# Patient Record
Sex: MALE | Race: WHITE | NOT HISPANIC OR LATINO | Employment: UNEMPLOYED | URBAN - METROPOLITAN AREA
[De-identification: names, ages, dates, MRNs, and addresses within clinical notes are randomized per-mention and may not be internally consistent; named-entity substitution may affect disease eponyms.]

---

## 2018-07-29 ENCOUNTER — HOSPITAL ENCOUNTER (INPATIENT)
Facility: CLINIC | Age: 48
LOS: 5 days | Discharge: HOME | End: 2018-08-03
Attending: FAMILY MEDICINE | Admitting: PSYCHIATRY & NEUROLOGY
Payer: COMMERCIAL

## 2018-07-29 DIAGNOSIS — F12.20 CANNABIS USE DISORDER, MODERATE, DEPENDENCE (H): ICD-10-CM

## 2018-07-29 DIAGNOSIS — R45.851 SUICIDAL IDEATION: Primary | ICD-10-CM

## 2018-07-29 DIAGNOSIS — F11.20 OPIOID USE DISORDER, SEVERE, ON MAINTENANCE THERAPY (H): ICD-10-CM

## 2018-07-29 DIAGNOSIS — F13.20 SEVERE BENZODIAZEPINE USE DISORDER (H): ICD-10-CM

## 2018-07-29 DIAGNOSIS — F13.930 BENZODIAZEPINE WITHDRAWAL WITHOUT COMPLICATION (H): ICD-10-CM

## 2018-07-29 DIAGNOSIS — F17.213 CIGARETTE NICOTINE DEPENDENCE WITH WITHDRAWAL: ICD-10-CM

## 2018-07-29 DIAGNOSIS — F19.980 SUBSTANCE-INDUCED ANXIETY DISORDER (H): ICD-10-CM

## 2018-07-29 DIAGNOSIS — F33.1 MODERATE EPISODE OF RECURRENT MAJOR DEPRESSIVE DISORDER (H): ICD-10-CM

## 2018-07-29 LAB
AMPHETAMINES UR QL SCN: NEGATIVE
BARBITURATES UR QL: NEGATIVE
BENZODIAZ UR QL: POSITIVE
CANNABINOIDS UR QL SCN: POSITIVE
COCAINE UR QL: NEGATIVE
ETHANOL UR QL SCN: NEGATIVE
OPIATES UR QL SCN: NEGATIVE

## 2018-07-29 PROCEDURE — 25000132 ZZH RX MED GY IP 250 OP 250 PS 637: Performed by: PSYCHIATRY & NEUROLOGY

## 2018-07-29 PROCEDURE — 25000132 ZZH RX MED GY IP 250 OP 250 PS 637: Performed by: FAMILY MEDICINE

## 2018-07-29 PROCEDURE — 90791 PSYCH DIAGNOSTIC EVALUATION: CPT

## 2018-07-29 PROCEDURE — 99285 EMERGENCY DEPT VISIT HI MDM: CPT | Mod: Z6 | Performed by: FAMILY MEDICINE

## 2018-07-29 PROCEDURE — HZ2ZZZZ DETOXIFICATION SERVICES FOR SUBSTANCE ABUSE TREATMENT: ICD-10-PCS | Performed by: PSYCHIATRY & NEUROLOGY

## 2018-07-29 PROCEDURE — 12400007 ZZH R&B MH INTERMEDIATE UMMC

## 2018-07-29 PROCEDURE — 80307 DRUG TEST PRSMV CHEM ANLYZR: CPT | Performed by: FAMILY MEDICINE

## 2018-07-29 PROCEDURE — 80320 DRUG SCREEN QUANTALCOHOLS: CPT | Performed by: FAMILY MEDICINE

## 2018-07-29 PROCEDURE — 99285 EMERGENCY DEPT VISIT HI MDM: CPT | Mod: 25 | Performed by: FAMILY MEDICINE

## 2018-07-29 RX ORDER — IBUPROFEN 600 MG/1
600 TABLET, FILM COATED ORAL ONCE
Status: COMPLETED | OUTPATIENT
Start: 2018-07-29 | End: 2018-07-29

## 2018-07-29 RX ORDER — PHENOBARBITAL 32.4 MG/1
32.4 TABLET ORAL ONCE
Status: COMPLETED | OUTPATIENT
Start: 2018-07-29 | End: 2018-07-29

## 2018-07-29 RX ORDER — HYDROXYZINE HYDROCHLORIDE 25 MG/1
25 TABLET, FILM COATED ORAL EVERY 4 HOURS PRN
Status: DISCONTINUED | OUTPATIENT
Start: 2018-07-29 | End: 2018-08-03 | Stop reason: HOSPADM

## 2018-07-29 RX ORDER — MIRTAZAPINE 15 MG/1
30 TABLET, FILM COATED ORAL AT BEDTIME
Status: DISCONTINUED | OUTPATIENT
Start: 2018-07-29 | End: 2018-08-03 | Stop reason: HOSPADM

## 2018-07-29 RX ORDER — PHENOBARBITAL 32.4 MG/1
32.4 TABLET ORAL 2 TIMES DAILY
Status: DISCONTINUED | OUTPATIENT
Start: 2018-07-29 | End: 2018-07-30

## 2018-07-29 RX ORDER — MIRTAZAPINE 30 MG/1
30 TABLET, FILM COATED ORAL AT BEDTIME
Status: ON HOLD | COMMUNITY
Start: 2018-06-12 | End: 2018-08-01

## 2018-07-29 RX ORDER — IBUPROFEN 400 MG/1
400 TABLET, FILM COATED ORAL EVERY 6 HOURS PRN
Status: ON HOLD | COMMUNITY
Start: 2018-06-12 | End: 2018-08-01

## 2018-07-29 RX ORDER — TRAZODONE HYDROCHLORIDE 50 MG/1
50 TABLET, FILM COATED ORAL
Status: DISCONTINUED | OUTPATIENT
Start: 2018-07-29 | End: 2018-08-03 | Stop reason: HOSPADM

## 2018-07-29 RX ORDER — VENLAFAXINE HYDROCHLORIDE 150 MG/1
300 CAPSULE, EXTENDED RELEASE ORAL DAILY
Status: ON HOLD | COMMUNITY
Start: 2018-06-13 | End: 2018-08-01

## 2018-07-29 RX ORDER — HYDROXYZINE HYDROCHLORIDE 50 MG/1
50 TABLET, FILM COATED ORAL EVERY 8 HOURS PRN
Status: ON HOLD | COMMUNITY
Start: 2018-06-12 | End: 2018-08-01

## 2018-07-29 RX ORDER — GABAPENTIN 400 MG/1
400 CAPSULE ORAL 2 TIMES DAILY
Status: DISCONTINUED | OUTPATIENT
Start: 2018-07-29 | End: 2018-08-03 | Stop reason: HOSPADM

## 2018-07-29 RX ORDER — VENLAFAXINE HYDROCHLORIDE 150 MG/1
300 TABLET, EXTENDED RELEASE ORAL DAILY
Status: DISCONTINUED | OUTPATIENT
Start: 2018-07-29 | End: 2018-08-03 | Stop reason: HOSPADM

## 2018-07-29 RX ORDER — OLANZAPINE 10 MG/1
10 TABLET ORAL
Status: DISCONTINUED | OUTPATIENT
Start: 2018-07-29 | End: 2018-08-03 | Stop reason: HOSPADM

## 2018-07-29 RX ORDER — IBUPROFEN 200 MG
400 TABLET ORAL EVERY 6 HOURS PRN
Status: DISCONTINUED | OUTPATIENT
Start: 2018-07-29 | End: 2018-08-03 | Stop reason: HOSPADM

## 2018-07-29 RX ORDER — ALUMINA, MAGNESIA, AND SIMETHICONE 2400; 2400; 240 MG/30ML; MG/30ML; MG/30ML
30 SUSPENSION ORAL EVERY 4 HOURS PRN
Status: DISCONTINUED | OUTPATIENT
Start: 2018-07-29 | End: 2018-08-03 | Stop reason: HOSPADM

## 2018-07-29 RX ORDER — HYDROXYZINE HYDROCHLORIDE 50 MG/1
50 TABLET, FILM COATED ORAL EVERY 8 HOURS PRN
Status: DISCONTINUED | OUTPATIENT
Start: 2018-07-29 | End: 2018-07-29

## 2018-07-29 RX ORDER — GABAPENTIN 800 MG/1
800 TABLET ORAL AT BEDTIME
Status: DISCONTINUED | OUTPATIENT
Start: 2018-07-29 | End: 2018-08-03 | Stop reason: HOSPADM

## 2018-07-29 RX ORDER — OLANZAPINE 10 MG/2ML
10 INJECTION, POWDER, FOR SOLUTION INTRAMUSCULAR
Status: DISCONTINUED | OUTPATIENT
Start: 2018-07-29 | End: 2018-08-03 | Stop reason: HOSPADM

## 2018-07-29 RX ORDER — POLYETHYLENE GLYCOL 3350 17 G
2-4 POWDER IN PACKET (EA) ORAL
Status: DISCONTINUED | OUTPATIENT
Start: 2018-07-29 | End: 2018-08-03 | Stop reason: HOSPADM

## 2018-07-29 RX ORDER — BISACODYL 10 MG
10 SUPPOSITORY, RECTAL RECTAL DAILY PRN
Status: DISCONTINUED | OUTPATIENT
Start: 2018-07-29 | End: 2018-08-03 | Stop reason: HOSPADM

## 2018-07-29 RX ORDER — NALOXONE HYDROCHLORIDE 0.4 MG/ML
.1-.4 INJECTION, SOLUTION INTRAMUSCULAR; INTRAVENOUS; SUBCUTANEOUS
Status: DISCONTINUED | OUTPATIENT
Start: 2018-07-29 | End: 2018-08-03 | Stop reason: HOSPADM

## 2018-07-29 RX ORDER — SENNOSIDES 8.6 MG
8.6 TABLET ORAL DAILY PRN
Status: ON HOLD | COMMUNITY
Start: 2018-06-12 | End: 2018-08-01

## 2018-07-29 RX ORDER — METHADONE HYDROCHLORIDE 5 MG/5ML
110 SOLUTION ORAL DAILY
Status: DISCONTINUED | OUTPATIENT
Start: 2018-07-29 | End: 2018-07-30

## 2018-07-29 RX ORDER — GABAPENTIN 400 MG/1
CAPSULE ORAL
Status: ON HOLD | COMMUNITY
Start: 2018-06-12 | End: 2018-08-01

## 2018-07-29 RX ADMIN — IBUPROFEN 600 MG: 600 TABLET, FILM COATED ORAL at 15:51

## 2018-07-29 RX ADMIN — PHENOBARBITAL 32.4 MG: 32.4 TABLET ORAL at 14:17

## 2018-07-29 RX ADMIN — PHENOBARBITAL 32.4 MG: 32.4 TABLET ORAL at 20:20

## 2018-07-29 RX ADMIN — GABAPENTIN 400 MG: 400 CAPSULE ORAL at 20:20

## 2018-07-29 RX ADMIN — GABAPENTIN 800 MG: 800 TABLET, FILM COATED ORAL at 23:36

## 2018-07-29 RX ADMIN — MIRTAZAPINE 30 MG: 15 TABLET, FILM COATED ORAL at 23:36

## 2018-07-29 RX ADMIN — VENLAFAXINE HYDROCHLORIDE 300 MG: 150 TABLET, EXTENDED RELEASE ORAL at 20:21

## 2018-07-29 RX ADMIN — METHADONE HYDROCHLORIDE 110 MG: 5 SOLUTION ORAL at 20:19

## 2018-07-29 ASSESSMENT — ENCOUNTER SYMPTOMS
NAUSEA: 0
DECREASED CONCENTRATION: 1
APPETITE CHANGE: 1
DYSPHORIC MOOD: 1
CHEST TIGHTNESS: 0
SHORTNESS OF BREATH: 0
CONFUSION: 0
PALPITATIONS: 0
ACTIVITY CHANGE: 1
ARTHRALGIAS: 0
HEADACHES: 0
DIFFICULTY URINATING: 0
COUGH: 0
ABDOMINAL PAIN: 0
TROUBLE SWALLOWING: 0
WEAKNESS: 0
VOMITING: 0
BACK PAIN: 0
BRUISES/BLEEDS EASILY: 0
FATIGUE: 1
FEVER: 0
NERVOUS/ANXIOUS: 1
COLOR CHANGE: 0
HALLUCINATIONS: 0
EYE REDNESS: 0
CHILLS: 1
NECK STIFFNESS: 0

## 2018-07-29 ASSESSMENT — ACTIVITIES OF DAILY LIVING (ADL)
DRESS: INDEPENDENT
LAUNDRY: WITH SUPERVISION
GROOMING: INDEPENDENT
ORAL_HYGIENE: INDEPENDENT

## 2018-07-29 NOTE — IP AVS SNAPSHOT
[address]    [address]    [place] [address]    Phone:  [phone]                                       After Visit Summary   7/29/2018    Zenon Truong    MRN: [id number]           After Visit Summary Signature Page     I have received my discharge instructions, and my questions have been answered. I have discussed any challenges I see with this plan with the nurse or doctor.    ..........................................................................................................................................  Patient/Patient Representative Signature      ..........................................................................................................................................  Patient Representative Print Name and Relationship to Patient    ..................................................               ................................................  Date                                            Time    ..........................................................................................................................................  Reviewed by Signature/Title    ...................................................              ..............................................  Date                                                            Time

## 2018-07-29 NOTE — PLAN OF CARE
"Admission:     Admitted voluntarily to [facility] for increasing ideation. Pt reports one prior attempt \"about a year\" via cutting wrist. [facility] assessment states 3 prior attempts. Records states numerous ER visits recently, [facility] yesterday. Hx substance abuse. Currently takes methadone maintenance daily at [facility]. States he's been abusing Xanax, 3-5 mg daily for the past 7 months. Reported hx of seizures with benzodiazapine withdrawal. Denies alcohol use. Reports daily marijuana use. Reports he is homeless. (See chart review, [facility] assessment for further details and hx)    Presents with blunted affect. Is calm and cooperative completing admission. Oriented to room and unit. Encouraged to voice needs, questions, and concerns. Pt verbalizes understanding.   "

## 2018-07-29 NOTE — ED NOTES
ED to Behavioral Floor Handoff    SITUATION  Zenon Truong is a 48 year old male who speaks English and is homeless  The patient arrived in the ED by ambulance from the streets with a complaint of being Suicidal  .The patient's current symptoms started/worsened 1 month(s) ago and during this time the symptoms have increased. Pt has been seen at multiple other ED's but is more disp[ondant now.  In the ED, pt was diagnosed with   Final diagnoses:   Suicidal ideation   Anxiety   Benzodiazepine abuse        Initial vitals were: BP: 141/83  Pulse: 61  Temp: 98.3  F (36.8  C)  Resp: 16  SpO2: 98 %   --------  Is the patient diabetic? No   If yes, last blood glucose? --     If yes, was this treated in the ED? --  --------  Is the patient inebriated (ETOH) No or Impaired on other substances? No  MSSA done? N/A  Last MSSA score: --    Were withdrawal symptoms treated? N/A  Does the patient have a seizure history? No. If yes, date of most recent seizure--  --------  Is the patient patient experiencing suicidal ideation? reports the following suicide factors: suicidal thoughts with plan to jump off a bridge or step in front of the light rail train.  has had 3 previous attempts in the past    Homicidal ideation? denies current or recent homicidal ideation or behaviors.    Self-injurious behavior/urges? denies current or recent self injurious behavior or ideation.  ------  Was pt aggressive in the ED No  Was a code called No  Is the pt now cooperative? Yes  -------  Meds given in ED:   Medications   PHENobarbital (LUMINAL) tablet 32.4 mg (32.4 mg Oral Given 7/29/18 1417)   ibuprofen (ADVIL/MOTRIN) tablet 600 mg (600 mg Oral Given 7/29/18 1551)      Family present during ED course? No  Family currently present? No    BACKGROUND  Does the patient have a cognitive impairment or developmental disability? No  Allergies:   Allergies   Allergen Reactions     Iodine-131 Hives   .   Social demographics are   Social History     Social  History     Marital status: Single     Spouse name: N/A     Number of children: N/A     Years of education: N/A     Social History Main Topics     Smoking status: Current Every Day Smoker     Smokeless tobacco: Never Used      Comment: Switched to e cigarettes     Alcohol use No     Drug use: Yes     Special: Benzodiazepines     Sexual activity: Not Asked     Other Topics Concern     None     Social History Narrative        ASSESSMENT  Labs results   Labs Ordered and Resulted from Time of ED Arrival Up to the Time of Departure from the ED   DRUG ABUSE SCREEN 6 CHEM DEP URINE ([facility]) - Abnormal; Notable for the following:        Result Value    Benzodiazepine Qual Urine Positive (*)     Cannabinoids Qual Urine Positive (*)     All other components within normal limits      Imaging Studies: No results found for this or any previous visit (from the past 24 hour(s)).   Most recent vital signs /83  Pulse 61  Temp 98.3  F (36.8  C) (Oral)  Resp 16  SpO2 98%   Abnormal labs/tests/findings requiring intervention:---   Pain control: pt had headache and given ibuprofen, pt missed methadone dose today.  Nausea control: pt had none    RECOMMENDATION  Are any infection precautions needed (MRSA, VRE, etc.)? No If yes, what infection? --  ---  Does the patient have mobility issues? independently. If yes, what device does the pt use? ---  ---  Is patient on 72 hour hold or commitment? No If on 72 hour hold, have hold and rights been given to patient? N/A  Are admitting orders written if after 10 p.m. ?N/A  Tasks needing to be completed:---     Urszula Campbell   [facility]-- [address]   [phone]   [phone] [facility] ED

## 2018-07-29 NOTE — PROGRESS NOTES
07/29/18 1718   Patient Belongings   Did you bring any home meds/supplements to the hospital?  No   Patient Belongings clothing;cell phone/electronics;plastic bag;shoes;wallet   Disposition of Belongings Locker;Sent to security per site process   Belongings Search Yes   Clothing Search Yes   Second Staff Brendan PATHAK   In locker 6:  Clothing, toiletries, green bag, leather bag, shoes, lighter, , phone, wallet, ID, misc. Cards   Security:  [facility] ([id number])  A               Admission:  I am responsible for any personal items that are not sent to the safe or pharmacy.  [facility] is not responsible for loss, theft or damage of any property in my possession.    Signature:  _________________________________ Date: _______  Time: _____                                              Staff Signature:  ____________________________ Date: ________  Time: _____      2nd Staff person, if patient is unable/unwilling to sign:    Signature: ________________________________ Date: ________  Time: _____     Discharge:  [facility] has returned all of my personal belongings:    Signature: _________________________________ Date: ________  Time: _____                                          Staff Signature:  ____________________________ Date: ________  Time: _____

## 2018-07-29 NOTE — ED NOTES
I have performed an in person assessment of the patient. Based on this assessment the patient no longer requires a one on one attendant at this point in time.    Jim London MD  11:28 AM  July 29, 2018             Jim London MD  07/29/18 1128

## 2018-07-29 NOTE — ED PROVIDER NOTES
History     Chief Complaint   Patient presents with     Suicidal     HPI  Zenon Truong is a 48 year old male with a history of depression, borderline personality disorder, polysubstance abuse, testicular cancer who presents to the Emergency Department via EMS for the evaluation of SI. Patient states he has suicidal ideation with a plan to jump of a bridge. Patient states he feels hopeless and suicidal and is seeking treatment for SI. Patient received treatment at [facility] in June and since has been declining and living homeless and in shelters. Patient's only consistent care is through his commitment  which will only last until August 2. Patient reports he has had no medication for the past 9 days as he states they do not help. He is currently going through benzodiazepine withdrawal (Xanax) with last use 2-3 days ago. Patient states he has a history of heroin and marijuana use and is still using marijuana. Patient has had 10 hospitalizations in 2017.  Patient now feeling unsafe denies any physical points at this point.  Denies any current seizures.    I have reviewed the Medications, Allergies, Past Medical and Surgical History, and Social History in the [facility] system.  Past Medical History:   Diagnosis Date     Hep C w/o coma, chronic (H) 2011     Substance abuse      Testicular cancer (H)        Past Surgical History:   Procedure Laterality Date     GENITOURINARY SURGERY  1996    Testicular CA        No family history on file.    Social History   Substance Use Topics     Smoking status: Current Every Day Smoker     Smokeless tobacco: Never Used      Comment: Switched to e cigarettes     Alcohol use No       No current facility-administered medications for this encounter.      Current Outpatient Prescriptions   Medication     gabapentin (NEURONTIN) 400 MG capsule     hydrOXYzine (ATARAX) 50 MG tablet     ibuprofen (ADVIL/MOTRIN) 400 MG tablet     melatonin 5 MG tablet     METHADONE HCL PO      mirtazapine (REMERON) 30 MG tablet     nicotine (NICOTROL) 10 MG Inhaler     venlafaxine (EFFEXOR-XR) 150 MG 24 hr capsule     sennosides (SENOKOT) 8.6 MG tablet        Allergies   Allergen Reactions     Iodine-131 Hives       Review of Systems   Constitutional: Positive for activity change, appetite change, chills and fatigue. Negative for fever.   HENT: Negative for congestion and trouble swallowing.    Eyes: Negative for redness and visual disturbance.   Respiratory: Negative for cough, chest tightness and shortness of breath.    Cardiovascular: Negative for chest pain, palpitations and leg swelling.   Gastrointestinal: Negative for abdominal pain, nausea and vomiting.   Genitourinary: Negative for difficulty urinating.   Musculoskeletal: Negative for arthralgias, back pain, gait problem and neck stiffness.   Skin: Negative for color change.   Allergic/Immunologic: Negative for immunocompromised state.   Neurological: Negative for weakness and headaches.   Hematological: Does not bruise/bleed easily.   Psychiatric/Behavioral: Positive for decreased concentration, dysphoric mood and suicidal ideas. Negative for confusion and hallucinations. The patient is nervous/anxious.    All other systems reviewed and are negative.      Physical Exam   BP: 141/83  Pulse: 61  Temp: 98.3  F (36.8  C)  Resp: 16  SpO2: 98 %      Physical Exam   Constitutional: He is oriented to person, place, and time. He appears well-developed and well-nourished. He appears distressed.   Patient mildly published in the ER though alert and oriented is not hallucinating.  Does admit to being suicidal still currently.   plan to jump off bridge.   HENT:   Head: Normocephalic and atraumatic.   Eyes: EOM are normal. Pupils are equal, round, and reactive to light. No scleral icterus.   Neck: Normal range of motion. Neck supple.   Cardiovascular: Regular rhythm.    Pulmonary/Chest: No stridor. No respiratory distress.   Abdominal: He exhibits no  distension. There is no tenderness.   Musculoskeletal: He exhibits no edema, tenderness or deformity.   Neurological: He is alert and oriented to person, place, and time. He has normal reflexes. No cranial nerve deficit. Coordination normal.   Tremor but nonfocal. More from anxiety.   Skin: Skin is warm and dry. No rash noted. He is not diaphoretic. No erythema. No pallor.   Psychiatric: He expresses suicidal ideation. He expresses suicidal plans.   Patient with SI and anxiousness. No acute delusional or HI.   Nursing note and vitals reviewed.      ED Course     ED Course       Patient evaluated ER.  Was seen by our assessors also.  At this point patient does have some benzodiazepine withdrawal.  Patient will be admitted to mental health once bed is available patient is voluntary at this point.    Patient did receive phenobarbital 32.4 mg orally ×1 to help with some of his benzodiazepine withdrawal and prevent any possible seizure activity patient understand and agrees with plan.      Procedures             Critical Care time:  none             Labs Ordered and Resulted from Time of ED Arrival Up to the Time of Departure from the ED   DRUG ABUSE SCREEN 6 CHEM DEP URINE ([facility]) - Abnormal; Notable for the following:        Result Value    Benzodiazepine Qual Urine Positive (*)     Cannabinoids Qual Urine Positive (*)     All other components within normal limits     Results for orders placed or performed during the hospital encounter of 07/29/18   Drug abuse screen 6 urine (tox)   Result Value Ref Range    Amphetamine Qual Urine Negative NEG^Negative    Barbiturates Qual Urine Negative NEG^Negative    Benzodiazepine Qual Urine Positive (A) NEG^Negative    Cannabinoids Qual Urine Positive (A) NEG^Negative    Cocaine Qual Urine Negative NEG^Negative    Ethanol Qual Urine Negative NEG^Negative    Opiates Qualitative Urine Negative NEG^Negative              Assessments & Plan (with Medical Decision Making)  48-year-old  male presents with suicidal ideation.  Patient plan to jump off bridge.  Patient also is been abusing benzodiazepine off the street.  Last use 02 days ago of Xanax.  Patient somewhat tremulous denies any seizures this point is not hallucinating no reports of alcohol abuse.  Patient evaluated ER is still suicidal will be admitted to mental health patient given 32.4 mg phenobarbital orally ×1 to help with some of the shakiness and benzodiazepine type withdrawal symptoms.  Patient is voluntary agrees with plan awaiting for bed.         I have reviewed the nursing notes.    I have reviewed the findings, diagnosis, plan and need for follow up with the patient.    New Prescriptions    No medications on file       Final diagnoses:   Suicidal ideation   Anxiety   Benzodiazepine abuse     I, Luis Alfredo Jensen, am serving as a trained medical scribe to document services personally performed by Jim London MD, based on the provider's statements to me.   I, Jim London MD, was physically present and have reviewed and verified the accuracy of this note documented by Luis Alfredo Jensen.    7/29/2018   [facility], [place], EMERGENCY DEPARTMENT      This note was created at least in part by the use of dragon voice dictation system. Inadvertent typographical errors may still exist.  Jim London MD.         Jim London MD  07/29/18 8916       Jim London MD  07/29/18 0362

## 2018-07-29 NOTE — IP AVS SNAPSHOT
"                  MRN:[id number]                      After Visit Summary   2018    Zenon Truong    MRN: [id number]           Thank you!     Thank you for choosing [facility] for your care. Our goal is always to provide you with excellent care.        Patient Information     Date Of Birth          1970        Designated Caregiver       Most Recent Value    Caregiver    Will someone help with your care after discharge? no [\"I'm on the street\"]      About your hospital stay     You were admitted on:  2018 You last received care in the:  [facility] 32NR    You were discharged on:  August 3, 2018       Who to Call     For medical emergencies, please call 911.  For non-urgent questions about your medical care, please call your primary care provider or clinic, None          Attending Provider     Provider Specialty    Jim London MD Emergency Medicine    \[facility]\""Rex rocha MD Psychiatry    Avery Bolton MD Psychiatry       Primary Care Provider Fax #    Physician No Ref-Primary [phone]      Additional Services     Medication Therapy Management Referral       MTM referral reason            antidepressants: 3 or more prescribed     This service is designed to help you get the most from your medications.  A specially trained pharmacist will work closely with you and your doctors  to solve any problems related to your medications and to help you get the   best results from taking them.      The Medication Therapy Management staff will call you to schedule an appointment.                  Further instructions from your care team        Behavioral Discharge Planning and Instructions      Summary:  You were admitted on 2018  due to Suicidal Ideations.  You were treated by Dr. Martha Cabezas MD and discharged on 8/3/2018 from [facility] [place]    You are on commitment through [facility]. The commitment will  2018. You remain on the same provisional discharge as " your PD was not revoked.     Principal Diagnosis:   Moderate episode of recurrent major depressive disorder (H)  Cigarette nicotine dependence with withdrawal  Cannabis use disorder, moderate, dependence (H)  Substance-induced anxiety disorder (H)  Severe benzodiazepine use disorder (H)  Opioid use disorder, severe, on maintenance therapy (H)    Health Care Follow-up Appointments:   You are going to a sober board and lodge:   SHANNON hawkins, [address] [facility]    : Natasha Gallegos  Phone: [phone]  Fax: [phone]    Attend all scheduled appointments with your outpatient providers. Call at least 24 hours in advance if you need to reschedule an appointment to ensure continued access to your outpatient providers.   Major Treatments, Procedures and Findings:  You were provided with: a psychiatric assessment, assessed for medical stability, medication evaluation and/or management, group therapy and milieu management    Symptoms to Report: feeling more aggressive, increased confusion, losing more sleep, mood getting worse or thoughts of suicide    Early warning signs can include: increased depression or anxiety sleep disturbances increased thoughts or behaviors of suicide or self-harm  increased unusual thinking, such as paranoia or hearing voices    Safety and Wellness:  Take all medicines as directed.  Make no changes unless your doctor suggests them.      Follow treatment recommendations.  Refrain from alcohol and non-prescribed drugs.  If there is a concern for safety, call 911.    Resources:   Crisis Intervention: 691.524.2109 or 954-794-5985 (TTY: 587.239.8588).  Call anytime for help.  [place] on Mental Illness (www.mn.ant.org): 741.499.5460 or 137-820-6926.  Alcoholics Anonymous (www.alcoholics-anonymous.org): Check your phone book for your local chapter.  Suicide Awareness Voices of Education (SAVE) (www.save.org): 644-106-ENWZ (7606)  National Suicide Prevention Line  "(www.mentalhealthmn.org): 769-476-RHWP (8255)  Mental Health Consumer/Survivor Network of [place] (www.mhcsn.net): 876-588-0809 or 723-521-2609  Mental Health Association of [place] (www.mentalhealth.org): 751.243.7196 or 809-535-3808  Self- Management and Recovery Training., SMART-- Toll free: 975.311.1682  [employer]-One.SentiOne  Text 4 Life: txt \"LIFE\" to 75246 for immediate support and crisis intervention  Crisis text line: Text \"[place]\" to 548063. Free, confidential, .    The treatment team has appreciated the opportunity to work with you.     If you have any questions or concerns our unit number is [phone]  You may be receiving a follow-up phone call within the next three days from a representative from behavioral health.            Pending Results     No orders found from 2018 to 2018.            Admission Information     Date & Time Provider Department Dept. Phone    2018 Avery Bolton MD  32NR [phone]      Your Vitals Were     Blood Pressure Pulse Temperature Respirations Height Weight    115/75 66 97.7  F (36.5  C) (Oral) 16 1.803 m (5' 11\") 102.5 kg (226 lb)    Pulse Oximetry BMI (Body Mass Index)                98% 31.52 kg/m2          MyChart Information     [employer] lets you send messages to your doctor, view your test results, renew your prescriptions, schedule appointments and more. To sign up, go to [email or web address] . Click on \"Log in\" on the left side of the screen, which will take you to the Welcome page. Then click on \"Sign up Now\" on the right side of the page.     You will be asked to enter the access code listed below, as well as some personal information. Please follow the directions to create your username and password.     Your access code is: [email or web address]  Expires: 2018  7:03 AM     Your access code will  in 90 days. If you need help or a new code, please call your [place] clinic or [phone].        Care EveryWhere ID     This is your Care " EveryWhere ID. This could be used by other organizations to access your [facility] medical records  [id number]        Equal Access to Services     JOANNA NEGRO : Fran Christianson, fam campbell, jamal soto, anderson blas. So [facility] [phone].    ATENCIÓN: Si habla español, tiene a bella disposición servicios gratuitos de asistencia lingüística. Llame al [phone].    We comply with applicable federal civil rights laws and [place] laws. We do not discriminate on the basis of race, color, national origin, age, disability, sex, sexual orientation, or gender identity.               Review of your medicines      CONTINUE these medicines which have NOT CHANGED        Dose / Directions    gabapentin 400 MG capsule   Commonly known as:  NEURONTIN   Used for:  Opioid use disorder, severe, on maintenance therapy (H)        Take 1 capsule (400 mg) by mouth twice daily and 2 capsules (800 mg) at bedtime   Quantity:  120 capsule   Refills:  1       hydrOXYzine 50 MG tablet   Commonly known as:  ATARAX        Dose:  50 mg   Take 1 tablet (50 mg) by mouth every 8 hours as needed   Quantity:  90 tablet   Refills:  1       ibuprofen 400 MG tablet   Commonly known as:  ADVIL/MOTRIN   Used for:  Opioid use disorder, severe, on maintenance therapy (H)        Dose:  400 mg   Take 1 tablet (400 mg) by mouth every 6 hours as needed   Quantity:  120 tablet   Refills:  1       melatonin 5 MG tablet        Dose:  5 mg   Take 1 tablet (5 mg) by mouth nightly as needed   Quantity:  30 tablet   Refills:  1       METHADONE HCL PO        Dose:  110 mg   Take 110 mg by mouth daily   Refills:  0       mirtazapine 30 MG tablet   Commonly known as:  REMERON        Dose:  30 mg   Take 1 tablet (30 mg) by mouth At Bedtime   Quantity:  30 tablet   Refills:  1       nicotine 10 MG Inhaler   Commonly known as:  NICOTROL        Dose:  10 mg   Inhale 10 mg into the lungs daily as needed   Refills:   0       venlafaxine 150 MG 24 hr capsule   Commonly known as:  EFFEXOR-XR        Dose:  300 mg   Take 2 capsules (300 mg) by mouth daily   Quantity:  30 capsule   Refills:  1         STOP taking     sennosides 8.6 MG tablet   Commonly known as:  SENOKOT                Where to get your medicines      These medications were sent to [facility] [place], [place] - [address]  [address], [facility] [address]     Phone:  [phone]     gabapentin 400 MG capsule    hydrOXYzine 50 MG tablet    ibuprofen 400 MG tablet    melatonin 5 MG tablet    mirtazapine 30 MG tablet    venlafaxine 150 MG 24 hr capsule                Protect others around you: Learn how to safely use, store and throw away your medicines at www.disposemymeds.org.        Information about OPIOIDS     PRESCRIPTION OPIOIDS: WHAT YOU NEED TO KNOW   We gave you an opioid (narcotic) pain medicine. It is important to manage your pain, but opioids are not always the best choice. You should first try all the other options your care team gave you. Take this medicine for as short a time (and as few doses) as possible.     These medicines have risks:    DO NOT drive when on new or higher doses of pain medicine. These medicines can affect your alertness and reaction times, and you could be arrested for driving under the influence (DUI). If you need to use opioids long-term, talk to your care team about driving.    DO NOT operate heave machinery    DO NOT do any other dangerous activities while taking these medicines.     DO NOT drink any alcohol while taking these medicines.      If the opioid prescribed includes acetaminophen, DO NOT take with any other medicines that contain acetaminophen. Read all labels carefully. Look for the word  acetaminophen  or  Tylenol.  Ask your pharmacist if you have questions or are unsure.    You can get addicted to pain medicines, especially if you have a history of addiction (chemical, alcohol or  substance dependence). Talk to your care team about ways to reduce this risk.    Store your pills in a secure place, locked if possible. We will not replace any lost or stolen medicine. If you don t finish your medicine, please throw away (dispose) as directed by your pharmacist. The [place] Pollution Control Agency has more information about safe disposal: https://www.pca.[facility].mn.us/living-green/managing-unwanted-medications.     All opioids tend to cause constipation. Drink plenty of water and eat foods that have a lot of fiber, such as fruits, vegetables, prune juice, apple juice and high-fiber cereal. Take a laxative (Miralax, milk of magnesia, Colace, Senna) if you don t move your bowels at least every other day.              Medication List: This is a list of all your medications and when to take them. Check marks below indicate your daily home schedule. Keep this list as a reference.      Medications           Morning Afternoon Evening Bedtime As Needed    gabapentin 400 MG capsule   Commonly known as:  NEURONTIN   Take 1 capsule (400 mg) by mouth twice daily and 2 capsules (800 mg) at bedtime   Last time this was given:  400 mg on 8/3/2018  7:41 AM                                hydrOXYzine 50 MG tablet   Commonly known as:  ATARAX   Take 1 tablet (50 mg) by mouth every 8 hours as needed   Last time this was given:  25 mg on 8/2/2018  6:24 PM                                ibuprofen 400 MG tablet   Commonly known as:  ADVIL/MOTRIN   Take 1 tablet (400 mg) by mouth every 6 hours as needed   Last time this was given:  600 mg on 7/29/2018  3:51 PM                                melatonin 5 MG tablet   Take 1 tablet (5 mg) by mouth nightly as needed                                METHADONE HCL PO   Take 110 mg by mouth daily   Last time this was given:  110 mg on 8/3/2018  7:41 AM                                mirtazapine 30 MG tablet   Commonly known as:  REMERON   Take 1 tablet (30 mg) by mouth At Bedtime    Last time this was given:  30 mg on 8/2/2018  8:20 PM                                nicotine 10 MG Inhaler   Commonly known as:  NICOTROL   Inhale 10 mg into the lungs daily as needed                                venlafaxine 150 MG 24 hr capsule   Commonly known as:  EFFEXOR-XR   Take 2 capsules (300 mg) by mouth daily

## 2018-07-29 NOTE — PHARMACY-ADMISSION MEDICATION HISTORY
Admission Medication History status for the 7/29/2018 admission is complete.  See EPIC admission navigator for Prior to Admission medications.    Medication history sources:  Patient, Care Everywhere     Medication history source reliability: Good    Medication adherence:  Moderate    Changes made to PTA medication list (reason)  Added: None  Deleted: None  Changed: gabapentin 400 mg TID >>>> gabapentin 400 mg twice daily and 800 mg at bedtime    Additional medication history information (including reliability of information, actions taken by pharmacist):     -Patient's medication allergies and home medications were reviewed.    -Medication history sources were reliable. Patient was able to identify medications, doses and directions. Care Everywhere was also used to confirm all medications.     -Gabapentin regimen was confirmed with patient and Care Everywhere. Patient said he has not taken this medication for a little over a week.    -Patient receives methadone from [facility] in [place], Patient states he takes 110 mg by mouth daily for heroin use treatment. [place] was contacted via phone and verified that this patient picked up 110 mg of methadone on 7/28/18 (1 dose).   The clinic location and phone number are:  [address] [place] [address] [id number] [phone]    Time spent in this activity: 20 minutes    Medication history completed by: Suad Short PD2 Pharmacy Intern    Prior to Admission medications    Medication Sig Last Dose Taking? Auth Provider   gabapentin (NEURONTIN) 400 MG capsule Take 1 capsule (400 mg) by mouth twice daily and 2 capsules (800 mg) at bedtime Past Month at Unknown time Yes Reported, Patient   hydrOXYzine (ATARAX) 50 MG tablet Take 50 mg by mouth every 8 hours as needed  Past Month at Unknown time Yes Reported, Patient   ibuprofen (ADVIL/MOTRIN) 400 MG tablet Take 400 mg by mouth every 6 hours as needed  Past Month at Unknown time Yes Reported, Patient    melatonin 5 MG tablet Take 5 mg by mouth nightly as needed  Past Week at Unknown time Yes Reported, Patient   METHADONE HCL PO Take 110 mg by mouth daily 7/28/2018 at Unknown time Yes Reported, Patient   mirtazapine (REMERON) 30 MG tablet Take 30 mg by mouth At Bedtime  7/25/2018 at Bedtime Yes Reported, Patient   nicotine (NICOTROL) 10 MG Inhaler Inhale 10 mg into the lungs daily as needed  Past Month at Unknown time Yes Reported, Patient   venlafaxine (EFFEXOR-XR) 150 MG 24 hr capsule Take 300 mg by mouth daily  7/25/2018 at Unknown time Yes Reported, Patient   sennosides (SENOKOT) 8.6 MG tablet Take 8.6 mg by mouth daily as needed  More than a month at Unknown time  Reported, Patient

## 2018-07-29 NOTE — PHARMACY
Methadone Clinic Information Note    Clinic Name: [facility]  Clinic Location (city): [place]  Phone Number: [phone]  Methadone Dose: 110mg daily    This information was copied from the medication history note. ED Pharmacist Janna Legesse confirmed the dose.     Lacey Cuellar, ArD, BCPS

## 2018-07-30 LAB
ALBUMIN SERPL-MCNC: 3.6 G/DL (ref 3.4–5)
ALP SERPL-CCNC: 91 U/L (ref 40–150)
ALT SERPL W P-5'-P-CCNC: 199 U/L (ref 0–70)
ANION GAP SERPL CALCULATED.3IONS-SCNC: 6 MMOL/L (ref 3–14)
AST SERPL W P-5'-P-CCNC: 123 U/L (ref 0–45)
BASOPHILS # BLD AUTO: 0 10E9/L (ref 0–0.2)
BASOPHILS NFR BLD AUTO: 0.5 %
BILIRUB SERPL-MCNC: 0.4 MG/DL (ref 0.2–1.3)
BUN SERPL-MCNC: 8 MG/DL (ref 7–30)
CALCIUM SERPL-MCNC: 8.6 MG/DL (ref 8.5–10.1)
CHLORIDE SERPL-SCNC: 104 MMOL/L (ref 94–109)
CHOLEST SERPL-MCNC: 99 MG/DL
CO2 SERPL-SCNC: 28 MMOL/L (ref 20–32)
CREAT SERPL-MCNC: 0.76 MG/DL (ref 0.66–1.25)
DIFFERENTIAL METHOD BLD: ABNORMAL
EOSINOPHIL # BLD AUTO: 0.2 10E9/L (ref 0–0.7)
EOSINOPHIL NFR BLD AUTO: 5.1 %
ERYTHROCYTE [DISTWIDTH] IN BLOOD BY AUTOMATED COUNT: 12.5 % (ref 10–15)
GFR SERPL CREATININE-BSD FRML MDRD: >90 ML/MIN/1.7M2
GLUCOSE SERPL-MCNC: 89 MG/DL (ref 70–99)
HCT VFR BLD AUTO: 42 % (ref 40–53)
HDLC SERPL-MCNC: 44 MG/DL
HGB BLD-MCNC: 14 G/DL (ref 13.3–17.7)
IMM GRANULOCYTES # BLD: 0 10E9/L (ref 0–0.4)
IMM GRANULOCYTES NFR BLD: 0.7 %
LDLC SERPL CALC-MCNC: 49 MG/DL
LYMPHOCYTES # BLD AUTO: 2 10E9/L (ref 0.8–5.3)
LYMPHOCYTES NFR BLD AUTO: 47.3 %
MCH RBC QN AUTO: 28.9 PG (ref 26.5–33)
MCHC RBC AUTO-ENTMCNC: 33.3 G/DL (ref 31.5–36.5)
MCV RBC AUTO: 87 FL (ref 78–100)
MONOCYTES # BLD AUTO: 0.5 10E9/L (ref 0–1.3)
MONOCYTES NFR BLD AUTO: 11 %
NEUTROPHILS # BLD AUTO: 1.5 10E9/L (ref 1.6–8.3)
NEUTROPHILS NFR BLD AUTO: 35.4 %
NONHDLC SERPL-MCNC: 55 MG/DL
NRBC # BLD AUTO: 0 10*3/UL
NRBC BLD AUTO-RTO: 0 /100
PLATELET # BLD AUTO: 124 10E9/L (ref 150–450)
POTASSIUM SERPL-SCNC: 3.8 MMOL/L (ref 3.4–5.3)
PROT SERPL-MCNC: 7.5 G/DL (ref 6.8–8.8)
RBC # BLD AUTO: 4.85 10E12/L (ref 4.4–5.9)
SODIUM SERPL-SCNC: 138 MMOL/L (ref 133–144)
TRIGL SERPL-MCNC: 30 MG/DL
TSH SERPL DL<=0.005 MIU/L-ACNC: 0.96 MU/L (ref 0.4–4)
WBC # BLD AUTO: 4.3 10E9/L (ref 4–11)

## 2018-07-30 PROCEDURE — 36415 COLL VENOUS BLD VENIPUNCTURE: CPT | Performed by: PSYCHIATRY & NEUROLOGY

## 2018-07-30 PROCEDURE — 99223 1ST HOSP IP/OBS HIGH 75: CPT | Mod: AI | Performed by: PSYCHIATRY & NEUROLOGY

## 2018-07-30 PROCEDURE — 80061 LIPID PANEL: CPT | Performed by: PSYCHIATRY & NEUROLOGY

## 2018-07-30 PROCEDURE — 80053 COMPREHEN METABOLIC PANEL: CPT | Performed by: PSYCHIATRY & NEUROLOGY

## 2018-07-30 PROCEDURE — 12400007 ZZH R&B MH INTERMEDIATE UMMC

## 2018-07-30 PROCEDURE — 85025 COMPLETE CBC W/AUTO DIFF WBC: CPT | Performed by: PSYCHIATRY & NEUROLOGY

## 2018-07-30 PROCEDURE — 25000132 ZZH RX MED GY IP 250 OP 250 PS 637: Performed by: PSYCHIATRY & NEUROLOGY

## 2018-07-30 PROCEDURE — 84443 ASSAY THYROID STIM HORMONE: CPT | Performed by: PSYCHIATRY & NEUROLOGY

## 2018-07-30 RX ORDER — PHENOBARBITAL 32.4 MG/1
64.8 TABLET ORAL 2 TIMES DAILY
Status: DISCONTINUED | OUTPATIENT
Start: 2018-07-31 | End: 2018-07-31

## 2018-07-30 RX ORDER — METHADONE HYDROCHLORIDE 5 MG/5ML
110 SOLUTION ORAL EVERY MORNING
Status: DISCONTINUED | OUTPATIENT
Start: 2018-07-31 | End: 2018-08-03 | Stop reason: HOSPADM

## 2018-07-30 RX ADMIN — NICOTINE POLACRILEX 4 MG: 2 LOZENGE ORAL at 13:16

## 2018-07-30 RX ADMIN — HYDROXYZINE HYDROCHLORIDE 25 MG: 25 TABLET ORAL at 18:00

## 2018-07-30 RX ADMIN — NICOTINE POLACRILEX 4 MG: 2 LOZENGE ORAL at 15:50

## 2018-07-30 RX ADMIN — PHENOBARBITAL 32.4 MG: 32.4 TABLET ORAL at 18:00

## 2018-07-30 RX ADMIN — GABAPENTIN 400 MG: 400 CAPSULE ORAL at 16:28

## 2018-07-30 RX ADMIN — GABAPENTIN 800 MG: 800 TABLET, FILM COATED ORAL at 21:20

## 2018-07-30 RX ADMIN — METHADONE HYDROCHLORIDE 110 MG: 5 SOLUTION ORAL at 19:13

## 2018-07-30 RX ADMIN — VENLAFAXINE HYDROCHLORIDE 300 MG: 150 TABLET, EXTENDED RELEASE ORAL at 08:14

## 2018-07-30 RX ADMIN — NICOTINE POLACRILEX 4 MG: 2 LOZENGE ORAL at 08:36

## 2018-07-30 RX ADMIN — GABAPENTIN 400 MG: 400 CAPSULE ORAL at 08:14

## 2018-07-30 RX ADMIN — MIRTAZAPINE 30 MG: 15 TABLET, FILM COATED ORAL at 21:20

## 2018-07-30 RX ADMIN — PHENOBARBITAL 32.4 MG: 32.4 TABLET ORAL at 08:14

## 2018-07-30 ASSESSMENT — ACTIVITIES OF DAILY LIVING (ADL)
DRESS: INDEPENDENT
GROOMING: HANDWASHING;INDEPENDENT
DRESS: SCRUBS (BEHAVIORAL HEALTH);INDEPENDENT
ORAL_HYGIENE: INDEPENDENT
ORAL_HYGIENE: INDEPENDENT
GROOMING: INDEPENDENT

## 2018-07-30 NOTE — PROGRESS NOTES
Patient's MSSA score was 6, with 4 points given for tremors and 1 for barely perceptible sweating.  He stated he was feeling very uncomfortable, and that day nurse and physician had told him he could have tonight's dose of methadone (due at 8pm) at 1800, and then subsequently daily doses would be given 2 hours earlier every day until he was back to AM administration time.  I informed him that schedule change was not passed on in report, only that he had opted to stay with evening administration time vs. refusing today's dose and starting AM dosing again tomorrow; and that there was no MD note in the chart from today that would confirm the discussion.  I gave him PRN Vistaril 25 mg, as well as 8pm dose of phenobarbital early, and informed him I could give him methadone at 1900 and would pass on his concerns to be addressed tomorrow.  Patient indicated he was satisfied with this for tonight.

## 2018-07-30 NOTE — PLAN OF CARE
Problem: Patient Care Overview  Goal: Team Discussion  Team Plan:   BEHAVIORAL TEAM DISCUSSION    Participants: Provider:  Rex Dewitt MD  CTC: Selma Leslie MS,   Nurse:  Keli Shetty RN     Progress: Pt newly admitted, suicidal ideation, is on commitment but not being revoked.  Continued Stay Criteria/Rationale: pt has reported suicidal ideation, has been using methamphetamine, struggles to comply with outpatient treatment  Medical/Physical: see chart  Precautions:   Behavioral Orders   Procedures     Code 1 - Restrict to Unit     Routine Programming     As clinically indicated     Seizure precautions     Status 15     Every 15 minutes.     Suicide precautions     Patients on Suicide Precautions should have a Combination Diet ordered that includes a Diet selection(s) AND a Behavioral Tray selection for Safe Tray - with utensils, or Safe Tray - NO utensils       Withdrawal precautions     Plan: The patient will continue to meet w/ the treatment team for assessment and treatment planning, CTC will continue to work w/ for discharge planning.    Rationale for change in precautions or plan: Pt to remain inpatient for stabilization and observation

## 2018-07-30 NOTE — PROGRESS NOTES
07/30/18 1203   Behavioral Health   Hallucinations denies / not responding to hallucinations   Thinking poor concentration   Orientation place: oriented;person: oriented   Memory baseline memory   Insight poor   Judgement impaired   Eye Contact at examiner   Affect full range affect   Mood mood is calm;anxious   Physical Appearance/Attire attire appropriate to age and situation   Hygiene other (see comment)  (fair)   Suicidality other (see comments)  (pt denies)   1. Wish to be Dead No   2. Non-Specific Active Suicidal Thoughts  No   Self Injury other (see comment)  (none stated. none observed.)   Elopement (none observed.)   Activity (present in the milieu.)   Speech clear;coherent   Medication Sensitivity no stated side effects;no observed side effects   Psychomotor / Gait balanced;steady   Psycho Education   Type of Intervention 1:1 intervention   Response participates with encouragement   Hours 0.5   Treatment Detail 1:1 check in   Activities of Daily Living   Hygiene/Grooming independent   Oral Hygiene independent   Dress independent   Room Organization independent   Pt denies SI/SIB. His goal for the day was to figure out housing after discharge which he states he established with his . He rates his depression as a 5/10 and anxiety as a 9/10. During the day shift, pt was calm, social and present in the milieu. Day shift was otherwise unremarkable.

## 2018-07-30 NOTE — PROGRESS NOTES
[facility] called pt's , Natasha Gallegos, [phone]. She states that pt is on commitment which expires . They will not revoke or extend the commitment; they are letting it .    Pt has extensive substance use history, minimal success in treatment. Was in CARE programs, board and lodge programs. Pt has been more communicative the past few months, has been telling her when he goes from one place to another. She has applications in for board and lodge programs in the [place]. Plan is to use their volunteer  to get him to a board and lodge.     Fax: [phone]

## 2018-07-30 NOTE — PROGRESS NOTES
Pt. admitted from the ER due to suicidal ideation.  Pt. states that he has been drinking a liter of hard liquor per day for a month.  Pt. Reports that he lost his job due to his drinking.  Pt. and his girlfriend also broke up.  Pt. feels helpless, hopeless and had a plan to commit suicide by carbon monoxide.  Pt. Is a voluntary pt.

## 2018-07-31 PROBLEM — F33.9 RECURRENT MAJOR DEPRESSIVE DISORDER (H): Status: ACTIVE | Noted: 2018-07-31

## 2018-07-31 PROBLEM — F19.980 SUBSTANCE-INDUCED ANXIETY DISORDER (H): Status: ACTIVE | Noted: 2018-07-31

## 2018-07-31 PROBLEM — F17.213 CIGARETTE NICOTINE DEPENDENCE WITH WITHDRAWAL: Status: ACTIVE | Noted: 2018-07-31

## 2018-07-31 PROBLEM — F12.20 CANNABIS USE DISORDER, MODERATE, DEPENDENCE (H): Status: ACTIVE | Noted: 2018-07-31

## 2018-07-31 PROBLEM — F13.930 BENZODIAZEPINE WITHDRAWAL WITHOUT COMPLICATION (H): Status: ACTIVE | Noted: 2018-07-31

## 2018-07-31 PROBLEM — F13.20 SEVERE BENZODIAZEPINE USE DISORDER (H): Status: ACTIVE | Noted: 2018-07-31

## 2018-07-31 PROBLEM — Z59.00 HOMELESSNESS: Status: ACTIVE | Noted: 2018-07-31

## 2018-07-31 PROBLEM — F41.9 ANXIETY: Status: ACTIVE | Noted: 2018-07-31

## 2018-07-31 PROBLEM — F33.1 MODERATE EPISODE OF RECURRENT MAJOR DEPRESSIVE DISORDER (H): Status: ACTIVE | Noted: 2018-07-31

## 2018-07-31 PROBLEM — F13.10 BENZODIAZEPINE ABUSE (H): Status: ACTIVE | Noted: 2018-07-31

## 2018-07-31 PROCEDURE — 25000132 ZZH RX MED GY IP 250 OP 250 PS 637: Performed by: PSYCHIATRY & NEUROLOGY

## 2018-07-31 PROCEDURE — 12400007 ZZH R&B MH INTERMEDIATE UMMC

## 2018-07-31 PROCEDURE — 99232 SBSQ HOSP IP/OBS MODERATE 35: CPT | Performed by: PSYCHIATRY & NEUROLOGY

## 2018-07-31 RX ORDER — PHENOBARBITAL 32.4 MG/1
32.4 TABLET ORAL ONCE
Status: COMPLETED | OUTPATIENT
Start: 2018-07-31 | End: 2018-07-31

## 2018-07-31 RX ORDER — PHENOBARBITAL 32.4 MG/1
32.4 TABLET ORAL 2 TIMES DAILY
Status: COMPLETED | OUTPATIENT
Start: 2018-07-31 | End: 2018-08-01

## 2018-07-31 RX ADMIN — PHENOBARBITAL 32.4 MG: 32.4 TABLET ORAL at 13:30

## 2018-07-31 RX ADMIN — NICOTINE POLACRILEX 4 MG: 2 LOZENGE ORAL at 16:05

## 2018-07-31 RX ADMIN — NICOTINE POLACRILEX 4 MG: 2 LOZENGE ORAL at 12:38

## 2018-07-31 RX ADMIN — NICOTINE POLACRILEX 4 MG: 2 LOZENGE ORAL at 18:41

## 2018-07-31 RX ADMIN — NICOTINE POLACRILEX 4 MG: 2 LOZENGE ORAL at 21:15

## 2018-07-31 RX ADMIN — PHENOBARBITAL 64.8 MG: 32.4 TABLET ORAL at 09:00

## 2018-07-31 RX ADMIN — NICOTINE POLACRILEX 4 MG: 2 LOZENGE ORAL at 05:35

## 2018-07-31 RX ADMIN — VENLAFAXINE HYDROCHLORIDE 300 MG: 150 TABLET, EXTENDED RELEASE ORAL at 09:00

## 2018-07-31 RX ADMIN — GABAPENTIN 800 MG: 800 TABLET, FILM COATED ORAL at 21:15

## 2018-07-31 RX ADMIN — NICOTINE POLACRILEX 4 MG: 2 LOZENGE ORAL at 08:06

## 2018-07-31 RX ADMIN — HYDROXYZINE HYDROCHLORIDE 25 MG: 25 TABLET ORAL at 19:09

## 2018-07-31 RX ADMIN — GABAPENTIN 400 MG: 400 CAPSULE ORAL at 13:30

## 2018-07-31 RX ADMIN — GABAPENTIN 400 MG: 400 CAPSULE ORAL at 09:00

## 2018-07-31 RX ADMIN — METHADONE HYDROCHLORIDE 110 MG: 5 SOLUTION ORAL at 13:30

## 2018-07-31 RX ADMIN — MIRTAZAPINE 30 MG: 15 TABLET, FILM COATED ORAL at 21:15

## 2018-07-31 RX ADMIN — PHENOBARBITAL 32.4 MG: 32.4 TABLET ORAL at 19:09

## 2018-07-31 ASSESSMENT — ACTIVITIES OF DAILY LIVING (ADL)
DRESS: SCRUBS (BEHAVIORAL HEALTH)
ORAL_HYGIENE: INDEPENDENT
GROOMING: INDEPENDENT
LAUNDRY: WITH SUPERVISION

## 2018-07-31 NOTE — PROGRESS NOTES
[facility], [place]   Psychiatric Progress Note  Hospital Day: 2        Interim History:   The patient's care was discussed with the treatment team during the daily team meeting and/or staff's chart notes were reviewed.  Staff report patient has been doing well. No acute issues.    Upon interview, the patient indicates that he probably needs to leave by Friday. Due to someone being on vacation next week, he indicates that he cannot get into his housing next week, so he wants to go by the end of this week. He denies suicidal thoughts. He denies medications problems other than some concern with night dosing of methadone. He isn't keen on skipping a dose to get back to morning dosing.    Psychiatric Symptoms: denies    Medication side effects reported: none    Other issues reported by patient: none         Medications:       gabapentin  400 mg Oral BID     gabapentin  800 mg Oral At Bedtime     methadone  110 mg Oral QAM     mirtazapine  30 mg Oral At Bedtime     PHENobarbital  64.8 mg Oral BID     venlafaxine  300 mg Oral Daily          Allergies:     Allergies   Allergen Reactions     Iodine-131 Hives          Labs:     Recent Results (from the past 48 hour(s))   Drug abuse screen 6 urine (tox)    Collection Time: 07/29/18  1:45 PM   Result Value Ref Range    Amphetamine Qual Urine Negative NEG^Negative    Barbiturates Qual Urine Negative NEG^Negative    Benzodiazepine Qual Urine Positive (A) NEG^Negative    Cannabinoids Qual Urine Positive (A) NEG^Negative    Cocaine Qual Urine Negative NEG^Negative    Ethanol Qual Urine Negative NEG^Negative    Opiates Qualitative Urine Negative NEG^Negative   CBC with platelets differential    Collection Time: 07/30/18  8:06 AM   Result Value Ref Range    WBC 4.3 4.0 - 11.0 10e9/L    RBC Count 4.85 4.4 - 5.9 10e12/L    Hemoglobin 14.0 13.3 - 17.7 g/dL    Hematocrit 42.0 40.0 - 53.0 %    MCV 87 78 - 100 fl    MCH 28.9 26.5 - 33.0 pg    MCHC 33.3 31.5 -  "36.5 g/dL    RDW 12.5 10.0 - 15.0 %    Platelet Count 124 (L) 150 - 450 10e9/L    Diff Method Automated Method     % Neutrophils 35.4 %    % Lymphocytes 47.3 %    % Monocytes 11.0 %    % Eosinophils 5.1 %    % Basophils 0.5 %    % Immature Granulocytes 0.7 %    Nucleated RBCs 0 0 /100    Absolute Neutrophil 1.5 (L) 1.6 - 8.3 10e9/L    Absolute Lymphocytes 2.0 0.8 - 5.3 10e9/L    Absolute Monocytes 0.5 0.0 - 1.3 10e9/L    Absolute Eosinophils 0.2 0.0 - 0.7 10e9/L    Absolute Basophils 0.0 0.0 - 0.2 10e9/L    Abs Immature Granulocytes 0.0 0 - 0.4 10e9/L    Absolute Nucleated RBC 0.0    TSH with free T4 reflex and/or T3 as indicated    Collection Time: 07/30/18  8:06 AM   Result Value Ref Range    TSH 0.96 0.40 - 4.00 mU/L   Comprehensive metabolic panel    Collection Time: 07/30/18  8:06 AM   Result Value Ref Range    Sodium 138 133 - 144 mmol/L    Potassium 3.8 3.4 - 5.3 mmol/L    Chloride 104 94 - 109 mmol/L    Carbon Dioxide 28 20 - 32 mmol/L    Anion Gap 6 3 - 14 mmol/L    Glucose 89 70 - 99 mg/dL    Urea Nitrogen 8 7 - 30 mg/dL    Creatinine 0.76 0.66 - 1.25 mg/dL    GFR Estimate >90 >60 mL/min/1.7m2    GFR Estimate If Black >90 >60 mL/min/1.7m2    Calcium 8.6 8.5 - 10.1 mg/dL    Bilirubin Total 0.4 0.2 - 1.3 mg/dL    Albumin 3.6 3.4 - 5.0 g/dL    Protein Total 7.5 6.8 - 8.8 g/dL    Alkaline Phosphatase 91 40 - 150 U/L     (H) 0 - 70 U/L     (H) 0 - 45 U/L   Lipid panel    Collection Time: 07/30/18  8:06 AM   Result Value Ref Range    Cholesterol 99 <200 mg/dL    Triglycerides 30 <150 mg/dL    HDL Cholesterol 44 >39 mg/dL    LDL Cholesterol Calculated 49 <100 mg/dL    Non HDL Cholesterol 55 <130 mg/dL          Psychiatric Examination:     /65  Pulse 70  Temp 98  F (36.7  C)  Resp 16  Ht 1.803 m (5' 11\")  Wt 103.3 kg (227 lb 11.2 oz)  SpO2 98%  BMI 31.76 kg/m2  Weight is 227 lbs 11.2 oz  Body mass index is 31.76 kg/(m^2).    Orthostatic Vitals       Most Recent      Sitting Orthostatic " /82 07/31 0732    Sitting Orthostatic Pulse (bpm) 65 07/31 0732    Standing Orthostatic /81 07/31 0732    Standing Orthostatic Pulse (bpm) 62 07/31 0732            Appearance: awake, alert, adequately groomed and dressed in hospital scrubs  Attitude:  cooperative  Eye Contact:  good  Mood:  good  Affect:  appropriate and in normal range and mood congruent  Speech:  clear, coherent and normal prosody  Language: fluent and intact in English  Psychomotor, Gait, Musculoskeletal:  no evidence of tardive dyskinesia, dystonia, or tics and intact station, gait and muscle tone  Throught Process:  logical, linear and goal oriented  Associations:  no loose associations  Thought Content:  no evidence of suicidal ideation or homicidal ideation and no evidence of psychotic thought  Insight:  fair  Judgement:  intact  Oriented to:  time, person, and place  Attention Span and Concentration:  intact  Recent and Remote Memory:  intact  Fund of Knowledge:  appropriate    Clinical Global Impressions  First:  Considering your total clinical experience with this particular patient population, how severe are the patient's symptoms at this time?: 7 (07/31/18 1234)  Compared to the patient's condition at the START of treatment, this patient's condition is:: 4 (07/31/18 1234)  Most recent:  Considering your total clinical experience with this particular patient population, how severe are the patient's symptoms at this time?: 7 (07/31/18 1234)  Compared to the patient's condition at the START of treatment, this patient's condition is:: 4 (07/31/18 1234)    # Pain Assessment:  Current Pain Score 7/31/2018   Patient currently in pain? dylan   Zenon s pain level was assessed and he currently denies pain.             Precautions:     Behavioral Orders   Procedures     Code 1 - Restrict to Unit     Routine Programming     As clinically indicated     Seizure precautions     Status 15     Every 15 minutes.     Suicide precautions      Patients on Suicide Precautions should have a Combination Diet ordered that includes a Diet selection(s) AND a Behavioral Tray selection for Safe Tray - with utensils, or Safe Tray - NO utensils       Withdrawal precautions          Diagnoses:      Suicidal ideation  Benzodiazepine withdrawal without complication (H)  Moderate episode of recurrent major depressive disorder (H)  Cigarette nicotine dependence with withdrawal  Cannabis use disorder, moderate, dependence (H)  Substance-induced anxiety disorder (H)  Severe benzodiazepine use disorder (H)  Opioid use disorder, severe, on maintenance therapy (H)         Assessment & Plan:       Target psychiatric symptoms and interventions:  Benzodiazapine withdrawal - continue phenobarbital taper.  Opioid dependence. Will change methadone to morning dose starting tomorrow. Will give 10 mg this evening to get through the night.  Nicotine dep - continue with nicotine replacement therapy  Depression and anxiety - continue with current medications    Medical Problems and Treatments:  None acute    Behavioral/Psychological/Social:  Encourage unit programming      Disposition Plan   Reason for ongoing admission: requires detoxification from substance that poses a risk of bodily harm during withdrawal period  Discharge location: board and lodge in [place]  Discharge Medications: not ordered  Follow-up Appointments: not scheduled  Legal Status: voluntary  Entered by: Avery Bolton on 7/31/2018 at 12:34 PM

## 2018-07-31 NOTE — H&P
"Admitted:     2018      The patient was seen for 70 minutes on 2018.  Greater than 50% of time was spent on counseling and coordinating care, clarifying diagnostic prognostic issues.      CHIEF COMPLAINT/REASON FOR ADMISSION:  The patient is a 48-year-old  male with history of depression, also borderline personality disorder, polysubstance abuse, and also history of testicular cancer who presented to the Emergency Room for evaluation of suicidal ideation.  The patient reported having plan to jump off a bridge.  Says that he went through chemical dependency treatment program in [facility].  He was committed to this program and still on commitment that would  in 1 week.  Reported that since his discharge he has been homeless, living in a shelter.  His only consistent care is through commitment  which will only last until .  Patient reported that he has not had any medication for the past 9 days and they did not believe that they were working.  He was self-medicating with Xanax and was using up to 6 mg per day.  Reported also using heroin, marijuana and still using marijuana.  He reported having thoughts about jumping off a bridge, poor sleep, poor appetite, feeling hopeless, helpless.      PAST PSYCHIATRIC HISTORY:  The patient had multiple psychiatric hospitalizations, a history of depression, polysubstance dependence and also a number of chemical dependency treatments, was discharged from [place] again in 2018.  Was incarcerated in [place] for 21 months for selling drugs.  He is currently receiving methadone from [facility], says that he has been off his Effexor, Remeron, gabapentin for 9 days initially because \"they do not help me.\"  During today's interview, he told me that he in fact felt that medications were helping him so much that he decided that he did not need them.  He has a  through [facility] called Natasha" El, phone number [phone].  He does not have any outpatient providers at this point in time.  Last hospitalization was in 2018 at [facility] [place] with diagnosis of major depression, borderline personality disorder and polysubstance dependence.      PAST MEDICAL HISTORY:  History of testicular cancer, hepatitis C, chronic.      PAST SURGICAL HISTORY:  History of genitourinary surgery for testicular cancer in .        ALLERGIES:  ALLERGIC TO ? .      HOME MEDICATIONS:   1. Gabapentin 400 mg twice a day and 800 mg at night.   2. Hydroxyzine 50 mg every 8 hours if needed for anxiety.     3. Ibuprofen 400 mg every 6 hours as needed for pain.   4. Melatonin 5 mg nightly as needed for sleep.     5. Methadone 110 mg daily.   6. Remeron 30 mg at bedtime.   7. Senokot 8.6 mg daily as needed.    8. Effexor  mg daily.      FAMILY AND SOCIAL HISTORY:  The patient was raised in [place].  Both of his parents are .  He has 4 sisters with whom he has not had any contact for 25 years.  He has 26-year-old son.  Has GED education.  Is not working.  Family history of MICD includes his father was an alcoholic and mother had bipolar disorder and had many suicide attempts and hospitalizations.        PHYSICAL EXAMINATION/REVIEW OF SYSTEMS:  For physical examination and 12-point review of systems please refer to Dr. Jim London's note from 2018.  I reviewed this note and agree with it.      VITAL SIGNS:  Temperature 97.9, respirations 16, blood pressure 132/85, heart rate 69.      MENTAL STATUS:  The patient is a  male dressed in hospital garbs, unshaven.  Pleasant, cooperative on approach.  Speech is spontaneous, normal rate, normal volume.  He seems to be open in talking about his inability to stay clean and sober, poor judgments about relapsing on drugs.  Stated that he wants to go into a board and lodge in the [facility].  Reports passive suicidal thoughts.  Said that he  feels safe being in the hospital.  Denies homicidal thoughts.  Describes his mood as depressed.  Affect constricted, congruent with mood.  There is no evidence of psychosis, hypomania or shreyas.  Thought processes are linear, goal directed.  He is alert, oriented x 3.  Fund of knowledge is average with proper usage of vocabulary.  Ability to focus, concentrate, immediate short and long-term memories are all intact.  Insight into his situation are fair.  Judgment fair.  There are no abnormal involuntary movements noted during this interview and posture and gait were within normal limits.      IMPRESSION:    1.  Major depressive disorder, recurrent, moderate severity.   2.  Polysubstance dependence.      TREATMENT PLAN:  The patient will be continued to treat with phenobarbital for benzodiazepine withdrawal and will continue his methadone dose per his request.  Methadone will be given to him in the morning.  Dose of phenobarbital will be increased to 64.8 mg 2 times a day.  The rest of the patient's medication will be restarted unchanged.  He already indicated that he had made some preparations to go to board and lodge in [facility].  I expect that he would need to continue to stay at this hospital to detoxify from his prolonged benzodiazepine use and stabilize his depressive symptoms for at least 3-4 days.         ZACARIAS LATIF MD             D: 2018   T: 2018   MT: ROMULO      Name:     EDELMIRA LEBLANC   MRN:      [id number]        Account:      [id number]   :      1970        Admitted:     2018                   Document: [id number]

## 2018-07-31 NOTE — PROGRESS NOTES
VM from pt's , Natasha. Has found housing for him in [place]. [phone]    [facility] called back. Left message

## 2018-07-31 NOTE — PROGRESS NOTES
VM from pt's , Natasha, [phone]. [facility] called back. Left message.    VM from Natasha. States that pt will go to [facility], [address] [facility].    [facility] called back. Natasha states that they don't have a ; wondered about using a bus

## 2018-07-31 NOTE — PROGRESS NOTES
INITIAL PSYCHOSOCIAL ASSESSMENT AND NOTE  I have reviewed the chart met with the patient, and developed Care Plan.  Information for assessment was obtained from: Pt and chart  PRESENTING PROBLEM: Pt was admitted to station 32 on a voluntary basis secondary to reporting SI with plan to jump off a bridge. Pt had apparently called the [facility] from a [employer] [facility] on [address] and told staff he was suicidal. They convinced him to ask someone to call the police, stayed on the phone with him until the police arrived and police brought him to hospital.   Pt is on commitment with [facility]; his PD is not revoked and they are not going to extend the commitment. His commitment expires 2018.   The following areas have been assessed:  History of Mental Health and Chemical Dependency: Pt reports ongoing issues with depression and substance abuse. Hx of suicide attempts X 3.   PT was in [facility] from 3/2018 to 2018. [place] went [place] [facility]. Hx of IV heroin, benzodiazepine and pot use. Hx of 15+ CD treatments. Last use Xanax and pot was 2 days earlier. Using 5-8 grams Xanax per day. Hx of withdrawal seizure in . Pt is on methadone.   Living Situation: homeless  Significant Life Events (Illness, Abuse, Trauma, Death): unknown at this time  Family Description (Constellation, Family Psychiatric History): Not , has a 26 year old son, no contact. Raised in , both parents are . 4 sisters; no contact for 25 years.   Dad alcoholic, mom had bipolar disorder resulting in many hospitalizations and suicide attempts. Strong family hx of mental health and chemical dependency issues.  Financial Status: no income; has [employer] MA  Occupational History: unemployed  Educational Background: obtained his GED     Service History: none  Legal Issues: hx incarceration at [place] for 21 months secondary to selling drugs.   Ethnic/Cultural Issues: none  Spiritual Orientation: none  Social Functioning  (organizations, interests): Much of pt's energy goes towards using and obtaining drugs. Pt has long hx of non-compliance with treatment    Current Treatment providers:   Methadone form [facility]   through [facility]: Natasha Gallegos, [phone]  Fax: [phone]  Social Service Assessment/Plan:   Pt to stabilize on medication. Pt will be seen and assessed by provider and staff. Groups will be offered to assist pt with increasing knowledge, strategies and coping techniques to help manage mental health. CTC will meet with pt to provide individualized mental health resources and support. CTC will assist with developing a care plan and after hospital appointments. Pt is on commitment; his  is seeking housing for him.

## 2018-08-01 PROCEDURE — 12400007 ZZH R&B MH INTERMEDIATE UMMC

## 2018-08-01 PROCEDURE — 97127 ZZHC OT THERAPEUTIC INTERVENTION W/FOCUS ON COGNITIVE FUNCTION,EA 15 MIN: CPT | Mod: GO

## 2018-08-01 PROCEDURE — 25000132 ZZH RX MED GY IP 250 OP 250 PS 637: Performed by: PSYCHIATRY & NEUROLOGY

## 2018-08-01 PROCEDURE — 99232 SBSQ HOSP IP/OBS MODERATE 35: CPT | Performed by: PSYCHIATRY & NEUROLOGY

## 2018-08-01 RX ORDER — HYDROXYZINE HYDROCHLORIDE 50 MG/1
50 TABLET, FILM COATED ORAL EVERY 8 HOURS PRN
Qty: 90 TABLET | Refills: 1 | Status: SHIPPED | OUTPATIENT
Start: 2018-08-01 | End: 2024-08-06

## 2018-08-01 RX ORDER — VENLAFAXINE HYDROCHLORIDE 150 MG/1
300 CAPSULE, EXTENDED RELEASE ORAL DAILY
Qty: 30 CAPSULE | Refills: 1 | Status: SHIPPED | OUTPATIENT
Start: 2018-08-01 | End: 2024-08-06

## 2018-08-01 RX ORDER — MIRTAZAPINE 30 MG/1
30 TABLET, FILM COATED ORAL AT BEDTIME
Qty: 30 TABLET | Refills: 1 | Status: ON HOLD | OUTPATIENT
Start: 2018-08-01 | End: 2024-08-09

## 2018-08-01 RX ORDER — PHENOBARBITAL 16.2 MG/1
16.2 TABLET ORAL 2 TIMES DAILY
Status: COMPLETED | OUTPATIENT
Start: 2018-08-02 | End: 2018-08-03

## 2018-08-01 RX ORDER — IBUPROFEN 400 MG/1
400 TABLET, FILM COATED ORAL EVERY 6 HOURS PRN
Qty: 120 TABLET | Refills: 1 | Status: SHIPPED | OUTPATIENT
Start: 2018-08-01 | End: 2024-08-06

## 2018-08-01 RX ORDER — GABAPENTIN 400 MG/1
CAPSULE ORAL
Qty: 120 CAPSULE | Refills: 1 | Status: SHIPPED | OUTPATIENT
Start: 2018-08-01 | End: 2024-08-06

## 2018-08-01 RX ADMIN — VENLAFAXINE HYDROCHLORIDE 300 MG: 150 TABLET, EXTENDED RELEASE ORAL at 08:48

## 2018-08-01 RX ADMIN — METHADONE HYDROCHLORIDE 110 MG: 5 SOLUTION ORAL at 08:48

## 2018-08-01 RX ADMIN — NICOTINE POLACRILEX 4 MG: 2 LOZENGE ORAL at 14:23

## 2018-08-01 RX ADMIN — HYDROXYZINE HYDROCHLORIDE 25 MG: 25 TABLET ORAL at 20:10

## 2018-08-01 RX ADMIN — NICOTINE POLACRILEX 4 MG: 2 LOZENGE ORAL at 12:00

## 2018-08-01 RX ADMIN — NICOTINE POLACRILEX 4 MG: 2 LOZENGE ORAL at 08:30

## 2018-08-01 RX ADMIN — NICOTINE POLACRILEX 2 MG: 2 LOZENGE ORAL at 20:13

## 2018-08-01 RX ADMIN — MIRTAZAPINE 30 MG: 15 TABLET, FILM COATED ORAL at 20:11

## 2018-08-01 RX ADMIN — PHENOBARBITAL 32.4 MG: 32.4 TABLET ORAL at 08:48

## 2018-08-01 RX ADMIN — NICOTINE POLACRILEX 4 MG: 2 LOZENGE ORAL at 06:50

## 2018-08-01 RX ADMIN — GABAPENTIN 800 MG: 800 TABLET, FILM COATED ORAL at 20:11

## 2018-08-01 RX ADMIN — PHENOBARBITAL 32.4 MG: 32.4 TABLET ORAL at 20:11

## 2018-08-01 RX ADMIN — GABAPENTIN 400 MG: 400 CAPSULE ORAL at 14:23

## 2018-08-01 RX ADMIN — GABAPENTIN 400 MG: 400 CAPSULE ORAL at 08:48

## 2018-08-01 ASSESSMENT — ACTIVITIES OF DAILY LIVING (ADL)
LAUNDRY: WITH SUPERVISION
GROOMING: INDEPENDENT
ORAL_HYGIENE: INDEPENDENT
GROOMING: INDEPENDENT
ORAL_HYGIENE: INDEPENDENT
LAUNDRY: WITH SUPERVISION
DRESS: INDEPENDENT
DRESS: STREET CLOTHES;INDEPENDENT

## 2018-08-01 NOTE — PROGRESS NOTES
"[facility], [place]   Psychiatric Progress Note  Hospital Day: 3        Interim History:   The patient's care was discussed with the treatment team during the daily team meeting and/or staff's chart notes were reviewed.  Staff report patient has been doing well. No acute issues.    Upon interview, the patient denies any problems with withdrawal symptoms. He will be leaving on Friday. He indicates intent on working with current methadone clinic to get care transferred near the board and lodge, or at least establishing appointments as he is aware we cannot send him out with methadone. Long term, he'd like to get switched to Suboxone.    Psychiatric Symptoms: denies    Medication side effects reported: none    Other issues reported by patient: none         Medications:       gabapentin  400 mg Oral BID     gabapentin  800 mg Oral At Bedtime     methadone  110 mg Oral QAM     mirtazapine  30 mg Oral At Bedtime     [START ON 8/2/2018] PHENobarbital  16.2 mg Oral BID     PHENobarbital  32.4 mg Oral BID     venlafaxine  300 mg Oral Daily          Allergies:     Allergies   Allergen Reactions     Iodine-131 Hives          Labs:     No results found for this or any previous visit (from the past 48 hour(s)).       Psychiatric Examination:     /86  Pulse 57  Temp 98.4  F (36.9  C)  Resp 16  Ht 1.803 m (5' 11\")  Wt 103.3 kg (227 lb 11.2 oz)  SpO2 98%  BMI 31.76 kg/m2  Weight is 227 lbs 11.2 oz  Body mass index is 31.76 kg/(m^2).    Orthostatic Vitals       Most Recent      Sitting Orthostatic /85 08/01 0735    Sitting Orthostatic Pulse (bpm) 65 08/01 0735    Standing Orthostatic /77 08/01 0735    Standing Orthostatic Pulse (bpm) 77 08/01 0735            Appearance: awake, alert, adequately groomed and dressed in hospital scrubs  Attitude:  cooperative  Eye Contact:  good  Mood:  good  Affect:  appropriate and in normal range and mood congruent  Speech:  clear, coherent and " normal prosody  Language: fluent and intact in English  Psychomotor, Gait, Musculoskeletal:  no evidence of tardive dyskinesia, dystonia, or tics and intact station, gait and muscle tone  Throught Process:  logical, linear and goal oriented  Associations:  no loose associations  Thought Content:  no evidence of suicidal ideation or homicidal ideation and no evidence of psychotic thought  Insight:  fair  Judgement:  intact  Oriented to:  time, person, and place  Attention Span and Concentration:  intact  Recent and Remote Memory:  intact  Fund of Knowledge:  appropriate    Clinical Global Impressions  First:  Considering your total clinical experience with this particular patient population, how severe are the patient's symptoms at this time?: 7 (07/31/18 1234)  Compared to the patient's condition at the START of treatment, this patient's condition is:: 4 (07/31/18 1234)  Most recent:  Considering your total clinical experience with this particular patient population, how severe are the patient's symptoms at this time?: 7 (07/31/18 1234)  Compared to the patient's condition at the START of treatment, this patient's condition is:: 4 (07/31/18 1234)    # Pain Assessment:  Current Pain Score 8/1/2018   Patient currently in pain? dylan Yarbrough s pain level was assessed and he currently denies pain.             Precautions:     Behavioral Orders   Procedures     Code 1 - Restrict to Unit     Routine Programming     As clinically indicated     Seizure precautions     Status 15     Every 15 minutes.     Suicide precautions     Patients on Suicide Precautions should have a Combination Diet ordered that includes a Diet selection(s) AND a Behavioral Tray selection for Safe Tray - with utensils, or Safe Tray - NO utensils       Withdrawal precautions          Diagnoses:      Suicidal ideation  Benzodiazepine withdrawal without complication (H)  Moderate episode of recurrent major depressive disorder (H)  Cigarette nicotine  dependence with withdrawal  Cannabis use disorder, moderate, dependence (H)  Substance-induced anxiety disorder (H)  Severe benzodiazepine use disorder (H)  Opioid use disorder, severe, on maintenance therapy (H)         Assessment & Plan:       Target psychiatric symptoms and interventions:  Benzodiazapine withdrawal - continue phenobarbital taper. Last dose Friday AM.  Opioid dependence. Continue with methadone maintenance.  Nicotine dep - continue with nicotine replacement therapy  Depression and anxiety - continue with current medications    Medical Problems and Treatments:  None acute    Behavioral/Psychological/Social:  Encourage unit programming      Disposition Plan   Reason for ongoing admission: requires detoxification from substance that poses a risk of bodily harm during withdrawal period  Discharge location: board and lodge in [place]  Discharge Medications: ordered.  Follow-up Appointments: not scheduled  Legal Status: voluntary  Entered by: Avery Bolton on 8/1/2018 at 4:05 PM

## 2018-08-01 NOTE — PROGRESS NOTES
[facility] called , Natasha, [phone]. Left message requesting that she try to arrange transport. Also asked about what pharmacy they want to use and the methadone clinic pt will use.    Call from Josef, [phone]. States that the volunteer transportation department won't provide transport because pt has insurance. [facility] will have pt call insurance to see about transport on Friday.    [facility] gave pt insurance transportation information; he will call to determine if they can provide transport. Pt states was able to get transportation; will be picked up Friday at 9 am. He also arranged transportation to the methadone clinic in [place] for 10 days; will work in the meantime to get his methadone transferred to [facility].

## 2018-08-01 NOTE — PROGRESS NOTES
"Pt was calm and appropriate. Did not attend group, but sat in the lounge and played cards with peers. Pt declined a check in but responds with \"Everything is good, no worries\". Pt ate well, hygiene is good, no visitors today.       08/01/18 1446   Behavioral Health   Hallucinations denies / not responding to hallucinations   Thinking intact   Orientation person: oriented;date: oriented;place: oriented;time: oriented   Memory baseline memory   Insight poor   Judgement impaired   Eye Contact at examiner   Affect blunted, flat   Mood anxious;mood is calm   Physical Appearance/Attire disheveled   Hygiene neglected grooming - unclean body, hair, teeth   Suicidality other (see comments)  (Denies)   1. Wish to be Dead No   2. Non-Specific Active Suicidal Thoughts  No   Self Injury other (see comment)  (Denies)   Activity withdrawn   Speech coherent;clear   Medication Sensitivity no observed side effects;no stated side effects   Psychomotor / Gait balanced;steady   Activities of Daily Living   Hygiene/Grooming independent   Oral Hygiene independent   Dress street clothes;independent   Laundry with supervision   Room Organization independent   Activity   Activity Assistance Provided independent     "

## 2018-08-01 NOTE — PLAN OF CARE
Problem: Mood Impairment (Depressive Signs/Symptoms) (Adult)  Goal: Improved Mood Symptoms (Depressive Signs/Symptoms)  Outcome: Improving  Nursing Assessment:  Zenon was up ad radha, spent >50% of the shift out of his room visible in the milieu. Pt was selectively social with peers. Zenon reported feeling body aches and increased hand tremors priors to receiving his HS dose of phenobarbitol. Pt MSSA=3, COW=6. About 1 hour after receiving HS phenobarb and remaining scheduled meds, Zenon reported feeling better with decreased withdrawal symptoms.     Zenon continues to report feeling moderately depressed and somewhat anxious, he denies having suicidal ideation or thoughts of hurting/harming himself. Pt was med compliant, pleasant on approach, cooperative.

## 2018-08-01 NOTE — PLAN OF CARE
"Problem: Social/Occupational/Functional Impairment (Depressive Signs/Symptoms) (Adult)  Goal: Improved Social/Occupational/Functional Skills (Depressive Signs/Symptoms)  Initiated Creative expressions group but, only stayed long enough to complete Self Assessment Form. Pt was given and completed a written self assessment. OT purpose was explained with a value of having involvement in tx plan, and provided options to meet self identified goals. Will assess further in the areas of organization, problem solving, and concentration. Identified experiencing sadness, anxiety, depression, racing thoughts, trouble concentrating, suicidal thoughts, restlessness and isolation for \"quite awhile\". Coping skills he has used is walks and meditation. Goals identified included: finding resources, managing anxiety and med compliance. Indicated his  is his only support. Left group to meet with doctor and did not return.  Not charged due to limited time in group. Will continue to assess functional performance and provide team with updates.        "

## 2018-08-01 NOTE — DISCHARGE INSTRUCTIONS
Behavioral Discharge Planning and Instructions      Summary:  You were admitted on 2018  due to Suicidal Ideations.  You were treated by Dr. Martha Cabezas MD and discharged on 8/3/2018 from [facility] to [facility] [place]    You are on commitment through [facility]. The commitment will  2018. You remain on the same provisional discharge as your PD was not revoked.     Principal Diagnosis:   Moderate episode of recurrent major depressive disorder (H)  Cigarette nicotine dependence with withdrawal  Cannabis use disorder, moderate, dependence (H)  Substance-induced anxiety disorder (H)  Severe benzodiazepine use disorder (H)  Opioid use disorder, severe, on maintenance therapy (H)    Health Care Follow-up Appointments:   You are going to a sober board and lodge:   SHANNON ross, [address] [facility]    : Natasha Gallegos  Phone: [phone]  Fax: [phone]    Attend all scheduled appointments with your outpatient providers. Call at least 24 hours in advance if you need to reschedule an appointment to ensure continued access to your outpatient providers.   Major Treatments, Procedures and Findings:  You were provided with: a psychiatric assessment, assessed for medical stability, medication evaluation and/or management, group therapy and milieu management    Symptoms to Report: feeling more aggressive, increased confusion, losing more sleep, mood getting worse or thoughts of suicide    Early warning signs can include: increased depression or anxiety sleep disturbances increased thoughts or behaviors of suicide or self-harm  increased unusual thinking, such as paranoia or hearing voices    Safety and Wellness:  Take all medicines as directed.  Make no changes unless your doctor suggests them.      Follow treatment recommendations.  Refrain from alcohol and non-prescribed drugs.  If there is a concern for safety, call [phone].    Resources:   Crisis Intervention: 906.391.2569 or  "501.351.9523 (TTY: 824.178.2908).  Call anytime for help.  National [place] on Mental Illness (www.mn.ant.org): 857.102.1091 or 932-401-5302.  Alcoholics Anonymous (www.alcoholics-anonymous.org): Check your phone book for your local chapter.  Suicide Awareness Voices of Education (SAVE) (www.save.org): 936-763-IMNK (1113)  National Suicide Prevention Line (www.mentalhealthmn.org): 744-257-ICEC (7261)  Mental Health Consumer/Survivor Network of [place] (www.mhcsn.net): 887.406.7731 or 554-709-3580  Mental Health Association of [place] (www.mentalhealth.org): 144.828.1088 or 589-908-7423  Self- Management and Recovery Training., [place]-- Toll free: 619.364.4765  www.Flyfit.Ticketbis  Text 4 Life: txt \"LIFE\" to 59413 for immediate support and crisis intervention  Crisis text line: Text \"[place]\" to 691576. Free, confidential, 24/7.    The treatment team has appreciated the opportunity to work with you.     If you have any questions or concerns our unit number is [phone]  You may be receiving a follow-up phone call within the next three days from a representative from behavioral health.          "

## 2018-08-02 PROCEDURE — 25000132 ZZH RX MED GY IP 250 OP 250 PS 637: Performed by: PSYCHIATRY & NEUROLOGY

## 2018-08-02 PROCEDURE — G0177 OPPS/PHP; TRAIN & EDUC SERV: HCPCS

## 2018-08-02 PROCEDURE — 12400007 ZZH R&B MH INTERMEDIATE UMMC

## 2018-08-02 RX ADMIN — NICOTINE POLACRILEX 4 MG: 2 LOZENGE ORAL at 06:29

## 2018-08-02 RX ADMIN — METHADONE HYDROCHLORIDE 110 MG: 5 SOLUTION ORAL at 07:55

## 2018-08-02 RX ADMIN — GABAPENTIN 800 MG: 800 TABLET, FILM COATED ORAL at 20:21

## 2018-08-02 RX ADMIN — MIRTAZAPINE 30 MG: 15 TABLET, FILM COATED ORAL at 20:20

## 2018-08-02 RX ADMIN — NICOTINE POLACRILEX 4 MG: 2 LOZENGE ORAL at 08:29

## 2018-08-02 RX ADMIN — VENLAFAXINE HYDROCHLORIDE 300 MG: 150 TABLET, EXTENDED RELEASE ORAL at 07:55

## 2018-08-02 RX ADMIN — NICOTINE POLACRILEX 4 MG: 2 LOZENGE ORAL at 10:58

## 2018-08-02 RX ADMIN — PHENOBARBITAL 16.2 MG: 16.2 TABLET ORAL at 20:21

## 2018-08-02 RX ADMIN — PHENOBARBITAL 16.2 MG: 16.2 TABLET ORAL at 07:55

## 2018-08-02 RX ADMIN — NICOTINE POLACRILEX 4 MG: 2 LOZENGE ORAL at 20:22

## 2018-08-02 RX ADMIN — NICOTINE POLACRILEX 4 MG: 2 LOZENGE ORAL at 16:13

## 2018-08-02 RX ADMIN — GABAPENTIN 400 MG: 400 CAPSULE ORAL at 07:55

## 2018-08-02 RX ADMIN — HYDROXYZINE HYDROCHLORIDE 25 MG: 25 TABLET ORAL at 18:24

## 2018-08-02 RX ADMIN — GABAPENTIN 400 MG: 400 CAPSULE ORAL at 14:51

## 2018-08-02 ASSESSMENT — ACTIVITIES OF DAILY LIVING (ADL)
ORAL_HYGIENE: INDEPENDENT
DRESS: STREET CLOTHES;INDEPENDENT
GROOMING: HANDWASHING;INDEPENDENT

## 2018-08-02 NOTE — PLAN OF CARE
Problem: Social/Occupational/Functional Impairment (Depressive Signs/Symptoms) (Adult)  Goal: Improved Social/Occupational/Functional Skills (Depressive Signs/Symptoms)  Attended 1 of 2 OT groups offered. Initiated task selection and engagement. Attentive to creative task x 45 minutes with good attention to details and planning. Social with peers. Identified the benefits he gained from participation in creative task.  Futuristic regarding goals and planning. Encourage participation in verbal groups to increase awareness of positive self management skills and application.

## 2018-08-02 NOTE — PROGRESS NOTES
VM from pt's Natasha GAINES. The pharmacy is [facility]; they will then deliver the meds to the board and lodge. CTC called back, advised that meds will be given to pt upon discharge; that they are already ordered.

## 2018-08-02 NOTE — PLAN OF CARE
Problem: Mood Impairment (Depressive Signs/Symptoms) (Adult)  Goal: Improved Mood Symptoms (Depressive Signs/Symptoms)      48 hour nursing assessment:  Pt evaluation continues. Assessed mood, anxiety, thoughts, and behavior. Is progressing towards goals. Encourage participation in groups and developing healthy coping skills. Pt denies auditory or visual  Hallucinations. Pt excited to DC on Friday. Refer to daily team meeting notes for individualized plan of care. Will continue to assess.

## 2018-08-02 NOTE — PROGRESS NOTES
Pt visible in milieu and social with peers, full range of affect. Irritable at times such as when TV was changed from something that was not unit appropriate to something that was and when the lights were turned on in the lounge, but otherwise pleasant. No SI, SIB stated/observed. No behavioral issues.     08/01/18 2151   Behavioral Health   Hallucinations denies / not responding to hallucinations   Thinking intact   Orientation person: oriented;place: oriented   Memory baseline memory   Insight poor   Judgement impaired   Eye Contact at examiner   Affect full range affect   Mood mood is calm;irritable   Physical Appearance/Attire attire appropriate to age and situation   Hygiene neglected grooming - unclean body, hair, teeth   Suicidality other (see comments)  (none stated/observed)   1. Wish to be Dead No   2. Non-Specific Active Suicidal Thoughts  No   Self Injury other (see comment)  (none stated/observed)   Elopement (N/A)   Activity other (see comment)  (visible/social in milieu)   Speech clear;coherent   Medication Sensitivity no stated side effects;no observed side effects   Psychomotor / Gait balanced;steady   Psycho Education   Type of Intervention 1:1 intervention   Response participates, initiates socially appropriate   Hours 0.5   Treatment Detail check in   Activities of Daily Living   Hygiene/Grooming independent   Oral Hygiene independent   Dress independent   Laundry with supervision   Room Organization independent

## 2018-08-03 VITALS
BODY MASS INDEX: 31.64 KG/M2 | HEIGHT: 71 IN | OXYGEN SATURATION: 98 % | SYSTOLIC BLOOD PRESSURE: 115 MMHG | RESPIRATION RATE: 16 BRPM | HEART RATE: 66 BPM | TEMPERATURE: 97.7 F | DIASTOLIC BLOOD PRESSURE: 75 MMHG | WEIGHT: 226 LBS

## 2018-08-03 PROCEDURE — 25000132 ZZH RX MED GY IP 250 OP 250 PS 637: Performed by: PSYCHIATRY & NEUROLOGY

## 2018-08-03 PROCEDURE — 99239 HOSP IP/OBS DSCHRG MGMT >30: CPT | Performed by: PSYCHIATRY & NEUROLOGY

## 2018-08-03 RX ADMIN — NICOTINE POLACRILEX 4 MG: 2 LOZENGE ORAL at 05:21

## 2018-08-03 RX ADMIN — PHENOBARBITAL 16.2 MG: 16.2 TABLET ORAL at 07:41

## 2018-08-03 RX ADMIN — METHADONE HYDROCHLORIDE 110 MG: 5 SOLUTION ORAL at 07:41

## 2018-08-03 RX ADMIN — NICOTINE POLACRILEX 4 MG: 2 LOZENGE ORAL at 07:44

## 2018-08-03 RX ADMIN — GABAPENTIN 400 MG: 400 CAPSULE ORAL at 07:41

## 2018-08-03 RX ADMIN — VENLAFAXINE HYDROCHLORIDE 300 MG: 150 TABLET, EXTENDED RELEASE ORAL at 07:41

## 2018-08-03 ASSESSMENT — ACTIVITIES OF DAILY LIVING (ADL)
LAUNDRY: WITH SUPERVISION
DRESS: STREET CLOTHES
GROOMING: INDEPENDENT
ORAL_HYGIENE: INDEPENDENT

## 2018-08-03 NOTE — PROGRESS NOTES
Patient was discharged today from [facility] [address] [place] to a board and lodge in [place] at approximately 0900. Patient was given discharge and medication instructions. Patient denied suicidal thoughts at time of discharge. A 30 day supply of medications was sent with patient. All personal belongings were returned to patient at discharge.

## 2018-08-03 NOTE — DISCHARGE SUMMARY
Psychiatric Discharge Summary    Zenon Truong MRN# [id number]   Age: 48 year old YOB: 1970     Date of Admission:  2018  Date of Discharge:  8/3/2018  Admitting Physician:  Rex Dewitt MD  Discharge Physician:  Avery Bolton MD          Event Leading to Hospitalization:   The patient is a 48-year-old  male with history of depression, also borderline personality disorder, polysubstance abuse, and also history of testicular cancer who presented to the Emergency Room for evaluation of suicidal ideation.  The patient reported having plan to jump off a bridge.  Says that he went through chemical dependency treatment program in [facility].  He was committed to this program and still on commitment that would  in 1 week.  Reported that since his discharge he has been homeless, living in a shelter.  His only consistent care is through commitment  which will only last until .  Patient reported that he has not had any medication for the past 9 days and they did not believe that they were working.  He was self-medicating with Xanax and was using up to 6 mg per day.  Reported also using heroin, marijuana and still using marijuana.  He reported having thoughts about jumping off a bridge, poor sleep, poor appetite, feeling hopeless, helpless.        See Admission note by Rex Dewitt MD on 18 for additional details.          Diagnoses:         Suicidal ideation - resolved  Benzodiazepine withdrawal without complication  Moderate episode of recurrent major depressive disorder  Cigarette nicotine dependence with withdrawal  Cannabis use disorder, moderate, dependence  Substance-induced anxiety disorder  Severe benzodiazepine use disorder  Opioid use disorder, severe, on maintenance therapy         Labs:     Results for orders placed or performed during the hospital encounter of 18   Drug abuse screen 6 urine (tox)   Result Value Ref Range     Amphetamine Qual Urine Negative NEG^Negative    Barbiturates Qual Urine Negative NEG^Negative    Benzodiazepine Qual Urine Positive (A) NEG^Negative    Cannabinoids Qual Urine Positive (A) NEG^Negative    Cocaine Qual Urine Negative NEG^Negative    Ethanol Qual Urine Negative NEG^Negative    Opiates Qualitative Urine Negative NEG^Negative   CBC with platelets differential   Result Value Ref Range    WBC 4.3 4.0 - 11.0 10e9/L    RBC Count 4.85 4.4 - 5.9 10e12/L    Hemoglobin 14.0 13.3 - 17.7 g/dL    Hematocrit 42.0 40.0 - 53.0 %    MCV 87 78 - 100 fl    MCH 28.9 26.5 - 33.0 pg    MCHC 33.3 31.5 - 36.5 g/dL    RDW 12.5 10.0 - 15.0 %    Platelet Count 124 (L) 150 - 450 10e9/L    Diff Method Automated Method     % Neutrophils 35.4 %    % Lymphocytes 47.3 %    % Monocytes 11.0 %    % Eosinophils 5.1 %    % Basophils 0.5 %    % Immature Granulocytes 0.7 %    Nucleated RBCs 0 0 /100    Absolute Neutrophil 1.5 (L) 1.6 - 8.3 10e9/L    Absolute Lymphocytes 2.0 0.8 - 5.3 10e9/L    Absolute Monocytes 0.5 0.0 - 1.3 10e9/L    Absolute Eosinophils 0.2 0.0 - 0.7 10e9/L    Absolute Basophils 0.0 0.0 - 0.2 10e9/L    Abs Immature Granulocytes 0.0 0 - 0.4 10e9/L    Absolute Nucleated RBC 0.0    TSH with free T4 reflex and/or T3 as indicated   Result Value Ref Range    TSH 0.96 0.40 - 4.00 mU/L   Comprehensive metabolic panel   Result Value Ref Range    Sodium 138 133 - 144 mmol/L    Potassium 3.8 3.4 - 5.3 mmol/L    Chloride 104 94 - 109 mmol/L    Carbon Dioxide 28 20 - 32 mmol/L    Anion Gap 6 3 - 14 mmol/L    Glucose 89 70 - 99 mg/dL    Urea Nitrogen 8 7 - 30 mg/dL    Creatinine 0.76 0.66 - 1.25 mg/dL    GFR Estimate >90 >60 mL/min/1.7m2    GFR Estimate If Black >90 >60 mL/min/1.7m2    Calcium 8.6 8.5 - 10.1 mg/dL    Bilirubin Total 0.4 0.2 - 1.3 mg/dL    Albumin 3.6 3.4 - 5.0 g/dL    Protein Total 7.5 6.8 - 8.8 g/dL    Alkaline Phosphatase 91 40 - 150 U/L     (H) 0 - 70 U/L     (H) 0 - 45 U/L   Lipid panel   Result  Value Ref Range    Cholesterol 99 <200 mg/dL    Triglycerides 30 <150 mg/dL    HDL Cholesterol 44 >39 mg/dL    LDL Cholesterol Calculated 49 <100 mg/dL    Non HDL Cholesterol 55 <130 mg/dL              Consults:   No consultations were requested during this admission         Hospital Course:   Zenon Truong was admitted to [facility] with attending Rex Dewitt MD as a voluntary patient. The patient was placed under status 15 (15 minute checks) to ensure patient safety.     The patient was detoxed off of benzodiazapine medications with phenobarbital. He was able to obtain a bed at a [facility] and lod in [place]. His suicidal thinking resolved very early in his stay. He as discharged on the medications noted below.    # Discharge Pain Plan:   - Patient currently has NO PAIN and is not being prescribed pain medications on discharge.      Zenon Truong was released to [facility]. At the time of discharge Zenon Truong was determined to not be a danger to himself or others.          Discharge Medications:     Current Discharge Medication List      CONTINUE these medications which have CHANGED    Details   gabapentin (NEURONTIN) 400 MG capsule Take 1 capsule (400 mg) by mouth twice daily and 2 capsules (800 mg) at bedtime  Qty: 120 capsule, Refills: 1    Associated Diagnoses: Severe benzodiazepine use disorder (H); Opioid use disorder, severe, on maintenance therapy (H); Substance-induced anxiety disorder (H)      hydrOXYzine (ATARAX) 50 MG tablet Take 1 tablet (50 mg) by mouth every 8 hours as needed  Qty: 90 tablet, Refills: 1    Associated Diagnoses: Substance-induced anxiety disorder (H)      ibuprofen (ADVIL/MOTRIN) 400 MG tablet Take 1 tablet (400 mg) by mouth every 6 hours as needed  Qty: 120 tablet, Refills: 1    Associated Diagnoses: Opioid use disorder, severe, on maintenance therapy (H)      melatonin 5 MG tablet Take 1 tablet (5 mg) by mouth nightly as needed  Qty: 30 tablet, Refills:  1    Associated Diagnoses: Substance-induced anxiety disorder (H)      mirtazapine (REMERON) 30 MG tablet Take 1 tablet (30 mg) by mouth At Bedtime  Qty: 30 tablet, Refills: 1    Associated Diagnoses: Substance-induced anxiety disorder (H)      venlafaxine (EFFEXOR-XR) 150 MG 24 hr capsule Take 2 capsules (300 mg) by mouth daily  Qty: 30 capsule, Refills: 1    Associated Diagnoses: Substance-induced anxiety disorder (H)         CONTINUE these medications which have NOT CHANGED    Details   METHADONE HCL PO Take 110 mg by mouth daily      nicotine (NICOTROL) 10 MG Inhaler Inhale 10 mg into the lungs daily as needed          STOP taking these medications       sennosides (SENOKOT) 8.6 MG tablet Comments:   Reason for Stopping:                    Psychiatric Examination:   Appearance:  awake, alert, adequately groomed and casually dressed  Attitude:  cooperative  Eye Contact:  good  Mood:  good  Affect:  appropriate and in normal range and mood congruent  Speech:  clear, coherent and normal prosody  Psychomotor Behavior:  no evidence of tardive dyskinesia, dystonia, or tics and intact station, gait and muscle tone  Thought Process:  logical, linear and goal oriented  Associations:  no loose associations  Thought Content:  no evidence of suicidal ideation or homicidal ideation and no evidence of psychotic thought  Insight:  good  Judgment:  intact  Oriented to:  time, person, and place  Attention Span and Concentration:  intact  Recent and Remote Memory:  intact  Language: Able to name objects, Able to repeat phrases and Able to read and write  Fund of Knowledge: appropriate  Muscle Strength and Tone: normal  Gait and Station: Normal         Discharge Plan:   Patient was discharged with plans to stay at [facility] in [place].  Outpatient : Natasha Gallegos  Patient will continue to follow-up with his methadone clinic.    Attestation:  The patient has been seen and evaluated by Avery carcamo  Chava Bolton MD  On the day of discharge, I saw the patient and performed the above examination, reviewed discharge medications, reviewed follow-up plan, and assessed safety for discharge. I spent greater than 30 minutes on these tasks.

## 2018-08-03 NOTE — PLAN OF CARE
Problem: Social/Occupational/Functional Impairment (Depressive Signs/Symptoms) (Adult)  Goal: Improved Social/Occupational/Functional Skills (Depressive Signs/Symptoms)  Attended 1 of 3 OT groups offered. Attentive to discussion focused on coping skills. Took note of personal ideas and ideas offered by peers. Pleasant and social. Goal is to discharge tomorrow.

## 2018-08-07 ENCOUNTER — TELEPHONE (OUTPATIENT)
Dept: PHARMACY | Facility: OTHER | Age: 48
End: 2018-08-07

## 2018-08-07 NOTE — TELEPHONE ENCOUNTER
MTM referral from: Transitions of Care (recent hospital discharge or ED visit)    MTM referral outreach attempt #2 on August 7, 2018 at 12:19 PM      Outcome: Patient not reachable after several attempts, will route to MTM Pharmacist/Provider as an FYI. Thank you for the referral.    Charles Raygoza, MTM Coordinator

## 2024-08-06 ENCOUNTER — HOSPITAL ENCOUNTER (INPATIENT)
Facility: CLINIC | Age: 54
LOS: 6 days | Discharge: PSYCHIATRIC OR SUBSTANCE USE FACILITY | DRG: 897 | End: 2024-08-12
Attending: EMERGENCY MEDICINE | Admitting: PSYCHIATRY & NEUROLOGY
Payer: COMMERCIAL

## 2024-08-06 ENCOUNTER — TELEPHONE (OUTPATIENT)
Dept: BEHAVIORAL HEALTH | Facility: CLINIC | Age: 54
End: 2024-08-06

## 2024-08-06 DIAGNOSIS — Z86.79 PERSONAL HISTORY OF OTHER DISEASES OF THE CIRCULATORY SYSTEM: Primary | ICD-10-CM

## 2024-08-06 DIAGNOSIS — M62.838 MUSCLE SPASM: ICD-10-CM

## 2024-08-06 DIAGNOSIS — F41.1 GAD (GENERALIZED ANXIETY DISORDER): ICD-10-CM

## 2024-08-06 DIAGNOSIS — F19.980 SUBSTANCE-INDUCED ANXIETY DISORDER (H): ICD-10-CM

## 2024-08-06 DIAGNOSIS — F33.1 MODERATE EPISODE OF RECURRENT MAJOR DEPRESSIVE DISORDER (H): ICD-10-CM

## 2024-08-06 DIAGNOSIS — F13.930 BENZODIAZEPINE WITHDRAWAL WITHOUT COMPLICATION (H): ICD-10-CM

## 2024-08-06 DIAGNOSIS — Z59.9 PROBLEM RELATED TO HOUSING AND ECONOMIC CIRCUMSTANCES: ICD-10-CM

## 2024-08-06 DIAGNOSIS — Z86.59 PERSONAL HISTORY OF MENTAL DISORDER: ICD-10-CM

## 2024-08-06 DIAGNOSIS — F17.213 CIGARETTE NICOTINE DEPENDENCE WITH WITHDRAWAL: ICD-10-CM

## 2024-08-06 DIAGNOSIS — Z56.9 UNSPECIFIED PROBLEMS RELATED TO EMPLOYMENT: ICD-10-CM

## 2024-08-06 DIAGNOSIS — F11.93 OPIOID WITHDRAWAL (H): ICD-10-CM

## 2024-08-06 DIAGNOSIS — F11.20 OPIOID USE DISORDER, SEVERE, ON MAINTENANCE THERAPY (H): ICD-10-CM

## 2024-08-06 LAB
ALBUMIN SERPL BCG-MCNC: 4.6 G/DL (ref 3.5–5.2)
ALP SERPL-CCNC: 84 U/L (ref 40–150)
ALT SERPL W P-5'-P-CCNC: 141 U/L (ref 0–70)
AMPHETAMINES UR QL SCN: ABNORMAL
ANION GAP SERPL CALCULATED.3IONS-SCNC: 12 MMOL/L (ref 7–15)
AST SERPL W P-5'-P-CCNC: 79 U/L (ref 0–45)
BARBITURATES UR QL SCN: ABNORMAL
BASOPHILS # BLD AUTO: 0 10E3/UL (ref 0–0.2)
BASOPHILS NFR BLD AUTO: 1 %
BENZODIAZ UR QL SCN: ABNORMAL
BILIRUB SERPL-MCNC: 0.6 MG/DL
BUN SERPL-MCNC: 13.9 MG/DL (ref 6–20)
BZE UR QL SCN: ABNORMAL
CALCIUM SERPL-MCNC: 9.1 MG/DL (ref 8.8–10.4)
CANNABINOIDS UR QL SCN: ABNORMAL
CHLORIDE SERPL-SCNC: 105 MMOL/L (ref 98–107)
CREAT SERPL-MCNC: 0.8 MG/DL (ref 0.67–1.17)
EGFRCR SERPLBLD CKD-EPI 2021: >90 ML/MIN/1.73M2
EOSINOPHIL # BLD AUTO: 0.4 10E3/UL (ref 0–0.7)
EOSINOPHIL NFR BLD AUTO: 8 %
ERYTHROCYTE [DISTWIDTH] IN BLOOD BY AUTOMATED COUNT: 13 % (ref 10–15)
FENTANYL UR QL: ABNORMAL
GLUCOSE SERPL-MCNC: 111 MG/DL (ref 70–99)
HCO3 SERPL-SCNC: 24 MMOL/L (ref 22–29)
HCT VFR BLD AUTO: 42.6 % (ref 40–53)
HGB BLD-MCNC: 15.5 G/DL (ref 13.3–17.7)
IMM GRANULOCYTES # BLD: 0 10E3/UL
IMM GRANULOCYTES NFR BLD: 1 %
LYMPHOCYTES # BLD AUTO: 1 10E3/UL (ref 0.8–5.3)
LYMPHOCYTES NFR BLD AUTO: 22 %
MAGNESIUM SERPL-MCNC: 1.9 MG/DL (ref 1.7–2.3)
MCH RBC QN AUTO: 30.3 PG (ref 26.5–33)
MCHC RBC AUTO-ENTMCNC: 36.4 G/DL (ref 31.5–36.5)
MCV RBC AUTO: 83 FL (ref 78–100)
MONOCYTES # BLD AUTO: 0.4 10E3/UL (ref 0–1.3)
MONOCYTES NFR BLD AUTO: 9 %
NEUTROPHILS # BLD AUTO: 2.7 10E3/UL (ref 1.6–8.3)
NEUTROPHILS NFR BLD AUTO: 59 %
NRBC # BLD AUTO: 0 10E3/UL
NRBC BLD AUTO-RTO: 0 /100
OPIATES UR QL SCN: ABNORMAL
PCP QUAL URINE (ROCHE): ABNORMAL
PLATELET # BLD AUTO: 107 10E3/UL (ref 150–450)
POTASSIUM SERPL-SCNC: 4.3 MMOL/L (ref 3.4–5.3)
PROT SERPL-MCNC: 7 G/DL (ref 6.4–8.3)
RBC # BLD AUTO: 5.12 10E6/UL (ref 4.4–5.9)
SODIUM SERPL-SCNC: 141 MMOL/L (ref 135–145)
WBC # BLD AUTO: 4.4 10E3/UL (ref 4–11)

## 2024-08-06 PROCEDURE — 96374 THER/PROPH/DIAG INJ IV PUSH: CPT | Performed by: EMERGENCY MEDICINE

## 2024-08-06 PROCEDURE — 250N000012 HC RX MED GY IP 250 OP 636 PS 637: Performed by: EMERGENCY MEDICINE

## 2024-08-06 PROCEDURE — 128N000004 HC R&B CD ADULT

## 2024-08-06 PROCEDURE — 80307 DRUG TEST PRSMV CHEM ANLYZR: CPT | Performed by: EMERGENCY MEDICINE

## 2024-08-06 PROCEDURE — HZ2ZZZZ DETOXIFICATION SERVICES FOR SUBSTANCE ABUSE TREATMENT: ICD-10-PCS | Performed by: PSYCHIATRY & NEUROLOGY

## 2024-08-06 PROCEDURE — 96361 HYDRATE IV INFUSION ADD-ON: CPT | Performed by: EMERGENCY MEDICINE

## 2024-08-06 PROCEDURE — 99285 EMERGENCY DEPT VISIT HI MDM: CPT | Performed by: EMERGENCY MEDICINE

## 2024-08-06 PROCEDURE — 99285 EMERGENCY DEPT VISIT HI MDM: CPT | Mod: 25 | Performed by: EMERGENCY MEDICINE

## 2024-08-06 PROCEDURE — 250N000011 HC RX IP 250 OP 636: Performed by: EMERGENCY MEDICINE

## 2024-08-06 PROCEDURE — G2213 INITIAT MED ASSIST TX IN ER: HCPCS | Performed by: EMERGENCY MEDICINE

## 2024-08-06 PROCEDURE — 258N000003 HC RX IP 258 OP 636: Performed by: EMERGENCY MEDICINE

## 2024-08-06 PROCEDURE — 250N000013 HC RX MED GY IP 250 OP 250 PS 637: Performed by: EMERGENCY MEDICINE

## 2024-08-06 PROCEDURE — 85025 COMPLETE CBC W/AUTO DIFF WBC: CPT | Performed by: EMERGENCY MEDICINE

## 2024-08-06 PROCEDURE — 36415 COLL VENOUS BLD VENIPUNCTURE: CPT | Performed by: EMERGENCY MEDICINE

## 2024-08-06 PROCEDURE — 250N000013 HC RX MED GY IP 250 OP 250 PS 637: Performed by: REGISTERED NURSE

## 2024-08-06 PROCEDURE — 80053 COMPREHEN METABOLIC PANEL: CPT | Performed by: EMERGENCY MEDICINE

## 2024-08-06 PROCEDURE — 83735 ASSAY OF MAGNESIUM: CPT | Performed by: EMERGENCY MEDICINE

## 2024-08-06 RX ORDER — METHOCARBAMOL 750 MG/1
750 TABLET, FILM COATED ORAL 3 TIMES DAILY
Status: ON HOLD | COMMUNITY
End: 2024-08-07

## 2024-08-06 RX ORDER — PHENOBARBITAL 64.8 MG/1
64.8 TABLET ORAL ONCE
Status: COMPLETED | OUTPATIENT
Start: 2024-08-06 | End: 2024-08-06

## 2024-08-06 RX ORDER — QUETIAPINE FUMARATE 50 MG/1
50 TABLET, FILM COATED ORAL EVERY 6 HOURS PRN
Status: ON HOLD | COMMUNITY
End: 2024-08-07

## 2024-08-06 RX ORDER — AMOXICILLIN 250 MG
1 CAPSULE ORAL 2 TIMES DAILY PRN
Status: DISCONTINUED | OUTPATIENT
Start: 2024-08-06 | End: 2024-08-12 | Stop reason: HOSPADM

## 2024-08-06 RX ORDER — PHENOBARBITAL 64.8 MG/1
64.8 TABLET ORAL 3 TIMES DAILY
Status: DISCONTINUED | OUTPATIENT
Start: 2024-08-07 | End: 2024-08-06

## 2024-08-06 RX ORDER — METHOCARBAMOL 500 MG/1
500 TABLET, FILM COATED ORAL 3 TIMES DAILY PRN
Status: DISCONTINUED | OUTPATIENT
Start: 2024-08-06 | End: 2024-08-12 | Stop reason: HOSPADM

## 2024-08-06 RX ORDER — IBUPROFEN 600 MG/1
600 TABLET, FILM COATED ORAL EVERY 6 HOURS PRN
Status: DISCONTINUED | OUTPATIENT
Start: 2024-08-06 | End: 2024-08-12 | Stop reason: HOSPADM

## 2024-08-06 RX ORDER — CLONIDINE HYDROCHLORIDE 0.1 MG/1
0.1 TABLET ORAL EVERY 6 HOURS PRN
Status: DISCONTINUED | OUTPATIENT
Start: 2024-08-06 | End: 2024-08-12 | Stop reason: HOSPADM

## 2024-08-06 RX ORDER — ONDANSETRON 2 MG/ML
4 INJECTION INTRAMUSCULAR; INTRAVENOUS ONCE
Status: COMPLETED | OUTPATIENT
Start: 2024-08-06 | End: 2024-08-06

## 2024-08-06 RX ORDER — MAGNESIUM HYDROXIDE/ALUMINUM HYDROXICE/SIMETHICONE 120; 1200; 1200 MG/30ML; MG/30ML; MG/30ML
30 SUSPENSION ORAL EVERY 4 HOURS PRN
Status: DISCONTINUED | OUTPATIENT
Start: 2024-08-06 | End: 2024-08-12 | Stop reason: HOSPADM

## 2024-08-06 RX ORDER — LOPERAMIDE HCL 2 MG
2 CAPSULE ORAL EVERY 4 HOURS PRN
Status: DISCONTINUED | OUTPATIENT
Start: 2024-08-06 | End: 2024-08-12 | Stop reason: HOSPADM

## 2024-08-06 RX ORDER — DIAZEPAM 5 MG
5-20 TABLET ORAL EVERY 30 MIN PRN
Status: DISCONTINUED | OUTPATIENT
Start: 2024-08-06 | End: 2024-08-06

## 2024-08-06 RX ORDER — BUPRENORPHINE AND NALOXONE 8; 2 MG/1; MG/1
1 FILM, SOLUBLE BUCCAL; SUBLINGUAL 2 TIMES DAILY
Status: ON HOLD | COMMUNITY
End: 2024-08-07

## 2024-08-06 RX ORDER — FOLIC ACID 1 MG/1
1 TABLET ORAL DAILY
Status: DISCONTINUED | OUTPATIENT
Start: 2024-08-06 | End: 2024-08-07

## 2024-08-06 RX ORDER — ACETAMINOPHEN 325 MG/1
650 TABLET ORAL EVERY 4 HOURS PRN
Status: DISCONTINUED | OUTPATIENT
Start: 2024-08-06 | End: 2024-08-12 | Stop reason: HOSPADM

## 2024-08-06 RX ORDER — GABAPENTIN 600 MG/1
1200 TABLET ORAL 3 TIMES DAILY
Status: ON HOLD | COMMUNITY
End: 2024-08-09

## 2024-08-06 RX ORDER — PHENOBARBITAL 64.8 MG/1
64.8 TABLET ORAL 2 TIMES DAILY
Status: COMPLETED | OUTPATIENT
Start: 2024-08-06 | End: 2024-08-07

## 2024-08-06 RX ORDER — BUPRENORPHINE 2 MG/1
2 TABLET SUBLINGUAL
Status: DISCONTINUED | OUTPATIENT
Start: 2024-08-06 | End: 2024-08-07

## 2024-08-06 RX ORDER — HYDROXYZINE PAMOATE 50 MG/1
50 CAPSULE ORAL 3 TIMES DAILY PRN
Status: ON HOLD | COMMUNITY
End: 2024-08-09

## 2024-08-06 RX ORDER — DICYCLOMINE HYDROCHLORIDE 10 MG/1
20 CAPSULE ORAL 3 TIMES DAILY PRN
Status: DISCONTINUED | OUTPATIENT
Start: 2024-08-06 | End: 2024-08-12 | Stop reason: HOSPADM

## 2024-08-06 RX ORDER — HYDROXYZINE HYDROCHLORIDE 25 MG/1
25 TABLET, FILM COATED ORAL EVERY 4 HOURS PRN
Status: DISCONTINUED | OUTPATIENT
Start: 2024-08-06 | End: 2024-08-12 | Stop reason: HOSPADM

## 2024-08-06 RX ORDER — TRAZODONE HYDROCHLORIDE 50 MG/1
50 TABLET, FILM COATED ORAL
Status: DISCONTINUED | OUTPATIENT
Start: 2024-08-06 | End: 2024-08-12 | Stop reason: HOSPADM

## 2024-08-06 RX ORDER — PHENOBARBITAL 64.8 MG/1
64.8 TABLET ORAL 3 TIMES DAILY
Status: DISCONTINUED | OUTPATIENT
Start: 2024-08-06 | End: 2024-08-06

## 2024-08-06 RX ADMIN — BUPRENORPHINE 2 MG: 2 TABLET SUBLINGUAL at 16:54

## 2024-08-06 RX ADMIN — PHENOBARBITAL 64.8 MG: 64.8 TABLET ORAL at 20:41

## 2024-08-06 RX ADMIN — DIAZEPAM 10 MG: 5 TABLET ORAL at 11:01

## 2024-08-06 RX ADMIN — CLONIDINE HYDROCHLORIDE 0.1 MG: 0.1 TABLET ORAL at 11:06

## 2024-08-06 RX ADMIN — BUPRENORPHINE 2 MG: 2 TABLET SUBLINGUAL at 11:02

## 2024-08-06 RX ADMIN — NICOTINE POLACRILEX 4 MG: 4 GUM, CHEWING BUCCAL at 11:35

## 2024-08-06 RX ADMIN — THIAMINE HCL TAB 100 MG 100 MG: 100 TAB at 11:05

## 2024-08-06 RX ADMIN — LOPERAMIDE HYDROCHLORIDE 2 MG: 2 CAPSULE ORAL at 11:05

## 2024-08-06 RX ADMIN — NICOTINE POLACRILEX 4 MG: 4 GUM, CHEWING BUCCAL at 13:19

## 2024-08-06 RX ADMIN — DIAZEPAM 10 MG: 5 TABLET ORAL at 15:45

## 2024-08-06 RX ADMIN — SODIUM CHLORIDE 1000 ML: 9 INJECTION, SOLUTION INTRAVENOUS at 11:02

## 2024-08-06 RX ADMIN — FOLIC ACID 1 MG: 1 TABLET ORAL at 11:05

## 2024-08-06 RX ADMIN — DIAZEPAM 5 MG: 5 TABLET ORAL at 13:20

## 2024-08-06 RX ADMIN — PHENOBARBITAL 64.8 MG: 64.8 TABLET ORAL at 11:43

## 2024-08-06 RX ADMIN — DIAZEPAM 10 MG: 5 TABLET ORAL at 16:54

## 2024-08-06 RX ADMIN — ONDANSETRON 4 MG: 2 INJECTION INTRAMUSCULAR; INTRAVENOUS at 11:05

## 2024-08-06 SDOH — ECONOMIC STABILITY - INCOME SECURITY: PROBLEM RELATED TO HOUSING AND ECONOMIC CIRCUMSTANCES, UNSPECIFIED: Z59.9

## 2024-08-06 SDOH — ECONOMIC STABILITY - INCOME SECURITY: UNSPECIFIED PROBLEMS RELATED TO EMPLOYMENT: Z56.9

## 2024-08-06 ASSESSMENT — ACTIVITIES OF DAILY LIVING (ADL)
LAUNDRY: WITH SUPERVISION
ADLS_ACUITY_SCORE: 28
DRESS: INDEPENDENT
ADLS_ACUITY_SCORE: 35
HYGIENE/GROOMING: INDEPENDENT
ADLS_ACUITY_SCORE: 28
ADLS_ACUITY_SCORE: 28
ADLS_ACUITY_SCORE: 35
ADLS_ACUITY_SCORE: 28
ADLS_ACUITY_SCORE: 35
ADLS_ACUITY_SCORE: 28
ADLS_ACUITY_SCORE: 28
ORAL_HYGIENE: INDEPENDENT
ADLS_ACUITY_SCORE: 35
ADLS_ACUITY_SCORE: 35

## 2024-08-06 ASSESSMENT — LIFESTYLE VARIABLES
TOTAL_SCORE: 13
TOTAL_SCORE: 21

## 2024-08-06 ASSESSMENT — COLUMBIA-SUICIDE SEVERITY RATING SCALE - C-SSRS
2. HAVE YOU ACTUALLY HAD ANY THOUGHTS OF KILLING YOURSELF IN THE PAST MONTH?: NO
1. IN THE PAST MONTH, HAVE YOU WISHED YOU WERE DEAD OR WISHED YOU COULD GO TO SLEEP AND NOT WAKE UP?: NO
6. HAVE YOU EVER DONE ANYTHING, STARTED TO DO ANYTHING, OR PREPARED TO DO ANYTHING TO END YOUR LIFE?: NO

## 2024-08-06 NOTE — ED NOTES
Pt states he has gone through withdrawal before and experienced several seizures. Seizure pads put in place.

## 2024-08-06 NOTE — PLAN OF CARE
"  Problem: Substance Withdrawal  Goal: Substance Withdrawal  Description: Signs and symptoms of listed problems will be absent or manageable.  Outcome: Progressing   3AW Admission Note    S = Situation:   Zenon Truong is a 54 year old year old male with a chief complaint of Addiction Problem (Patient stopped using fentanyl and xanax and now having withdrawal symptoms. Patient reports hx of withdrawal seizures from benzos)    Voluntary admission to 3AW for Benzo withdrawal and detox.    B  = Background:   Substance use history: benzodiazapine  and Fentanyl  Mental health history: Hx SI, Depression and anxiety .  Medical history: No acute medical concerns  Legal history: Voluntary  Treatment history: more than 20 inpatients and one out patient treatment  Living situation: Homeless  Recent life stressors: frustrations for being jobless and  the inability to ever keep a job     A  =  Assessment:   During admission interview, pt affect was flat and blunted while mood is calm and cooperative.Patient reports currently using 2.4 mg of xanax daily since October 2023.  His last use was 3 days ago.  Pt endorse withdrawal symptoms like Tremulous, Perspiration , and Nausea.Pt  endorses hx of seizures. Last known seizure: it's been a while, no specifics  MSSA 6 & 7 while COWS was 3 &  /76   Pulse 65   Temp 97.2  F (36.2  C) (Oral)   Resp 16   Ht 1.803 m (5' 11\")   Wt 93.9 kg (207 lb)   SpO2 100%   BMI 28.87 kg/m       R =   Request or Recommendation:   Patient's benzodiazapine withdrawal will be monitored and treated using MSSA with valium and phenobarbital  Pt will meet with psychiatry, internal medicine, and case management tomorrow.  At the time of admission, pt reports discharge plan is to detox and go get treatment at [facility].  "

## 2024-08-06 NOTE — TELEPHONE ENCOUNTER
R: 3A/CD/LEVAR    11:54a Paged ED Psychiatry NP Kraina, awaiting response.     [12:32 PM] Ericka Collins     MRN [id number]  accepted to 3A/detox/levar    Intake to update work lists.     12:36p Indicia Completed.     12:36p Called Unit 3A CRN Ericka to inform pt is in queue for the unit. CRN informed they will call for report when they are ready to admit pt.     12:41p Called [facility] ED Nurse Betty to inform pt is in queue for 3A/Levar. 3A CRN informed they will call for report when they are ready to admit pt.     12:42p Intake notes all work lists updated with pt's acceptance.

## 2024-08-06 NOTE — TELEPHONE ENCOUNTER
S: [facility]  provider Pranay calling at 11:38 AM with clinical on a 54 year old/Male presenting for benzodiazapine detox.     B: Pt presents for benzodiazapine detox. Currently reports using 2.4 mg of xanax daily since October 2023.    Patient reports last use was 3 days ago.  Pt endorsing following symptoms of withdrawal: Tremulous, Perspiration , and Nausea  Pt  endorses hx of seizures. Last known seizure: it's been a while, no specifics.   Pt denies acute mental health or medical concerns.   Pt endorses other drug use: fentanyl      Pt has a detox care plan in [facility]? No    Does pt present with specific needs, assistive devices, or exclusionary criteria? None      A: Pt meets criteria to be presented for IP detox admission. Patient is voluntary    COVID Symptoms: No  If yes, COVID test required   Utox: Positive for  amphetamines, marijuana and fentanyl  Magnesium: WNL  CMP: Abnormalities: GLUCOSE 111, AST 79,   CBC: Abnormalities: Platelet count 107  HCG: N/A     R: Patient cleared and ready for behavioral bed placement: Yes    Pt is meeting criteria for presentation to 3A/CD    Does Patient need a Transfer Center request created? No, Pt is located within [facility] ED, [facility] ED, or [place] ED

## 2024-08-06 NOTE — ED NOTES
Patient is calm and cooperative, able to ambulate independently. Patient wants detox. Patient has been to detox before and is familiar with the rules, that he will not have his phone and that it is a locked unit.    Recorded Pressure: LV, Ao, HR=62, Condition=Condition 1 (Left Ventricle) /1/19, (Aorta) Ao 108/56/77

## 2024-08-06 NOTE — ED TRIAGE NOTES
Patient reports that he stopped using benzos and fentanyl a few days ago. Patient reports he has had seizures before from benzo withdrawal. Patient reports he is in treatment right now and they told him they are not equipped for this kind of withdrawal.     Friday was last use of Xanax  Yesterday was last use of Fentanyl

## 2024-08-06 NOTE — PROGRESS NOTES
08/06/24 1207   Patient Belongings   Did you bring any home meds/supplements to the hospital?  Yes   Patient Belongings Remaining with Patient other (see comments)   Patient Belongings Put in Hospital Secure Location (Security or Locker, etc.) other (see comments)   Belongings Search Yes   Clothing Search Yes   Second Staff Nadhi     Storage Bin: shoe lace, 16 per socks, 13 T-shirts, 4 towels, 2 shirts, 2 pants, 1 hat, 1 sweatshirt, 3 underwear's,1 small bag, 1 duffle bag and small duffle bag with his personal care   Meed Room Box Bin : Cell phone, wallet, key ,lighter and 2 vapes  Security env#[id number]:  Cash $ 186 dollars and 2 :70 in change, 1 master card and [place] ID   A               Admission:  I am responsible for any personal items that are not sent to the safe or pharmacy.  [facility] is not responsible for loss, theft or damage of any property in my possession.    Signature:  _________________________________ Date: _______  Time: _____                                              Staff Signature:  ____________________________ Date: ________  Time: _____      2nd Staff person, if patient is unable/unwilling to sign:    Signature: ________________________________ Date: ________  Time: _____     Discharge:  [facility] has returned all of my personal belongings:    Signature: _________________________________ Date: ________  Time: _____                                          Staff Signature:  ____________________________ Date: ________  Time: _____

## 2024-08-06 NOTE — ED PROVIDER NOTES
[facility] [place] EMERGENCY DEPARTMENT ([facility])    8/06/24      ED PROVIDER NOTE    History     Chief Complaint   Patient presents with    Addiction Problem     Patient stopped using fentanyl and xanax and now having withdrawal symptoms. Patient reports hx of withdrawal seizures from benzos     The history is provided by the patient and medical records.     Zenon Truong is a 54 year old male with PMH notable for Afib, HTN, polysubstance use disorder (cannabis, opioid, meth), ALISSON, prior suicidal attempts with recent admission for overdosing on prescribed medications (07/25-07/29/24) who presents to the Emergency Department with withdrawal symptoms.     Patient reports using fentanyl and xanax daily. His last fentanyl use was last morning at 5 AM. He reports taking 1.3-1.4 mg of fentanyl daily. Also reports taking ~2.4mg of xanax daily since 10/2023. He now reports withdrawal symptoms including shaking, diarrhea, anxiety, and inability to tolerate PO. He reports history of withdrawal seizure. He reports occasional mild alcohol use. Denies other drug use. He reports taking Suboxone before but not taking it now.        Past Medical History  Past Medical History:   Diagnosis Date    Hep C w/o coma, chronic (H) 2011    Substance abuse (H)     Testicular cancer (H)      Past Surgical History:   Procedure Laterality Date    GENITOURINARY SURGERY  1996    Testicular CA     TESTICLE SURGERY Bilateral      gabapentin (NEURONTIN) 400 MG capsule  hydrOXYzine (ATARAX) 50 MG tablet  ibuprofen (ADVIL/MOTRIN) 400 MG tablet  melatonin 5 MG tablet  METHADONE HCL PO  mirtazapine (REMERON) 30 MG tablet  nicotine (NICOTROL) 10 MG Inhaler  venlafaxine (EFFEXOR-XR) 150 MG 24 hr capsule      Allergies   Allergen Reactions    Iodinated Contrast Media Hives and Rash     Reports he is allergic to Iodine contrast     Reports he is allergic to Iodine contrast       Reports he is allergic to Iodine contrast    Reports he is allergic to  "Iodine contrast      Reports he is allergic to Iodine contrast   Reports he is allergic to Iodine contrast    Iodine Hives    Lisinopril Hives and Rash    Lithium Hives    Topiramate Hives    Escitalopram Rash     Rash around mouth    Iodine-131 Hives    Trazodone Anxiety     Trazodone causes RLS for pt    Olanzapine Rash     Pt reported taking approximately one year ago. Developed rash around mouth.     Family History  Family History   Problem Relation Age of Onset    Cancer Mother     Mental Illness Mother     Cancer Father     Cancer Sister     Depression Mother     Alcoholism Father     Substance Abuse Sister      Social History   Social History     Tobacco Use    Smoking status: Every Day    Smokeless tobacco: Never    Tobacco comments:     Switched to e cigarettes   Substance Use Topics    Alcohol use: No    Drug use: Yes     Types: Benzodiazepines         ROS: 14 point ROS neg other than the symptoms noted above in the HPI.      Physical Exam   BP: (!) 126/97  Pulse: 98  Temp: 98  F (36.7  C)  Resp: 18  Height: 180.3 cm (5' 11\")  Weight: 93.9 kg (207 lb)  SpO2: 98 %  Physical Exam  Physical Exam   Constitutional: oriented to person, place, and time. appears well-developed and well-nourished.   HENT:   Head: Normocephalic and atraumatic.   Neck: Normal range of motion.   Pulmonary/Chest: Effort normal. No respiratory distress.   Cardiac: No murmurs, rubs, gallops. RRR.  Abdominal: Abdomen soft, nontender, nondistended. No rebound tenderness.  MSK: Long bones without deformity or evidence of trauma  Neurological: alert and oriented to person, place, and time. Tremors present. Tongue fasciculations present. No hallucinations. Stable gait  Skin: Skin is warm and dry.   Psychiatric:  normal mood and affect.  behavior is normal. Thought content normal.       ED Course, Procedures, & Data      Procedures       Results for orders placed or performed during the hospital encounter of 08/06/24   Comprehensive metabolic " panel     Status: Abnormal   Result Value Ref Range    Sodium 141 135 - 145 mmol/L    Potassium 4.3 3.4 - 5.3 mmol/L    Carbon Dioxide (CO2) 24 22 - 29 mmol/L    Anion Gap 12 7 - 15 mmol/L    Urea Nitrogen 13.9 6.0 - 20.0 mg/dL    Creatinine 0.80 0.67 - 1.17 mg/dL    GFR Estimate >90 >60 mL/min/1.73m2    Calcium 9.1 8.8 - 10.4 mg/dL    Chloride 105 98 - 107 mmol/L    Glucose 111 (H) 70 - 99 mg/dL    Alkaline Phosphatase 84 40 - 150 U/L    AST 79 (H) 0 - 45 U/L     (H) 0 - 70 U/L    Protein Total 7.0 6.4 - 8.3 g/dL    Albumin 4.6 3.5 - 5.2 g/dL    Bilirubin Total 0.6 <=1.2 mg/dL   Magnesium     Status: Normal   Result Value Ref Range    Magnesium 1.9 1.7 - 2.3 mg/dL   CBC with platelets and differential     Status: Abnormal   Result Value Ref Range    WBC Count 4.4 4.0 - 11.0 10e3/uL    RBC Count 5.12 4.40 - 5.90 10e6/uL    Hemoglobin 15.5 13.3 - 17.7 g/dL    Hematocrit 42.6 40.0 - 53.0 %    MCV 83 78 - 100 fL    MCH 30.3 26.5 - 33.0 pg    MCHC 36.4 31.5 - 36.5 g/dL    RDW 13.0 10.0 - 15.0 %    Platelet Count 107 (L) 150 - 450 10e3/uL    % Neutrophils 59 %    % Lymphocytes 22 %    % Monocytes 9 %    % Eosinophils 8 %    % Basophils 1 %    % Immature Granulocytes 1 %    NRBCs per 100 WBC 0 <1 /100    Absolute Neutrophils 2.7 1.6 - 8.3 10e3/uL    Absolute Lymphocytes 1.0 0.8 - 5.3 10e3/uL    Absolute Monocytes 0.4 0.0 - 1.3 10e3/uL    Absolute Eosinophils 0.4 0.0 - 0.7 10e3/uL    Absolute Basophils 0.0 0.0 - 0.2 10e3/uL    Absolute Immature Granulocytes 0.0 <=0.4 10e3/uL    Absolute NRBCs 0.0 10e3/uL   Urine Drug Screen Panel     Status: Abnormal   Result Value Ref Range    Amphetamines Urine Screen Positive (A) Screen Negative    Barbituates Urine Screen Negative Screen Negative    Benzodiazepine Urine Screen Negative Screen Negative    Cannabinoids Urine Screen Positive (A) Screen Negative    Cocaine Urine Screen Negative Screen Negative    Fentanyl Qual Urine Screen Positive (A) Screen Negative    Opiates  Urine Screen Negative Screen Negative    PCP Urine Screen Negative Screen Negative   CBC with platelets differential     Status: Abnormal    Narrative    The following orders were created for panel order CBC with platelets differential.  Procedure                               Abnormality         Status                     ---------                               -----------         ------                     CBC with platelets and d...[[id number]]  Abnormal            Final result                 Please view results for these tests on the individual orders.   Urine Drug Screen     Status: None ()    Narrative    The following orders were created for panel order Urine Drug Screen.  Procedure                               Abnormality         Status                     ---------                               -----------         ------                     Urine Drug Screen Panel[[id number]]                                                       Please view results for these tests on the individual orders.   Urine Drug Screen     Status: Abnormal    Narrative    The following orders were created for panel order Urine Drug Screen.  Procedure                               Abnormality         Status                     ---------                               -----------         ------                     Urine Drug Screen Panel[[id number]]      Abnormal            Final result                 Please view results for these tests on the individual orders.     Medications - No data to display  Labs Ordered and Resulted from Time of ED Arrival to Time of ED Departure - No data to display  No orders to display          Critical care was not performed.     Medical Decision Making  The patient's presentation was of high complexity (an acute health issue posing potential threat to life or bodily function).    The patient's evaluation involved:  review of external note(s) from 1 sources (discharge note from 7/25/24)  ordering and/or  review of 3+ test(s) in this encounter (see separate area of note for details)  discussion of management or test interpretation with another health professional (chem dep MD)    The patient's management necessitated moderate risk (limitations due to social determinants of health (employment or unemployment issues, financial insecurity, and substance use)) and high risk (a decision regarding hospitalization).    Assessment & Plan    MDM   Patient here with BZD's and opioid use. Here given bzds, phenobarb, and suboxone. Symptoms improved. Labs ok, patient medically clear. He will go to a detox bed.    I have reviewed the nursing notes. I have reviewed the findings, diagnosis, plan and need for follow up with the patient.    New Prescriptions    No medications on file       Final diagnoses:   Benzodiazepine withdrawal without complication (H)   Opioid withdrawal (H)       Luis Alfredo Pires MD  [facility] EMERGENCY DEPARTMENT  8/6/2024        Luis Alfredo Pires MD  08/06/24 1146

## 2024-08-07 PROCEDURE — 250N000013 HC RX MED GY IP 250 OP 250 PS 637: Performed by: EMERGENCY MEDICINE

## 2024-08-07 PROCEDURE — 128N000004 HC R&B CD ADULT

## 2024-08-07 PROCEDURE — 90853 GROUP PSYCHOTHERAPY: CPT

## 2024-08-07 PROCEDURE — 250N000013 HC RX MED GY IP 250 OP 250 PS 637: Performed by: PSYCHIATRY & NEUROLOGY

## 2024-08-07 PROCEDURE — 250N000012 HC RX MED GY IP 250 OP 636 PS 637: Performed by: PSYCHIATRY & NEUROLOGY

## 2024-08-07 PROCEDURE — 250N000013 HC RX MED GY IP 250 OP 250 PS 637: Performed by: REGISTERED NURSE

## 2024-08-07 PROCEDURE — 250N000011 HC RX IP 250 OP 636: Performed by: STUDENT IN AN ORGANIZED HEALTH CARE EDUCATION/TRAINING PROGRAM

## 2024-08-07 PROCEDURE — 250N000013 HC RX MED GY IP 250 OP 250 PS 637: Performed by: NURSE PRACTITIONER

## 2024-08-07 PROCEDURE — 99253 IP/OBS CNSLTJ NEW/EST LOW 45: CPT

## 2024-08-07 RX ORDER — BUPRENORPHINE 2 MG/1
2 TABLET SUBLINGUAL EVERY 4 HOURS PRN
Status: DISCONTINUED | OUTPATIENT
Start: 2024-08-07 | End: 2024-08-12 | Stop reason: HOSPADM

## 2024-08-07 RX ORDER — DESVENLAFAXINE 25 MG/1
25 TABLET, EXTENDED RELEASE ORAL DAILY
Status: DISCONTINUED | OUTPATIENT
Start: 2024-08-08 | End: 2024-08-12 | Stop reason: HOSPADM

## 2024-08-07 RX ORDER — ONDANSETRON 4 MG/1
4 TABLET, ORALLY DISINTEGRATING ORAL EVERY 6 HOURS PRN
Status: DISCONTINUED | OUTPATIENT
Start: 2024-08-07 | End: 2024-08-12 | Stop reason: HOSPADM

## 2024-08-07 RX ORDER — BUPRENORPHINE 2 MG/1
2 TABLET SUBLINGUAL 4 TIMES DAILY
Status: DISCONTINUED | OUTPATIENT
Start: 2024-08-08 | End: 2024-08-07

## 2024-08-07 RX ORDER — NALOXONE HYDROCHLORIDE 0.4 MG/ML
0.2 INJECTION, SOLUTION INTRAMUSCULAR; INTRAVENOUS; SUBCUTANEOUS
Status: DISCONTINUED | OUTPATIENT
Start: 2024-08-07 | End: 2024-08-12 | Stop reason: HOSPADM

## 2024-08-07 RX ORDER — GABAPENTIN 300 MG/1
300 CAPSULE ORAL 3 TIMES DAILY
Status: COMPLETED | OUTPATIENT
Start: 2024-08-07 | End: 2024-08-07

## 2024-08-07 RX ORDER — NALOXONE HYDROCHLORIDE 0.4 MG/ML
0.4 INJECTION, SOLUTION INTRAMUSCULAR; INTRAVENOUS; SUBCUTANEOUS
Status: DISCONTINUED | OUTPATIENT
Start: 2024-08-07 | End: 2024-08-12 | Stop reason: HOSPADM

## 2024-08-07 RX ORDER — BUPRENORPHINE 2 MG/1
2 TABLET SUBLINGUAL 3 TIMES DAILY
Status: DISCONTINUED | OUTPATIENT
Start: 2024-08-07 | End: 2024-08-09

## 2024-08-07 RX ORDER — ONDANSETRON 4 MG/1
4 TABLET, ORALLY DISINTEGRATING ORAL EVERY 6 HOURS PRN
OUTPATIENT
Start: 2024-08-07

## 2024-08-07 RX ORDER — MIRTAZAPINE 30 MG/1
30 TABLET, FILM COATED ORAL AT BEDTIME
Status: DISCONTINUED | OUTPATIENT
Start: 2024-08-07 | End: 2024-08-12 | Stop reason: HOSPADM

## 2024-08-07 RX ORDER — POLYETHYLENE GLYCOL 3350 17 G
4 POWDER IN PACKET (EA) ORAL
Status: DISCONTINUED | OUTPATIENT
Start: 2024-08-07 | End: 2024-08-12 | Stop reason: HOSPADM

## 2024-08-07 RX ORDER — PHENOBARBITAL 64.8 MG/1
64.8 TABLET ORAL 2 TIMES DAILY
Status: DISCONTINUED | OUTPATIENT
Start: 2024-08-07 | End: 2024-08-08

## 2024-08-07 RX ORDER — FOLIC ACID 1 MG/1
5 TABLET ORAL DAILY
Status: DISCONTINUED | OUTPATIENT
Start: 2024-08-08 | End: 2024-08-12 | Stop reason: HOSPADM

## 2024-08-07 RX ORDER — BUPRENORPHINE 2 MG/1
2 TABLET SUBLINGUAL
Status: DISCONTINUED | OUTPATIENT
Start: 2024-08-07 | End: 2024-08-07

## 2024-08-07 RX ORDER — GABAPENTIN 300 MG/1
600 CAPSULE ORAL 3 TIMES DAILY
Status: DISCONTINUED | OUTPATIENT
Start: 2024-08-08 | End: 2024-08-09

## 2024-08-07 RX ADMIN — NICOTINE POLACRILEX 4 MG: 2 LOZENGE ORAL at 20:07

## 2024-08-07 RX ADMIN — MIRTAZAPINE 30 MG: 30 TABLET, FILM COATED ORAL at 21:54

## 2024-08-07 RX ADMIN — GABAPENTIN 300 MG: 300 CAPSULE ORAL at 20:07

## 2024-08-07 RX ADMIN — GABAPENTIN 300 MG: 300 CAPSULE ORAL at 14:10

## 2024-08-07 RX ADMIN — NICOTINE POLACRILEX 4 MG: 2 LOZENGE ORAL at 21:54

## 2024-08-07 RX ADMIN — BUPRENORPHINE HYDROCHLORIDE 2 MG: 2 TABLET SUBLINGUAL at 14:11

## 2024-08-07 RX ADMIN — HYDROXYZINE HYDROCHLORIDE 25 MG: 25 TABLET ORAL at 08:21

## 2024-08-07 RX ADMIN — GABAPENTIN 300 MG: 300 CAPSULE ORAL at 10:43

## 2024-08-07 RX ADMIN — THIAMINE HCL TAB 100 MG 100 MG: 100 TAB at 08:21

## 2024-08-07 RX ADMIN — NICOTINE POLACRILEX 4 MG: 4 GUM, CHEWING BUCCAL at 08:21

## 2024-08-07 RX ADMIN — NICOTINE POLACRILEX 4 MG: 2 LOZENGE ORAL at 10:43

## 2024-08-07 RX ADMIN — PHENOBARBITAL 64.8 MG: 64.8 TABLET ORAL at 20:03

## 2024-08-07 RX ADMIN — ONDANSETRON 4 MG: 4 TABLET, ORALLY DISINTEGRATING ORAL at 12:00

## 2024-08-07 RX ADMIN — NICOTINE POLACRILEX 4 MG: 2 LOZENGE ORAL at 14:12

## 2024-08-07 RX ADMIN — NICOTINE POLACRILEX 4 MG: 2 LOZENGE ORAL at 12:00

## 2024-08-07 RX ADMIN — BUPRENORPHINE HYDROCHLORIDE 2 MG: 2 TABLET SUBLINGUAL at 20:08

## 2024-08-07 RX ADMIN — METHOCARBAMOL 500 MG: 500 TABLET ORAL at 08:21

## 2024-08-07 RX ADMIN — PHENOBARBITAL 64.8 MG: 64.8 TABLET ORAL at 08:21

## 2024-08-07 RX ADMIN — NICOTINE POLACRILEX 4 MG: 4 GUM, CHEWING BUCCAL at 05:39

## 2024-08-07 RX ADMIN — ONDANSETRON 4 MG: 4 TABLET, ORALLY DISINTEGRATING ORAL at 04:39

## 2024-08-07 RX ADMIN — NICOTINE POLACRILEX 4 MG: 2 LOZENGE ORAL at 18:29

## 2024-08-07 RX ADMIN — NICOTINE POLACRILEX 4 MG: 4 GUM, CHEWING BUCCAL at 07:01

## 2024-08-07 RX ADMIN — NICOTINE POLACRILEX 4 MG: 2 LOZENGE ORAL at 16:09

## 2024-08-07 RX ADMIN — FOLIC ACID 1 MG: 1 TABLET ORAL at 08:21

## 2024-08-07 ASSESSMENT — ACTIVITIES OF DAILY LIVING (ADL)
ADLS_ACUITY_SCORE: 28
DRESS: INDEPENDENT
HYGIENE/GROOMING: INDEPENDENT
ADLS_ACUITY_SCORE: 28
ORAL_HYGIENE: INDEPENDENT
HYGIENE/GROOMING: INDEPENDENT
ADLS_ACUITY_SCORE: 28
ORAL_HYGIENE: INDEPENDENT
DRESS: INDEPENDENT
ADLS_ACUITY_SCORE: 28

## 2024-08-07 NOTE — PLAN OF CARE
Problem: Sleep Disturbance  Goal: Adequate Sleep/Rest  Outcome: Progressing     Problem: Pain Acute  Goal: Optimal Pain Control and Function  Outcome: Progressing     Problem: Substance Withdrawal  Goal: Substance Withdrawal  Description: Signs and symptoms of listed problems will be absent or manageable.  Outcome: Progressing  Flowsheets (Taken 8/7/2024 0154)  Substance Withdrawal Assessed: all   Goal Outcome Evaluation:        This patient is observed through the night for Benzos withdrawal symptoms. He scored 4 and 2 on MSSA and COWS; He had complained of Nausea and vomiting X1. Zofran offered after getting an order from the on call MD. He appeared comfortable while sleeping. Breathing was quiet and spontaneous. No safety or behavioral issues were reported or noted.The team will continue to monitor.

## 2024-08-07 NOTE — H&P
"Mr. Zenon Truong is a 54 year old man admitted to [facility] to withdrawal from Opioids and Benzos in the presence of treatment resistant depression.    From [facility] ED note:  \"  Addiction Problem        Patient stopped using fentanyl and xanax and now having withdrawal symptoms. Patient reports hx of withdrawal seizures from benzos      The history is provided by the patient and medical records.      Zenon Truong is a 54 year old male with PMH notable for Afib, HTN, polysubstance use disorder (cannabis, opioid, meth), ALISSON, prior suicidal attempts with recent admission for overdosing on prescribed medications (07/25-07/29/24) who presents to the Emergency Department with withdrawal symptoms.      Patient reports using fentanyl and xanax daily. His last fentanyl use was last morning at 5 AM. He reports taking 1.3-1.4 mg of fentanyl daily. Also reports taking ~2.4mg of xanax daily since 10/2023. He now reports withdrawal symptoms including shaking, diarrhea, anxiety, and inability to tolerate PO. He reports history of withdrawal seizure. He reports occasional mild alcohol use. Denies other drug use. He reports taking Suboxone before but not taking it now.\"    He has a history of anxiety since about age 22 with [ainc and severe discomfort.    Depression started in his early teens with symptoms of hopelessness, isolation, and suicidal feelings and actions.  He estimates he has had over 25 psychiatric hospital stays in 10 years.    Last Fentanyl use was 2 days ago, last Methadone was over one year ago.  He recently failed IM buprenorphine, and has not tried oral by his report, see below.      Medications Prior to Admission   Medication Sig Dispense Refill Last Dose    buprenorphine ER (SUBLOCADE) 300 MG/1.5ML prefilled syringe Inject 300 mg subcutaneously every 30 days   7/23/2024    buprenorphine HCl-naloxone HCl (SUBOXONE) 8-2 MG per film Place 1 Film under the tongue 2 times daily       gabapentin " "(NEURONTIN) 600 MG tablet Take 1,200 mg by mouth 3 times daily       hydrOXYzine (VISTARIL) 50 MG capsule Take 50 mg by mouth 3 times daily as needed for anxiety       methocarbamol (ROBAXIN) 750 MG tablet Take 750 mg by mouth 3 times daily       mirtazapine (REMERON) 30 MG tablet Take 1 tablet (30 mg) by mouth At Bedtime 30 tablet 1     QUEtiapine (SEROQUEL) 50 MG tablet Take 50 mg by mouth every 6 hours as needed (anxiety, agitation)         Past psych meds include Celexa (allergy), Zoloft, Effexor up to 300mg, Cymbalta (GI issues), Lithium (rash), Topamaz (rash), propranolol, Buspoar, Latuda, Risperdal, Seroquel, Strattera,and Abilify.    Family history positive for father with alcohol problems, and mother with depression and possibly bipolar.    Childhood diagnosis:  ADHD    8/7: Blood pressure (!) 125/90, pulse 63, temperature 97.1  F (36.2  C), temperature source Temporal, resp. rate 16, height 1.803 m (5' 11\"), weight 93.9 kg (207 lb), SpO2 98%.  8/7: General appearance: stressed  Alert.   Affect: fair, anxious  Mood: fair    Speech:  normal.   Eye contact:  good.    Psychomotor behavior: slightly increased  Gait: steady   Abnormal movements:  none  Delusions: none  Hallucinations:  none  Thoughts: logical, linear  Associations: intact  Judgement: fair  Insight: good  Cognitions: intact in conversation  Memory:  intact in conversation  Orientation: normal    Not suicidal.    He feels hot/cold, runny nose, body aches.    Patient Active Problem List   Diagnosis    Opioid use disorder, severe, on maintenance therapy (H)    Suicidal ideation    Severe benzodiazepine use disorder (H)    Substance-induced anxiety disorder (H)    Benzodiazepine withdrawal without complication (H)    Cannabis use disorder, moderate, dependence (H)    Cigarette nicotine dependence with withdrawal    Moderate episode of recurrent major depressive disorder (H)    Homelessness    Opioid withdrawal (H)     Past Medical History: "   Diagnosis Date    Hep C w/o coma, chronic (H) 2011    Substance abuse (H)     Testicular cancer (H)      Imp:  Opiod use disorder, severe, recurrent, with withdrawal, complicated  2.  Benzodiazepine use disorder, severe, recurrent, with withdrawal and complications  3.  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    Plan:  Detox with phenobarbital and Subutex  2.  Rx Pristiq (because it is kidney metabolized) and augment with high dose folic acid    Recent Results (from the past 168 hour(s))   Comprehensive metabolic panel    Collection Time: 08/06/24 10:43 AM   Result Value Ref Range    Sodium 141 135 - 145 mmol/L    Potassium 4.3 3.4 - 5.3 mmol/L    Carbon Dioxide (CO2) 24 22 - 29 mmol/L    Anion Gap 12 7 - 15 mmol/L    Urea Nitrogen 13.9 6.0 - 20.0 mg/dL    Creatinine 0.80 0.67 - 1.17 mg/dL    GFR Estimate >90 >60 mL/min/1.73m2    Calcium 9.1 8.8 - 10.4 mg/dL    Chloride 105 98 - 107 mmol/L    Glucose 111 (H) 70 - 99 mg/dL    Alkaline Phosphatase 84 40 - 150 U/L    AST 79 (H) 0 - 45 U/L     (H) 0 - 70 U/L    Protein Total 7.0 6.4 - 8.3 g/dL    Albumin 4.6 3.5 - 5.2 g/dL    Bilirubin Total 0.6 <=1.2 mg/dL   Magnesium    Collection Time: 08/06/24 10:43 AM   Result Value Ref Range    Magnesium 1.9 1.7 - 2.3 mg/dL   CBC with platelets and differential    Collection Time: 08/06/24 10:44 AM   Result Value Ref Range    WBC Count 4.4 4.0 - 11.0 10e3/uL    RBC Count 5.12 4.40 - 5.90 10e6/uL    Hemoglobin 15.5 13.3 - 17.7 g/dL    Hematocrit 42.6 40.0 - 53.0 %    MCV 83 78 - 100 fL    MCH 30.3 26.5 - 33.0 pg    MCHC 36.4 31.5 - 36.5 g/dL    RDW 13.0 10.0 - 15.0 %    Platelet Count 107 (L) 150 - 450 10e3/uL    % Neutrophils 59 %    % Lymphocytes 22 %    % Monocytes 9 %    % Eosinophils 8 %    % Basophils 1 %    % Immature Granulocytes 1 %    NRBCs per 100 WBC 0 <1 /100    Absolute Neutrophils 2.7 1.6 - 8.3 10e3/uL    Absolute Lymphocytes 1.0 0.8 - 5.3 10e3/uL     Absolute Monocytes 0.4 0.0 - 1.3 10e3/uL    Absolute Eosinophils 0.4 0.0 - 0.7 10e3/uL    Absolute Basophils 0.0 0.0 - 0.2 10e3/uL    Absolute Immature Granulocytes 0.0 <=0.4 10e3/uL    Absolute NRBCs 0.0 10e3/uL   Urine Drug Screen Panel    Collection Time: 08/06/24 10:46 AM   Result Value Ref Range    Amphetamines Urine Screen Positive (A) Screen Negative    Barbituates Urine Screen Negative Screen Negative    Benzodiazepine Urine Screen Negative Screen Negative    Cannabinoids Urine Screen Positive (A) Screen Negative    Cocaine Urine Screen Negative Screen Negative    Fentanyl Qual Urine Screen Positive (A) Screen Negative    Opiates Urine Screen Negative Screen Negative    PCP Urine Screen Negative Screen Negative     .  Current Facility-Administered Medications   Medication Dose Route Frequency Provider Last Rate Last Admin    acetaminophen (TYLENOL) tablet 650 mg  650 mg Oral Q4H PRN Ericka Doins APRN CNP        alum & mag hydroxide-simethicone (MAALOX) suspension 30 mL  30 mL Oral Q4H PRN Ericka Donis APRN CNP        buprenorphine (SUBUTEX) sublingual tablet 2 mg  2 mg Sublingual Q2H PRN Reilly Hunter MD        buprenorphine (SUBUTEX) sublingual tablet 2 mg  2 mg Sublingual TID Chava Madrid MD        [START ON 8/8/2024] buprenorphine (SUBUTEX) sublingual tablet 2 mg  2 mg Sublingual Q4H PRN Chava Madrid MD        cloNIDine (CATAPRES) tablet 0.1 mg  0.1 mg Oral Q6H PRN Luis Alfredo Pires MD   0.1 mg at 08/06/24 1106    [START ON 8/8/2024] desvenlafaxine succinate (PRISTIQ) 24 hr tablet 25 mg  25 mg Oral Daily Chava Madrid MD        dicyclomine (BENTYL) capsule 20 mg  20 mg Oral TID PRN Luis Alfredo Pires MD        [START ON 8/8/2024] folic acid (FOLVITE) tablet 5 mg  5 mg Oral Daily Chava Madrid MD        gabapentin (NEURONTIN) capsule 300 mg  300 mg Oral TID Chava Madrid MD   300 mg at 08/07/24 1043    [START ON 8/8/2024] gabapentin (NEURONTIN) capsule 600 mg  600 mg  Oral TID Chava Madrid MD        hydrOXYzine HCl (ATARAX) tablet 25 mg  25 mg Oral Q4H PRN Ericka Donis APRN CNP   25 mg at 08/07/24 0821    ibuprofen (ADVIL/MOTRIN) tablet 600 mg  600 mg Oral Q6H PRN Ericka Donis APRN CNP        loperamide (IMODIUM) capsule 2 mg  2 mg Oral Q4H PRN Luis Alfredo Pires MD   2 mg at 08/06/24 1105    methocarbamol (ROBAXIN) tablet 500 mg  500 mg Oral TID PRN Ericka Donis APRN CNP   500 mg at 08/07/24 0821    mirtazapine (REMERON) tablet 30 mg  30 mg Oral At Bedtime Chava Madrid MD        naloxone (NARCAN) injection 0.2 mg  0.2 mg Intravenous Q2 Min PRN Chava Madrid MD        Or    naloxone (NARCAN) injection 0.4 mg  0.4 mg Intravenous Q2 Min PRN Chava Madrid MD        Or    naloxone (NARCAN) injection 0.2 mg  0.2 mg Intramuscular Q2 Min PRN Chava Madrid MD        Or    naloxone (NARCAN) injection 0.4 mg  0.4 mg Intramuscular Q2 Min PRN Chava Madrid MD        nicotine (COMMIT) lozenge 4 mg  4 mg Buccal Q1H PRN Chava Madrid MD   4 mg at 08/07/24 1043    ondansetron (ZOFRAN ODT) ODT tab 4 mg  4 mg Oral Q6H PRN Fifi Virgen MD   4 mg at 08/07/24 0439    senna-docusate (SENOKOT-S/PERICOLACE) 8.6-50 MG per tablet 1 tablet  1 tablet Oral BID PRN Ericka Donis APRN CNP        thiamine (B-1) tablet 100 mg  100 mg Oral Daily Luis Alfredo Pires MD   100 mg at 08/07/24 0821    traZODone (DESYREL) tablet 50 mg  50 mg Oral At Bedtime PRN Ericka Donis APRN CNP

## 2024-08-07 NOTE — PLAN OF CARE
".Problem: Substance Withdrawal  Goal: Substance Withdrawal  Description: Signs and symptoms of listed problems will be absent or manageable.  Outcome: Progressing    Patient is being monitored for benzodiazepine and opioid withdrawal. MSSA scores are 6 and 2, and COWS scores are 4 and 2 for this shift. The patient is on scheduled SUBUTEX (buprenorphine) x 3 and Phenobarbital 68.4 mg x 2. The patient reports chills and symptoms of feeling hot and cold. PRN clonidine was offered but declined by the patient, who expressed concern that it would lower his pulse rate. Blood pressure was elevated during the assessment, and the patient also reported feeling somewhat dizzy. The patient was encouraged to increase fluid intake and rest. BP remains slightly elevated.    1600 Vitals .BP (!) 153/109 (BP Location: Left arm)   Pulse 86   Temp 98  F (36.7  C) (Oral)   Resp 16    SpO2 92%      2000 Vitals: .BP (!) 140/98 (BP Location: Left arm)   Pulse 64   Temp 97.5  F (36.4  C) (Temporal)   Resp 16   Ht 1.803 m (5' 11\")   Wt 93.9 kg (207 lb)   SpO2 95%       "

## 2024-08-07 NOTE — CONSULTS
Behavioral Team Discussion: (8/7/2024)    Continued Stay Criteria/Rationale: Patient admitted for Chemical Use Issues.  Plan: The following services will be provided to the patient; psychiatric assessment, medication management, therapeutic milieu, individual and group support, and skills groups.   Participants: 3A Provider: Dr. Chava Madrid MD; 3A RN:  Kay Moran RN; 3A CM's:  BLOSSOM Navarrete .  Summary/Recommendation: Providers will assess today for treatment recommendations, discharge planning, and aftercare plans. CM will meet with pt for discharge planning.   Medical/Physical: pt reports withdrawal symptoms including shaking, diarrhea, anxiety, and inability to tolerate PO.   Precautions:   Behavioral Orders   Procedures    Code 1 - Restrict to Unit    Routine Programming     As clinically indicated    Status 15     Every 15 minutes.    Withdrawal precautions     Rationale for change in precautions or plan: N/A  Progress: Initial.    ASAM Dimension Scale Ratings:  Dimension 1: 1 Client can tolerate and cope with withdrawal discomfort. The client displays mild to moderate intoxication or signs and symptoms interfering with daily functioning but does not immediately endanger self or others. Client poses minimal risk of severe withdrawal.  Dimension 2: 1 Client tolerates and kena with physical discomfort and is able to get the services that the client needs.  Dimension 3: 2 Client has difficulty with impulse control and lacks coping skills. Client has thoughts of suicide or harm to others without means; however, the thoughts may interfere with participation in some treatment activities. Client has difficulty functioning in significant life areas. Client has moderate symptoms of emotional, behavioral, or cognitive problems. Client is able to participate in most treatment activities.  Dimension 4: 2 Client displays verbal compliance, but lacks consistent behaviors; has low motivation for change; and is  passively involved in treatment.  Dimension 5: 4 No awareness of the negative impact of mental health problems or substance abuse. No coping skills to arrest mental health or addiction illnesses, or prevent relapse.  Dimension 6: 4 Client has (A) Chronically antagonistic significant other, living environment, family, peer group or long-term criminal justice involvement that is harmful to recovery or treatment progress, or (B) Client has an actively antagonistic significant other, family, work, or living environment with immediate threat to the client's safety and well-being.

## 2024-08-07 NOTE — CONSULTS
[facility]  Internal Medicine Consult     Zenon Truong MRN# [id number]   Age: 54 year old YOB: 1970     Date of Admission: 8/6/2024  Date of Consult: 8/7/2024    Primary Care Provider: No Ref-Primary, Physician    Requesting Service: Behavioral Health - Chava Madrid MD  Reason for Consult: General Medical Evaluation      SUBJECTIVE   CC:   Fatigue   Assessment and Plan/Recommendations:     Zenon Truong is a 54 year old man with a history of opioid dependence, polysubstance abuse, depression, anxiety, prior suicidal attempts, PAF, hypertension, HCV. Admitted to  8/9/24 for medical management of withdrawal from fentanyl and xanax.     # Polysubstance abuse (cannabis, opioids, meth, benzo)  Pt reports using fentanyl and xanax daily. He reports taking 1.3-1.4 mg of fentanyl daily and ~ 2.4 mg of xanax daily since 10/2023. Presents with withdrawal symptoms including shaking, diarrhea, anxiety, and inability to tolerate PO. Hx of withdrawal seizures. Occasional mild alcohol use. Has been on suboxone in the past.   - Management per psychiatry    # Transaminitis  AST 79, , T bili 0.6, alk phos 141. Abd US 2/15/24 normal gallbladder, fatty liver. C/o nausea.    - Encourage alcohol and polysubstance cessation    # Thrombocytopenia  Several year history of low platelet count due to ETOH abuse causing bone marrow suppression. No s/s of bleeding. PLT count currently 107.  - No aspirin, ibuprofen, naproxen, or other non-steroidal anti-inflammatory drugs  - Alcohol cessation  - Continue to manage outpatient with PCP     # Hx of hypertension  # Hx of PAF  Not currently on medication. Normotensive, regular rate and rhythm.   - Continue to monitor    # Chronic back pain  - APAP, robaxin, ibuprofen PRN     Currently, medically stable and internal medicine will sign off. Please contact if future questions or concerns arise. Thank you for the opportunity to be a part of this  patient's care.      HERBERT Davis CNP  Internal Medicine [facility] Hospitalist  Securely message with the [employer] Web Console (learn more here)  Text paging via [employer] is appreciated  August 7, 2024         HPI:   Zenon Truong is a 54 year old man with a history of opioid dependence, polysubstance abuse, depression, anxiety, prior suicidal attempts, PAF, hypertension, HCV. Admitted to  8/9/24 for medical management of withdrawal from fentanyl and xanax.   Pt reports using fentanyl and xanax daily. He reports taking 1.3-1.4 mg of fentanyl daily and ~ 2.4 mg of xanax daily since 10/2023. Presents with withdrawal symptoms including shaking, diarrhea, anxiety, and inability to tolerate PO. Hx of withdrawal seizures. Occasional mild alcohol use. Has been on suboxone in the past.   Feels generally unwell with body aches, fatigue and nausea. Falling asleep during exam, calm and cooperative. Denies other needs or concerns for Medicine at this time.        Past Medical History:     Past Medical History:   Diagnosis Date    Hep C w/o coma, chronic (H) 2011    Substance abuse (H)     Testicular cancer (H)         Reviewed and updated in [employer].     Past Surgical History:      Past Surgical History:   Procedure Laterality Date    GENITOURINARY SURGERY  1996    Testicular CA     TESTICLE SURGERY Bilateral          Social History:     Social History     Tobacco Use    Smoking status: Every Day    Smokeless tobacco: Never    Tobacco comments:     Switched to e cigarettes   Substance Use Topics    Alcohol use: No    Drug use: Yes     Types: Benzodiazepines        Family History:     Family History   Problem Relation Age of Onset    Cancer Mother     Mental Illness Mother     Cancer Father     Cancer Sister     Depression Mother     Alcoholism Father     Substance Abuse Sister          Allergies:     Allergies   Allergen Reactions    Iodinated Contrast Media Hives and Rash     Reports he is allergic to Iodine contrast     Reports  "he is allergic to Iodine contrast       Reports he is allergic to Iodine contrast    Reports he is allergic to Iodine contrast      Reports he is allergic to Iodine contrast   Reports he is allergic to Iodine contrast    Iodine Hives    Lisinopril Hives and Rash    Lithium Hives    Topiramate Hives    Escitalopram Rash     Rash around mouth    Iodine-131 Hives    Trazodone Anxiety     Trazodone causes RLS for pt    Olanzapine Rash     Pt reported taking approximately one year ago. Developed rash around mouth.         Medications:   Reviewed. Please see MAR     Review of Systems:   10 point ROS of systems including Constitutional, Eyes, Respiratory, Cardiovascular, Gastroenterology, Genitourinary, Integumentary, Muscularskeletal, Psychiatric were all negative except for pertinent positives noted in my HPI.    OBJECTIVE   Physical Exam:   Vitals were reviewed  Blood pressure 128/89, pulse 71, temperature 96.8  F (36  C), temperature source Temporal, resp. rate 16, height 1.803 m (5' 11\"), weight 93.9 kg (207 lb), SpO2 100%.  General: Alert and oriented x3, mild distress, fatigue  HEENT: Anicteric sclera, MMM  Cardiovascular: RRR, S1S2. No murmur noted  Lungs: CTAB without wheezing or crackles   GI: Abdomen soft, non-tender with bowel sounds present. No guarding or rebound   Vascular: No peripheral edema, distal pulses palpable  Neurologic: No focal deficits, CN II-XII grossly intact  Skin: No jaundice, rashes, or lesions        Data:        Lab Results   Component Value Date     08/06/2024     07/30/2018    Lab Results   Component Value Date    CHLORIDE 105 08/06/2024    CHLORIDE 104 07/30/2018    Lab Results   Component Value Date    BUN 13.9 08/06/2024    BUN 8 07/30/2018      Lab Results   Component Value Date    POTASSIUM 4.3 08/06/2024    POTASSIUM 3.8 07/30/2018    Lab Results   Component Value Date    CO2 24 08/06/2024    CO2 28 07/30/2018    Lab Results   Component Value Date    CR 0.80 08/06/2024 "    CR 0.76 2018        Lab Results   Component Value Date    WBC 4.4 2024    HGB 15.5 2024    HCT 42.6 2024    MCV 83 2024     (L) 2024     Lab Results   Component Value Date    WBC 4.4 2024           Medical Decision Makin MINUTES SPENT BY ME on the date of service doing chart review, history, exam, documentation & further activities per the note.

## 2024-08-07 NOTE — PROGRESS NOTES
"Email from Nurse Deidra Gonzalez at [facility] regarding patient's attempt at admission to [facility] prior to hospital admission (TIERNEY on file):    \"....He was recently admitted to our treatment program yesterday afternoon (8/05) and withdrawing from Xanax. I completed his nursing intake today and recommended detox. He arrived here with no medications and no established providers. He reports last Xanax use on Saturday 8/03/24 and last Fentanyl use yesterday 8/05/24. He stated he had been using Xanax 2-4mg along with Fentanyl daily for the last 10 months or more. He has history of multiple seizures d/t withdrawal from Xanax. He stated he was not prescribed Xanax by any provider and was getting his supply  from a friend .\"    Writer will coordinate with Deidra as needed if patient wishes to return to [facility]  "

## 2024-08-07 NOTE — PLAN OF CARE
Group Attendance:  attended partial group    Time session began: 1645  Time session ended: 1730  Patient's total time in group: 20    Total # Attendees   6   Group Type psychotherapeutic and DBT     Group Topic Covered insight improvement, healthy coping skills, and DBT skills: IMPROVE     Group Session Detail Group members checked in before the activity. Group members discussed the DBT acronym IMPROVE, discussing them meaning of each and putting them into practice. Writer then led a guided meditation for Urge Surfing. After pts checked in with what they noticed during or after the exercise. Pt's were offered worksheets with descriptions of both exercises.        Patient's response to the group topic/interactions:  cooperative with task and listened actively     Patient Details: Pt attended group for the first 20 minutes, listened and answered questions when asked directly. Pt left group early.            45032 - Group psychotherapy - 1 Session    Patient Active Problem List   Diagnosis    Opioid use disorder, severe, on maintenance therapy (H)    Suicidal ideation    Severe benzodiazepine use disorder (H)    Substance-induced anxiety disorder (H)    Benzodiazepine withdrawal without complication (H)    Cannabis use disorder, moderate, dependence (H)    Cigarette nicotine dependence with withdrawal    Moderate episode of recurrent major depressive disorder (H)    Homelessness    Opioid withdrawal (H)

## 2024-08-07 NOTE — PHARMACY-ADMISSION MEDICATION HISTORY
Admission Medication History Completed by Pharmacy    See [facility] Admission Navigator for allergy information, preferred outpatient pharmacy, prior to admission medications and immunization status.     Medication history sources:  Patient via phone; Surescripts dispense history; chart review     Pertinent changes made to PTA medication list:  Added: N/A  Deleted:   - Suboxone 8-2 mg films (per patient - no longer taking; only on Sublocade injection)   Changed: N/A    Additional medication history information:   - Patient denies taking any additional Rx/OTC medications other than the ones listed below.    Prior to Admission medications    Medication Sig Last Dose Taking? Auth Provider Long Term End Date   buprenorphine ER (SUBLOCADE) 300 MG/1.5ML prefilled syringe Inject 300 mg subcutaneously every 30 days 7/23/2024 Yes Unknown, Entered By History     gabapentin (NEURONTIN) 600 MG tablet Take 1,200 mg by mouth 3 times daily Past Week Yes Unknown, Entered By History No    hydrOXYzine (VISTARIL) 50 MG capsule Take 50 mg by mouth 3 times daily as needed for anxiety Past Week Yes Unknown, Entered By History     mirtazapine (REMERON) 30 MG tablet Take 1 tablet (30 mg) by mouth At Bedtime Past Week Yes Avery Bolton MD Yes           Date completed: 08/07/24    Medication history completed by:   Diana Phoenix, PharmD  *[id number]

## 2024-08-07 NOTE — PSYCH
Writer received call from nurse reporting that patient has nausea and requesting Zofran. Order placed for Zofran prn.

## 2024-08-07 NOTE — PLAN OF CARE
"  Problem: Adult Behavioral Health Plan of Care  Goal: Plan of Care Review  Outcome: Progressing     Problem: Pain Acute  Goal: Optimal Pain Control and Function  Outcome: Progressing  Intervention: Develop Pain Management Plan  Recent Flowsheet Documentation  Taken 8/7/2024 0826 by Kay Moran RN  Pain Management Interventions: medication (see MAR)     Problem: Substance Withdrawal  Goal: Substance Withdrawal  Description: Signs and symptoms of listed problems will be absent or manageable.  Outcome: Progressing   Goal Outcome Evaluation:                      Patient alert and oriented on approach. He reported nausea and prn was previously given (too soon to administer another dose during 0800 assessment).   He reported back pain at 10/10-prn robaxin administered. Robaxin was effective per patient. Reported increasing anxiety-prn hydroxyzine 25 mg administered. Patient vitals noted /89   Pulse 71   Temp 96.8  F (36  C) (Temporal)   Resp 16   Ht 1.803 m (5' 11\")   Wt 93.9 kg (207 lb)   SpO2 100%   BMI 28.87 kg/m    He denied SI, HI, hallucinations and contracted for safety. Mild sadness noted. He remains safe on the unit and have been slightly engaging with others and out of his room on and off.    MSSA at 0800 assessment was 6 and COWS was 4-no prn medications administered for withdrawal symptoms.     1200 MSSA assessment completed and patient scored 4 and COWS 4-no prn administered for withdrawal symptoms.  Vitals noted BP (!) 125/90   Pulse 63   Temp 97.1  F (36.2  C) (Temporal)   Resp 16   Ht 1.803 m (5' 11\")   Wt 93.9 kg (207 lb)   SpO2 98%   BMI 28.87 kg/m    Patient given prn Zofran for nausea and he remains calm and safe on the unit. No further concerns noted.    "

## 2024-08-07 NOTE — PROGRESS NOTES
"Jeronimo Langston's goals for this visit include: Follow up on right knee pain. Discuss injection and other treatment options.   He requests these members of his care team be copied on today's visit information: yes    PCP: None    Referring Provider:  No referring provider defined for this encounter.    Chief Complaint   Patient presents with     RECHECK     Recheck right knee. Discuss injection.        Initial /89  Pulse 91  SpO2 93% Estimated body mass index is 32.17 kg/(m^2) as calculated from the following:    Height as of 8/15/16: 1.753 m (5' 9\").    Weight as of 8/15/16: 98.8 kg (217 lb 13 oz).  Medication Reconciliation: complete  " [facility]     Case Management Encounter: Writer met with patient to discuss after care plans. Patient reported he arrived at [facility] location   And was under the influence so he was recommended detox (this was confirmed by [facility] nursing who emailed an TIERNEY to writer). Patient reports he would like to return to [facility] once out of detox status.     Insurance: [facility] [employer] PMAP     Legal Status: vol     SUDs Assessment Status: does not need     ROIs on file: [facility]     Living Situation: [place]    Current Providers, Supports & Collateral:  Friends and family, [facility] staff    Current Plan/Referral Status: Return to [facility] once out of detox status. (Writer confirmed they are holding a bed for patient for 8/9 - writer will schedule a ride - time is up to [facility], [facility] is prepared for him anytime Friday)    [facility]:  [address] [place] [address] [place] [address]  Phone: [phone]  Fax: [phone]  Email: [email or web address]    RN updated.    Monique Bailey  [facility] - Adult Inpatient Addiction Psychiatry Unit

## 2024-08-07 NOTE — DISCHARGE INSTRUCTIONS
Behavioral Discharge Planning and Instructions  THANK YOU FOR CHOOSING [facility]  3A  [phone]    Summary: You were admitted to [facility] on 8/6/24 for detoxification from Benzodiazepines and Opiates.  A medical exam was performed that included lab work. You have met with a  and opted to attend [facility].  Please take care and make your recovery a daily priority, Zenon!  It was a pleasure working with you and the entire treatment team here wishes you the very best in your recovery!     Recommendation:  Attend residential JOSIAH treatment at [facility]. Complete program and follow all aftercare recommendations. Abstain from using mood altering substances.     Main Diagnoses:  Per Dr. Chava Madrid MD;  304.10 (F13.20) Sedative, Hypnotic, Anxiolytic Use Disorder Severe    Major Treatments, Procedures and Findings:  You were treated for benzodiazepine detoxification using phenobarbital.  You did not complete a chemical dependency assessment. You had labs drawn and those results were reviewed with you. Please take a copy of your lab work with you to your next primary care provider appointment.    Symptoms to Report:  If you experience more anxiety, confusion, sleeplessness, deep sadness or thoughts of suicide, notify your treatment team or notify your primary care provider. IF ANY OF THE SYMPTOMS YOU ARE EXPERIENCING ARE A MEDICAL EMERGENCY CALL 911 IMMEDIATELY.     Lifestyle Adjustment: Adjust your lifestyle to get enough sleep, relaxation, exercise and good nutrition. Continue to develop healthy coping skills to decrease stress and promote a sober living environment. Do not use mood altering substances including alcohol, illegal drugs or addictive medications other than what is currently prescribed.     Disposition: [facility]:  [address] [place] [address]  Phone: [phone]  Fax: [phone]    Facts about COVID19 at www.cdc.gov/COVID19  and www.[place].gov/covid19    Keeping hands clean is one of the most important steps we can take to avoid getting sick and spreading germs to others.  Please wash your hands frequently and lather with soap for at least 20 seconds!    Residential JOSIAH Treatment:   [facility] House:   [address]  [place] [address]  Phone: [phone]  Fax: [phone]  Email: [email or web address]    Akil Bowen at 8 am for continued suboxone maintainace    Recovery apps for your phone for educational purposes and to locate in person and zoom recovery meetings  Everything AA -  matthew is a great resource  12 Step Toolkit - educational purpose learning about the 12 steps to recovery  [employer] Cloud - meeting matthew  AA  - meeting matthew  Meeting guide - meeting matthew  Quick NA meeting - meeting matthew  Layne- has various apps    Resources:  AA/NA meetings have returned to in-person or a hybrid combination of zoom/in-person therefore please check online to verify*  Need Support Now? If you or someone you know is struggling or in crisis, help is available. Call or text "[employer] or chat [employer].Categorical  AA meetings search for them at: https://aa-intergroup.org (worldwide meeting listings)  AA meetings for [place] area can be found online at: https://a[place].org (click local online meetings listings)  NA meetings for [place] area can be found online at: https://www.nam[place].org  (click find a meeting)  AA and NA Sponsors are excellent resources for support and you can find one at any support group meeting.   Alcoholics Anonymous (https://aa.org/): for information 24 hours/day  AA Intergroup service office in [place] (http://www.aastpaul.org/) 519.445.3013  AA Intergroup service office in [facility]: 133.145.3745. (http://www.a[place].org/)  Narcotics Anonymous (www.nam[place].org) (371) 180-6724  https://aafairviewriverside.org/meetings  SMART Recovery - self management for addiction recovery:   "www.AMSC.org  Pathways ~ A Health Crisis Resource & Support Center:  617.332.5791.  https://prescribetoprevent.org/patient-education/videos/  http://www.harmreduction.org  Crisis Text Line  Text 009330  You will be connected with a trained live crisis counselor to provide support. Por espanol, texto  MICHAEL a 170402 o texto a 442-AYUDAME en WhatsApp  [place] Hope Line  1.800.SUICIDE [9554834]  [facility] 263-550-8839  Support Group:  AA/NA and Sponsor/support.  Fast Tracker  Linking people to mental health and substance use disorder resources  Intelligent Portal [employer]as[employer].[address] [facility] FACC Fund I   [place] Mental Health Warm Line  Peer to peer support  Monday thru Saturday, 12 pm to 10 pm  525.686.5092 or 6.727.829.8113  Text \"Support\" to 38025  National [place] on Mental Illness (GEMA)  321.420.8970 or 1888.GEMA.HELPS  Alcoholics Anonymous (www.alcoholics-anonymous.org): Check your phone book for your local chapter.  Suicide Awareness Voices of Education (SAVE) (www.save.org): 156-498-VOAM (7283)  National Suicide Prevention Line (www.mentalhealthmn.org): 370-744-EIZV (4164)  Mental Health Consumer/Survivor Network of [place] (www.mhcsn.net): 819.518.7938 or 774-380-6849  Mental Health Association of [place] (www.mentalhealth.org): 652.832.1360 or 729-175-6555   Substance Abuse and Mental Health Services (www.samhsa.gov)  [place] Opioid Prevention Coalition: www.opioidcoalition.org    [place] Recovery Connection (MRC)  [facility] connects people seeking recovery to resources that help foster and sustain long-term recovery.  Whether you are seeking resources for treatment, transportation, housing, job training, education, health care or other pathways to recovery, [facility] is a great place to start.  813.263.6048.  www.sharing.it.org    Great Pod casts for nutrition and wellness  Listen on Apple Podcasts  Dishing Up Nutrition   [employer]   Nutrition       Understand the connection between what you " eat and how you feel. Hosted by licensed nutritionists and dietitians from [employer] we share practical, real-life solutions for healthier living through nutrition.     General Medication Instructions:   See your medication sheet(s) for instructions.   Take all medications as prescribed.  Make no changes unless your primary care provider suggests them.   Go to all your primary care provider visits.  Be sure to have all your required lab tests. This way, your medicines can be refilled on time.  Do not use any forms of alcohol.    Please Note:  If you have any questions at anytime after you are discharged please call [facility] detox unit 3AW at [phone].  [facility], Behavioral Intake [phone]  Medical Records call [phone]  Outpatient Behavioral Intake call [phone]  LP+ Wait List/Bed Availability call [phone]    Please remember to take all of your behavioral discharge planning and lab paperwork to any follow up appointments, it contains your lab results, diagnosis, medication list and discharge recommendations.      THANK YOU FOR CHOOSING [facility]

## 2024-08-08 PROCEDURE — 250N000013 HC RX MED GY IP 250 OP 250 PS 637: Performed by: NURSE PRACTITIONER

## 2024-08-08 PROCEDURE — 250N000013 HC RX MED GY IP 250 OP 250 PS 637: Performed by: EMERGENCY MEDICINE

## 2024-08-08 PROCEDURE — 250N000013 HC RX MED GY IP 250 OP 250 PS 637: Performed by: PSYCHIATRY & NEUROLOGY

## 2024-08-08 PROCEDURE — 128N000004 HC R&B CD ADULT

## 2024-08-08 PROCEDURE — 250N000012 HC RX MED GY IP 250 OP 636 PS 637: Performed by: PSYCHIATRY & NEUROLOGY

## 2024-08-08 RX ORDER — PHENOBARBITAL 64.8 MG/1
64.8 TABLET ORAL 3 TIMES DAILY
Status: DISCONTINUED | OUTPATIENT
Start: 2024-08-08 | End: 2024-08-09

## 2024-08-08 RX ADMIN — GABAPENTIN 600 MG: 300 CAPSULE ORAL at 08:06

## 2024-08-08 RX ADMIN — NICOTINE POLACRILEX 4 MG: 2 LOZENGE ORAL at 20:09

## 2024-08-08 RX ADMIN — NICOTINE POLACRILEX 4 MG: 2 LOZENGE ORAL at 08:08

## 2024-08-08 RX ADMIN — NICOTINE POLACRILEX 4 MG: 2 LOZENGE ORAL at 09:14

## 2024-08-08 RX ADMIN — BUPRENORPHINE HYDROCHLORIDE 2 MG: 2 TABLET SUBLINGUAL at 20:09

## 2024-08-08 RX ADMIN — HYDROXYZINE HYDROCHLORIDE 25 MG: 25 TABLET ORAL at 04:27

## 2024-08-08 RX ADMIN — NICOTINE POLACRILEX 4 MG: 2 LOZENGE ORAL at 11:18

## 2024-08-08 RX ADMIN — FOLIC ACID 5 MG: 1 TABLET ORAL at 08:06

## 2024-08-08 RX ADMIN — BUPRENORPHINE HYDROCHLORIDE 2 MG: 2 TABLET SUBLINGUAL at 08:10

## 2024-08-08 RX ADMIN — NICOTINE POLACRILEX 4 MG: 2 LOZENGE ORAL at 18:12

## 2024-08-08 RX ADMIN — PHENOBARBITAL 64.8 MG: 64.8 TABLET ORAL at 20:09

## 2024-08-08 RX ADMIN — PHENOBARBITAL 64.8 MG: 64.8 TABLET ORAL at 13:38

## 2024-08-08 RX ADMIN — THIAMINE HCL TAB 100 MG 100 MG: 100 TAB at 08:06

## 2024-08-08 RX ADMIN — NICOTINE POLACRILEX 4 MG: 2 LOZENGE ORAL at 06:41

## 2024-08-08 RX ADMIN — NICOTINE POLACRILEX 4 MG: 2 LOZENGE ORAL at 16:37

## 2024-08-08 RX ADMIN — DESVENLAFAXINE 25 MG: 25 TABLET, EXTENDED RELEASE ORAL at 08:06

## 2024-08-08 RX ADMIN — NICOTINE POLACRILEX 4 MG: 2 LOZENGE ORAL at 13:38

## 2024-08-08 RX ADMIN — NICOTINE POLACRILEX 4 MG: 2 LOZENGE ORAL at 04:25

## 2024-08-08 RX ADMIN — PHENOBARBITAL 64.8 MG: 64.8 TABLET ORAL at 08:06

## 2024-08-08 RX ADMIN — BUPRENORPHINE HYDROCHLORIDE 2 MG: 2 TABLET SUBLINGUAL at 13:38

## 2024-08-08 RX ADMIN — GABAPENTIN 600 MG: 300 CAPSULE ORAL at 20:08

## 2024-08-08 RX ADMIN — GABAPENTIN 600 MG: 300 CAPSULE ORAL at 13:38

## 2024-08-08 ASSESSMENT — ACTIVITIES OF DAILY LIVING (ADL)
ORAL_HYGIENE: INDEPENDENT
ADLS_ACUITY_SCORE: 28
DRESS: INDEPENDENT
ADLS_ACUITY_SCORE: 28
HYGIENE/GROOMING: INDEPENDENT
ORAL_HYGIENE: INDEPENDENT
ADLS_ACUITY_SCORE: 28
ADLS_ACUITY_SCORE: 28
HYGIENE/GROOMING: INDEPENDENT
ADLS_ACUITY_SCORE: 28
DRESS: INDEPENDENT
ADLS_ACUITY_SCORE: 28

## 2024-08-08 NOTE — PLAN OF CARE
Problem: Substance Withdrawal  Goal: Substance Withdrawal  Description: Signs and symptoms of listed problems will be absent or manageable.  Outcome: Progressing   Goal Outcome Evaluation:    Plan of Care Reviewed With: patient        Pt presented this am with significant tremor, facial twitching and involuntary muscle movement.  Pt reported feeling unsettled.  Gabapentin, phenobarbital and buprenorphine given with little improvement --discussed issue with psychiatry and dose of phenobarbital was increased to 64.2 TID.  Patient denies SI SIB.  He is awake and alert, denies somnolence form phenobarbital.  Gait is steady and speech is clear.  Will closely monitor changes in symptoms.  Pt is planning to attend [facility] when discharged--pt has already made arrangements for admission.  Will continue to monitor and assist with discharge planning

## 2024-08-08 NOTE — PLAN OF CARE
Problem: Sleep Disturbance  Goal: Adequate Sleep/Rest  Outcome: Progressing   Goal Outcome Evaluation:    Pt is in detox for benzos and opioids. On COWS and MSSA.  Pt slept well, did not appear to be in any distress.

## 2024-08-08 NOTE — PROGRESS NOTES
Writer emailed nursing at [facility] to confirm patient can attend program on a Phenobarbital taper. Will update nursing when she responds.     [facility] Nursing Contact:   Deidra Gonzalez RN  [facility]  [address] [place] [address]  P: [phone]  F: [phone]    Discharge Address: (needs transportation)  [facility] House:  [address] [place] [address]  Phone: [phone]  Fax: [phone]    [facility] complete

## 2024-08-08 NOTE — PLAN OF CARE
"Problem: Substance Withdrawal  Goal: Substance Withdrawal  Description: Signs and symptoms of listed problems will be absent or manageable.  Outcome: Progressing   Plan of Care Reviewed With: patient    Patient continues monitored for benzodiazepines and opioids. COWS score 3 and 4; MSSA score 4, 5.  Patient is out and visible on the unit. Reports eating 100% of meals and hydrating well. Reports mild anxiety and sweating. Notably asymptomatic bradycardia with a pulse of 54 during initial assessments; patient reports this is their baseline.     Patient on Scheduled SUBUTEX 3 times daily and Phenobarbital 68.4 mg 3 times daily  No medication side effects reported. Pt Able to verbalize needs effectively.    Vital Signs:  1600: ./88 (BP Location: Left arm)   Pulse 54   Temp 97.5  F (36.4  C) (Temporal)   Resp 16   SpO2 99%  .  /79   Pulse 77   Temp 97.7  F (36.5  C)   Resp 16   Ht 1.803 m (5' 11\")     SpO2 100%      Discharge plan to [facility] after detox.    "

## 2024-08-09 ENCOUNTER — DOCUMENTATION ONLY (OUTPATIENT)
Dept: BEHAVIORAL HEALTH | Facility: CLINIC | Age: 54
End: 2024-08-09
Payer: COMMERCIAL

## 2024-08-09 PROCEDURE — 250N000012 HC RX MED GY IP 250 OP 636 PS 637: Performed by: PSYCHIATRY & NEUROLOGY

## 2024-08-09 PROCEDURE — 250N000013 HC RX MED GY IP 250 OP 250 PS 637: Performed by: EMERGENCY MEDICINE

## 2024-08-09 PROCEDURE — 128N000004 HC R&B CD ADULT

## 2024-08-09 PROCEDURE — 250N000013 HC RX MED GY IP 250 OP 250 PS 637: Performed by: PSYCHIATRY & NEUROLOGY

## 2024-08-09 RX ORDER — DESVENLAFAXINE 25 MG/1
25 TABLET, EXTENDED RELEASE ORAL DAILY
Qty: 30 TABLET | Refills: 3 | Status: SHIPPED | OUTPATIENT
Start: 2024-08-10

## 2024-08-09 RX ORDER — METHOCARBAMOL 500 MG/1
500 TABLET, FILM COATED ORAL 3 TIMES DAILY PRN
Qty: 90 TABLET | Refills: 3 | Status: SHIPPED | OUTPATIENT
Start: 2024-08-09

## 2024-08-09 RX ORDER — GABAPENTIN 600 MG/1
1200 TABLET ORAL 3 TIMES DAILY
Qty: 180 TABLET | Refills: 2 | Status: SHIPPED | OUTPATIENT
Start: 2024-08-09

## 2024-08-09 RX ORDER — GABAPENTIN 400 MG/1
1200 CAPSULE ORAL 3 TIMES DAILY
Status: DISCONTINUED | OUTPATIENT
Start: 2024-08-09 | End: 2024-08-12 | Stop reason: HOSPADM

## 2024-08-09 RX ORDER — BUPRENORPHINE 2 MG/1
4 TABLET SUBLINGUAL 3 TIMES DAILY
Status: DISCONTINUED | OUTPATIENT
Start: 2024-08-09 | End: 2024-08-12 | Stop reason: HOSPADM

## 2024-08-09 RX ORDER — HYDROXYZINE PAMOATE 50 MG/1
50 CAPSULE ORAL 3 TIMES DAILY PRN
Qty: 90 CAPSULE | Refills: 2 | Status: SHIPPED | OUTPATIENT
Start: 2024-08-09

## 2024-08-09 RX ORDER — PHENOBARBITAL 64.8 MG/1
64.8 TABLET ORAL
Status: DISCONTINUED | OUTPATIENT
Start: 2024-08-09 | End: 2024-08-12 | Stop reason: HOSPADM

## 2024-08-09 RX ORDER — PHENOBARBITAL 32.4 MG/1
TABLET ORAL
Qty: 37 TABLET | Refills: 0 | Status: SHIPPED | OUTPATIENT
Start: 2024-08-09

## 2024-08-09 RX ORDER — FOLIC ACID 1 MG/1
5 TABLET ORAL DAILY
Qty: 150 TABLET | Refills: 2 | Status: SHIPPED | OUTPATIENT
Start: 2024-08-10

## 2024-08-09 RX ORDER — MIRTAZAPINE 30 MG/1
30 TABLET, FILM COATED ORAL AT BEDTIME
Qty: 30 TABLET | Refills: 2 | Status: SHIPPED | OUTPATIENT
Start: 2024-08-09

## 2024-08-09 RX ORDER — POLYETHYLENE GLYCOL 3350 17 G
4 POWDER IN PACKET (EA) ORAL
Qty: 100 LOZENGE | Refills: 4 | Status: SHIPPED | OUTPATIENT
Start: 2024-08-09

## 2024-08-09 RX ADMIN — BUPRENORPHINE 4 MG: 2 TABLET SUBLINGUAL at 13:03

## 2024-08-09 RX ADMIN — DESVENLAFAXINE 25 MG: 25 TABLET, EXTENDED RELEASE ORAL at 08:03

## 2024-08-09 RX ADMIN — PHENOBARBITAL 64.8 MG: 64.8 TABLET ORAL at 08:03

## 2024-08-09 RX ADMIN — NICOTINE POLACRILEX 4 MG: 2 LOZENGE ORAL at 13:03

## 2024-08-09 RX ADMIN — PHENOBARBITAL 64.8 MG: 64.8 TABLET ORAL at 16:26

## 2024-08-09 RX ADMIN — NICOTINE POLACRILEX 4 MG: 2 LOZENGE ORAL at 04:57

## 2024-08-09 RX ADMIN — NICOTINE POLACRILEX 4 MG: 2 LOZENGE ORAL at 10:40

## 2024-08-09 RX ADMIN — NICOTINE POLACRILEX 4 MG: 2 LOZENGE ORAL at 11:30

## 2024-08-09 RX ADMIN — MIRTAZAPINE 30 MG: 30 TABLET, FILM COATED ORAL at 21:29

## 2024-08-09 RX ADMIN — NICOTINE POLACRILEX 4 MG: 2 LOZENGE ORAL at 17:51

## 2024-08-09 RX ADMIN — NICOTINE POLACRILEX 4 MG: 2 LOZENGE ORAL at 08:03

## 2024-08-09 RX ADMIN — THIAMINE HCL TAB 100 MG 100 MG: 100 TAB at 08:07

## 2024-08-09 RX ADMIN — NICOTINE POLACRILEX 4 MG: 2 LOZENGE ORAL at 20:36

## 2024-08-09 RX ADMIN — NICOTINE POLACRILEX 4 MG: 2 LOZENGE ORAL at 06:59

## 2024-08-09 RX ADMIN — NICOTINE POLACRILEX 4 MG: 2 LOZENGE ORAL at 16:28

## 2024-08-09 RX ADMIN — NICOTINE POLACRILEX 4 MG: 2 LOZENGE ORAL at 14:48

## 2024-08-09 RX ADMIN — BUPRENORPHINE HYDROCHLORIDE 2 MG: 2 TABLET SUBLINGUAL at 08:03

## 2024-08-09 RX ADMIN — GABAPENTIN 600 MG: 300 CAPSULE ORAL at 08:03

## 2024-08-09 RX ADMIN — GABAPENTIN 1200 MG: 400 CAPSULE ORAL at 20:45

## 2024-08-09 RX ADMIN — BUPRENORPHINE 4 MG: 2 TABLET SUBLINGUAL at 20:45

## 2024-08-09 RX ADMIN — GABAPENTIN 1200 MG: 400 CAPSULE ORAL at 13:03

## 2024-08-09 RX ADMIN — PHENOBARBITAL 64.8 MG: 64.8 TABLET ORAL at 12:23

## 2024-08-09 RX ADMIN — FOLIC ACID 5 MG: 1 TABLET ORAL at 08:03

## 2024-08-09 RX ADMIN — PHENOBARBITAL 64.8 MG: 64.8 TABLET ORAL at 21:29

## 2024-08-09 ASSESSMENT — ACTIVITIES OF DAILY LIVING (ADL)
ADLS_ACUITY_SCORE: 28
HYGIENE/GROOMING: INDEPENDENT
ADLS_ACUITY_SCORE: 28
DRESS: INDEPENDENT
ADLS_ACUITY_SCORE: 28
ADLS_ACUITY_SCORE: 28
ORAL_HYGIENE: INDEPENDENT
ADLS_ACUITY_SCORE: 28
ADLS_ACUITY_SCORE: 28
DRESS: INDEPENDENT
ADLS_ACUITY_SCORE: 28
HYGIENE/GROOMING: INDEPENDENT
LAUNDRY: WITH SUPERVISION
ADLS_ACUITY_SCORE: 28
ORAL_HYGIENE: INDEPENDENT

## 2024-08-09 NOTE — PLAN OF CARE
Problem: Substance Withdrawal  Goal: Substance Withdrawal  Description: Signs and symptoms of listed problems will be absent or manageable.  Outcome: Progressing   Goal Outcome Evaluation:    Plan of Care Reviewed With: patient        Patient is reporting an improvement in withdrawal symptoms from yesterday after his dosage of phenobarbital was increased.  He reports that he wakes around 4 am with a resumption of more intense withdrawal symptoms and consequently his dose was increased again today.  Patient has been cooperative and medications compliant.  He is social with staff and peers, he attends programming.  Patient denies SI, SIB--he is alert and oriented X 4.  Patient is eating well and drinking adequate fluids.  Patient plans to attend [facility]/return to [facility] when he begins the phenobarbital taper.  Will continue to monitor withdrawal and assist with discharge planning.

## 2024-08-09 NOTE — PROVIDER NOTIFICATION
08/09/24 0628   Sleep/Rest   Sleep/Rest/Relaxation no problem identified   Sleep Hygiene Promotion regular sleep pattern promoted;reading encouraged;noise level reduced   Night Time # Hours 7 hours     MSSA score was 2      Patient's eyes were closed, respirations regular, no indication made to staff of being awake.      No complaints/concerns voiced. Patient appeared to have slept most of the night.

## 2024-08-09 NOTE — PLAN OF CARE
"  Problem: Adult Behavioral Health Plan of Care  Goal: Patient-Specific Goal (Individualization)  Description: You can add care plan individualizations to a care plan. Examples of Individualization might be:  \"Parent requests to be called daily at 9am for status\", \"I have a hard time hearing out of my right ear\", or \"Do not touch me to wake me up as it startles  me\".  Outcome: Progressing     Patient was active on the unit engaging with staff and peers appropriately. Patient denies pain and endorsed anxiety and depression rated 7/10 and 3/10. He report  intermittent nausea and  denies other mental health symptoms. Patient stated,\" My appetite is improving. I couldn't get good sleep last night, I think I only slept for about 3 hrs.\" Patient COWS score was 5 and 5 MSSA score was 7 and 5. Patient utilized PRN Lozenges. Patient is compliant with schedule medication. Patient denies any acute concern and no safety issue observed or reported.    "

## 2024-08-09 NOTE — PROGRESS NOTES
Writer spoke to nursing at [facility] who stated they were unable to hold patient's bed until Monday and that he would need to return to the wait list. Patient is not medically ready to discharge today and [facility] nursing stated they need patient to be medically cleared to return.     Writer spoke to the  at [facility] and explained the situation and they agreed they would save a bed until Monday for the patient and writer will coordinate the time and ride.     Plan: [facility] on Monday, 8/12

## 2024-08-09 NOTE — PROGRESS NOTES
Prior Authorization **INITIATED**    Medication: PRISTIQ 25 MG PO TB24  Insurance Company: [employer] - Phone [phone] Fax [phone]  Pharmacy Filling the Rx: [place], [place] - [address]  Filling Pharmacy Phone: [phone]  Filling Pharmacy Fax: [phone]  Start Date: 8/9/2024  Reference #: CoverMyMeds Key: [id number] PA Case ID #: [id number]  Comments:  -      Kristen Zavala [facility]  Discharge Pharmacy Liaison  [facility]  Pronouns: She/Her/Hers    Securely message with Liberata, Epic Secure Chat, or Zelnas  Phone: [phone]  Fax: [phone]  [email or web address]

## 2024-08-09 NOTE — PROGRESS NOTES
"Patient seen, chart reviewed, care discussed with staff.  Blood pressure (!) 124/97, pulse 80, temperature 97.2  F (36.2  C), temperature source Temporal, resp. rate 16, height 1.803 m (5' 11\"), weight 93.9 kg (207 lb), SpO2 100%.  Moderate withdrawal continues; twitching, feeling his equilibrium is off (consistent with past withdrawals), and discomfort.    He notes he was \"constantly blacking out\" and is uncertain how much he was using.    General appearance: looks uncomfortable  Alert.   Affect: fair  Mood: fair    Speech:  spontaneous production a bit low   Eye contact:  good.    Psychomotor behavior: normal  Gait: slow and careful but steady   Abnormal movements:  none  Delusions: none  Hallucinations:  denied  Thoughts: linear  Associations: intact  Judgement: good  Insight: good  Cognitions: intact in conversation  Memory:  intact in conversation  Orientation: normal    Not suicidal (some thoughts, no plans or intent).    Imp:   Opiod use disorder, severe, recurrent, with withdrawal, complicated  2.  Benzodiazepine use disorder, severe, recurrent, with withdrawal and complications  3.  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  And:   Patient Active Problem List   Diagnosis    Opioid use disorder, severe, on maintenance therapy (H)    Suicidal ideation    Severe benzodiazepine use disorder (H)    Substance-induced anxiety disorder (H)    Benzodiazepine withdrawal without complication (H)    Cannabis use disorder, moderate, dependence (H)    Cigarette nicotine dependence with withdrawal    Moderate episode of recurrent major depressive disorder (H)    Homelessness    Opioid withdrawal (H)     Plan:  Increase Gabapentin to previous dose  2.  Increase Phenobarbital to 64.8  mg QID  3.  Increase Subutex    Current Facility-Administered Medications   Medication Dose Route Frequency Provider Last Rate Last Admin    acetaminophen (TYLENOL) tablet 650 mg  650 mg Oral " Q4H PRN Ericka Donis APRN CNP        alum & mag hydroxide-simethicone (MAALOX) suspension 30 mL  30 mL Oral Q4H PRN Ericka Donis APRN CNP        buprenorphine (SUBUTEX) sublingual tablet 2 mg  2 mg Sublingual Q4H PRN Chava Madrid MD        buprenorphine (SUBUTEX) sublingual tablet 4 mg  4 mg Sublingual TID Chava Madrid MD        cloNIDine (CATAPRES) tablet 0.1 mg  0.1 mg Oral Q6H PRN Luis Alfredo Pires MD   0.1 mg at 08/06/24 1106    desvenlafaxine succinate (PRISTIQ) 24 hr tablet 25 mg  25 mg Oral Daily Chava Madrid MD   25 mg at 08/09/24 0803    dicyclomine (BENTYL) capsule 20 mg  20 mg Oral TID PRN Luis Alfredo Pires MD        folic acid (FOLVITE) tablet 5 mg  5 mg Oral Daily Chava Madrid MD   5 mg at 08/09/24 0803    gabapentin (NEURONTIN) capsule 1,200 mg  1,200 mg Oral TID Chava Madrid MD        hydrOXYzine HCl (ATARAX) tablet 25 mg  25 mg Oral Q4H PRN Ericka Donis APRN CNP   25 mg at 08/08/24 0427    ibuprofen (ADVIL/MOTRIN) tablet 600 mg  600 mg Oral Q6H PRN Ericka Donis APRN CNP        loperamide (IMODIUM) capsule 2 mg  2 mg Oral Q4H PRN Luis Alfredo Pires MD   2 mg at 08/06/24 1105    methocarbamol (ROBAXIN) tablet 500 mg  500 mg Oral TID PRN Ericka Donis APRN CNP   500 mg at 08/07/24 0821    mirtazapine (REMERON) tablet 30 mg  30 mg Oral At Bedtime Chava Madrid MD   30 mg at 08/07/24 2154    naloxone (NARCAN) injection 0.2 mg  0.2 mg Intravenous Q2 Min PRN Chava Madrid MD        Or    naloxone (NARCAN) injection 0.4 mg  0.4 mg Intravenous Q2 Min PRN Chava Madrid MD        Or    naloxone (NARCAN) injection 0.2 mg  0.2 mg Intramuscular Q2 Min PRN Chava Madrid MD        Or    naloxone (NARCAN) injection 0.4 mg  0.4 mg Intramuscular Q2 Min PRN Chava Madrid MD        nicotine (COMMIT) lozenge 4 mg  4 mg Buccal Q1H PRN Chava Madrid MD   4 mg at 08/09/24 0803    ondansetron (ZOFRAN ODT) ODT tab 4 mg  4 mg Oral Q6H PRN Fifi Virgen MD   4 mg at  08/07/24 1200    PHENobarbital tablet 64.8 mg  64.8 mg Oral 4x Daily AC & HS Chava Madrid MD        senna-docusate (SENOKOT-S/PERICOLACE) 8.6-50 MG per tablet 1 tablet  1 tablet Oral BID PRN Ericka Donis APRN CNP        thiamine (B-1) tablet 100 mg  100 mg Oral Daily Luis Alfredo Pires MD   100 mg at 08/09/24 0807    traZODone (DESYREL) tablet 50 mg  50 mg Oral At Bedtime PRN Ericka Donis APRN CNP         Recent Results (from the past 168 hour(s))   Comprehensive metabolic panel    Collection Time: 08/06/24 10:43 AM   Result Value Ref Range    Sodium 141 135 - 145 mmol/L    Potassium 4.3 3.4 - 5.3 mmol/L    Carbon Dioxide (CO2) 24 22 - 29 mmol/L    Anion Gap 12 7 - 15 mmol/L    Urea Nitrogen 13.9 6.0 - 20.0 mg/dL    Creatinine 0.80 0.67 - 1.17 mg/dL    GFR Estimate >90 >60 mL/min/1.73m2    Calcium 9.1 8.8 - 10.4 mg/dL    Chloride 105 98 - 107 mmol/L    Glucose 111 (H) 70 - 99 mg/dL    Alkaline Phosphatase 84 40 - 150 U/L    AST 79 (H) 0 - 45 U/L     (H) 0 - 70 U/L    Protein Total 7.0 6.4 - 8.3 g/dL    Albumin 4.6 3.5 - 5.2 g/dL    Bilirubin Total 0.6 <=1.2 mg/dL   Magnesium    Collection Time: 08/06/24 10:43 AM   Result Value Ref Range    Magnesium 1.9 1.7 - 2.3 mg/dL   CBC with platelets and differential    Collection Time: 08/06/24 10:44 AM   Result Value Ref Range    WBC Count 4.4 4.0 - 11.0 10e3/uL    RBC Count 5.12 4.40 - 5.90 10e6/uL    Hemoglobin 15.5 13.3 - 17.7 g/dL    Hematocrit 42.6 40.0 - 53.0 %    MCV 83 78 - 100 fL    MCH 30.3 26.5 - 33.0 pg    MCHC 36.4 31.5 - 36.5 g/dL    RDW 13.0 10.0 - 15.0 %    Platelet Count 107 (L) 150 - 450 10e3/uL    % Neutrophils 59 %    % Lymphocytes 22 %    % Monocytes 9 %    % Eosinophils 8 %    % Basophils 1 %    % Immature Granulocytes 1 %    NRBCs per 100 WBC 0 <1 /100    Absolute Neutrophils 2.7 1.6 - 8.3 10e3/uL    Absolute Lymphocytes 1.0 0.8 - 5.3 10e3/uL    Absolute Monocytes 0.4 0.0 - 1.3 10e3/uL    Absolute Eosinophils 0.4 0.0 - 0.7  10e3/uL    Absolute Basophils 0.0 0.0 - 0.2 10e3/uL    Absolute Immature Granulocytes 0.0 <=0.4 10e3/uL    Absolute NRBCs 0.0 10e3/uL   Urine Drug Screen Panel    Collection Time: 08/06/24 10:46 AM   Result Value Ref Range    Amphetamines Urine Screen Positive (A) Screen Negative    Barbituates Urine Screen Negative Screen Negative    Benzodiazepine Urine Screen Negative Screen Negative    Cannabinoids Urine Screen Positive (A) Screen Negative    Cocaine Urine Screen Negative Screen Negative    Fentanyl Qual Urine Screen Positive (A) Screen Negative    Opiates Urine Screen Negative Screen Negative    PCP Urine Screen Negative Screen Negative

## 2024-08-10 PROCEDURE — 250N000013 HC RX MED GY IP 250 OP 250 PS 637: Performed by: EMERGENCY MEDICINE

## 2024-08-10 PROCEDURE — 128N000004 HC R&B CD ADULT

## 2024-08-10 PROCEDURE — 250N000013 HC RX MED GY IP 250 OP 250 PS 637: Performed by: PSYCHIATRY & NEUROLOGY

## 2024-08-10 PROCEDURE — 250N000012 HC RX MED GY IP 250 OP 636 PS 637: Performed by: PSYCHIATRY & NEUROLOGY

## 2024-08-10 RX ADMIN — NICOTINE POLACRILEX 4 MG: 2 LOZENGE ORAL at 07:52

## 2024-08-10 RX ADMIN — MIRTAZAPINE 30 MG: 30 TABLET, FILM COATED ORAL at 21:28

## 2024-08-10 RX ADMIN — NICOTINE POLACRILEX 4 MG: 2 LOZENGE ORAL at 10:55

## 2024-08-10 RX ADMIN — DESVENLAFAXINE 25 MG: 25 TABLET, EXTENDED RELEASE ORAL at 07:52

## 2024-08-10 RX ADMIN — PHENOBARBITAL 64.8 MG: 64.8 TABLET ORAL at 21:27

## 2024-08-10 RX ADMIN — PHENOBARBITAL 64.8 MG: 64.8 TABLET ORAL at 12:03

## 2024-08-10 RX ADMIN — NICOTINE POLACRILEX 4 MG: 2 LOZENGE ORAL at 05:27

## 2024-08-10 RX ADMIN — GABAPENTIN 1200 MG: 400 CAPSULE ORAL at 20:25

## 2024-08-10 RX ADMIN — BUPRENORPHINE 4 MG: 2 TABLET SUBLINGUAL at 07:53

## 2024-08-10 RX ADMIN — NICOTINE POLACRILEX 4 MG: 2 LOZENGE ORAL at 20:25

## 2024-08-10 RX ADMIN — GABAPENTIN 1200 MG: 400 CAPSULE ORAL at 07:52

## 2024-08-10 RX ADMIN — BUPRENORPHINE 4 MG: 2 TABLET SUBLINGUAL at 20:25

## 2024-08-10 RX ADMIN — NICOTINE POLACRILEX 4 MG: 2 LOZENGE ORAL at 13:48

## 2024-08-10 RX ADMIN — NICOTINE POLACRILEX 4 MG: 2 LOZENGE ORAL at 12:03

## 2024-08-10 RX ADMIN — GABAPENTIN 1200 MG: 400 CAPSULE ORAL at 13:47

## 2024-08-10 RX ADMIN — THIAMINE HCL TAB 100 MG 100 MG: 100 TAB at 07:56

## 2024-08-10 RX ADMIN — PHENOBARBITAL 64.8 MG: 64.8 TABLET ORAL at 16:33

## 2024-08-10 RX ADMIN — FOLIC ACID 5 MG: 1 TABLET ORAL at 07:52

## 2024-08-10 RX ADMIN — PHENOBARBITAL 64.8 MG: 64.8 TABLET ORAL at 07:52

## 2024-08-10 RX ADMIN — NICOTINE POLACRILEX 4 MG: 2 LOZENGE ORAL at 06:38

## 2024-08-10 RX ADMIN — NICOTINE POLACRILEX 4 MG: 2 LOZENGE ORAL at 21:35

## 2024-08-10 RX ADMIN — BUPRENORPHINE 4 MG: 2 TABLET SUBLINGUAL at 13:48

## 2024-08-10 RX ADMIN — NICOTINE POLACRILEX 4 MG: 2 LOZENGE ORAL at 09:23

## 2024-08-10 RX ADMIN — NICOTINE POLACRILEX 4 MG: 2 LOZENGE ORAL at 16:33

## 2024-08-10 RX ADMIN — NICOTINE POLACRILEX 4 MG: 2 LOZENGE ORAL at 17:39

## 2024-08-10 ASSESSMENT — ACTIVITIES OF DAILY LIVING (ADL)
ADLS_ACUITY_SCORE: 28
DRESS: INDEPENDENT
ADLS_ACUITY_SCORE: 28
ORAL_HYGIENE: INDEPENDENT
ADLS_ACUITY_SCORE: 28
ADLS_ACUITY_SCORE: 28
HYGIENE/GROOMING: INDEPENDENT
ADLS_ACUITY_SCORE: 28

## 2024-08-10 NOTE — PLAN OF CARE
Problem: Substance Withdrawal  Goal: Substance Withdrawal  Description: Signs and symptoms of listed problems will be absent or manageable.  Outcome: Progressing   Goal Outcome Evaluation:    Plan of Care Reviewed With: patient        Patient has been awake and alert all day. He is eating well and drinking adequate fluids. Pt reports improved sleep and improved appetite.  Patient's anxiety has improved and he no longer has a tremor.  Patient's affect is full range and he is calmer today.  Pt is participating in station programming and is medication compliant.  Pt is social with staff and peers.  He offers no complaints today.  Pt's gait is steady, his speech is clear and his thoughts follow.  Pt denies SI, SIB and is alert and oriented X 4.  Will continue to monitor withdrawal and assist with discharge planning for Monday to [facility].

## 2024-08-10 NOTE — PLAN OF CARE
Problem: Sleep Disturbance  Goal: Adequate Sleep/Rest  Outcome: Progressing     Problem: Substance Withdrawal  Goal: Substance Withdrawal  Description: Signs and symptoms of listed problems will be absent or manageable.  Outcome: Progressing   Goal Outcome Evaluation:    The Patient continues to take Phenobarbital and Suboxone for Benzodiazepine withdrawal. He slept throughout the night.

## 2024-08-11 PROCEDURE — 250N000013 HC RX MED GY IP 250 OP 250 PS 637: Performed by: EMERGENCY MEDICINE

## 2024-08-11 PROCEDURE — 250N000012 HC RX MED GY IP 250 OP 636 PS 637: Performed by: PSYCHIATRY & NEUROLOGY

## 2024-08-11 PROCEDURE — 250N000013 HC RX MED GY IP 250 OP 250 PS 637: Performed by: NURSE PRACTITIONER

## 2024-08-11 PROCEDURE — 128N000004 HC R&B CD ADULT

## 2024-08-11 PROCEDURE — 250N000013 HC RX MED GY IP 250 OP 250 PS 637: Performed by: PSYCHIATRY & NEUROLOGY

## 2024-08-11 RX ADMIN — NICOTINE POLACRILEX 4 MG: 2 LOZENGE ORAL at 05:21

## 2024-08-11 RX ADMIN — NICOTINE POLACRILEX 4 MG: 2 LOZENGE ORAL at 15:52

## 2024-08-11 RX ADMIN — BUPRENORPHINE 4 MG: 2 TABLET SUBLINGUAL at 13:28

## 2024-08-11 RX ADMIN — GABAPENTIN 1200 MG: 400 CAPSULE ORAL at 20:07

## 2024-08-11 RX ADMIN — NICOTINE POLACRILEX 4 MG: 2 LOZENGE ORAL at 19:41

## 2024-08-11 RX ADMIN — NICOTINE POLACRILEX 4 MG: 2 LOZENGE ORAL at 21:44

## 2024-08-11 RX ADMIN — PHENOBARBITAL 64.8 MG: 64.8 TABLET ORAL at 11:57

## 2024-08-11 RX ADMIN — CLONIDINE HYDROCHLORIDE 0.1 MG: 0.1 TABLET ORAL at 17:13

## 2024-08-11 RX ADMIN — MIRTAZAPINE 30 MG: 30 TABLET, FILM COATED ORAL at 21:58

## 2024-08-11 RX ADMIN — NICOTINE POLACRILEX 4 MG: 2 LOZENGE ORAL at 10:43

## 2024-08-11 RX ADMIN — NICOTINE POLACRILEX 4 MG: 2 LOZENGE ORAL at 09:21

## 2024-08-11 RX ADMIN — NICOTINE POLACRILEX 4 MG: 2 LOZENGE ORAL at 17:33

## 2024-08-11 RX ADMIN — GABAPENTIN 1200 MG: 400 CAPSULE ORAL at 08:04

## 2024-08-11 RX ADMIN — GABAPENTIN 1200 MG: 400 CAPSULE ORAL at 13:28

## 2024-08-11 RX ADMIN — NICOTINE POLACRILEX 4 MG: 2 LOZENGE ORAL at 06:44

## 2024-08-11 RX ADMIN — PHENOBARBITAL 64.8 MG: 64.8 TABLET ORAL at 21:58

## 2024-08-11 RX ADMIN — DESVENLAFAXINE 25 MG: 25 TABLET, EXTENDED RELEASE ORAL at 08:04

## 2024-08-11 RX ADMIN — BUPRENORPHINE 4 MG: 2 TABLET SUBLINGUAL at 08:04

## 2024-08-11 RX ADMIN — HYDROXYZINE HYDROCHLORIDE 25 MG: 25 TABLET ORAL at 15:55

## 2024-08-11 RX ADMIN — PHENOBARBITAL 64.8 MG: 64.8 TABLET ORAL at 08:08

## 2024-08-11 RX ADMIN — NICOTINE POLACRILEX 4 MG: 2 LOZENGE ORAL at 11:57

## 2024-08-11 RX ADMIN — NICOTINE POLACRILEX 4 MG: 2 LOZENGE ORAL at 13:28

## 2024-08-11 RX ADMIN — THIAMINE HCL TAB 100 MG 100 MG: 100 TAB at 08:03

## 2024-08-11 RX ADMIN — FOLIC ACID 5 MG: 1 TABLET ORAL at 08:04

## 2024-08-11 RX ADMIN — BUPRENORPHINE 4 MG: 2 TABLET SUBLINGUAL at 20:08

## 2024-08-11 RX ADMIN — NICOTINE POLACRILEX 4 MG: 2 LOZENGE ORAL at 08:05

## 2024-08-11 RX ADMIN — PHENOBARBITAL 64.8 MG: 64.8 TABLET ORAL at 15:55

## 2024-08-11 RX ADMIN — ACETAMINOPHEN 650 MG: 325 TABLET ORAL at 08:05

## 2024-08-11 ASSESSMENT — ACTIVITIES OF DAILY LIVING (ADL)
ADLS_ACUITY_SCORE: 28
HYGIENE/GROOMING: INDEPENDENT
ADLS_ACUITY_SCORE: 28
DRESS: INDEPENDENT
ADLS_ACUITY_SCORE: 28
ADLS_ACUITY_SCORE: 28
DRESS: INDEPENDENT
ADLS_ACUITY_SCORE: 28
HYGIENE/GROOMING: INDEPENDENT
ADLS_ACUITY_SCORE: 28
ADLS_ACUITY_SCORE: 28
LAUNDRY: UNABLE TO COMPLETE
ADLS_ACUITY_SCORE: 28
ORAL_HYGIENE: INDEPENDENT
ADLS_ACUITY_SCORE: 28
ORAL_HYGIENE: INDEPENDENT
ADLS_ACUITY_SCORE: 28

## 2024-08-11 NOTE — PLAN OF CARE
Problem: Adult Behavioral Health Plan of Care  Goal: Plan of Care Review  Outcome: Progressing  Flowsheets (Taken 8/10/2024 3401)  Patient Agreement with Plan of Care: agrees     Problem: Pain Acute  Goal: Optimal Pain Control and Function  Outcome: Progressing     Problem: Substance Withdrawal  Goal: Substance Withdrawal  Description: Signs and symptoms of listed problems will be absent or manageable.  Outcome: Progressing   Goal Outcome Evaluation:    Plan of Care Reviewed With: patient      The Patient visibly spent time in the lounge watching TV and socializing with his peers throughout this shift. Despite having a calm mood and a flat affect, he reported feeling good but anxious. The Patient denied any thoughts of self-harm, thoughts of harming others, or experiencing any visual or auditory hallucinations and physical pain. He is eating, sleeping, and not constipated. He took his routine meds and PRN nicotine lozenges.

## 2024-08-11 NOTE — PLAN OF CARE
"  Problem: Substance Withdrawal  Goal: Substance Withdrawal  Description: Signs and symptoms of listed problems will be absent or manageable.  Outcome: Progressing   Goal Outcome Evaluation:    Plan of Care Reviewed With: patient        Pt has been awake and alert all day.  His speech is clear and his gait is steady.  Pt is alert and oriented X 4 with no SI, no SIB.  Pt asks appropriate questions regarding discharge plan.  Have discussed discharge process with him today regarding phenobarbital and a taper after discharge.  Pt's affect is blunted, he is calm and cooperative and medication compliant.  [facility] taper as ordered.  [facility] has been informed of taper by case management and they are willing to admit.  Withdrawal has been stable, patient mostly reports feeling \"good.\"  Will continue to monitor and assist with discharge planning.           "

## 2024-08-11 NOTE — PLAN OF CARE
Problem: Sleep Disturbance  Goal: Adequate Sleep/Rest  Outcome: Progressing     Problem: Substance Withdrawal  Goal: Substance Withdrawal  Description: Signs and symptoms of listed problems will be absent or manageable.  8/11/2024 0415 by Neptali Wallace RN  Outcome: Progressing   Goal Outcome Evaluation:    Plan of Care Reviewed With: patient      The Patient' sleep without any Prns during the night. The fifteen-minute safety checks are ongoing without any related issues. He takes Subutex and Phenobarbital during the day. The healthcare team conducted safety checks every 15 minutes without any problems.

## 2024-08-12 VITALS
HEIGHT: 71 IN | HEART RATE: 52 BPM | BODY MASS INDEX: 28.98 KG/M2 | RESPIRATION RATE: 16 BRPM | OXYGEN SATURATION: 100 % | WEIGHT: 207 LBS | TEMPERATURE: 97.2 F | SYSTOLIC BLOOD PRESSURE: 112 MMHG | DIASTOLIC BLOOD PRESSURE: 80 MMHG

## 2024-08-12 PROCEDURE — 250N000012 HC RX MED GY IP 250 OP 636 PS 637: Performed by: PSYCHIATRY & NEUROLOGY

## 2024-08-12 PROCEDURE — 99239 HOSP IP/OBS DSCHRG MGMT >30: CPT | Performed by: PSYCHIATRY & NEUROLOGY

## 2024-08-12 PROCEDURE — 250N000013 HC RX MED GY IP 250 OP 250 PS 637: Performed by: PSYCHIATRY & NEUROLOGY

## 2024-08-12 PROCEDURE — 250N000013 HC RX MED GY IP 250 OP 250 PS 637: Performed by: EMERGENCY MEDICINE

## 2024-08-12 RX ORDER — BUPRENORPHINE HYDROCHLORIDE AND NALOXONE HYDROCHLORIDE DIHYDRATE 2; .5 MG/1; MG/1
2 TABLET SUBLINGUAL 3 TIMES DAILY
Qty: 14 TABLET | Refills: 0 | Status: SHIPPED | OUTPATIENT
Start: 2024-08-12

## 2024-08-12 RX ADMIN — BUPRENORPHINE 4 MG: 2 TABLET SUBLINGUAL at 07:46

## 2024-08-12 RX ADMIN — NICOTINE POLACRILEX 4 MG: 2 LOZENGE ORAL at 12:06

## 2024-08-12 RX ADMIN — PHENOBARBITAL 64.8 MG: 64.8 TABLET ORAL at 07:46

## 2024-08-12 RX ADMIN — NICOTINE POLACRILEX 4 MG: 2 LOZENGE ORAL at 07:46

## 2024-08-12 RX ADMIN — NICOTINE POLACRILEX 4 MG: 2 LOZENGE ORAL at 08:59

## 2024-08-12 RX ADMIN — NICOTINE POLACRILEX 4 MG: 2 LOZENGE ORAL at 06:48

## 2024-08-12 RX ADMIN — NICOTINE POLACRILEX 4 MG: 2 LOZENGE ORAL at 11:09

## 2024-08-12 RX ADMIN — NICOTINE POLACRILEX 4 MG: 2 LOZENGE ORAL at 13:26

## 2024-08-12 RX ADMIN — GABAPENTIN 1200 MG: 400 CAPSULE ORAL at 12:05

## 2024-08-12 RX ADMIN — THIAMINE HCL TAB 100 MG 100 MG: 100 TAB at 07:50

## 2024-08-12 RX ADMIN — BUPRENORPHINE 4 MG: 2 TABLET SUBLINGUAL at 12:06

## 2024-08-12 RX ADMIN — FOLIC ACID 5 MG: 1 TABLET ORAL at 07:46

## 2024-08-12 RX ADMIN — PHENOBARBITAL 64.8 MG: 64.8 TABLET ORAL at 11:09

## 2024-08-12 RX ADMIN — GABAPENTIN 1200 MG: 400 CAPSULE ORAL at 07:46

## 2024-08-12 RX ADMIN — NICOTINE POLACRILEX 4 MG: 2 LOZENGE ORAL at 10:14

## 2024-08-12 RX ADMIN — DESVENLAFAXINE 25 MG: 25 TABLET, EXTENDED RELEASE ORAL at 07:46

## 2024-08-12 RX ADMIN — NICOTINE POLACRILEX 4 MG: 2 LOZENGE ORAL at 05:03

## 2024-08-12 ASSESSMENT — ACTIVITIES OF DAILY LIVING (ADL)
ADLS_ACUITY_SCORE: 28
ORAL_HYGIENE: INDEPENDENT
ADLS_ACUITY_SCORE: 28
HYGIENE/GROOMING: INDEPENDENT
DRESS: INDEPENDENT
ADLS_ACUITY_SCORE: 28

## 2024-08-12 NOTE — CARE PLAN
Upon discharge patient found 23 seroquel tablets in his belongings and requested that they be destroyed.  Medications were destroyed per patient request

## 2024-08-12 NOTE — PLAN OF CARE
Problem: Adult Behavioral Health Plan of Care  Goal: Plan of Care Review  Outcome: Progressing  Flowsheets (Taken 8/12/2024 0800)  Patient Agreement with Plan of Care: agrees   Goal Outcome Evaluation:    Plan of Care Reviewed With: patient       Patient alert and oriented X 4 with no SI, SIB.  Pt is eating well and drinking adequate fluids. Pt's affect is full range and he is calm.  Patient participates in programming and is social with peer and staff.  Pt has completed detox.  He is discharging to [facility].  All labs, medications and discharge instructions have been reviewed with patient.  Patient denies questions.  Discharge via [facility] to [facility].

## 2024-08-12 NOTE — PLAN OF CARE
"  Problem: Adult Behavioral Health Plan of Care  Goal: Plan of Care Review  Outcome: Progressing  Flowsheets (Taken 8/11/2024 1600)  Patient Agreement with Plan of Care: agrees     Problem: Substance Withdrawal  Goal: Substance Withdrawal  Description: Signs and symptoms of listed problems will be absent or manageable.  Outcome: Progressing   Goal Outcome Evaluation:    Plan of Care Reviewed With: patient        Pt continues to seek detox from alcohol, benzodiazepines, and opioids . MSSA scores = 4 and 4 while COWS scores = 4 and 2 respectively. No intervention needed. At 17:07, pt., c/o \"experiencing sensation of pins/needles pocking my face and having cold sweats\". VSS. MSSA/COWS repeated. Criteria for treatment not met. Clonidine 0.1 mg administered with some relief. Up and visible on the unit the majority of the shift, but did not attend group activities. Will continue to monitor and treat as ordered.             "

## 2024-08-12 NOTE — PLAN OF CARE
Problem: Sleep Disturbance  Goal: Adequate Sleep/Rest  Outcome: Progressing   Goal Outcome Evaluation:    The Patient continues to take Phenobarbital and Subutex for Benzodiazepine withdrawal. He slept throughout the night.

## 2024-08-12 NOTE — PROGRESS NOTES
Patient has a 2pm med cab for discharge to [facility] - [employer] - they WILL call when they arrive and if we don't hear from them by 2:15 call [phone]    Discharge Address:  [facility]:  [facility] [place] [address]  Phone: [phone]  Fax: [phone]

## 2024-08-12 NOTE — DISCHARGE SUMMARY
"Psychiatric Discharge Summary    Zenon Truong MRN# [id number]   Age: 54 year old YOB: 1970     Date of Admission:  8/6/2024  Date of Discharge:  8/12/2024  Admitting Physician:  Chava Madrid MD  Discharge Physician:  Martha Barnhart MD (Contact: [phone])         Event Leading to Hospitalization:   Per H&P:    Mr. Zenon Truong is a 54 year old man admitted to [facility] to withdrawal from Opioids and Benzos in the presence of treatment resistant depression.    From [facility] ED note:  \"       Addiction Problem         Patient stopped using fentanyl and xanax and now having withdrawal symptoms. Patient reports hx of withdrawal seizures from benzos      The history is provided by the patient and medical records.      Zenon Truong is a 54 year old male with PMH notable for Afib, HTN, polysubstance use disorder (cannabis, opioid, meth), ALISSON, prior suicidal attempts with recent admission for overdosing on prescribed medications (07/25-07/29/24) who presents to the Emergency Department with withdrawal symptoms.      Patient reports using fentanyl and xanax daily. His last fentanyl use was last morning at 5 AM. He reports taking 1.3-1.4 mg of fentanyl daily. Also reports taking ~2.4mg of xanax daily since 10/2023. He now reports withdrawal symptoms including shaking, diarrhea, anxiety, and inability to tolerate PO. He reports history of withdrawal seizure. He reports occasional mild alcohol use. Denies other drug use. He reports taking Suboxone before but not taking it now.\"     He has a history of anxiety since about age 22 with [ainc and severe discomfort.    Depression started in his early teens with symptoms of hopelessness, isolation, and suicidal feelings and actions.  He estimates he has had over 25 psychiatric hospital stays in 10 years.     Last Fentanyl use was 2 days ago, last Methadone was over one year ago.  He recently failed IM buprenorphine, and has not tried oral by " "his report, see below.       Prescriptions Prior to Admission           Medications Prior to Admission   Medication Sig Dispense Refill Last Dose    buprenorphine ER (SUBLOCADE) 300 MG/1.5ML prefilled syringe Inject 300 mg subcutaneously every 30 days     7/23/2024    buprenorphine HCl-naloxone HCl (SUBOXONE) 8-2 MG per film Place 1 Film under the tongue 2 times daily          gabapentin (NEURONTIN) 600 MG tablet Take 1,200 mg by mouth 3 times daily          hydrOXYzine (VISTARIL) 50 MG capsule Take 50 mg by mouth 3 times daily as needed for anxiety          methocarbamol (ROBAXIN) 750 MG tablet Take 750 mg by mouth 3 times daily          mirtazapine (REMERON) 30 MG tablet Take 1 tablet (30 mg) by mouth At Bedtime 30 tablet 1      QUEtiapine (SEROQUEL) 50 MG tablet Take 50 mg by mouth every 6 hours as needed (anxiety, agitation)                Past psych meds include Celexa (allergy), Zoloft, Effexor up to 300mg, Cymbalta (GI issues), Lithium (rash), Topamaz (rash), propranolol, Buspoar, Latuda, Risperdal, Seroquel, Strattera,and Abilify.     Family history positive for father with alcohol problems, and mother with depression and possibly bipolar.     Childhood diagnosis:  ADHD     8/7: Blood pressure (!) 125/90, pulse 63, temperature 97.1  F (36.2  C), temperature source Temporal, resp. rate 16, height 1.803 m (5' 11\"), weight 93.9 kg (207 lb), SpO2 98%.  8/7: General appearance: stressed  Alert.   Affect: fair, anxious  Mood: fair    Speech:  normal.   Eye contact:  good.    Psychomotor behavior: slightly increased  Gait: steady   Abnormal movements:  none  Delusions: none  Hallucinations:  none  Thoughts: logical, linear  Associations: intact  Judgement: fair  Insight: good  Cognitions: intact in conversation  Memory:  intact in conversation  Orientation: normal    Not suicidal.     He feels hot/cold, runny nose, body aches.         Patient Active Problem List   Diagnosis    Opioid use disorder, severe, on " maintenance therapy (H)    Suicidal ideation    Severe benzodiazepine use disorder (H)    Substance-induced anxiety disorder (H)    Benzodiazepine withdrawal without complication (H)    Cannabis use disorder, moderate, dependence (H)    Cigarette nicotine dependence with withdrawal    Moderate episode of recurrent major depressive disorder (H)    Homelessness    Opioid withdrawal (H)      Past Medical History        Past Medical History:   Diagnosis Date    Hep C w/o coma, chronic (H) 2011    Substance abuse (H)      Testicular cancer (H)           Imp:  Opiod use disorder, severe, recurrent, with withdrawal, complicated  2.  Benzodiazepine use disorder, severe, recurrent, with withdrawal and complications  3.  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     Plan:  Detox with phenobarbital and Subutex  2.  Rx Pristiq (because it is kidney metabolized) and augment with high dose folic acid          See Admission note by Chava Madrid MD on 8/7/24 for additional details.          Diagnoses:   Opiod use disorder, severe, recurrent, with withdrawal, complicated  2.  Benzodiazepine use disorder, severe, recurrent, with withdrawal and complications  3.  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         Labs:     Recent Results (from the past 168 hour(s))   Comprehensive metabolic panel    Collection Time: 08/06/24 10:43 AM   Result Value Ref Range    Sodium 141 135 - 145 mmol/L    Potassium 4.3 3.4 - 5.3 mmol/L    Carbon Dioxide (CO2) 24 22 - 29 mmol/L    Anion Gap 12 7 - 15 mmol/L    Urea Nitrogen 13.9 6.0 - 20.0 mg/dL    Creatinine 0.80 0.67 - 1.17 mg/dL    GFR Estimate >90 >60 mL/min/1.73m2    Calcium 9.1 8.8 - 10.4 mg/dL    Chloride 105 98 - 107 mmol/L    Glucose 111 (H) 70 - 99 mg/dL    Alkaline Phosphatase 84 40 - 150 U/L    AST 79 (H) 0 - 45 U/L     (H) 0 - 70 U/L    Protein Total 7.0 6.4 - 8.3 g/dL    Albumin 4.6  3.5 - 5.2 g/dL    Bilirubin Total 0.6 <=1.2 mg/dL   Magnesium    Collection Time: 08/06/24 10:43 AM   Result Value Ref Range    Magnesium 1.9 1.7 - 2.3 mg/dL   CBC with platelets and differential    Collection Time: 08/06/24 10:44 AM   Result Value Ref Range    WBC Count 4.4 4.0 - 11.0 10e3/uL    RBC Count 5.12 4.40 - 5.90 10e6/uL    Hemoglobin 15.5 13.3 - 17.7 g/dL    Hematocrit 42.6 40.0 - 53.0 %    MCV 83 78 - 100 fL    MCH 30.3 26.5 - 33.0 pg    MCHC 36.4 31.5 - 36.5 g/dL    RDW 13.0 10.0 - 15.0 %    Platelet Count 107 (L) 150 - 450 10e3/uL    % Neutrophils 59 %    % Lymphocytes 22 %    % Monocytes 9 %    % Eosinophils 8 %    % Basophils 1 %    % Immature Granulocytes 1 %    NRBCs per 100 WBC 0 <1 /100    Absolute Neutrophils 2.7 1.6 - 8.3 10e3/uL    Absolute Lymphocytes 1.0 0.8 - 5.3 10e3/uL    Absolute Monocytes 0.4 0.0 - 1.3 10e3/uL    Absolute Eosinophils 0.4 0.0 - 0.7 10e3/uL    Absolute Basophils 0.0 0.0 - 0.2 10e3/uL    Absolute Immature Granulocytes 0.0 <=0.4 10e3/uL    Absolute NRBCs 0.0 10e3/uL   Urine Drug Screen Panel    Collection Time: 08/06/24 10:46 AM   Result Value Ref Range    Amphetamines Urine Screen Positive (A) Screen Negative    Barbituates Urine Screen Negative Screen Negative    Benzodiazepine Urine Screen Negative Screen Negative    Cannabinoids Urine Screen Positive (A) Screen Negative    Cocaine Urine Screen Negative Screen Negative    Fentanyl Qual Urine Screen Positive (A) Screen Negative    Opiates Urine Screen Negative Screen Negative    PCP Urine Screen Negative Screen Negative          Consults:   Consultation during this admission received from internal medicine on 8/7/24:    Zenon Truong is a 54 year old man with a history of opioid dependence, polysubstance abuse, depression, anxiety, prior suicidal attempts, PAF, hypertension, HCV. Admitted to  8/9/24 for medical management of withdrawal from fentanyl and xanax.      # Polysubstance abuse (cannabis, opioids, meth,  benzo)  Pt reports using fentanyl and xanax daily. He reports taking 1.3-1.4 mg of fentanyl daily and ~ 2.4 mg of xanax daily since 10/2023. Presents with withdrawal symptoms including shaking, diarrhea, anxiety, and inability to tolerate PO. Hx of withdrawal seizures. Occasional mild alcohol use. Has been on suboxone in the past.   - Management per psychiatry     # Transaminitis  AST 79, , T bili 0.6, alk phos 141. Abd US 2/15/24 normal gallbladder, fatty liver. C/o nausea.    - Encourage alcohol and polysubstance cessation     # Thrombocytopenia  Several year history of low platelet count due to ETOH abuse causing bone marrow suppression. No s/s of bleeding. PLT count currently 107.  - No aspirin, ibuprofen, naproxen, or other non-steroidal anti-inflammatory drugs  - Alcohol cessation  - Continue to manage outpatient with PCP      # Hx of hypertension  # Hx of PAF  Not currently on medication. Normotensive, regular rate and rhythm.   - Continue to monitor     # Chronic back pain  - APAP, robaxin, ibuprofen PRN      Currently, medically stable and internal medicine will sign off. Please contact if future questions or concerns arise. Thank you for the opportunity to be a part of this patient's care.        [facility] Course:   Patient was admitted to [facility] with attending Martha Barnhart MD as a voluntary patient. The patient was placed under status 15 (15 minute checks) to ensure patient safety.     Fentanyl dependence:  Started on Subutex which was titrated to current dose of 4 mg TID with noted resolution of withdrawal sx and cravings.    Xanax dependence:  Started on Phenobarbital with resolution of withdrawal sx at dose of 64.8 mg QID. The following taper plan was given upon discharge:  Take two 4 times daily for 2 days, then 1 each in the morning and two 3 times per day for the rest of the day for 1 day, then 2 three times daily for 1 day, then one 5 times/day for  "1 day, then one 4 times/day for 1 day, then one 3 times//day for one day, then one twice/day for one day, then one/day for 2 days.     Zenon has not experienced any significant symptoms of withdrawal since that time. He experienced no complications associated with withdrawal. Hydroxyzine and Trazodone were utilized prn for management of anxiety and sleep, respectively. He was evaluated by IM (see above). He was medically cleared at time of discharge.     For treatment resistant MDD, pt was started on Pristiq, and tolerated medications well without reported side effects.     Per CTC note dated 8/12/24, treatment plan includes:     Patient has a 2pm med cab for discharge to [facility] - they WILL call when they arrive and if we don't hear from them by 2:15 call [phone]     Patient denied alcohol cravings at time of discharge. He understands risks associated with relapse. Appears to be motivated to maintain sobriety upon discharge.      He did participate in groups and was visible in the milieu.     The patient's symptoms of withdrawal fully resolved.     Patient was released to [facility] treatment facility. At the time of discharge, patient was determined to not be a danger to himself or others. He consistently denied active SI/HI, and remained future oriented. Denied access to firearms. He reported \"fleeting\" SI at baseline. Protective factors include: 4 older sisters, nieces and nephews, and 30 year old son. He expressed desire to reconnect with his family after a period of sobriety.     Because this patient meets criteria for an Alcohol Use Disorder, I performed the following brief intervention on the date of this note:              1) Expressed concern that the patient is drinking at unhealthy levels known to increase their risk of alcohol related problems              2) Gave feedback linking alcohol use and health, including personalized feedback explaining how alcohol use can interact with " their medical and/or psychiatric problems, and with prescribed medications.              3) Advised patient to abstain.    RISK ASSESSMENT:  Today Zenon Truong denies SI, SIB, and HI. No overt evidence of psychosis or shreyas observed. Patient grossly appears to be cognitively intact. Patient has not exhibited any aggressive or violent behaviors throughout hospitalization. He has notable risk factors for self-harm including previous suicide attempt, lives alone/ isolated, substance use / pending treatment, recent loss, financial/legal stress, relationship conflict, and on opiates.  However, risk is mitigated by no plan or intent, describes a safety plan, h/o seeking help when needed, symptom improvement, future oriented, and commitment to family. Patient does not have access to firearms. Based on all available evidence he does not appear to be at imminent risk for self-harm therefore does not meet criteria for a 72-hr hold/ involuntary hospitalization.  However, based on degree of symptoms substance use treatment, therapy, and close psych FU was recommended which the pt did agree to. Patient also agreed to call 911/present to ED if any imminent safety concerns arise, including emergence of SI, HI, symptoms of psychosis or shreyas. Patient was provided with crisis resources at the time of discharge. Patient agreed to further reduce risk of self-harm by completely abstaining from illicit substances and alcohol, and agreed to remain medication adherent. Expressed understanding of the risks associated with excessive alcohol use, illicit substance use, and medication/treatment non-adherence, including increased risk of harm to self or others.             Discharge Medications:     Current Discharge Medication List        START taking these medications    Details   buprenorphine-naloxone (SUBOXONE) 2-0.5 MG SUBL sublingual tablet Place 2 tablets under the tongue 3 times daily  Qty: 14 tablet, Refills: 0    Associated  Diagnoses: Opioid use disorder, severe, on maintenance therapy (H)      desvenlafaxine succinate (PRISTIQ) 25 MG 24 hr tablet Take 1 tablet (25 mg) by mouth daily  Qty: 30 tablet, Refills: 3    Comments: See h&p for med failure history  Associated Diagnoses: Moderate episode of recurrent major depressive disorder (H)      folic acid (FOLVITE) 1 MG tablet Take 5 tablets (5 mg) by mouth daily  Qty: 150 tablet, Refills: 2    Associated Diagnoses: Moderate episode of recurrent major depressive disorder (H)      methocarbamol (ROBAXIN) 500 MG tablet Take 1 tablet (500 mg) by mouth 3 times daily as needed for muscle spasms  Qty: 90 tablet, Refills: 3    Associated Diagnoses: Muscle spasm      nicotine (COMMIT) 2 MG lozenge Place 2 lozenges (4 mg) inside cheek every hour as needed for nicotine withdrawal symptoms  Qty: 100 lozenge, Refills: 4    Associated Diagnoses: Cigarette nicotine dependence with withdrawal      PHENobarbital 32.4 MG tablet Take two 4 times daily for 2 days, then 1 each in the morning and two 3 times per day for the rest of the day for 1 day, then 2 three times daily for 1 day, then one 5 times/day for 1 day, then one 4 times/day for 1 day, then one 3 times//day for one day, then one twice/day for one day, then one/day for 2 days.  Qty: 37 tablet, Refills: 0    Associated Diagnoses: Benzodiazepine withdrawal without complication (H)           CONTINUE these medications which have CHANGED    Details   gabapentin (NEURONTIN) 600 MG tablet Take 2 tablets (1,200 mg) by mouth 3 times daily  Qty: 180 tablet, Refills: 2    Associated Diagnoses: ALISSON (generalized anxiety disorder)      hydrOXYzine (VISTARIL) 50 MG capsule Take 1 capsule (50 mg) by mouth 3 times daily as needed for anxiety  Qty: 90 capsule, Refills: 2    Associated Diagnoses: ALISSON (generalized anxiety disorder)      mirtazapine (REMERON) 30 MG tablet Take 1 tablet (30 mg) by mouth at bedtime  Qty: 30 tablet, Refills: 2    Associated Diagnoses:  Substance-induced anxiety disorder (H)           STOP taking these medications       buprenorphine ER (SUBLOCADE) 300 MG/1.5ML prefilled syringe Comments:   Reason for Stopping:                       Psychiatric Examination:   Appearance:  awake, alert, adequately groomed, and casually dressed  Attitude:  cooperative  Eye Contact:  good  Mood:  better  Affect:  appropriate and in normal range  Speech:  clear, coherent  Psychomotor Behavior:  no evidence of tardive dyskinesia, dystonia, or tics  Thought Process:  logical, linear, and goal oriented  Associations:  no loose associations  Thought Content:  no evidence of active suicidal ideation or homicidal ideation and no evidence of psychotic thought. Pt reported brief, fleeting SI thoughts at baseline that are longstanding  Insight:  good  Judgment:  intact  Oriented to:  time, person, and place  Attention Span and Concentration:  intact  Recent and Remote Memory:  intact  Language: Able to name objects, Able to repeat phrases, and Able to read and write  Fund of Knowledge: appropriate  Muscle Strength and Tone: normal  Gait and Station: Normal         Discharge Plan:   Summary: You were admitted to [facility] on 8/6/24 for detoxification from Benzodiazepines and Opiates.  A medical exam was performed that included lab work. You have met with a  and opted to attend [facility].  Please take care and make your recovery a daily priority, Zenon!  It was a pleasure working with you and the entire treatment team here wishes you the very best in your recovery!      Recommendation:  Attend residential JOSIAH treatment at [facility]. Complete program and follow all aftercare recommendations. Abstain from using mood altering substances.      Main Diagnoses:  Per Dr. 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  You did not complete a chemical dependency assessment. You had labs drawn and those results were reviewed with you. Please take a copy of your lab work with you to your next primary care provider appointment.     Symptoms to Report:  If you experience more anxiety, confusion, sleeplessness, deep sadness or thoughts of suicide, notify your treatment team or notify your primary care provider. IF ANY OF THE SYMPTOMS YOU ARE EXPERIENCING ARE A MEDICAL EMERGENCY CALL 911 IMMEDIATELY.      Lifestyle Adjustment: Adjust your lifestyle to get enough sleep, relaxation, exercise and good nutrition. Continue to develop healthy coping skills to decrease stress and promote a sober living environment. Do not use mood altering substances including alcohol, illegal drugs or addictive medications other than what is currently prescribed.      Disposition: [facility]:  [address] [facility] [place] [address]  Phone: [phone]  Fax: [phone]     Facts about COVID19 at www.cdc.gov/COVID19 and www.[place].gov/covid19     Keeping hands clean is one of the most important steps we can take to avoid getting sick and spreading germs to others.  Please wash your hands frequently and lather with soap for at least 20 seconds!     Residential JOSIAH Treatment:   [facility]:   [address] [place] [address]  Phone: [phone]  Fax: [phone]  Email: [email or web address]     Akil Bowen at 8 am for continued suboxone maintainace     Recovery apps for your phone for educational purposes and to locate in person and zoom recovery meetings  Everything AA -  matthew is a great resource  12 Step Toolkit - educational purpose learning about the 12 steps to recovery  [place] Cloud - meeting matthew  AA  - meeting matthew  Meeting guide - meeting matthew  Quick NA meeting - meeting matthew  Layne- has various apps     Resources:  AA/NA meetings have returned to in-person  "or a hybrid combination of zoom/in-person therefore please check online to verify*  Need Support Now? If you or someone you know is struggling or in crisis, help is available. Call or text 121 or chat Sensopia.org  AA meetings search for them at: https://aa-intergroup.org (worldwide meeting listings)  AA meetings for [place] area can be found online at: https://a[place].org (click local online meetings listings)  NA meetings for [place] area can be found online at: https://www.nam[place].org  (click find a meeting)  AA and NA Sponsors are excellent resources for support and you can find one at any support group meeting.   Alcoholics Anonymous (https://aa.org/): for information 24 hours/day  AA Intergroup service office in [place] (http://www.aastpaul.org/) 322.173.1515  AA Intergroup service office in [facility]: 656.435.8043. (http://www.aaCymaBay [employer]rapeutics.org/)  Narcotics Anonymous (www.nam[place].org) (322) 667-8135  https://aafairviewriverside.org/meetings  SMART Recovery - self management for addiction recovery:  www.smartrecovery.org  Pathways ~ A Health Crisis Resource & Support Center:  464.365.2799.  https://prescribetoprevent.org/patient-education/videos/  http://www.harmreduction.org  Crisis Text Line  Text 323536  You will be connected with a trained live crisis counselor to provide support. Por espanol, texto  MICHAEL a 152279 o texto a 442-AYUDAME en WhatsApp  [place] Hope Line  1.800.SUICIDE [1046512]  [facility] 509-772-5311  Support Group:  AA/NA and Sponsor/support.  Fast Tracker  Linking people to mental health and substance use disorder resources  [employer].org   [place] Mental Health Warm Line  Peer to peer support  Monday thru Saturday, 12 pm to 10 pm  781.871.7081 or 9.978.343.0344  Text \"Support\" to 31987  National Ecru on Mental Illness (GEMA)  735.978.1666 or 1.887.GEMA.HELPS  Alcoholics Anonymous (www.alcoholics-anonymous.org): Check your phone book " for your local chapter.  Suicide Awareness Voices of Education (SAVE) (www.save.org): 879-327-RIBG (7283)  National Suicide Prevention Line (www.mentalhealthmn.org): 993-641-TVYY (8201)  Mental Health Consumer/Survivor Network of [place] (www.mhcsn.net): 546.371.7674 or 705-903-2995  Mental Health Association of [place] (www.mentalhealth.org): 578.173.5950 or 520-537-6301   Substance Abuse and Mental Health Services (www.samhsa.gov)  [place] Opioid Prevention Coalition: www.opioidcoalition.org     [place] Recovery Connection (MRC)  [facility] connects people seeking recovery to resources that help foster and sustain long-term recovery.  Whether you are seeking resources for treatment, transportation, housing, job training, education, health care or other pathways to recovery, [facility] is a great place to start.  112.433.6031.  www.[employer]     Great Pod casts for nutrition and wellness  Listen on Apple Podcasts  Dishing Up Nutrition   [employer] Weight & "[employer]   Nutrition       Understand the connection between what you eat and how you feel. Hosted by licensed nutritionists and dietitians from [employer] we share practical, real-life solutions for healthier living through nutrition.      General Medication Instructions:   See your medication sheet(s) for instructions.   Take all medications as prescribed.  Make no changes unless your primary care provider suggests them.   Go to all your primary care provider visits.  Be sure to have all your required lab tests. This way, your medicines can be refilled on time.  Do not use any forms of alcohol.     Please Note:  If you have any questions at anytime after you are discharged please call [facility] detox unit [facility] at [phone].  [facility], Behavioral Intake [phone]  Medical Records call [phone]  Outpatient Behavioral Intake call [phone]  LP+ Wait List/Bed Availability call [phone]    Please remember to take  all of your behavioral discharge planning and lab paperwork to any follow up appointments, it contains your lab results, diagnosis, medication list and discharge recommendations.       THANK YOU FOR CHOOSING [facility]     Attestation:  The patient has been seen and evaluated by me,  Martha Barnhart MD    > 45 minutes total time that was spent and over 50% of this time was spent in counseling and coordination of care with staff, reviewing medical record, educating patient about treatment options, side effects and benefits and alternative treatments for medications, providing supportive therapy and redirection regarding above symptoms.     This document is created with the help of Dragon dictation system.  All grammatical/typing errors or context distortion are unintentional and inherent to software.

## 2024-08-22 NOTE — PROGRESS NOTES
Prior Authorization **APPROVED**    Authorization Effective Date: 7/10/2024  Authorization Expiration Date: 8/9/2025  Medication: DESVENLAFAXINE SUCCINATE ER 25 MG PO TB24  Approved Dose/Quantity: -  Reference #: CoverMyMeds Key: [id number] Case ID #: [id number], [employer] Case ID: [id number]   Insurance Company: [employer] - [employer] [phone] Fax [phone]  Expected CoPay: $ 0    CoPay Card Available: No    Foundation Assistance Needed: -  Which Pharmacy is filling the prescription (Not needed for infusion/clinic administered): [facility] [place] [address]  Pharmacy Notified: Yes  Patient Notified: Yes  Comments:  -            Kristen Zavala [facility]  Discharge Pharmacy Liaison  [facility]  Pronouns: She/Her/Hers    Securely message with Rosalind, Epic Secure Chat, or [facility]  Phone: [phone]  Fax: [phone]  [email or web address]

## 2025-03-14 ENCOUNTER — HOSPITAL ENCOUNTER (INPATIENT)
Facility: CLINIC | Age: 55
LOS: 4 days | Discharge: PSYCHIATRIC OR SUBSTANCE USE FACILITY | DRG: 897 | End: 2025-03-18
Attending: STUDENT IN AN ORGANIZED HEALTH CARE EDUCATION/TRAINING PROGRAM | Admitting: INTERNAL MEDICINE
Payer: COMMERCIAL

## 2025-03-14 DIAGNOSIS — Z59.00 HOMELESSNESS: ICD-10-CM

## 2025-03-14 DIAGNOSIS — D64.9 ANEMIA, UNSPECIFIED TYPE: Primary | ICD-10-CM

## 2025-03-14 DIAGNOSIS — R45.851 SUICIDAL IDEATION: ICD-10-CM

## 2025-03-14 DIAGNOSIS — F33.2 MDD (MAJOR DEPRESSIVE DISORDER), RECURRENT SEVERE, WITHOUT PSYCHOSIS (H): ICD-10-CM

## 2025-03-14 DIAGNOSIS — F41.1 GAD (GENERALIZED ANXIETY DISORDER): ICD-10-CM

## 2025-03-14 DIAGNOSIS — F13.930 BENZODIAZEPINE WITHDRAWAL WITHOUT COMPLICATION (H): ICD-10-CM

## 2025-03-14 DIAGNOSIS — R21 RASH: ICD-10-CM

## 2025-03-14 PROBLEM — F13.20 SEVERE BENZODIAZEPINE USE DISORDER (H): Status: ACTIVE | Noted: 2018-07-31

## 2025-03-14 LAB
ALBUMIN SERPL BCG-MCNC: 4.6 G/DL (ref 3.5–5.2)
ALP SERPL-CCNC: 84 U/L (ref 40–150)
ALT SERPL W P-5'-P-CCNC: 84 U/L (ref 0–70)
AMPHETAMINES UR QL SCN: ABNORMAL
ANION GAP SERPL CALCULATED.3IONS-SCNC: 11 MMOL/L (ref 7–15)
AST SERPL W P-5'-P-CCNC: 47 U/L (ref 0–45)
BARBITURATES UR QL SCN: ABNORMAL
BASOPHILS # BLD AUTO: 0 10E3/UL (ref 0–0.2)
BASOPHILS NFR BLD AUTO: 1 %
BENZODIAZ UR QL SCN: ABNORMAL
BILIRUB SERPL-MCNC: 0.4 MG/DL
BUN SERPL-MCNC: 9.9 MG/DL (ref 6–20)
BZE UR QL SCN: ABNORMAL
CALCIUM SERPL-MCNC: 9.2 MG/DL (ref 8.8–10.4)
CANNABINOIDS UR QL SCN: ABNORMAL
CHLORIDE SERPL-SCNC: 101 MMOL/L (ref 98–107)
CREAT SERPL-MCNC: 0.75 MG/DL (ref 0.67–1.17)
EGFRCR SERPLBLD CKD-EPI 2021: >90 ML/MIN/1.73M2
EOSINOPHIL # BLD AUTO: 0.4 10E3/UL (ref 0–0.7)
EOSINOPHIL NFR BLD AUTO: 8 %
ERYTHROCYTE [DISTWIDTH] IN BLOOD BY AUTOMATED COUNT: 13.1 % (ref 10–15)
ETHANOL SERPL-MCNC: <0.01 G/DL
FENTANYL UR QL: ABNORMAL
GLUCOSE SERPL-MCNC: 82 MG/DL (ref 70–99)
HCO3 SERPL-SCNC: 27 MMOL/L (ref 22–29)
HCT VFR BLD AUTO: 29.6 % (ref 40–53)
HGB BLD-MCNC: 10.1 G/DL (ref 13.3–17.7)
HOLD SPECIMEN: NORMAL
IMM GRANULOCYTES # BLD: 0 10E3/UL
IMM GRANULOCYTES NFR BLD: 1 %
LACTATE SERPL-SCNC: 0.5 MMOL/L (ref 0.7–2)
LYMPHOCYTES # BLD AUTO: 1 10E3/UL (ref 0.8–5.3)
LYMPHOCYTES NFR BLD AUTO: 23 %
MAGNESIUM SERPL-MCNC: 1.9 MG/DL (ref 1.7–2.3)
MCH RBC QN AUTO: 30 PG (ref 26.5–33)
MCHC RBC AUTO-ENTMCNC: 34.1 G/DL (ref 31.5–36.5)
MCV RBC AUTO: 88 FL (ref 78–100)
MONOCYTES # BLD AUTO: 0.3 10E3/UL (ref 0–1.3)
MONOCYTES NFR BLD AUTO: 6 %
NEUTROPHILS # BLD AUTO: 2.7 10E3/UL (ref 1.6–8.3)
NEUTROPHILS NFR BLD AUTO: 61 %
NRBC # BLD AUTO: 0 10E3/UL
NRBC BLD AUTO-RTO: 0 /100
OPIATES UR QL SCN: ABNORMAL
PCP QUAL URINE (ROCHE): ABNORMAL
PLATELET # BLD AUTO: 78 10E3/UL (ref 150–450)
POTASSIUM SERPL-SCNC: 3.7 MMOL/L (ref 3.4–5.3)
PROT SERPL-MCNC: 7.3 G/DL (ref 6.4–8.3)
RBC # BLD AUTO: 3.37 10E6/UL (ref 4.4–5.9)
SODIUM SERPL-SCNC: 139 MMOL/L (ref 135–145)
WBC # BLD AUTO: 4.4 10E3/UL (ref 4–11)

## 2025-03-14 PROCEDURE — 84295 ASSAY OF SERUM SODIUM: CPT | Performed by: STUDENT IN AN ORGANIZED HEALTH CARE EDUCATION/TRAINING PROGRAM

## 2025-03-14 PROCEDURE — 82077 ASSAY SPEC XCP UR&BREATH IA: CPT | Performed by: STUDENT IN AN ORGANIZED HEALTH CARE EDUCATION/TRAINING PROGRAM

## 2025-03-14 PROCEDURE — 99223 1ST HOSP IP/OBS HIGH 75: CPT | Performed by: INTERNAL MEDICINE

## 2025-03-14 PROCEDURE — 250N000013 HC RX MED GY IP 250 OP 250 PS 637: Performed by: STUDENT IN AN ORGANIZED HEALTH CARE EDUCATION/TRAINING PROGRAM

## 2025-03-14 PROCEDURE — 83735 ASSAY OF MAGNESIUM: CPT | Performed by: STUDENT IN AN ORGANIZED HEALTH CARE EDUCATION/TRAINING PROGRAM

## 2025-03-14 PROCEDURE — 85004 AUTOMATED DIFF WBC COUNT: CPT | Performed by: STUDENT IN AN ORGANIZED HEALTH CARE EDUCATION/TRAINING PROGRAM

## 2025-03-14 PROCEDURE — 120N000002 HC R&B MED SURG/OB UMMC

## 2025-03-14 PROCEDURE — 85041 AUTOMATED RBC COUNT: CPT | Performed by: STUDENT IN AN ORGANIZED HEALTH CARE EDUCATION/TRAINING PROGRAM

## 2025-03-14 PROCEDURE — 83605 ASSAY OF LACTIC ACID: CPT | Performed by: STUDENT IN AN ORGANIZED HEALTH CARE EDUCATION/TRAINING PROGRAM

## 2025-03-14 PROCEDURE — 99207 PR NO CHARGE LOS: CPT | Performed by: PSYCHIATRY & NEUROLOGY

## 2025-03-14 PROCEDURE — 99285 EMERGENCY DEPT VISIT HI MDM: CPT | Performed by: STUDENT IN AN ORGANIZED HEALTH CARE EDUCATION/TRAINING PROGRAM

## 2025-03-14 PROCEDURE — 36415 COLL VENOUS BLD VENIPUNCTURE: CPT | Performed by: STUDENT IN AN ORGANIZED HEALTH CARE EDUCATION/TRAINING PROGRAM

## 2025-03-14 PROCEDURE — HZ2ZZZZ DETOXIFICATION SERVICES FOR SUBSTANCE ABUSE TREATMENT: ICD-10-PCS | Performed by: INTERNAL MEDICINE

## 2025-03-14 PROCEDURE — 80307 DRUG TEST PRSMV CHEM ANLYZR: CPT | Performed by: STUDENT IN AN ORGANIZED HEALTH CARE EDUCATION/TRAINING PROGRAM

## 2025-03-14 PROCEDURE — 250N000013 HC RX MED GY IP 250 OP 250 PS 637: Performed by: INTERNAL MEDICINE

## 2025-03-14 RX ORDER — AMOXICILLIN 250 MG
1 CAPSULE ORAL 2 TIMES DAILY PRN
Status: DISCONTINUED | OUTPATIENT
Start: 2025-03-14 | End: 2025-03-18 | Stop reason: HOSPADM

## 2025-03-14 RX ORDER — MULTIPLE VITAMINS W/ MINERALS TAB 9MG-400MCG
1 TAB ORAL DAILY
Status: DISCONTINUED | OUTPATIENT
Start: 2025-03-14 | End: 2025-03-18 | Stop reason: HOSPADM

## 2025-03-14 RX ORDER — LORAZEPAM 2 MG/ML
2 INJECTION INTRAMUSCULAR ONCE
Status: DISCONTINUED | OUTPATIENT
Start: 2025-03-14 | End: 2025-03-14

## 2025-03-14 RX ORDER — METHADONE HYDROCHLORIDE 10 MG/ML
105 CONCENTRATE ORAL DAILY
Status: ON HOLD | COMMUNITY

## 2025-03-14 RX ORDER — FOLIC ACID 1 MG/1
1 TABLET ORAL DAILY
Status: DISCONTINUED | OUTPATIENT
Start: 2025-03-14 | End: 2025-03-18 | Stop reason: HOSPADM

## 2025-03-14 RX ORDER — BUPROPION HYDROCHLORIDE 300 MG/1
300 TABLET ORAL EVERY MORNING
Status: ON HOLD | COMMUNITY
End: 2025-03-18

## 2025-03-14 RX ORDER — DESVENLAFAXINE 25 MG/1
25 TABLET, EXTENDED RELEASE ORAL DAILY
Status: DISCONTINUED | OUTPATIENT
Start: 2025-03-14 | End: 2025-03-18 | Stop reason: HOSPADM

## 2025-03-14 RX ORDER — POLYETHYLENE GLYCOL 3350 17 G
4 POWDER IN PACKET (EA) ORAL ONCE
Status: COMPLETED | OUTPATIENT
Start: 2025-03-14 | End: 2025-03-14

## 2025-03-14 RX ORDER — POLYETHYLENE GLYCOL 3350 17 G
4 POWDER IN PACKET (EA) ORAL
Status: DISCONTINUED | OUTPATIENT
Start: 2025-03-14 | End: 2025-03-14

## 2025-03-14 RX ORDER — LORAZEPAM 1 MG/1
1 TABLET ORAL ONCE
Status: COMPLETED | OUTPATIENT
Start: 2025-03-14 | End: 2025-03-14

## 2025-03-14 RX ORDER — DIAZEPAM 5 MG/1
10 TABLET ORAL EVERY 30 MIN PRN
Status: DISCONTINUED | OUTPATIENT
Start: 2025-03-14 | End: 2025-03-16

## 2025-03-14 RX ORDER — CALCIUM CARBONATE 500 MG/1
1000 TABLET, CHEWABLE ORAL 4 TIMES DAILY PRN
Status: DISCONTINUED | OUTPATIENT
Start: 2025-03-14 | End: 2025-03-18 | Stop reason: HOSPADM

## 2025-03-14 RX ORDER — LORAZEPAM 2 MG/1
2 TABLET ORAL ONCE
Status: DISCONTINUED | OUTPATIENT
Start: 2025-03-14 | End: 2025-03-14

## 2025-03-14 RX ORDER — DIAZEPAM 10 MG/2ML
5-10 INJECTION, SOLUTION INTRAMUSCULAR; INTRAVENOUS EVERY 30 MIN PRN
Status: DISCONTINUED | OUTPATIENT
Start: 2025-03-14 | End: 2025-03-16

## 2025-03-14 RX ORDER — POLYETHYLENE GLYCOL 3350 17 G
2 POWDER IN PACKET (EA) ORAL ONCE
Status: DISCONTINUED | OUTPATIENT
Start: 2025-03-14 | End: 2025-03-14

## 2025-03-14 RX ORDER — ONDANSETRON 4 MG/1
4 TABLET, ORALLY DISINTEGRATING ORAL EVERY 6 HOURS PRN
Status: DISCONTINUED | OUTPATIENT
Start: 2025-03-14 | End: 2025-03-18 | Stop reason: HOSPADM

## 2025-03-14 RX ORDER — PROCHLORPERAZINE MALEATE 5 MG/1
10 TABLET ORAL EVERY 6 HOURS PRN
Status: DISCONTINUED | OUTPATIENT
Start: 2025-03-14 | End: 2025-03-18 | Stop reason: HOSPADM

## 2025-03-14 RX ORDER — LIDOCAINE 40 MG/G
CREAM TOPICAL
Status: DISCONTINUED | OUTPATIENT
Start: 2025-03-14 | End: 2025-03-18 | Stop reason: HOSPADM

## 2025-03-14 RX ORDER — MIRTAZAPINE 30 MG/1
30 TABLET, FILM COATED ORAL AT BEDTIME
Status: DISCONTINUED | OUTPATIENT
Start: 2025-03-14 | End: 2025-03-18 | Stop reason: HOSPADM

## 2025-03-14 RX ORDER — METHADONE HYDROCHLORIDE 10 MG/1
100 TABLET ORAL
Status: DISCONTINUED | OUTPATIENT
Start: 2025-03-15 | End: 2025-03-14

## 2025-03-14 RX ORDER — METHADONE HYDROCHLORIDE 10 MG/ML
105 CONCENTRATE ORAL DAILY
Status: DISCONTINUED | OUTPATIENT
Start: 2025-03-15 | End: 2025-03-18 | Stop reason: HOSPADM

## 2025-03-14 RX ORDER — MAGNESIUM HYDROXIDE/ALUMINUM HYDROXICE/SIMETHICONE 120; 1200; 1200 MG/30ML; MG/30ML; MG/30ML
30 SUSPENSION ORAL EVERY 4 HOURS PRN
Status: DISCONTINUED | OUTPATIENT
Start: 2025-03-14 | End: 2025-03-18 | Stop reason: HOSPADM

## 2025-03-14 RX ORDER — PHENOBARBITAL 64.8 MG/1
64.8 TABLET ORAL ONCE
Status: COMPLETED | OUTPATIENT
Start: 2025-03-14 | End: 2025-03-14

## 2025-03-14 RX ORDER — FLUMAZENIL 0.1 MG/ML
0.2 INJECTION, SOLUTION INTRAVENOUS
Status: DISCONTINUED | OUTPATIENT
Start: 2025-03-14 | End: 2025-03-18 | Stop reason: HOSPADM

## 2025-03-14 RX ORDER — AMOXICILLIN 250 MG
2 CAPSULE ORAL 2 TIMES DAILY PRN
Status: DISCONTINUED | OUTPATIENT
Start: 2025-03-14 | End: 2025-03-18 | Stop reason: HOSPADM

## 2025-03-14 RX ORDER — ACETAMINOPHEN 325 MG/1
650 TABLET ORAL EVERY 4 HOURS PRN
Status: DISCONTINUED | OUTPATIENT
Start: 2025-03-14 | End: 2025-03-18 | Stop reason: HOSPADM

## 2025-03-14 RX ORDER — FLUMAZENIL 0.1 MG/ML
0.2 INJECTION, SOLUTION INTRAVENOUS
Status: DISCONTINUED | OUTPATIENT
Start: 2025-03-14 | End: 2025-03-14

## 2025-03-14 RX ORDER — ACETAMINOPHEN 650 MG/1
650 SUPPOSITORY RECTAL EVERY 4 HOURS PRN
Status: DISCONTINUED | OUTPATIENT
Start: 2025-03-14 | End: 2025-03-18 | Stop reason: HOSPADM

## 2025-03-14 RX ORDER — POLYETHYLENE GLYCOL 3350 17 G/17G
17 POWDER, FOR SOLUTION ORAL 2 TIMES DAILY PRN
Status: DISCONTINUED | OUTPATIENT
Start: 2025-03-14 | End: 2025-03-18 | Stop reason: HOSPADM

## 2025-03-14 RX ORDER — ONDANSETRON 2 MG/ML
4 INJECTION INTRAMUSCULAR; INTRAVENOUS EVERY 6 HOURS PRN
Status: DISCONTINUED | OUTPATIENT
Start: 2025-03-14 | End: 2025-03-18 | Stop reason: HOSPADM

## 2025-03-14 RX ADMIN — LORAZEPAM 1 MG: 1 TABLET ORAL at 15:39

## 2025-03-14 RX ADMIN — NICOTINE POLACRILEX 4 MG: 2 LOZENGE ORAL at 20:29

## 2025-03-14 RX ADMIN — Medication 1 TABLET: at 18:03

## 2025-03-14 RX ADMIN — FOLIC ACID 1 MG: 1 TABLET ORAL at 18:03

## 2025-03-14 RX ADMIN — DESVENLAFAXINE 25 MG: 25 TABLET, EXTENDED RELEASE ORAL at 18:03

## 2025-03-14 RX ADMIN — NICOTINE POLACRILEX 4 MG: 2 LOZENGE ORAL at 15:21

## 2025-03-14 RX ADMIN — NICOTINE POLACRILEX 4 MG: 4 LOZENGE ORAL at 23:57

## 2025-03-14 RX ADMIN — PHENOBARBITAL 64.8 MG: 64.8 TABLET ORAL at 12:19

## 2025-03-14 RX ADMIN — THIAMINE HCL TAB 100 MG 100 MG: 100 TAB at 18:03

## 2025-03-14 RX ADMIN — NICOTINE POLACRILEX 4 MG: 2 LOZENGE ORAL at 18:02

## 2025-03-14 RX ADMIN — DIAZEPAM 10 MG: 10 TABLET ORAL at 18:20

## 2025-03-14 RX ADMIN — DIAZEPAM 10 MG: 10 TABLET ORAL at 19:57

## 2025-03-14 SDOH — ECONOMIC STABILITY - HOUSING INSECURITY: HOMELESSNESS UNSPECIFIED: Z59.00

## 2025-03-14 ASSESSMENT — COLUMBIA-SUICIDE SEVERITY RATING SCALE - C-SSRS
5. HAVE YOU STARTED TO WORK OUT OR WORKED OUT THE DETAILS OF HOW TO KILL YOURSELF? DO YOU INTEND TO CARRY OUT THIS PLAN?: NO
6. HAVE YOU EVER DONE ANYTHING, STARTED TO DO ANYTHING, OR PREPARED TO DO ANYTHING TO END YOUR LIFE?: NO
2. HAVE YOU ACTUALLY HAD ANY THOUGHTS OF KILLING YOURSELF IN THE PAST MONTH?: YES
4. HAVE YOU HAD THESE THOUGHTS AND HAD SOME INTENTION OF ACTING ON THEM?: YES
3. HAVE YOU BEEN THINKING ABOUT HOW YOU MIGHT KILL YOURSELF?: YES
1. IN THE PAST MONTH, HAVE YOU WISHED YOU WERE DEAD OR WISHED YOU COULD GO TO SLEEP AND NOT WAKE UP?: YES

## 2025-03-14 ASSESSMENT — ACTIVITIES OF DAILY LIVING (ADL)
ADLS_ACUITY_SCORE: 46

## 2025-03-14 ASSESSMENT — LIFESTYLE VARIABLES
TOTAL SCORE: 13
TOTAL_SCORE: 17
TACTILE DISTURBANCES: MILD ITCHING, PINS AND NEEDLES, BURNING OR NUMBNESS
HEADACHE, FULLNESS IN HEAD: VERY MILD
TOTAL SCORE: 11
AUDITORY DISTURBANCES: NOT PRESENT
TREMOR: 2
PAROXYSMAL SWEATS: NO SWEAT VISIBLE
HEADACHE, FULLNESS IN HEAD: MILD
ORIENTATION AND CLOUDING OF SENSORIUM: ORIENTED AND CAN DO SERIAL ADDITIONS
ORIENTATION AND CLOUDING OF SENSORIUM: ORIENTED AND CAN DO SERIAL ADDITIONS
VISUAL DISTURBANCES: MILD SENSITIVITY
ANXIETY: 2
NAUSEA AND VOMITING: MILD NAUSEA WITH NO VOMITING
ANXIETY: 2
TACTILE DISTURBANCES: MODERATELY SEVERE HALLUCINATIONS
VISUAL DISTURBANCES: VERY MILD SENSITIVITY
AGITATION: NORMAL ACTIVITY
AUDITORY DISTURBANCES: NOT PRESENT
PAROXYSMAL SWEATS: NO SWEAT VISIBLE
AGITATION: NORMAL ACTIVITY
TREMOR: MODERATE, WITH PATIENT'S ARMS EXTENDED
NAUSEA AND VOMITING: MILD NAUSEA WITH NO VOMITING

## 2025-03-14 NOTE — ED PROVIDER NOTES
[facility] [place] [facility] ([facility])    3/14/25      ED PROVIDER NOTE    History     Chief Complaint   Patient presents with    Suicidal     SI with a plan to overdose on fentanyl    Addiction Problem     Fentanyl and benzos. Smoke the fentanyl, snort the benzos. Last use of benzos was yesterday and fentanyl was the day before.      The history is provided by the patient, a caregiver and medical records.     Zenon Truong is a 54 year old male with a history notable for polysubstance use disorder, depression, anxiety, housing instability who presents to the ED with suicidal ideation and concern for withdrawal.  Patient presents to the ED with .  Patient reports suicidal ideation with a plan to overdose on fentanyl.  He reports using fentanyl and Xanax regularly.  His last use of fentanyl was 2 days ago.  His last use of Xanax was yesterday and he is concerned he is going into withdrawal.  He began developing shakiness and sweatiness this morning which is usual when he withdrawals from benzos.  He reports a history of withdrawal seizures.  States he takes between 6-10 mg of Xanax per day.    Past Medical History  Past Medical History:   Diagnosis Date    Hep C w/o coma, chronic (H) 2011    Substance abuse (H)     Testicular cancer (H)      Past Surgical History:   Procedure Laterality Date    GENITOURINARY SURGERY  1996    Testicular CA     TESTICLE SURGERY Bilateral      buprenorphine-naloxone (SUBOXONE) 2-0.5 MG SUBL sublingual tablet  desvenlafaxine succinate (PRISTIQ) 25 MG 24 hr tablet  folic acid (FOLVITE) 1 MG tablet  gabapentin (NEURONTIN) 600 MG tablet  hydrOXYzine (VISTARIL) 50 MG capsule  methocarbamol (ROBAXIN) 500 MG tablet  mirtazapine (REMERON) 30 MG tablet  nicotine (COMMIT) 2 MG lozenge  PHENobarbital 32.4 MG tablet      Allergies   Allergen Reactions    Iodinated Contrast Media Hives and Rash     Reports he is allergic to Iodine contrast     Reports he is allergic to  "Iodine contrast       Reports he is allergic to Iodine contrast    Reports he is allergic to Iodine contrast      Reports he is allergic to Iodine contrast   Reports he is allergic to Iodine contrast    Iodine Hives    Lisinopril Hives and Rash    Lithium Hives    Topiramate Hives    Escitalopram Rash     Rash around mouth    Iodine-131 Hives    Trazodone Anxiety     Trazodone causes RLS for pt    Olanzapine Rash     Pt reported taking approximately one year ago. Developed rash around mouth.     Family History  Family History   Problem Relation Age of Onset    Cancer Mother     Mental Illness Mother     Cancer Father     Cancer Sister     Depression Mother     Alcoholism Father     Substance Abuse Sister      Social History   Social History     Tobacco Use    Smoking status: Every Day    Smokeless tobacco: Never    Tobacco comments:     Switched to e cigarettes   Substance Use Topics    Alcohol use: No    Drug use: Yes     Types: Benzodiazepines      Past medical history, past surgical history, medications, allergies, family history, and social history were reviewed with the patient. No additional pertinent items.     A complete review of systems was performed with pertinent positives and negatives noted in the HPI, and all other systems negative.    Physical Exam   BP: (!) 177/106  Pulse: 80  Temp: 98.4  F (36.9  C)  Resp: 18  Height: 180.3 cm (5' 11\")  Weight: 88.5 kg (195 lb)  SpO2: 98 %    Physical Exam  Vitals and nursing note reviewed.   Constitutional:       General: He is not in acute distress.     Appearance: Normal appearance. He is not toxic-appearing.   HENT:      Head: Atraumatic.   Eyes:      General: No scleral icterus.     Extraocular Movements: Extraocular movements intact.      Conjunctiva/sclera: Conjunctivae normal.      Pupils: Pupils are equal, round, and reactive to light.   Cardiovascular:      Rate and Rhythm: Normal rate.      Heart sounds: Normal heart sounds. No murmur heard.  Pulmonary: "      Effort: Pulmonary effort is normal. No respiratory distress.      Breath sounds: Normal breath sounds. No stridor. No wheezing, rhonchi or rales.   Chest:      Chest wall: No tenderness.   Abdominal:      General: There is no distension.      Palpations: Abdomen is soft. There is no mass.      Tenderness: There is no abdominal tenderness. There is no guarding.      Hernia: No hernia is present.   Musculoskeletal:         General: No deformity.      Cervical back: Neck supple.   Skin:     General: Skin is warm.   Neurological:      Mental Status: He is alert.      Comments: Shaking, sweating       ED Course, Procedures, & Data      Procedures                No results found for any visits on 03/14/25.  Medications - No data to display  Labs Ordered and Resulted from Time of ED Arrival to Time of ED Departure - No data to display  No orders to display          Critical care was not performed.     Medical Decision Making  The patient's presentation was of high complexity (an acute health issue posing potential threat to life or bodily function).    The patient's evaluation involved:  review of external note(s) from 3+ sources (see separate area of note for details)  review of 3+ test result(s) ordered prior to this encounter (see separate area of note for details)  ordering and/or review of 3+ test(s) in this encounter (see separate area of note for details)  discussion of management or test interpretation with another health professional (hospitalist, DEC )    The patient's management necessitated high risk (a decision regarding hospitalization).    Assessment & Plan    Patient presented to the emergency room for benzo withdrawal and suicidality  Spoke with DEC who recommends admission which I agree with as patient is high risk for suicidality, furthermore his  was hoping we would petition for commitment as he is chronically suicidal, has blocked guns multiple times with intention to harm  himself, and has had multiple suicide attempts.  However patient is voluntary at this point  Labs as reviewed and interpreted by me are reassuring, but do show a new anemia to 10.1 from a baseline around 15, as patient denies any current rectal bleeding, or any kind of other bleeding, inpatient hospitalist can consider endoscopy colonoscopy  Discussed patient with detox staff who said patient cannot go to detox due to his high risk suicidality, discussed with mental health who said patient cannot go to mental health due to his high risk for withdrawal seizures  Initially ordered phenobarbital for benzo withdrawal, and then ordered 1 mg of p.o. Ativan  4 discussed case with internal medicine physician who accepted patient for admission for suicidality, benzo withdrawal, and new anemia 10.1      I have reviewed the nursing notes. I have reviewed the findings, diagnosis, plan and need for follow up with the patient.    New Prescriptions    No medications on file       Final diagnoses:   None     I, Narendra Hernandez, am serving as a trained medical scribe to document services personally performed by Eleazar Xiao MD based on the provider's statements to me on March 14, 2025.  This document has been checked and approved by the attending provider.    I, Eleazar Xiao MD, was physically present and have reviewed and verified the accuracy of this note documented by Narendra Hernandez medical scribe.      Eleazar Xiao MD  [facility] EMERGENCY DEPARTMENT  3/14/2025     Eleazar Xiao MD  03/14/25 1533       Eleazar Xiao MD  03/14/25 1534

## 2025-03-14 NOTE — PHARMACY-ADMISSION MEDICATION HISTORY
Pharmacist Admission Medication History    Admission medication history is complete. The information provided in this note is only as accurate as the sources available at the time of the update.    Medication reconciliation/reorder completed by provider prior to medication history? No    Information Source(s): Patient and Patient's pharmacy via in-person    Pertinent Information: Discussed medication history with patient and updated list accordingly.     Changes made to PTA medication list:  Added: Bupropion, methadone  Deleted: Suboxone, phenobarbital  Changed:   Desvenlafaxine from 25mg daily TO 50mg daily  Gabapentin from 1200mg three times daily TO 600mg three times daily    Allergies reviewed with patient and updates made in EHR: yes    Medication History Completed By: ERICK LAW RPH 3/14/2025 6:50 PM    Prior to Admission medications    Medication Sig Last Dose Taking? Auth Provider Long Term End Date   buPROPion (WELLBUTRIN XL) 300 MG 24 hr tablet Take 300 mg by mouth every morning. Past Week Yes Unknown, Entered By History Yes    desvenlafaxine succinate (PRISTIQ) 25 MG 24 hr tablet Take 1 tablet (25 mg) by mouth daily  Patient taking differently: Take 50 mg by mouth daily. Past Week Yes Chava [place] MD Yes    folic acid (FOLVITE) 1 MG tablet Take 5 tablets (5 mg) by mouth daily Past Week Yes Chava [place] MD     gabapentin (NEURONTIN) 600 MG tablet Take 2 tablets (1,200 mg) by mouth 3 times daily  Patient taking differently: Take 600 mg by mouth 3 times daily. Past Week Yes Chava [place] MD Yes    hydrOXYzine (VISTARIL) 50 MG capsule Take 1 capsule (50 mg) by mouth 3 times daily as needed for anxiety Past Week Yes Chava [place] MD     methadone (DOLOPHINE-INTENSOL) 10 MG/ML (HIGH CONC) solution Take 105 mg by mouth daily. 3/14/2025 Yes Unknown, Entered By History     methocarbamol (ROBAXIN) 500 MG tablet Take 1 tablet (500 mg) by mouth 3 times daily as needed for muscle spasms Past Week Yes Julius  Chava PAUL MD     mirtazapine (REMERON) 30 MG tablet Take 1 tablet (30 mg) by mouth at bedtime Past Week Yes Chava Madrid MD Yes    nicotine (COMMIT) 2 MG lozenge Place 2 lozenges (4 mg) inside cheek every hour as needed for nicotine withdrawal symptoms Past Week Yes Chava Madrid MD

## 2025-03-14 NOTE — CONSULTS
Diagnostic Evaluation Consultation  Crisis Assessment    Patient Name: Zenon Truong  Age:  54 year old  Legal Sex: male  Gender Identity: male  Pronouns:   Race: White  Ethnicity: Not  or   Language: English      Patient was assessed: Virtual: iPad   Crisis Assessment Start Date: 03/14/25  Crisis Assessment Start Time: 1354  Crisis Assessment Stop Time: 1417  Patient location: [facility] Emergency Department                             ED12    Referral Data and Chief Complaint  Zenon Truong presents to the ED per community partner(s). Patient is presenting to the ED for the following concerns: Depression, Suicidal ideation, Significant behavioral change, Worsening psychosocial stress, Anxiety, Substance use. Factors that make the mental health crisis life threatening or complex are: Pt presenting in the ER today with his  due to worsening of depression, anxiety and suicidal ideations.  Pt has been homeless but staying at sober housing called, [facility] in [place] for a week.  Pt reported he has been using Fentanyl 2 to 3x weekly and his last use was 2 days ago.  Pt reported using Xanax daily and his last use was 2 days ago.  Pt reported his doctor discontinued Klonopin prescription 10 days ago.  Pt stopped taking all other psychiatric medications 1 week ago.  Pt has no current outpatient mental health service providers..      Informed Consent and Assessment Methods  Explained the crisis assessment process, including applicable information disclosures and limits to confidentiality, assessed understanding of the process, and obtained consent to proceed with the assessment.  Assessment methods included conducting a formal interview with patient, review of medical records, collaboration with medical staff, and obtaining relevant collateral information from family and community providers when available.  : done     History of the Crisis   Pt is a 54 year old White male  "with history of depression, anxiety, suicidal ideations and JOSIAH.  Pt was brought to the ER today by his  due to worsening of depression, anxiety and suicidal ideations.  Pt has history of multiple psychiatric hospitalizations and CD treatments.  Pt curently being homeless but has been staying at sober housing called, [facility] in [place] for a week now.  Pt reported he has been using Fentanyl 2 to 3x weekly and his last use was 2 days ago. Pt reported using Xanax daily and his last use was 2 days ago.  Pt reported having Benzo withdrawal symptoms today, including sweating, runny nose, shaky, vomiting, nausea, anxious and panicking.  Pt remarked, \"Because I was going to kill myself.\" as his reason for visiting the ER today.  Pt was not able to identify particular events, stressors or triggers leading to his current mental health crisis.  However, Pt shared being hopeless as he has chronic suicidal ideations, struggle with depression, anxiety and substance abuse as stressors.  Pt endorsed increased depression, isolation, cry, worry, racing thoughts and anxiety.  Pt shared he mostly worried about uncertain future and everything.  Pt reported having poor sleep and appetite.  Pt denied having acute psychosis and shreyas.  Pt endorsed active suicidal ideations with intent and plan to overdosing on Fentanyl.  Pt noted he purchased Fentanyl and going to mix it with water and drink to kill himself.  Pt denied having homicidal ideations, access to firearms and history of SIB.  Pt reported 3x previous suicide attempts by overdosing on pills and substance.    Brief Psychosocial History  Family:  Single, Children no  Support System:   (Pt identified his  as positive support.)  Employment Status:  unemployed  Source of Income:  unable to assess  Financial Environmental Concerns:  unable to afford rent/mortgage, unemployed  Current Hobbies:  arts/crafts, television/movies/videos, reading, " writing/journaling/blogging, social media/computer activities, music, meditation, exercise/fitness, outdoor activities  Barriers in Personal Life:  behavioral concerns, mental health concerns, financial concerns, lack of motivation, emotional concerns    Significant Clinical History  Current Anxiety Symptoms:  panic attack, shortness of breath or racing heart, anxious, racing thoughts, excessive worry  Current Depression/Trauma:  apathy, crying or feels like crying, helplessness, hopelessness, sadness, difficulty concentrating, low self esteem, thoughts of death/suicide, impaired decision making, withdrawl/isolation, negativistic  Current Somatic Symptoms:  excessive worry, shortness of breath or racing heart, anxious, racing thoughts  Current Psychosis/Thought Disturbance:  impulsive, high risk behavior  Current Eating Symptoms:  loss of appetite  Chemical Use History:  Alcohol: None  Benzodiazepines: Rx Abuse, Daily use  Last Use:: 03/12/25  Opiates: Other (comments) (Pt reported using Fentanyl 2 to 3x weekly.)  Last Use:: 03/12/25  Cocaine: None  Marijuana: Occasional  Other Use: Methamphetamines  Withdrawal Symptoms: Tremors, Nausea   Past diagnosis:  Anxiety Disorder, Suicide attempt(s), Depression, Substance Use Disorder  Family history:  No known history of mental health or chemical health concerns  Past treatment:  Civil Commitment, Primary Care, Psychiatric Medication Management, Case management, Supportive Living Environment (group home, prison house, etc), Inpatient Hospitalization  Details of most recent treatment:  Per EPIC record, Pt had most recent psychiatric hospitalization at [facility] on 2/23/25.  Pt reported he has no current outpatient mental health service providers, but has his .  Other relevant history:  Pt reported his parents passed away, he had 4 siblings but one passed away.  Pt reported he was single, has no children, being homeless and unemployed.  Pt  denied having medical conditions and denied history of legal issues.  Pt denied history of being abused.    Have there been any medication changes in the past two weeks:  yes, please comment  Pt reported his doctor discontinued his Klonopin 10 days ago and Pt did not know the reason.    Is the patient compliant with medications:  no  Pt reported he stopped taking all of his psychiatric medications a week ago.     Collateral Information  Is there collateral information: Yes     Collateral information name, relationship, phone number:  [facility] , Stephanie Bailey [phone]    What happened today: Stephanie reported she has been working with Pt for about a year now.  Stephanie shared Pt has been suicidal for entire week as he has been sending her text messages with suicidal comments and telling her he has been also using substances.  Stephanie reported Pt made suicidal comments to her today with plans to either jumping into oncoming car or overdosing on Fentanyl which lead him to the ER visit today.     What is different about patient's functioning: Stephanie reported Pt told her that he threw away his psychiatric medications but has been using Fentanyl and Xanax daily. Stephanie reported Pt has history of not following up with his treatment recommendations and medication non-adherence.  Pt was planning to go to [facility] treatment facility in [place] but they told him he needed to get off his methodone treatment.  Pt has poor self-care, and ILS as he has been decompensating.     What do you think the patient needs:      Has patient made comments about wanting to kill themselves/others: yes    If d/c is recommended, can they take part in safety/aftercare planning:  yes    Additional collateral information:  Stephanie reported Pt has history of civil commitment through [facility] in 2022 which ended.  Stephanie agreed with inpatient psychiatric recommendation for Pt and reported she would like to petition  for commitment.     Risk Assessment  Bruning Suicide Severity Rating Scale Full Clinical Version:  Suicidal Ideation  Q1 Wish to be Dead (Lifetime): Yes  Q2 Non-Specific Active Suicidal Thoughts (Lifetime): Yes  3. Active Suicidal Ideation with any Methods (Not Plan) Without Intent to Act (Lifetime): Yes  4. Active Suicidal Ideation with Some Intent to Act, Without Specific Plan (Lifetime): Yes  5. Active Suicidal Ideation with Specific Plan and Intent (Lifetime): Yes  Q6 Suicide Behavior (Lifetime): yes  Intensity of Ideation (Lifetime)  Most Severe Ideation Rating (Lifetime): 5  Frequency (Lifetime): Daily or almost daily  Duration (Lifetime): 1-4 hours/a lot of time  Suicidal Behavior (Lifetime)  Actual Attempt (Lifetime): Yes  Total Number of Actual Attempts (Lifetime): 3  Actual Attempt Description (Lifetime): Pt reported 3 previous suicide attempts by overdosing on pills and Fentanyl.  Pt reported his most recent suicide attempt was about a year ago by overdosing on pills.  Pt shared he passed out, then called 911 for help.  Has subject engaged in non-suicidal self-injurious behavior? (Lifetime): No  Interrupted Attempts (Lifetime): No  Aborted or Self-Interrupted Attempt (Lifetime): Yes  Total Number of Aborted or Self-Interrupted Attempts (Lifetime): 1  Aborted or Self-Interrupted Attempt Description (Lifetime): Pt reported 3 previous suicide attempts by overdosing on pills and Fentanyl. Pt reported his most recent suicide attempt was about a year ago by overdosing on pills. Pt shared he passed out, then called 911 for help.  Preparatory Acts or Behavior (Lifetime): Yes  Preparatory Acts or Behavior Description (Lifetime): Pt reported he recently purchased more Fentanyl to plan to overdose to kill himself.    Bruning Suicide Severity Rating Scale Recent:   Suicidal Ideation (Recent)  Q1 Wished to be Dead (Past Month): yes  Q2 Suicidal Thoughts (Past Month): yes  Q3 Suicidal Thought Method: yes  Q4 Suicidal  Intent without Specific Plan: yes  Q5 Suicide Intent with Specific Plan: yes  Level of Risk per Screen: high risk  Intensity of Ideation (Recent)  Most Severe Ideation Rating (Past 1 Month): 5  Frequency (Past 1 Month): Daily or almost daily  Duration (Past 1 Month): 1-4 hours/a lot of time  Suicidal Behavior (Recent)  Actual Attempt (Past 3 Months): No  Has subject engaged in non-suicidal self-injurious behavior? (Past 3 Months): No  Interrupted Attempts (Past 3 Months): No  Aborted or Self-Interrupted Attempt (Past 3 Months): No  Preparatory Acts or Behavior (Past 3 Months): Yes  Total Number of Preparatory Acts (Past 3 Months): 1  Preparatory Acts or Behavior Description (Past 3 Months): Pt reported he recently purchased more Fentanyl to plan to overdose to kill himself.    Environmental or Psychosocial Events: impulsivity/recklessness, work or task failure, challenging interpersonal relationships, helplessness/hopelessness, unemployment/underemployment, other life stressors, neither working nor attending school, recent life events (see comment), excessive debt, poor finances, unstable housing, homelessness, ongoing abuse of substances, social isolation  Protective Factors: Protective Factors: lives in a responsibly safe and stable environment, help seeking, constructive use of leisure time, enjoyable activities, resilience, reality testing ability    Does the patient have thoughts of harming others? Feels Like Hurting Others: no  Previous Attempt to Hurt Others: no  Current presentation:  (Pt was depressed and anxous.)  Is the patient engaging in sexually inappropriate behavior?: no  Does Patient have a known history of aggressive behavior: No  Does patient have history of aggression in hospital: None reported.    Is the patient engaging in sexually inappropriate behavior?  no        Mental Status Exam   Affect: Constricted  Appearance: Appropriate, Disheveled  Attention Span/Concentration: Attentive  Eye  Contact: Variable    Fund of Knowledge: Appropriate   Language /Speech Content: Fluent  Language /Speech Volume: Normal  Language /Speech Rate/Productions: Normal  Recent Memory: Variable  Remote Memory: Variable  Mood: Anxious, Apathetic, Depressed, Sad  Orientation to Person: Yes   Orientation to Place: Yes  Orientation to Time of Day: Yes  Orientation to Date: Yes     Situation (Do they understand why they are here?): Yes  Psychomotor Behavior: Normal  Thought Content: Clear, Suicidal  Thought Form: Intact     Mini-Cog Assessment  Number of Words Recalled:    Clock-Drawing Test:     Three Item Recall:    Mini-Cog Total Score:       Medication  Psychotropic medications:   Medication Orders - Psychiatric (From admission, onward)      Start     Dose/Rate Route Frequency Ordered Stop    03/14/25 1328  nicotine (COMMIT) lozenge 4 mg         4 mg Buccal EVERY 1 HOUR PRN 03/14/25 1328               Current Care Team  Patient Care Team:  No Ref-Primary, Physician as PCP - General    Diagnosis  Patient Active Problem List   Diagnosis Code    Opioid use disorder, severe, on maintenance therapy (H) F11.20    Suicidal ideation R45.851    Severe benzodiazepine use disorder (H) F13.20    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    Benzodiazepine withdrawal without complication (H) F13.930    Cannabis use disorder, moderate, dependence (H) F12.20    Cigarette nicotine dependence with withdrawal F17.213    Moderate episode of recurrent major depressive disorder (H) F33.1    Homelessness Z59.00    Opioid withdrawal (H) F11.93    MDD (major depressive disorder), recurrent severe, without psychosis (H) F33.2    ALISSON (generalized anxiety disorder) F41.1       Primary Problem This Admission  Active Hospital Problems    MDD (major depressive disorder), recurrent severe, without psychosis (H)      ALISSON (generalized anxiety disorder)      Severe benzodiazepine use disorder (H)      Benzodiazepine withdrawal without complication  "(H)      Suicidal ideation      Opioid use disorder, severe, on maintenance therapy (H)        Clinical Summary and Substantiation of Recommendations   Clinical Substantiation:  Pt presenting in the ER today due to worsening of depression, anxiety and suicidal ideations.  Pt curently being homeless but has been staying at sober housing called, [facility] in [place] for a week now. Pt reported he has been using Fentanyl 2 to 3x weekly and his last use was 2 days ago. Pt reported using Xanax daily and his last use was 2 days ago. Pt reported having Benzo withdrawal symptoms today, including sweating, runny nose, shaky, vomiting, nausea, anxious and panicking. Pt remarked, \"Because I was going to kill myself.\" as his reason for visiting the ER today. Pt was not able to identify particular events, stressors or triggers leading to his current mental health crisis. However, Pt shared being hopeless as he has chronic suicidal ideations, struggle with depression, anxiety and substance abuse as stressors. Pt endorsed increased depression, isolation, cry, worry, racing thoughts and anxiety. Pt shared he mostly worried about uncertain future and everything. Pt reported having poor sleep and appetite. Pt denied having acute psychosis and shreyas. Pt endorsed active suicidal ideations with intent and plan to overdosing on Fentanyl. Pt noted he purchased Fentanyl and going to mix it with water and drink to kill himself. Pt denied having homicidal ideations, access to firearms and history of SIB. Pt reported 3x previous suicide attempts by overdosing on pills and substance.  Pt was not able to engage in his [facility] Safety plan as he did not feel safe to return to his sober housing.  Pt noted he will likely to act on his suicidal ideations to kill himself if he were to be discharged from the hospital.  Pt was not able to demonstrate his ability to use coping skills and support system to mitigate his current mental health crisis.  Pt has " impaired judgment, daily functioning and self-care.  Pt having active suicidal ideations with intent and plan to overdosing on Fentanyl to kill himself as he was imminent danger to himself out in the community.  Writer recommended inpatient psychiatric service for further stabilization, safety assessment and medication management.    Goals for crisis stabilization:  Pt being recommended for inpatient psychiatric service for further stabilization, safety assessment and medication management.    Next steps for Care Team:  EC will follow up with Pt to provide further therapeutic support and re-assessment while on boarding.  Psych consult order was made for medication evaluation.  DEC  will call the intake service once Pt was medically cleared.    Treatment Objectives Addressed:  rapport building, safety planning, assessing safety, identifying an appropriate aftercare plan, identifying and practicing coping strategies, processing feelings, identifying additional supports    Therapeutic Interventions:  Engaged in safety planning, Engaged in guided discovery, explored patient's perspectives and helped expand them through socratic dialogue., Coached on coping techniques/relaxation skills to help improve distress tolerance and managing intense emotions.    Has a specific means been identified for suicidal/homicide actions: Yes    If yes, describe:  Pt endorsed suicidal plan to overdosing on Fentanyl.  Pt said he planned to mix it with water and drink to kill himself.    Explain action steps toward mitigation:  Writer engaged Pt in his suicide risk assessment, reviewed safety plan, coping skills and support system.    Document completion of mitigation actions:  Writer recommended inpatient psychiatric service for further stabilization, safety assessment and medication management as Pt was not able to engage in his [facility] Safety plan.    The follow up action still needed prior to discharge:  EC will follow up with Pt to  provide further therapeutic support while on boarding.  Psych consult order was made for medication evaluation.    Patient coping skills attempted to reduce the crisis:       Disposition  Recommended referrals:          Reviewed case and recommendations with attending provider. Attending Name: Eleazar Xiao MD reported Pt was having Benzo withdrawal issues as he will need medical admission.  Pt will need medical clearance for psychiatric hospitalization.       Attending concurs with disposition: yes       Patient and/or validated legal guardian concurs with disposition:   yes       Final disposition:  inpatient mental health         Imminent risk of harm: Suicidal Behavior  Severe psychiatric, behavioral or other comorbid conditions are appropriate for management at inpatient mental health as indicated by at least one of the following: Psychiatric Symptoms, Comorbid substance use disorder, Impaired impulse control, judgement, or insight, Symptoms of impact to function  Severe dysfunction in daily living is present as indicated by at least one of the following: Complete inability to maintain any appropriate aspect of personal responsibility in any adult roles, Other evidence of severe dysfunction  Situation and expectations are appropriate for inpatient care: Voluntary treatment at lower level of care is not feasible, Patient management/treatment at lower level of care is not feasible or is inappropriate, Biopsychosocial stresses potentially contributing to clinical presentation (co morbidities) have been assessed and are absent or manageable at proposed level of care  Inpatient mental health services are necessary to meet patient needs and at least one of the following: Specific condition related to admission diagnosis is present and judged likely to further improve at proposed level of care, Specific condition related to admission diagnosis is present and judged likely to deteriorate in absence of treatment at  proposed level of care      Legal status: Voluntary/Patient has signed consent for treatment                                                                                                                                         Assessment Details   Total duration spent with the patient: 23 min     CPT code(s) utilized: 72138 - Psychotherapy (with patient) - 30 (16-37*) min    DAKSHA Frederick, Psychotherapist  [facility] - Triage & Transition Services  Callback: [phone]

## 2025-03-14 NOTE — PLAN OF CARE
"Zenon Truong  March 14, 2025  Plan of Care Hand-off Note     Patient Recommended Care Path: inpatient mental health    Clinical Substantiation:  Pt presenting in the ER today due to worsening of depression, anxiety and suicidal ideations.  Pt curently being homeless but has been staying at sober housing called, [facility] in [place] for a week now. Pt reported he has been using Fentanyl 2 to 3x weekly and his last use was 2 days ago. Pt reported using Xanax daily and his last use was 2 days ago. Pt reported having Benzo withdrawal symptoms today, including sweating, runny nose, shaky, vomiting, nausea, anxious and panicking. Pt remarked, \"Because I was going to kill myself.\" as his reason for visiting the ER today. Pt was not able to identify particular events, stressors or triggers leading to his current mental health crisis. However, Pt shared being hopeless as he has chronic suicidal ideations, struggle with depression, anxiety and substance abuse as stressors. Pt endorsed increased depression, isolation, cry, worry, racing thoughts and anxiety. Pt shared he mostly worried about uncertain future and everything. Pt reported having poor sleep and appetite. Pt denied having acute psychosis and shreyas. Pt endorsed active suicidal ideations with intent and plan to overdosing on Fentanyl. Pt noted he purchased Fentanyl and going to mix it with water and drink to kill himself. Pt denied having homicidal ideations, access to firearms and history of SIB. Pt reported 3x previous suicide attempts by overdosing on pills and substance.  Pt was not able to engage in his DEC Safety plan as he did not feel safe to return to his sober housing.  Pt noted he will likely to act on his suicidal ideations to kill himself if he were to be discharged from the hospital.  Pt was not able to demonstrate his ability to use coping skills and support system to mitigate his current mental health crisis.  Pt has impaired judgment, daily " functioning and self-care.  Pt having active suicidal ideations with intent and plan to overdosing on Fentanyl to kill himself as he was imminent danger to himself out in the community.  Writer recommended inpatient psychiatric service for further stabilization, safety assessment and medication management.  Dr. Xiao reported Pt was not medically cleared due to Benzo withdrawal issues as Pt will need medical admission.  However, Dr. Xiao agreed with psychiatric hospitalization once Pt was medically cleared.    Goals for crisis stabilization:  Pt being recommended for inpatient psychiatric service for further stabilization, safety assessment and medication management.    Next steps for Care Team:  EC will follow up with Pt to provide further therapeutic support and re-assessment while on boarding.  Psych consult order was made for medication evaluation.  DEC  will call the intake service once Pt was medically cleared.    Treatment Objectives Addressed:  rapport building, safety planning, assessing safety, identifying an appropriate aftercare plan, identifying and practicing coping strategies, processing feelings, identifying additional supports    Therapeutic Interventions:  Engaged in safety planning, Engaged in guided discovery, explored patient's perspectives and helped expand them through socratic dialogue., Coached on coping techniques/relaxation skills to help improve distress tolerance and managing intense emotions.    Has a specific means been identified for suicidal.homicide actions: Yes  If yes, describe: Pt endorsed suicidal plan to overdosing on Fentanyl.  Pt said he planned to mix it with water and drink to kill himself.  Explain action steps toward mitigation: Writer engaged Pt in his suicide risk assessment, reviewed safety plan, coping skills and support system.  Document completion of mitigation action: Writer recommended inpatient psychiatric service for further stabilization, safety  assessment and medication management as Pt was not able to engage in his DEC Safety plan.  The follow up action still needed prior to discharge: EC will follow up with Pt to provide further therapeutic support while on boarding.  Psych consult order was made for medication evaluation.    Patient coping skills attempted to reduce the crisis:   camping, hiking, walking, reading, journaling, watching TV, listening to music, coloring, deep breathing exercise, guided meditation and affirmations.        Imminent risk of harm: Suicidal Behavior  Severe psychiatric, behavioral or other comorbid conditions are appropriate for management at inpatient mental health as indicated by at least one of the following: Psychiatric Symptoms, Comorbid substance use disorder, Impaired impulse control, judgement, or insight, Symptoms of impact to function  Severe dysfunction in daily living is present as indicated by at least one of the following: Complete inability to maintain any appropriate aspect of personal responsibility in any adult roles, Other evidence of severe dysfunction  Situation and expectations are appropriate for inpatient care: Voluntary treatment at lower level of care is not feasible, Patient management/treatment at lower level of care is not feasible or is inappropriate, Biopsychosocial stresses potentially contributing to clinical presentation (co morbidities) have been assessed and are absent or manageable at proposed level of care  Inpatient mental health services are necessary to meet patient needs and at least one of the following: Specific condition related to admission diagnosis is present and judged likely to further improve at proposed level of care, Specific condition related to admission diagnosis is present and judged likely to deteriorate in absence of treatment at proposed level of care      Collateral contact information:  [facility] , Stephanie Bailey [phone]    Legal Status:  Voluntary/Patient has signed consent for treatment                                                                                                                                       Psychiatry Consult: Patient has Psychiatry Consult Order    DAKSHA Frederick

## 2025-03-14 NOTE — H&P
[facility]    History and Physical - Hospitalist Service, GOLD TEAM        Date of Admission:  3/14/2025    Assessment & Plan   Zenon Truong is a 54 year old male with history of depression and anxiety, polysubstance abuse including benzodiazepines, fentanyl, occasional methamphetamine and THC who was recently hospitalized at [facility] on voluntary psychiatric admission from 2/23-3/6/2025.  Has been homeless for several years, was discharged to a sober home and patient states that he stopped taking all prescribed medications except his methadone and immediately relapsed on fentanyl, benzodiazepines as well as some methamphetamine and THC abuse.  Had continued to feel suicidal, but continued to maintain contact with his .  States he purchased some fentanyl with intention of overdosing, but fortunately he was in contact with his  who intervened and brought him to the ED.  Admitted to hospitalist service due to concerns for active benzodiazepine withdrawal with history of benzodiazepine withdrawal seizures with anticipation of inpatient psychiatry admission once medically deemed stable.      #Suicidal ideation with plan  #History of prior suicide attempts by prescription and illicit drug overdose  #Anxiety, depression, PTSD  #Borderline personality disorder  #Polysubstance abuse:  Recently hospitalized at [facility] on voluntary psychiatric admission from 2/23-3/6/2025.  Has been homeless for several years, was discharged to a sober home and patient states that he stopped taking all prescribed medications except his methadone and immediately relapsed on fentanyl, benzodiazepines as well as some methamphetamine and THC abuse.  Had continued to feel suicidal, but continued to maintain contact with his .  States he purchased some fentanyl with intention of overdosing, but fortunately he was in contact with his case  manager who intervened and brought him to the ED.  At recent hospital discharge was prescribed Pristiq 50 mg, gabapentin 600 mg TID, Remeron 30 mg and bupropion  mg but as above stopped taking all of these on the day of discharge.  The only medication he has been taking is his methadone 105 mg daily through the [facility].  Last dose 3/14/2025. Had DEC assessment in ED, recommended inpatient psychiatry admission, but not until medically stabilized with respect to active benzodiazepine withdrawal.  Received phenobarbital as well as Ativan 1 mg in ED.  Continues to have suicidal thoughts and intent, feelings of hopelessness with symptoms of active benzodiazepine withdrawal as discussed below.  -One-to-one sitter, suicide and seizure precautions  -Pharmacy consult to update medications  -Psychiatry consult  -Resume Remeron 30 mg at bedtime, Pristiq at dose of 25 mg daily pending psychiatry input  -Hold bupropion given risk of lowering seizure threshold    #Polysubstance abuse including fentanyl, benzodiazepines, methamphetamine, THC  #Acute benzodiazepine withdrawal  #History of benzodiazepine withdrawal seizures:  Recently hospitalized at [facility] for voluntary psychiatric admission.  Discharged 3/6/2025 and immediately relapsed on fentanyl and benzodiazepines.  Has also been accessing his methadone 105 mg daily through the [facility].  States he is also used methamphetamine and some THC in the past week but his drugs of choice are fentanyl and benzodiazepines.  Denies any alcohol use.  Past history of benzodiazepine withdrawal seizures.  States he has been taking about 6 mg of Xanax daily, last taking on 3/11/2025.  Last used fentanyl on 3/13/2025.  Had purchased more fentanyl with the intent of overdosing in a suicide attempt but his  intervened.  Received phenobarbital as well as Ativan 1 mg in ED.  Continues to feel tremulous and having chills, feeling hopeless.  -Resume  PTA methadone 105 mg daily, next dose due 3/15/2025  -Utilize CIWA protocol with Valium for benzodiazepine withdrawal  -Thiamine, MVI, folic acid given homelessness, concerns about nutritional intake and cytopenias  -Seizure precautions    #Chronic hepatitis C, never treated  #Mild transaminase elevations, hepatic steatosis:  Never treated for hepatitis C.  Chronic, mild transaminase elevations with normal hepatic function.  Most recent abdominal ultrasound 2/15/2024 showed hepatic steatosis without hepatic lesions, normal gallbladder.  Denies alcohol use.  -Repeat CMP 3/15    #Chronic thrombocytopenia  #Normocytic anemia:  Chronic thrombocytopenia, moderate and stable.  Denies any history of bleeding including no history of gastric ulcers, hematemesis, melena or hematochezia.  Anemia appears to be more recent since 2025 when hemoglobin was 11.6, MCV 86.  Admission hemoglobin 10.1, platelets 78, MCV 88.  -Repeat CBC with platelets, check B12, folic acid, ferritin, iron studies, TSH 3/15    #Housing insecurity:  Patient reports being homeless for several years.  Does not have any support network, his parents are  and he is estranged from his 3 sisters.  Had been discharged to sober living following his psychiatric admission on 3/6/2025.  -Social work, care coordinator consult    #Nicotine dependence:  Vapes.  -Nicotine lozenges as needed      Diet: Combination Diet Regular Diet; Safe Tray - with utensils    DVT Prophylaxis: Low Risk/Ambulatory with no VTE prophylaxis indicated and Pneumatic Compression Devices  Walker Catheter: Not present  Lines: None     Cardiac Monitoring: ACTIVE order. Indication: Drug overdose (24 hours)  Code Status: Full Code      Clinically Significant Risk Factors Present on Admission                 # Thrombocytopenia: Lowest platelets = 78 in last 2 days, will monitor for bleeding        # Anemia: based on hgb <11       # Overweight: Estimated body mass index is 27.2 kg/m  as  "calculated from the following:    Height as of this encounter: 1.803 m (5' 11\").    Weight as of this encounter: 88.5 kg (195 lb).       # Financial/Environmental Concerns: unable to afford rent/mortgage;unemployed         Disposition Plan     Medically Ready for Discharge: Anticipated Tomorrow Needs inpatient psychiatry.           Chun Stewart MD  Hospitalist Service, [facility]  Securely message with [employer] (more info)  Text page via [facility] Paging/Directory   See signed in provider for up to date coverage information    ______________________________________________________________________    Chief Complaint   Suicidal ideation with plan.  Polysubstance abuse.      History of Present Illness   Zenon Truong is a 54 year old male who presented to the ED today through his  for suicidal ideation and plan.  Patient had recently been hospitalized at [facility] on voluntary psychiatric admission from 2/23 until 3/6/2025 when he was discharged to a sober home.  Prior to that he had been homeless for many years.  Patient states \"I left too early\" and states he relapsed on the day of discharge and did not take any of his prescribed psychiatric medication since the day of that discharge stating \"I threw them all away\".  He immediately had relapsed on fentanyl and Xanax, stating that he was utilizing about 6 mg of Xanax daily up until 3 days PTA.  He had also been abusing fentanyl up until the day PTA.  He also reports taking some methamphetamine and THC.  Denies any alcohol use.  He has had multiple previous psychiatric admissions and past suicidal attempts through illicit or prescribed drug overdoses.  He states his most recent suicidal attempt occurred 1 year ago on his birthday when he overdosed on prescription medications.  He states he has continued to feel hopeless, extremely depressed as well as anxious and had purchased a " significant quantity of fentanyl with plans to overdose.  He had however been in contact with his  who intervened and had him brought to the ED today.  He has continued to take his methadone through the [facility] methadone clinic, and took his dose this morning 105 mg daily.  He reports a history of withdrawal seizures, most recently 6 months ago due to benzodiazepine withdrawal.  He again denies any alcohol use.  He has been homeless for 12 years.  His mom and dad are  and he is estranged from his 3 sisters and states he has no support network.  He did have some emesis today which she describes as watery and secondary to benzodiazepine withdrawal but denies any hematemesis, abdominal epigastric pain.  No history of melena or hematochezia and denies any history of ulcers.  Denies any fevers or night sweats but has been having chills for the past 1-2 days while withdrawing.  He denies any cough, chest pain or dyspnea.  No dysuria or urgency or voiding difficulties.  No recent falls or trauma.  He continues to feel hopeless and suicidal, feels generally tremulous.  Received phenobarbital as well as Ativan 1 mg in the ED.    Past Medical History    Past Medical History:   Diagnosis Date    Hep C w/o coma, chronic (H)     Substance abuse (H)     Testicular cancer (H)        Past Surgical History   Past Surgical History:   Procedure Laterality Date    GENITOURINARY SURGERY      Testicular CA     TESTICLE SURGERY Bilateral        Prior to Admission Medications   Prior to Admission Medications   Prescriptions Last Dose Informant Patient Reported? Taking?   PHENobarbital 32.4 MG tablet   No No   Sig: Take two 4 times daily for 2 days, then 1 each in the morning and two 3 times per day for the rest of the day for 1 day, then 2 three times daily for 1 day, then one 5 times/day for 1 day, then one 4 times/day for 1 day, then one 3 times//day for one day, then one twice/day for one day, then one/day for 2  days.   buprenorphine-naloxone (SUBOXONE) 2-0.5 MG SUBL sublingual tablet   No No   Sig: Place 2 tablets under the tongue 3 times daily   desvenlafaxine succinate (PRISTIQ) 25 MG 24 hr tablet   No No   Sig: Take 1 tablet (25 mg) by mouth daily   folic acid (FOLVITE) 1 MG tablet   No No   Sig: Take 5 tablets (5 mg) by mouth daily   gabapentin (NEURONTIN) 600 MG tablet   No No   Sig: Take 2 tablets (1,200 mg) by mouth 3 times daily   hydrOXYzine (VISTARIL) 50 MG capsule   No No   Sig: Take 1 capsule (50 mg) by mouth 3 times daily as needed for anxiety   methocarbamol (ROBAXIN) 500 MG tablet   No No   Sig: Take 1 tablet (500 mg) by mouth 3 times daily as needed for muscle spasms   mirtazapine (REMERON) 30 MG tablet   No No   Sig: Take 1 tablet (30 mg) by mouth at bedtime   nicotine (COMMIT) 2 MG lozenge   No No   Sig: Place 2 lozenges (4 mg) inside cheek every hour as needed for nicotine withdrawal symptoms      Facility-Administered Medications: None           Physical Exam   Vital Signs: Temp: 98.3  F (36.8  C) Temp src: Oral BP: (!) 128/98 Pulse: 83   Resp: 16 SpO2: 100 % O2 Device: None (Room air)    Weight: 195 lbs 0 oz  General: Alert and oriented x 4, somewhat tremulous, otherwise pleasant.  Speech normal.  HEENT: Atraumatic.  PERRL, EOM intact.  OP moist and clear.  Neck supple, no palpable lymphadenopathy or tenderness.  Chest: CTA bilaterally  CV: RRR.  No murmurs.  Abdomen: Normal active bowel sounds.  Soft, nontender, nondistended.  No rebound or guarding.  Liver tip palpable 1 cm below costal margin.  Nontender.  Extremities: Warm.  No edema.  Neuro: CN II through XII grossly intact.  Strength 5/5 symmetrically.  Gait normal.  Mild to moderate generalized tremors with normal finger-nose bilaterally.        80 MINUTES SPENT BY ME on the date of service doing chart review, history, exam, documentation & further activities per the note.      Data   Imaging results reviewed over the past 24 hrs:   No results  found for this or any previous visit (from the past 24 hours).  Recent Labs   Lab 03/14/25  1210   WBC 4.4   HGB 10.1*   MCV 88   PLT 78*      POTASSIUM 3.7   CHLORIDE 101   CO2 27   BUN 9.9   CR 0.75   ANIONGAP 11   JOVITA 9.2   GLC 82   ALBUMIN 4.6   PROTTOTAL 7.3   BILITOTAL 0.4   ALKPHOS 84   ALT 84*   AST 47*

## 2025-03-14 NOTE — ED TRIAGE NOTES
Patient endorses having suicidal thoughts. Patient reports to the ER with his  who reports that the patient has chronic suicidal ideation. Patient reports a plan to intentionally overdose on fentanyl. Patient reports using benzos and fentanyl. Patient reports last use of benzos was yesterday and the last use of fentanyl was the day before yesterday. Patient is restless and shaky in triage. Patient denies pain at this time. Patient denies diarrhea. Patient has a history of withdrawal seizures from benzos.     Patient reports using between 6-10 mg of Xanax a day with last use yesterday.

## 2025-03-14 NOTE — PHARMACY
Opioid Treatment Program (Methadone Clinic) Information Note      [place] [facility]   [place]city[place]   Phone number: [phone]              Spoke with: Cedric (Pharmacist)     Patient's current methadone dose verified as: 105 mg PO    Last dose was administered on: 3/14/2025   Take-home dose information (if applicable): No      Note(s): Treatment programs in [place] dispense methadone using the 10 mg/ml oral concentrate.      Pharmacy ([facility]) [phone] and available on [facility] under unit pharmacist

## 2025-03-15 LAB
ALBUMIN SERPL BCG-MCNC: 4.1 G/DL (ref 3.5–5.2)
ALP SERPL-CCNC: 75 U/L (ref 40–150)
ALT SERPL W P-5'-P-CCNC: 84 U/L (ref 0–70)
ANION GAP SERPL CALCULATED.3IONS-SCNC: 13 MMOL/L (ref 7–15)
AST SERPL W P-5'-P-CCNC: 56 U/L (ref 0–45)
BILIRUB SERPL-MCNC: 0.5 MG/DL
BUN SERPL-MCNC: 10.9 MG/DL (ref 6–20)
CALCIUM SERPL-MCNC: 9.2 MG/DL (ref 8.8–10.4)
CHLORIDE SERPL-SCNC: 99 MMOL/L (ref 98–107)
CREAT SERPL-MCNC: 0.74 MG/DL (ref 0.67–1.17)
EGFRCR SERPLBLD CKD-EPI 2021: >90 ML/MIN/1.73M2
ERYTHROCYTE [DISTWIDTH] IN BLOOD BY AUTOMATED COUNT: 13.1 % (ref 10–15)
FERRITIN SERPL-MCNC: 137 NG/ML (ref 31–409)
FOLATE SERPL-MCNC: 34.3 NG/ML (ref 4.6–34.8)
GLUCOSE SERPL-MCNC: 93 MG/DL (ref 70–99)
HCO3 SERPL-SCNC: 24 MMOL/L (ref 22–29)
HCT VFR BLD AUTO: 41.2 % (ref 40–53)
HGB BLD-MCNC: 14.1 G/DL (ref 13.3–17.7)
IRON BINDING CAPACITY (ROCHE): 295 UG/DL (ref 240–430)
IRON SATN MFR SERPL: 29 % (ref 15–46)
IRON SERPL-MCNC: 87 UG/DL (ref 61–157)
MCH RBC QN AUTO: 29.3 PG (ref 26.5–33)
MCHC RBC AUTO-ENTMCNC: 34.2 G/DL (ref 31.5–36.5)
MCV RBC AUTO: 86 FL (ref 78–100)
PLATELET # BLD AUTO: 97 10E3/UL (ref 150–450)
POTASSIUM SERPL-SCNC: 4 MMOL/L (ref 3.4–5.3)
PROT SERPL-MCNC: 6.6 G/DL (ref 6.4–8.3)
RBC # BLD AUTO: 4.81 10E6/UL (ref 4.4–5.9)
SODIUM SERPL-SCNC: 136 MMOL/L (ref 135–145)
TSH SERPL DL<=0.005 MIU/L-ACNC: 1.2 UIU/ML (ref 0.3–4.2)
WBC # BLD AUTO: 4.9 10E3/UL (ref 4–11)

## 2025-03-15 PROCEDURE — 99254 IP/OBS CNSLTJ NEW/EST MOD 60: CPT

## 2025-03-15 PROCEDURE — 85041 AUTOMATED RBC COUNT: CPT | Performed by: INTERNAL MEDICINE

## 2025-03-15 PROCEDURE — 120N000002 HC R&B MED SURG/OB UMMC

## 2025-03-15 PROCEDURE — 250N000013 HC RX MED GY IP 250 OP 250 PS 637: Performed by: INTERNAL MEDICINE

## 2025-03-15 PROCEDURE — 250N000011 HC RX IP 250 OP 636: Performed by: INTERNAL MEDICINE

## 2025-03-15 PROCEDURE — 82746 ASSAY OF FOLIC ACID SERUM: CPT | Performed by: INTERNAL MEDICINE

## 2025-03-15 PROCEDURE — 84443 ASSAY THYROID STIM HORMONE: CPT | Performed by: INTERNAL MEDICINE

## 2025-03-15 PROCEDURE — 82728 ASSAY OF FERRITIN: CPT | Performed by: INTERNAL MEDICINE

## 2025-03-15 PROCEDURE — 85014 HEMATOCRIT: CPT | Performed by: INTERNAL MEDICINE

## 2025-03-15 PROCEDURE — 82607 VITAMIN B-12: CPT | Performed by: INTERNAL MEDICINE

## 2025-03-15 PROCEDURE — 36415 COLL VENOUS BLD VENIPUNCTURE: CPT | Performed by: INTERNAL MEDICINE

## 2025-03-15 PROCEDURE — 99232 SBSQ HOSP IP/OBS MODERATE 35: CPT | Performed by: STUDENT IN AN ORGANIZED HEALTH CARE EDUCATION/TRAINING PROGRAM

## 2025-03-15 PROCEDURE — 84155 ASSAY OF PROTEIN SERUM: CPT | Performed by: INTERNAL MEDICINE

## 2025-03-15 PROCEDURE — 84450 TRANSFERASE (AST) (SGOT): CPT | Performed by: INTERNAL MEDICINE

## 2025-03-15 PROCEDURE — 83550 IRON BINDING TEST: CPT | Performed by: INTERNAL MEDICINE

## 2025-03-15 RX ORDER — TEMAZEPAM 15 MG/1
15 CAPSULE ORAL
Status: DISCONTINUED | OUTPATIENT
Start: 2025-03-15 | End: 2025-03-17

## 2025-03-15 RX ORDER — NALOXONE HYDROCHLORIDE 0.4 MG/ML
0.2 INJECTION, SOLUTION INTRAMUSCULAR; INTRAVENOUS; SUBCUTANEOUS
Status: DISCONTINUED | OUTPATIENT
Start: 2025-03-15 | End: 2025-03-18 | Stop reason: HOSPADM

## 2025-03-15 RX ORDER — NALOXONE HYDROCHLORIDE 0.4 MG/ML
0.4 INJECTION, SOLUTION INTRAMUSCULAR; INTRAVENOUS; SUBCUTANEOUS
Status: DISCONTINUED | OUTPATIENT
Start: 2025-03-15 | End: 2025-03-18 | Stop reason: HOSPADM

## 2025-03-15 RX ADMIN — Medication 1 TABLET: at 08:52

## 2025-03-15 RX ADMIN — DIAZEPAM 10 MG: 10 TABLET ORAL at 12:58

## 2025-03-15 RX ADMIN — NICOTINE POLACRILEX 4 MG: 4 LOZENGE ORAL at 12:58

## 2025-03-15 RX ADMIN — NICOTINE POLACRILEX 4 MG: 4 LOZENGE ORAL at 16:03

## 2025-03-15 RX ADMIN — DIAZEPAM 10 MG: 10 TABLET ORAL at 06:49

## 2025-03-15 RX ADMIN — NICOTINE POLACRILEX 4 MG: 4 LOZENGE ORAL at 19:29

## 2025-03-15 RX ADMIN — NICOTINE POLACRILEX 4 MG: 4 LOZENGE ORAL at 06:55

## 2025-03-15 RX ADMIN — DIAZEPAM 10 MG: 10 TABLET ORAL at 22:05

## 2025-03-15 RX ADMIN — DIAZEPAM 10 MG: 10 TABLET ORAL at 08:52

## 2025-03-15 RX ADMIN — ONDANSETRON 4 MG: 4 TABLET, ORALLY DISINTEGRATING ORAL at 04:42

## 2025-03-15 RX ADMIN — NICOTINE POLACRILEX 4 MG: 4 LOZENGE ORAL at 10:46

## 2025-03-15 RX ADMIN — DIAZEPAM 10 MG: 10 TABLET ORAL at 16:03

## 2025-03-15 RX ADMIN — METHADONE HYDROCHLORIDE 105 MG: 10 CONCENTRATE ORAL at 09:54

## 2025-03-15 RX ADMIN — NICOTINE POLACRILEX 4 MG: 4 LOZENGE ORAL at 14:28

## 2025-03-15 RX ADMIN — DIAZEPAM 10 MG: 10 TABLET ORAL at 11:14

## 2025-03-15 RX ADMIN — NICOTINE POLACRILEX 4 MG: 4 LOZENGE ORAL at 04:44

## 2025-03-15 RX ADMIN — DIAZEPAM 10 MG: 10 TABLET ORAL at 18:18

## 2025-03-15 RX ADMIN — NICOTINE POLACRILEX 4 MG: 4 LOZENGE ORAL at 22:07

## 2025-03-15 RX ADMIN — DIAZEPAM 10 MG: 10 TABLET ORAL at 04:42

## 2025-03-15 RX ADMIN — NICOTINE POLACRILEX 4 MG: 4 LOZENGE ORAL at 08:52

## 2025-03-15 RX ADMIN — DIAZEPAM 10 MG: 10 TABLET ORAL at 14:28

## 2025-03-15 RX ADMIN — NICOTINE POLACRILEX 4 MG: 4 LOZENGE ORAL at 18:18

## 2025-03-15 RX ADMIN — THIAMINE HCL TAB 100 MG 100 MG: 100 TAB at 08:52

## 2025-03-15 RX ADMIN — FOLIC ACID 1 MG: 1 TABLET ORAL at 08:52

## 2025-03-15 RX ADMIN — DESVENLAFAXINE 25 MG: 25 TABLET, EXTENDED RELEASE ORAL at 09:55

## 2025-03-15 RX ADMIN — DIAZEPAM 10 MG: 5 INJECTION, SOLUTION INTRAMUSCULAR; INTRAVENOUS at 09:58

## 2025-03-15 ASSESSMENT — ACTIVITIES OF DAILY LIVING (ADL)
ADLS_ACUITY_SCORE: 31
ADLS_ACUITY_SCORE: 26
ADLS_ACUITY_SCORE: 31

## 2025-03-15 NOTE — PLAN OF CARE
"Goal Outcome Evaluation:    VS: /90   Pulse 75   Temp 98.4  F (36.9  C) (Oral)   Resp 18   Ht 1.803 m (5' 10.98\")   Wt 86.8 kg (191 lb 5.8 oz)   SpO2 98%   BMI 26.70 kg/m      O2: 98% on RA.    Neuro: A&Ox4.    Bowel/Bladder: Contin. B&B.    Diet: Regular diet safe-tray.    Skin: Pt refused full skin assessment. Visible skin intact.    Pain: Pt rating anxiety and agitation high being managed with PRN Valium (see MAR).    Activity: Independent.    Dressings: N/A.    LDA: R PIV SL.    Equipment: N/A.    Plan: Psych consult. Continue POC.    Additional Info: Psych consult discontinued 1:1 today.      Yonatan Garnica RN  "

## 2025-03-15 NOTE — PLAN OF CARE
"VS: /90   Pulse 75   Temp 98.4  F (36.9  C) (Oral)   Resp 18   Ht 1.803 m (5' 10.98\")   Wt 86.8 kg (191 lb 5.8 oz)   SpO2 98%   BMI 26.70 kg/m      O2: Stable on room air, no SOB or CP.   Neuro: AOx4   Bowel/Bladder: Continent of bowel and bladder   LBM: PTA   Diet: Regular diet- safe tray   Skin: Refused skin assessment   Pain: Denies pain   Activity: independent   Dressings: none   LDA: R PIV-SL   Equipment: Call light within reach. Room secured by RN. Patient belongings in SI cart and backpack behind nurses station.   Plan: Continue with plan of care   Additional Info: Patient on tele- NSR with episodes of bradycardia >58  Refused Remeron and skin check.  CIWA scores low <7 most of night 11 @ 0440 requiring interventions.  Nicotine lozenges used    1:1 sitter remains at bedside for SI       Ailyn Rust RN on 3/15/2025 at 1:40 AM       Goal Outcome Evaluation:      Plan of Care Reviewed With: patient    Overall Patient Progress: improvingOverall Patient Progress: improving    Outcome Evaluation: No acute changes overnight.      "

## 2025-03-15 NOTE — PROGRESS NOTES
[facility]    Medicine Progress Note - Hospitalist Service, GOLD TEAM 22    Date of Admission:  3/14/2025    Assessment & Plan   Zenon Truong is a 54 year old male with history of depression and anxiety, polysubstance abuse including benzodiazepines, fentanyl, occasional methamphetamine and THC who was recently hospitalized at [facility] on voluntary psychiatric admission from 2/23-3/6/2025.  Has been homeless for several years, was discharged to a sober home and patient states that he stopped taking all prescribed medications except his methadone and immediately relapsed on fentanyl, benzodiazepines as well as some methamphetamine and THC abuse.  Had continued to feel suicidal, but continued to maintain contact with his .  States he purchased some fentanyl with intention of overdosing, but fortunately he was in contact with his  who intervened and brought him to the ED.  Admitted to hospitalist service due to concerns for active benzodiazepine withdrawal with history of benzodiazepine withdrawal seizures with anticipation of inpatient psychiatry admission once medically deemed stable.        #Suicidal ideation with plan  #History of prior suicide attempts by prescription and illicit drug overdose  #Anxiety, depression, PTSD  #Borderline personality disorder  #Polysubstance abuse:  Recently hospitalized at [facility] on voluntary psychiatric admission from 2/23-3/6/2025.  Has been homeless for several years, was discharged to a sober home and patient states that he stopped taking all prescribed medications except his methadone and immediately relapsed on fentanyl, benzodiazepines as well as some methamphetamine and THC abuse.  Had continued to feel suicidal, but continued to maintain contact with his .  States he purchased some fentanyl with intention of overdosing, but fortunately he was in contact with his case  manager who intervened and brought him to the ED.  At recent hospital discharge was prescribed Pristiq 50 mg, gabapentin 600 mg TID, Remeron 30 mg and bupropion  mg but as above stopped taking all of these on the day of discharge.  The only medication he has been taking is his methadone 105 mg daily through the [facility] methadone clinic.  Last dose 3/14/2025. Had DEC assessment in ED, recommended inpatient psychiatry admission, but not until medically stabilized with respect to active benzodiazepine withdrawal.  Received phenobarbital as well as Ativan 1 mg in ED.  Continues to have suicidal thoughts and intent, feelings of hopelessness with symptoms of active benzodiazepine withdrawal as discussed below.  -One-to-one sitter discontinued-- patient is not holdable since patient admitted voluntarily and has capacity to make decisions  -Psychiatry consult-- recommending Restoril 15mg at bedtime  -Resume Remeron 30 mg at bedtime, Pristiq at dose of 25 mg daily pending psychiatry input  -Holding bupropion given risk of lowering seizure threshold     #Polysubstance abuse including fentanyl, benzodiazepines, methamphetamine, THC  #Acute benzodiazepine withdrawal  #History of benzodiazepine withdrawal seizures:  Recently hospitalized at [facility] for voluntary psychiatric admission.  Discharged 3/6/2025 and immediately relapsed on fentanyl and benzodiazepines.  Has also been accessing his methadone 105 mg daily through the [facility] clinic.  States he is also used methamphetamine and some THC in the past week but his drugs of choice are fentanyl and benzodiazepines.  Denies any alcohol use.  Past history of benzodiazepine withdrawal seizures.  States he has been taking about 6 mg of Xanax daily, last taking on 3/11/2025.  Last used fentanyl on 3/13/2025.  Had purchased more fentanyl with the intent of overdosing in a suicide attempt but his  intervened.  Received phenobarbital as well as Ativan 1 mg in  ED.  Continues to feel tremulous and having chills, feeling hopeless.  -continue PTA methadone 105 mg daily, next dose due 3/15/2025  -Utilize [facility] protocol with Valium for benzodiazepine withdrawal  -Thiamine, MVI, folic acid given homelessness, concerns about nutritional intake and cytopenias  -Seizure precautions     #Chronic hepatitis C, never treated  #Mild transaminase elevations, hepatic steatosis:  Never treated for hepatitis C.  Chronic, mild transaminase elevations with normal hepatic function.  Most recent abdominal ultrasound 2/15/2024 showed hepatic steatosis without hepatic lesions, normal gallbladder.  Denies alcohol use.  -Repeat CMP 3/15     #Chronic thrombocytopenia  #Normocytic anemia:  Chronic thrombocytopenia, moderate and stable.  Denies any history of bleeding including no history of gastric ulcers, hematemesis, melena or hematochezia.  Anemia appears to be more recent since 2025 when hemoglobin was 11.6, MCV 86.  Admission hemoglobin 10.1, platelets 78, MCV 88.  -Repeat CBC with platelets, check B12, folic acid, ferritin, iron studies, TSH 3/15     #Housing insecurity:  Patient reports being homeless for several years.  Does not have any support network, his parents are  and he is estranged from his 3 sisters.  Had been discharged to sober living following his psychiatric admission on 3/6/2025.  -Social work, care coordinator consult     #Nicotine dependence:  Vapes.  -Nicotine lozenges as needed          Diet: Combination Diet Regular Diet; Safe Tray - with utensils    DVT Prophylaxis: Low Risk/Ambulatory with no VTE prophylaxis indicated and Ambulate every shift  Walker Catheter: Not present  Lines: None     Cardiac Monitoring: ACTIVE order. Indication: Drug overdose (24 hours)  Code Status: Full Code      Clinically Significant Risk Factors                 # Thrombocytopenia: Lowest platelets = 78 in last 2 days, will monitor for bleeding              # Overweight: Estimated  "body mass index is 26.7 kg/m  as calculated from the following:    Height as of this encounter: 1.803 m (5' 10.98\").    Weight as of this encounter: 86.8 kg (191 lb 5.8 oz)., PRESENT ON ADMISSION     # Financial/Environmental Concerns: unable to afford rent/mortgage;unemployed         Social Drivers of Health    Food Insecurity: High Risk (3/14/2025)    Food Insecurity     Within the past 12 months, did you worry that your food would run out before you got money to buy more?: Yes     Within the past 12 months, did the food you bought just not last and you didn t have money to get more?: Yes   Depression: At risk (2/26/2025)    [facility]    PHQ-2     PHQ-2 TOTAL SCORE: 6   Housing Stability: High Risk (3/14/2025)    Housing Stability     Do you have housing? : No     Are you worried about losing your housing?: Yes   Tobacco Use: Medium Risk (3/1/2025)    [facility]    Patient History     Smoking Tobacco Use: Former     Smokeless Tobacco Use: Never   Transportation Needs: High Risk (3/14/2025)    Transportation Needs     Within the past 12 months, has lack of transportation kept you from medical appointments, getting your medicines, non-medical meetings or appointments, work, or from getting things that you need?: Yes   Social Connections: Socially Isolated (2/23/2025)    [facility]    Social Connections     Do you often feel lonely or isolated from those around you?: 4          Disposition Plan     Medically Ready for Discharge: Anticipated in 2-4 Days             MARLENA JOHNSON MD  Hospitalist Service, GOLD TEAM [address] [place] [address]  Securely message with [employer] (more info)  Text page via [facility] Paging/Directory   See signed in provider for up to date coverage " information  ______________________________________________________________________    Interval History   NAION. This morning continues to be feeling anxious and irritable. Feels like he is still going through withdrawal. Denies cp, palpitations, sob, columba, coughing, emesis, diarrhea.    Physical Exam   Vital Signs: Temp: 97.7  F (36.5  C) Temp src: Oral BP: 119/85 Pulse: 75   Resp: 16 SpO2: 98 % O2 Device: None (Room air)    Weight: 191 lbs 5.75 oz    General Appearance: Awake, alert, very anxious  Respiratory: CTAB  Cardiovascular: Normal S1, S2  GI: Abd soft, nontender, no rebound  Skin: no skin lesions  Neuro: irritable, tremors, no FND    Medical Decision Making       45 MINUTES SPENT BY ME on the date of service doing chart review, history, exam, documentation & further activities per the note.      Data     I have personally reviewed the following data over the past 24 hrs:    4.9  \   14.1   / 97 (L)     136 99 10.9 /  93   4.0 24 0.74 \     ALT: 84 (H) AST: 56 (H) AP: 75 TBILI: 0.5   ALB: 4.1 TOT PROTEIN: 6.6 LIPASE: N/A     TSH: 1.20 T4: N/A A1C: N/A     Ferritin:  137 % Retic:  N/A LDH:  N/A       Imaging results reviewed over the past 24 hrs:   No results found for this or any previous visit (from the past 24 hours).

## 2025-03-15 NOTE — PROGRESS NOTES
.8 MS Admission Note    Reason for admission: Benzo withdrawl  Primary team notified of pt arrival.  Admitted from:  ED  Via: transport  Accompanied by: self  Belongings: Placed in closet; valuables sent home with family. Valuables backpack behind nurses station, phone with patient, other belongings clothes/shoes in SI cart outside of patient room.  Admission Required Doc Completed: Yes  Mobility Devices Utilized by Patient Provided (i.e. walker, wheelchair, etc.): NA  Teaching: Orientation to unit and call light- call light within reach, use of console, meal times, when to call for the RN, and enforced importance of safety.  IV Access: R PIV-SL  Telemetry: Yes  Ht./Wt.: Completed  Code Status verified on armband: Yes  2 RN Skin Assessment Completed with: patient refused, states no skin issues  Suction/Ambu bag/Flowmeter at bedside: No    Pt status: VSS. Refused skin assessment and remeron scheduled at 2200. CIWA-7. SI plan is still to overdose on fentanyl. 1:1 sitter at bedside for safety. Environment checked by RN for safety.     Temp:  [98.3  F (36.8  C)-98.4  F (36.9  C)] 98.4  F (36.9  C)  Pulse:  [75-88] 75  Resp:  [16-18] 18  BP: (114-177)/() 114/90  SpO2:  [98 %-100 %] 98 %

## 2025-03-15 NOTE — CONSULTS
"      Initial Psychiatric Consult   Consult date: March 15, 2025         Reason for Consult, requesting source:    SI with plan, substance abuse  Requesting source: Dillon S Paranjpe    Labs and imaging reviewed. Discussed with nursing.        HPI:   Zenon Truong is a 54 year old male with a psychiatric history including depression, anxiety, polysubstance abuse (fentanyl, benzos, meth, cannabis) who was recently hospitalized at [facility] voluntarily for suicidal ideation 2/23/25-3/6/25. He was homeless for several years prior to this admission, and discharged to sober house. Upon discharge, he had stopped all meds except methadone and relapsed on fentanyl, benzos, meth, and cannabis. He was suicidal at this time, and had planned to OD but was in contact with his  who brought him to the ED. He is now admitted medically for benzo withdrawal, possible IP psych transfer upon medical clearance.    I met with Zenon in his room. He was calm and cooperative with assessment. He describes feeling anxious and \"agitated\", describes psychomotor agitation/restlessness likely r/t withdrawal. He reports a long hx of opioid, benzo, meth, and cannabis use. He expresses hopelessness and guilt regarding substance use. He states he has been using for 25 years, longest period of sobriety was 5 years. He doesn't want to live this way anymore, but struggles to see any other alternative as this is all he knows, and all his friends and family also are caught up in addiction. He states that if he were to be discharged today, \"I would go shoot myself in the head\". He states that he would not harm himself in the hospital, and wants to stay inpatient in order to get help. He reports that recent IP admission at [facility] was helpful.        Past Psychiatric History:   Depression, anxiety. Recent admission at [facility] voluntarily for suicidal ideation 2/23/25-3/6/25. ECT was considered and even planned during " "this admission, but he decided against pursuing.     Multiple prior suicide attempts: recent OD on clonazepam in Feb 2025, previous attempt by cutting wrist lacerating artery        Substance Use and History:   Opioids, recently fentanyl; methamphetamine, benzodiazepines, cannabis. On methadone maintenance.     For the past year, he has been smoking fentanyl. Now up to 2g daily. Using fentanyl while on methadone treatment.     Reports he was previously prescribed clonazepam which he \"supplemented\" with illicit alprazolam. He is no longer prescribed any benzos and gets alprazolam off the street.           Past Medical History:   PAST MEDICAL HISTORY:   Past Medical History:   Diagnosis Date    Hep C w/o coma, chronic (H) 2011    Substance abuse (H)     Testicular cancer (H)        PAST SURGICAL HISTORY:   Past Surgical History:   Procedure Laterality Date    GENITOURINARY SURGERY  1996    Testicular CA     TESTICLE SURGERY Bilateral              Family History:   FAMILY HISTORY:   Family History   Problem Relation Age of Onset    Cancer Mother     Mental Illness Mother     Cancer Father     Cancer Sister     Depression Mother     Alcoholism Father     Substance Abuse Sister            Social History:   SOCIAL HISTORY:   Social History     Tobacco Use    Smoking status: Every Day    Smokeless tobacco: Never    Tobacco comments:     Switched to e cigarettes   Substance Use Topics    Alcohol use: No            Physical ROS:   The 10 point Review of Systems is negative other than noted in the HPI or here.         Medications:     Current Facility-Administered Medications   Medication Dose Route Frequency Provider Last Rate Last Admin    desvenlafaxine succinate (PRISTIQ) 24 hr tablet 25 mg  25 mg Oral Daily Chun Stewart MD   25 mg at 03/15/25 0955    folic acid (FOLVITE) tablet 1 mg  1 mg Oral Daily Chun Stewart MD   1 mg at 03/15/25 0852    methadone (DOLOPHINE-INTENSOL) 10 MG/ML (HIGH CONC) " solution 105 mg  105 mg Oral Daily Chun Stewart MD   105 mg at 03/15/25 0954    mirtazapine (REMERON) tablet 30 mg  30 mg Oral At Bedtime Chun Stewart MD        multivitamin w/minerals (THERA-VIT-M) tablet 1 tablet  1 tablet Oral Daily Chun Stewart MD   1 tablet at 03/15/25 0852    sodium chloride (PF) 0.9% PF flush 3 mL  3 mL Intracatheter Q8H Chun Stewart MD   3 mL at 03/14/25 2357    thiamine (B-1) tablet 100 mg  100 mg Oral Daily Chun Stewart MD   100 mg at 03/15/25 0852              Allergies:     Allergies   Allergen Reactions    Iodinated Contrast Media Hives and Rash     Reports he is allergic to Iodine contrast     Reports he is allergic to Iodine contrast       Reports he is allergic to Iodine contrast    Reports he is allergic to Iodine contrast      Reports he is allergic to Iodine contrast   Reports he is allergic to Iodine contrast    Iodine Hives    Lisinopril Hives and Rash    Lithium Hives    Topiramate Hives    Escitalopram Rash     Rash around mouth    Iodine-131 Hives    Trazodone Anxiety     Trazodone causes RLS for pt    Olanzapine Rash     Pt reported taking approximately one year ago. Developed rash around mouth.          Labs:     Recent Results (from the past 48 hours)   Extra Green Top (Lithium Heparin) Tube    Collection Time: 03/14/25 12:10 PM   Result Value Ref Range    Hold Specimen [facility]    Extra Purple Top Tube    Collection Time: 03/14/25 12:10 PM   Result Value Ref Range    Hold Specimen [facility]    Comprehensive metabolic panel    Collection Time: 03/14/25 12:10 PM   Result Value Ref Range    Sodium 139 135 - 145 mmol/L    Potassium 3.7 3.4 - 5.3 mmol/L    Carbon Dioxide (CO2) 27 22 - 29 mmol/L    Anion Gap 11 7 - 15 mmol/L    Urea Nitrogen 9.9 6.0 - 20.0 mg/dL    Creatinine 0.75 0.67 - 1.17 mg/dL    GFR Estimate >90 >60 mL/min/1.73m2    Calcium 9.2 8.8 - 10.4 mg/dL    Chloride 101 98 - 107 mmol/L    Glucose 82 70 - 99  mg/dL    Alkaline Phosphatase 84 40 - 150 U/L    AST 47 (H) 0 - 45 U/L    ALT 84 (H) 0 - 70 U/L    Protein Total 7.3 6.4 - 8.3 g/dL    Albumin 4.6 3.5 - 5.2 g/dL    Bilirubin Total 0.4 <=1.2 mg/dL   Magnesium    Collection Time: 03/14/25 12:10 PM   Result Value Ref Range    Magnesium 1.9 1.7 - 2.3 mg/dL   Ethyl Alcohol Level    Collection Time: 03/14/25 12:10 PM   Result Value Ref Range    Alcohol ethyl <0.01 <=0.01 g/dL   CBC with platelets and differential    Collection Time: 03/14/25 12:10 PM   Result Value Ref Range    WBC Count 4.4 4.0 - 11.0 10e3/uL    RBC Count 3.37 (L) 4.40 - 5.90 10e6/uL    Hemoglobin 10.1 (L) 13.3 - 17.7 g/dL    Hematocrit 29.6 (L) 40.0 - 53.0 %    MCV 88 78 - 100 fL    MCH 30.0 26.5 - 33.0 pg    MCHC 34.1 31.5 - 36.5 g/dL    RDW 13.1 10.0 - 15.0 %    Platelet Count 78 (L) 150 - 450 10e3/uL    % Neutrophils 61 %    % Lymphocytes 23 %    % Monocytes 6 %    % Eosinophils 8 %    % Basophils 1 %    % Immature Granulocytes 1 %    NRBCs per 100 WBC 0 <1 /100    Absolute Neutrophils 2.7 1.6 - 8.3 10e3/uL    Absolute Lymphocytes 1.0 0.8 - 5.3 10e3/uL    Absolute Monocytes 0.3 0.0 - 1.3 10e3/uL    Absolute Eosinophils 0.4 0.0 - 0.7 10e3/uL    Absolute Basophils 0.0 0.0 - 0.2 10e3/uL    Absolute Immature Granulocytes 0.0 <=0.4 10e3/uL    Absolute NRBCs 0.0 10e3/uL   Urine Drug Screen Panel    Collection Time: 03/14/25 12:30 PM   Result Value Ref Range    Amphetamines Urine Screen Positive (A) Screen Negative    Barbituates Urine Screen Negative Screen Negative    Benzodiazepine Urine Screen Negative Screen Negative    Cannabinoids Urine Screen Positive (A) Screen Negative    Cocaine Urine Screen Negative Screen Negative    Fentanyl Qual Urine Screen Positive (A) Screen Negative    Opiates Urine Screen Negative Screen Negative    PCP Urine Screen Negative Screen Negative   Lactic acid whole blood with 1x repeat in 2 hr when >2    Collection Time: 03/14/25 12:56 PM   Result Value Ref Range    Lactic  "Acid, Initial 0.5 (L) 0.7 - 2.0 mmol/L   Extra Purple Top Tube    Collection Time: 03/14/25 12:56 PM   Result Value Ref Range    Hold Specimen [facility]    Comprehensive metabolic panel    Collection Time: 03/15/25  7:18 AM   Result Value Ref Range    Sodium 136 135 - 145 mmol/L    Potassium 4.0 3.4 - 5.3 mmol/L    Carbon Dioxide (CO2) 24 22 - 29 mmol/L    Anion Gap 13 7 - 15 mmol/L    Urea Nitrogen 10.9 6.0 - 20.0 mg/dL    Creatinine 0.74 0.67 - 1.17 mg/dL    GFR Estimate >90 >60 mL/min/1.73m2    Calcium 9.2 8.8 - 10.4 mg/dL    Chloride 99 98 - 107 mmol/L    Glucose 93 70 - 99 mg/dL    Alkaline Phosphatase 75 40 - 150 U/L    AST 56 (H) 0 - 45 U/L    ALT 84 (H) 0 - 70 U/L    Protein Total 6.6 6.4 - 8.3 g/dL    Albumin 4.1 3.5 - 5.2 g/dL    Bilirubin Total 0.5 <=1.2 mg/dL   CBC with platelets    Collection Time: 03/15/25  7:18 AM   Result Value Ref Range    WBC Count 4.9 4.0 - 11.0 10e3/uL    RBC Count 4.81 4.40 - 5.90 10e6/uL    Hemoglobin 14.1 13.3 - 17.7 g/dL    Hematocrit 41.2 40.0 - 53.0 %    MCV 86 78 - 100 fL    MCH 29.3 26.5 - 33.0 pg    MCHC 34.2 31.5 - 36.5 g/dL    RDW 13.1 10.0 - 15.0 %    Platelet Count 97 (L) 150 - 450 10e3/uL   Ferritin    Collection Time: 03/15/25  7:18 AM   Result Value Ref Range    Ferritin 137 31 - 409 ng/mL   Iron & Iron Binding Capacity    Collection Time: 03/15/25  7:18 AM   Result Value Ref Range    Iron 87 61 - 157 ug/dL    Iron Binding Capacity 295 240 - 430 ug/dL    Iron Sat Index 29 15 - 46 %   TSH with free T4 reflex    Collection Time: 03/15/25  7:18 AM   Result Value Ref Range    TSH 1.20 0.30 - 4.20 uIU/mL          Physical and Psychiatric Examination:     /90   Pulse 75   Temp 98.4  F (36.9  C) (Oral)   Resp 18   Ht 1.803 m (5' 10.98\")   Wt 86.8 kg (191 lb 5.8 oz)   SpO2 98%   BMI 26.70 kg/m    Weight is 191 lbs 5.75 oz  Body mass index is 26.7 kg/m .    Physical Exam:  I have reviewed the physical exam as documented by by the medical team and agree with findings " and assessment and have no additional findings to add at this time.    Mental Status Exam:    Appearance: awake, alert and scattered abrasions/scabs, dressed in hospital scrub pants and street hoodie.   Attitude:  cooperative  Eye Contact:  fair  Mood:  anxious and depressed  Affect:  mood congruent and intensity is blunted  Speech:  clear, coherent  Psychomotor Behavior:  no evidence of tardive dyskinesia, dystonia, or tics and tremor observed   Thought Process:  logical, linear, and goal oriented  Associations:  no loose associations  Thought Content:  no evidence of psychotic thought, active suicidal ideation present, and plan for suicide present  Insight:  fair  Judgement:  intact  Oriented to:  time, person, and place  Attention Span and Concentration:  intact  Recent and Remote Memory:  intact               DSM-5 Diagnosis:   Major depressive disorder, recurrent, severe without psychosis  Opioid use disorder  Opioid withdrawal  Benzodiazepine use disorder  Benzodiazepine withdrawal  Stimulant (methamphetamine) use disorder  Methamphetamine withdrawal  Cannabis use disorder            Assessment:   This is a 54 year old male with a long history of polysubstance abuse, depression, and anxiety who is admitted for suicidal ideation with a plan, and withdrawal from benzos, fentanyl, and amphetamine. He is currently experiencing active withdrawal symptoms. Greatest concern is risk of seizures due to benzo withdrawal, and he does report a history of withdrawal seizures. Bupropion has been held due to seizure risk; agree with this and would go further to say that this is probably not an appropriate medication for him considering his significant hx of substance abuse.    He continues to endorse suicidal ideation with a plan to shoot himself. He did recently purchase a gun, but had given it to a friend. He denies any intent or plan to harm himself here in the hospital, and is agreeable to IP psych admission. Okay to  discontinue 1:1 sitter.     He also notes poor sleep, has only slept a few hours per night since discharge from hospital. Long history of insomnia. Previously had good response to Restoril. While not preferred long term med due to substance abuse history, I did discuss with him that we could use it on a very short term basis while he is admitted medically. I did set the expectation that this medication would likely not be continued once transferred to  psychiatry. He expressed understanding.          Summary of Recommendations:   Discontinue 1:1 sitter  Continue CIWA protocol for benzo withdrawal  Continue opioid withdrawal protocol  Agree with stopping bupropion due to seizure risk. Due to his long history of benzo abuse, would not recommend resuming this medication.  Temazepam 15mg PRN for sleep: short term use only while admitted medically.  Transfer to  psych once medically stable for suicidal ideation. He is voluntary.      HERBERT Colin CNP    Consult/Liaison Psychiatry   [facility]    Contact information available via [facility] Paging/Directory  If I am not available, then [facility] CL line ([phone]) should know who is covering our consult service.

## 2025-03-16 LAB — VIT B12 SERPL-MCNC: 864 PG/ML (ref 232–1245)

## 2025-03-16 PROCEDURE — 250N000013 HC RX MED GY IP 250 OP 250 PS 637: Performed by: INTERNAL MEDICINE

## 2025-03-16 PROCEDURE — 99232 SBSQ HOSP IP/OBS MODERATE 35: CPT | Performed by: STUDENT IN AN ORGANIZED HEALTH CARE EDUCATION/TRAINING PROGRAM

## 2025-03-16 PROCEDURE — 250N000013 HC RX MED GY IP 250 OP 250 PS 637: Performed by: STUDENT IN AN ORGANIZED HEALTH CARE EDUCATION/TRAINING PROGRAM

## 2025-03-16 PROCEDURE — 120N000002 HC R&B MED SURG/OB UMMC

## 2025-03-16 PROCEDURE — 250N000011 HC RX IP 250 OP 636: Performed by: INTERNAL MEDICINE

## 2025-03-16 RX ORDER — BENZOCAINE/MENTHOL 6 MG-10 MG
LOZENGE MUCOUS MEMBRANE 2 TIMES DAILY
Status: DISCONTINUED | OUTPATIENT
Start: 2025-03-16 | End: 2025-03-18 | Stop reason: HOSPADM

## 2025-03-16 RX ORDER — PHENOBARBITAL 32.4 MG/1
129.6 TABLET ORAL 3 TIMES DAILY
Status: DISCONTINUED | OUTPATIENT
Start: 2025-03-16 | End: 2025-03-18

## 2025-03-16 RX ADMIN — Medication 1 TABLET: at 08:09

## 2025-03-16 RX ADMIN — NICOTINE POLACRILEX 4 MG: 4 LOZENGE ORAL at 17:11

## 2025-03-16 RX ADMIN — NICOTINE POLACRILEX 4 MG: 4 LOZENGE ORAL at 22:42

## 2025-03-16 RX ADMIN — DIAZEPAM 10 MG: 10 TABLET ORAL at 04:12

## 2025-03-16 RX ADMIN — NICOTINE POLACRILEX 4 MG: 4 LOZENGE ORAL at 10:04

## 2025-03-16 RX ADMIN — MIRTAZAPINE 30 MG: 30 TABLET, FILM COATED ORAL at 22:42

## 2025-03-16 RX ADMIN — ONDANSETRON 4 MG: 4 TABLET, ORALLY DISINTEGRATING ORAL at 06:31

## 2025-03-16 RX ADMIN — NICOTINE POLACRILEX 4 MG: 4 LOZENGE ORAL at 04:12

## 2025-03-16 RX ADMIN — NICOTINE POLACRILEX 4 MG: 4 LOZENGE ORAL at 06:33

## 2025-03-16 RX ADMIN — NICOTINE POLACRILEX 4 MG: 4 LOZENGE ORAL at 12:06

## 2025-03-16 RX ADMIN — NICOTINE POLACRILEX 4 MG: 4 LOZENGE ORAL at 13:55

## 2025-03-16 RX ADMIN — NICOTINE POLACRILEX 4 MG: 4 LOZENGE ORAL at 15:59

## 2025-03-16 RX ADMIN — THIAMINE HCL TAB 100 MG 100 MG: 100 TAB at 08:09

## 2025-03-16 RX ADMIN — DIAZEPAM 10 MG: 10 TABLET ORAL at 13:55

## 2025-03-16 RX ADMIN — FOLIC ACID 1 MG: 1 TABLET ORAL at 08:09

## 2025-03-16 RX ADMIN — NICOTINE POLACRILEX 4 MG: 4 LOZENGE ORAL at 08:10

## 2025-03-16 RX ADMIN — NICOTINE POLACRILEX 4 MG: 4 LOZENGE ORAL at 18:14

## 2025-03-16 RX ADMIN — DIAZEPAM 10 MG: 10 TABLET ORAL at 08:09

## 2025-03-16 RX ADMIN — DIAZEPAM 10 MG: 10 TABLET ORAL at 15:59

## 2025-03-16 RX ADMIN — DIAZEPAM 10 MG: 10 TABLET ORAL at 10:04

## 2025-03-16 RX ADMIN — DIAZEPAM 10 MG: 10 TABLET ORAL at 12:06

## 2025-03-16 RX ADMIN — METHADONE HYDROCHLORIDE 105 MG: 10 CONCENTRATE ORAL at 08:10

## 2025-03-16 RX ADMIN — PHENOBARBITAL 129.6 MG: 32.4 TABLET ORAL at 20:41

## 2025-03-16 RX ADMIN — DIAZEPAM 10 MG: 10 TABLET ORAL at 06:31

## 2025-03-16 RX ADMIN — DESVENLAFAXINE 25 MG: 25 TABLET, EXTENDED RELEASE ORAL at 08:10

## 2025-03-16 RX ADMIN — NICOTINE POLACRILEX 4 MG: 4 LOZENGE ORAL at 20:41

## 2025-03-16 ASSESSMENT — ACTIVITIES OF DAILY LIVING (ADL)
ADLS_ACUITY_SCORE: 26

## 2025-03-16 NOTE — PROGRESS NOTES
[facility]    Medicine Progress Note - Hospitalist Service, GOLD TEAM 22    Date of Admission:  3/14/2025    Assessment & Plan   Zenon Truong is a 54 year old male with history of depression and anxiety, polysubstance abuse including benzodiazepines, fentanyl, occasional methamphetamine and THC who was recently hospitalized at [facility] on voluntary psychiatric admission from 2/23-3/6/2025.  Has been homeless for several years, was discharged to a sober home and patient states that he stopped taking all prescribed medications except his methadone and immediately relapsed on fentanyl, benzodiazepines as well as some methamphetamine and THC abuse.  Had continued to feel suicidal, but continued to maintain contact with his .  States he purchased some fentanyl with intention of overdosing, but fortunately he was in contact with his  who intervened and brought him to the ED.  Admitted to hospitalist service due to concerns for active benzodiazepine withdrawal with history of benzodiazepine withdrawal seizures with anticipation of inpatient psychiatry admission once medically deemed stable.        #Suicidal ideation with plan  #History of prior suicide attempts by prescription and illicit drug overdose  #Anxiety, depression, PTSD  #Borderline personality disorder  #Polysubstance abuse:  Recently hospitalized at [facility] on voluntary psychiatric admission from 2/23-3/6/2025.  Has been homeless for several years, was discharged to a sober home and patient states that he stopped taking all prescribed medications except his methadone and immediately relapsed on fentanyl, benzodiazepines as well as some methamphetamine and THC abuse.  Had continued to feel suicidal, but continued to maintain contact with his .  States he purchased some fentanyl with intention of overdosing, but fortunately he was in contact with his case  manager who intervened and brought him to the ED.  At recent hospital discharge was prescribed Pristiq 50 mg, gabapentin 600 mg TID, Remeron 30 mg and bupropion  mg but as above stopped taking all of these on the day of discharge.  The only medication he has been taking is his methadone 105 mg daily through [place] [facility] [place].  Last dose 3/14/2025. Had DEC assessment in ED, recommended inpatient psychiatry admission, but not until medically stabilized with respect to active benzodiazepine withdrawal.  Received phenobarbital as well as Ativan 1 mg in ED.  Continues to have suicidal thoughts and intent, feelings of hopelessness with symptoms of active benzodiazepine withdrawal as discussed below.  -One-to-one sitter discontinued-- patient is not holdable since patient admitted voluntarily and has capacity to make decisions  -Psychiatry consult-- Restoril 15mg at bedtime  -Resume Remeron 30 mg at bedtime, Pristiq at dose of 25 mg daily  -Holding bupropion given risk of lowering seizure threshold.  - Plan for transfer to inpatient psych once medically improved     #Polysubstance abuse including fentanyl, benzodiazepines, methamphetamine, THC  #Acute benzodiazepine withdrawal  #History of benzodiazepine withdrawal seizures:  Recently hospitalized at [facility] for voluntary psychiatric admission.  Discharged 3/6/2025 and immediately relapsed on fentanyl and benzodiazepines.  Has also been accessing his methadone 105 mg daily through the [facility].  States he is also used methamphetamine and some THC in the past week but his drugs of choice are fentanyl and benzodiazepines.  Denies any alcohol use.  Past history of benzodiazepine withdrawal seizures.  States he has been taking about 6 mg of Xanax daily, last taking on 3/11/2025.  Last used fentanyl on 3/13/2025.  Had purchased more fentanyl with the intent of overdosing in a suicide attempt but his  intervened.  Received phenobarbital  as well as Ativan 1 mg in ED.  Continues to feel tremulous and having chills, feeling hopeless.  -continue PTA methadone 105 mg daily  -Utilize CIWA protocol with Valium for benzodiazepine withdrawal  -Thiamine, MVI, folic acid given homelessness, concerns about nutritional intake and cytopenias  -Seizure precautions  - will discuss possibility of starting phenobarb with psych     #Chronic hepatitis C, never treated  #Mild transaminase elevations, hepatic steatosis:  Never treated for hepatitis C.  Chronic, mild transaminase elevations with normal hepatic function.  Most recent abdominal ultrasound 2/15/2024 showed hepatic steatosis without hepatic lesions, normal gallbladder.  Denies alcohol use.  -Repeat CMP 3/15     #Chronic thrombocytopenia  #Normocytic anemia:  Chronic thrombocytopenia, moderate and stable.  Denies any history of bleeding including no history of gastric ulcers, hematemesis, melena or hematochezia.  Anemia appears to be more recent since 2025 when hemoglobin was 11.6, MCV 86.  Admission hemoglobin 10.1, platelets 78, MCV 88.  -Repeat CBC with platelets, check B12, folic acid, ferritin, iron studies, TSH 3/15     #Housing insecurity:  Patient reports being homeless for several years.  Does not have any support network, his parents are  and he is estranged from his 3 sisters.  Had been discharged to sober living following his psychiatric admission on 3/6/2025.  -Social work, care coordinator consult     #Nicotine dependence:  Vapes.  -Nicotine lozenges as needed          Diet: Combination Diet Regular Diet; Safe Tray - with utensils    DVT Prophylaxis: Low Risk/Ambulatory with no VTE prophylaxis indicated  Walker Catheter: Not present  Lines: None     Cardiac Monitoring: ACTIVE order. Indication: Drug overdose (24 hours)  Code Status: Full Code      Clinically Significant Risk Factors                 # Thrombocytopenia: Lowest platelets = 78 in last 2 days, will monitor for bleeding     "          # Overweight: Estimated body mass index is 26.7 kg/m  as calculated from the following:    Height as of this encounter: 1.803 m (5' 10.98\").    Weight as of this encounter: 86.8 kg (191 lb 5.8 oz)., PRESENT ON ADMISSION     # Financial/Environmental Concerns: unable to afford rent/mortgage;unemployed         Social Drivers of Health    Food Insecurity: High Risk (3/14/2025)    Food Insecurity     Within the past 12 months, did you worry that your food would run out before you got money to buy more?: Yes     Within the past 12 months, did the food you bought just not last and you didn t have money to get more?: Yes   Depression: At risk (2/26/2025)    [facility]    PHQ-2     PHQ-2 TOTAL SCORE: 6   Housing Stability: High Risk (3/14/2025)    Housing Stability     Do you have housing? : No     Are you worried about losing your housing?: Yes   Tobacco Use: Medium Risk (3/1/2025)    [facility]    Patient History     Smoking Tobacco Use: Former     Smokeless Tobacco Use: Never   Transportation Needs: High Risk (3/14/2025)    Transportation Needs     Within the past 12 months, has lack of transportation kept you from medical appointments, getting your medicines, non-medical meetings or appointments, work, or from getting things that you need?: Yes   Social Connections: Socially Isolated (2/23/2025)    [facility]    Social Connections     Do you often feel lonely or isolated from those around you?: 4          Disposition Plan     Medically Ready for Discharge: Anticipated in 2-4 Days             MARLENA JOHNSON MD  Hospitalist Service, GOLD TEAM [address] [place] [address]  Securely message with [employer] (more info)  Text page via [facility] Paging/Directory   See signed in provider for up to date coverage " information  ______________________________________________________________________    Interval History   Overnight, patient states that his anxiety seems to be worsening since yesterday. Tachycardia, tachypnea, tremors improved. Patient pacing in his room    Physical Exam   Vital Signs: Temp: 97.3  F (36.3  C) Temp src: Oral BP: (!) 115/91 Pulse: 67   Resp: 16 SpO2: 100 % O2 Device: None (Room air)    Weight: 191 lbs 5.75 oz    General Appearance:  Awake, alert, very anxious  Respiratory: CTAB  Cardiovascular: Normal S1, S2  GI: Abd soft, nontender, no rebound  Skin: no skin lesions  Neuro: irritable,, pacing in the room resting tremors improved since yesterday, no FND    Medical Decision Making       45 MINUTES SPENT BY ME on the date of service doing chart review, history, exam, documentation & further activities per the note.      Data   ------------------------- PAST 24 HR DATA REVIEWED -----------------------------------------------        Imaging results reviewed over the past 24 hrs:   No results found for this or any previous visit (from the past 24 hours).

## 2025-03-16 NOTE — PLAN OF CARE
"VS: /76 (BP Location: Right arm)   Pulse 75   Temp 97.7  F (36.5  C) (Oral)   Resp 16   Ht 1.803 m (5' 10.98\")   Wt 86.8 kg (191 lb 5.8 oz)   SpO2 98%   BMI 26.70 kg/m      O2: Stable on room air, no SOB or CP.    Neuro: AOx4    Bowel/Bladder: Continent of bowel and bladder    LBM: 3/14   Diet: Regular diet-safe tray   Skin: Refused skin assessment    Pain: Denies pain   Activity: independent   Dressings: none   LDA: R PIV-SL    Equipment: Call light within reach. Room secured by RN. Patient belongings in SI cart and backpack behind nurses station.    Plan: Continue with plan of care   Additional Info: Patient on tele- NSR with episodes of bradycardia--refused vitals  Refused Remeron and skin check   Nicotine lozenge used  CIWA scores resulting in interventions-see flowsheets/MAR        Ailyn Rust RN on 3/16/2025 at 1:52 AM       Goal Outcome Evaluation:      Plan of Care Reviewed With: patient    Overall Patient Progress: improvingOverall Patient Progress: improving    Outcome Evaluation: No acute changes overnight.      "

## 2025-03-16 NOTE — PLAN OF CARE
"Goal Outcome Evaluation:    VS: BP (!) 115/91 (BP Location: Left arm)   Pulse 67   Temp 97.3  F (36.3  C) (Oral)   Resp 16   Ht 1.803 m (5' 10.98\")   Wt 86.8 kg (191 lb 5.8 oz)   SpO2 100%   BMI 26.70 kg/m      O2: 100% on  RA.    Neuro: A&Ox4.    Bowel/Bladder: Contin. B&B.    Diet: Regular diet safe-tray.    Skin: Pt refused full skin assessment. Visible skin intact.    Pain: Pt rating anxiety and agitation high being managed w PRN Valium (see MAR).   Pt reporting later in shift that Valium not working. Provider ordered scheduled Phenobarbital for 2000 (if BP is 90/60 or lower, or pulse is 60 or lower stop phenobarbital and notify provider).    Activity: Independent.    Dressings: N/A.    LDA: R PIV dislodged and removed.    Equipment: N/A.    Plan: Continue POC.    Additional Info: Pt reporting small rash on hands, Provider aware and ordered scheduled hydrocortisone.      Yonatan Garnica RN   "

## 2025-03-17 ENCOUNTER — TELEPHONE (OUTPATIENT)
Dept: BEHAVIORAL HEALTH | Facility: CLINIC | Age: 55
End: 2025-03-17

## 2025-03-17 ENCOUNTER — TELEPHONE (OUTPATIENT)
Dept: BEHAVIORAL HEALTH | Facility: CLINIC | Age: 55
End: 2025-03-17
Payer: COMMERCIAL

## 2025-03-17 PROCEDURE — 250N000013 HC RX MED GY IP 250 OP 250 PS 637: Performed by: INTERNAL MEDICINE

## 2025-03-17 PROCEDURE — 250N000013 HC RX MED GY IP 250 OP 250 PS 637: Performed by: STUDENT IN AN ORGANIZED HEALTH CARE EDUCATION/TRAINING PROGRAM

## 2025-03-17 PROCEDURE — 99232 SBSQ HOSP IP/OBS MODERATE 35: CPT | Performed by: STUDENT IN AN ORGANIZED HEALTH CARE EDUCATION/TRAINING PROGRAM

## 2025-03-17 PROCEDURE — 120N000002 HC R&B MED SURG/OB UMMC

## 2025-03-17 RX ADMIN — PHENOBARBITAL 129.6 MG: 32.4 TABLET ORAL at 08:17

## 2025-03-17 RX ADMIN — DESVENLAFAXINE 25 MG: 25 TABLET, EXTENDED RELEASE ORAL at 08:17

## 2025-03-17 RX ADMIN — NICOTINE POLACRILEX 4 MG: 4 LOZENGE ORAL at 14:34

## 2025-03-17 RX ADMIN — NICOTINE POLACRILEX 4 MG: 4 LOZENGE ORAL at 09:37

## 2025-03-17 RX ADMIN — METHADONE HYDROCHLORIDE 105 MG: 10 CONCENTRATE ORAL at 08:17

## 2025-03-17 RX ADMIN — NICOTINE POLACRILEX 4 MG: 4 LOZENGE ORAL at 12:43

## 2025-03-17 RX ADMIN — NICOTINE POLACRILEX 4 MG: 4 LOZENGE ORAL at 20:23

## 2025-03-17 RX ADMIN — MIRTAZAPINE 30 MG: 30 TABLET, FILM COATED ORAL at 22:12

## 2025-03-17 RX ADMIN — NICOTINE POLACRILEX 4 MG: 4 LOZENGE ORAL at 17:08

## 2025-03-17 RX ADMIN — HYDROCORTISONE: 1 CREAM TOPICAL at 20:32

## 2025-03-17 RX ADMIN — PHENOBARBITAL 129.6 MG: 32.4 TABLET ORAL at 14:16

## 2025-03-17 RX ADMIN — Medication 1 TABLET: at 08:17

## 2025-03-17 RX ADMIN — HYDROCORTISONE: 1 CREAM TOPICAL at 08:21

## 2025-03-17 RX ADMIN — THIAMINE HCL TAB 100 MG 100 MG: 100 TAB at 08:17

## 2025-03-17 RX ADMIN — PHENOBARBITAL 129.6 MG: 32.4 TABLET ORAL at 20:23

## 2025-03-17 RX ADMIN — FOLIC ACID 1 MG: 1 TABLET ORAL at 08:17

## 2025-03-17 ASSESSMENT — ACTIVITIES OF DAILY LIVING (ADL)
ADLS_ACUITY_SCORE: 26
DEPENDENT_IADLS:: TRANSPORTATION
ADLS_ACUITY_SCORE: 26
ADLS_ACUITY_SCORE: 26

## 2025-03-17 NOTE — PROGRESS NOTES
[facility]    Medicine Progress Note - Hospitalist Service, GOLD TEAM 22    Date of Admission:  3/14/2025    Assessment & Plan   Zenon Truong is a 54 year old male with history of depression and anxiety, polysubstance abuse including benzodiazepines, fentanyl, occasional methamphetamine and THC who was recently hospitalized at [facility] on voluntary psychiatric admission from 2/23-3/6/2025.  Has been homeless for several years, was discharged to a sober home and patient states that he stopped taking all prescribed medications except his methadone and immediately relapsed on fentanyl, benzodiazepines as well as some methamphetamine and THC abuse.  Had continued to feel suicidal, but continued to maintain contact with his .  States he purchased some fentanyl with intention of overdosing, but fortunately he was in contact with his  who intervened and brought him to the ED.  Admitted to hospitalist service due to concerns for active benzodiazepine withdrawal. Initially was on CIWA protocol and requiring valium for withdrawal symptoms. Despite this, patient continued to have tachycardia, tachypnea and patient continued to be restless. Switched to phenobarb on 3/16 with plans to begin taper on 3/19. After phenobarb was started, withdrawal sx improved.       Today's Plan:  -- withdrawal sx have improved after phenobarb was started for benzo withdrawal. Holding CIWA at this time  -- will discuss case with psychiatry. Appears to be med optimized and appropriate for transfer to inpatient psych  - This morning, patient expressed SI. Will place on 1:1 while on medical floor. Psych may clear per their assessment    #Suicidal ideation with plan  #History of prior suicide attempts by prescription and illicit drug overdose  #Anxiety, depression, PTSD  #Borderline personality disorder  #Polysubstance abuse:  Recently hospitalized at [facility] on voluntary psychiatric admission from 2/23-3/6/2025.  Has been homeless for several years, was discharged to a sober home and patient states that he stopped taking all prescribed medications except his methadone and immediately relapsed on fentanyl, benzodiazepines as well as some methamphetamine and THC abuse.  Had continued to feel suicidal, but continued to maintain contact with his .  States he purchased some fentanyl with intention of overdosing, but fortunately he was in contact with his  who intervened and brought him to the ED.  At recent hospital discharge was prescribed Pristiq 50 mg, gabapentin 600 mg TID, Remeron 30 mg and bupropion  mg but as above stopped taking all of these on the day of discharge.  The only medication he has been taking is his methadone 105 mg daily through the [facility] [place] [facility].  Last dose 3/14/2025. Had DEC assessment in ED, recommended inpatient psychiatry admission, but not until medically stabilized with respect to active benzodiazepine withdrawal.  Received phenobarbital as well as Ativan 1 mg in ED.  Continues to have suicidal thoughts and intent, feelings of hopelessness with symptoms of active benzodiazepine withdrawal as discussed below.  -Psychiatry consult, appreciate recs  -Restoril 15 mg at bedtime, Remeron 30 mg at bedtime, Pristiq at dose of 25 mg daily-- continue at dispo  -Holding bupropion given risk of lowering seizure threshold.-- continue to hold at discharge  - Plan for transfer to inpatient psych once medically improved. Anticipate medically cleared for dispo as off 3/17     #Polysubstance abuse including fentanyl, benzodiazepines, methamphetamine, THC  #Acute benzodiazepine withdrawal  #History of benzodiazepine withdrawal seizures:  Recently hospitalized at [facility] for voluntary psychiatric admission.  Discharged 3/6/2025 and immediately relapsed on fentanyl and benzodiazepines.  Has also been  accessing his methadone 105 mg daily through the [facility].  States he is also used methamphetamine and some THC in the past week but his drugs of choice are fentanyl and benzodiazepines.  Denies any alcohol use.  Past history of benzodiazepine withdrawal seizures.  States he has been taking about 6 mg of Xanax daily, last taking on 3/11/2025.  Last used fentanyl on 3/13/2025.  Had purchased more fentanyl with the intent of overdosing in a suicide attempt but his  in/tervened.  Received phenobarbital as well as Ativan 1 mg in ED.  Continues to feel tremulous and having chills, feeling hopeless.  -continue PTA methadone 105 mg daily  -Thiamine, MVI, folic acid given homelessness, concerns about nutritional intake and cytopenias  -Seizure precautions  - continue phenobarb at current dose for 3 days (until 3/19), then will begin weaning daily per protocol     #Chronic hepatitis C, never treated  #Mild transaminase elevations, hepatic steatosis:  Never treated for hepatitis C.  Chronic, mild transaminase elevations with normal hepatic function.  Most recent abdominal ultrasound 2/15/2024 showed hepatic steatosis without hepatic lesions, normal gallbladder.  Denies alcohol use.  -Repeat CMP 3/15     #Chronic thrombocytopenia  #Normocytic anemia:  Chronic thrombocytopenia, moderate and stable.  Denies any history of bleeding including no history of gastric ulcers, hematemesis, melena or hematochezia.  Anemia appears to be more recent since 2025 when hemoglobin was 11.6, MCV 86.  Admission hemoglobin 10.1, platelets 78, MCV 88.  -Repeat CBC with platelets, check B12, folic acid, ferritin, iron studies, TSH 3/15     #Housing insecurity:  Patient reports being homeless for several years.  Does not have any support network, his parents are  and he is estranged from his 3 sisters.  Had been discharged to sober living following his psychiatric admission on 3/6/2025.  -Social work, care coordinator  "consult     #Nicotine dependence:  Vapes.  -Nicotine lozenges as needed          Diet: Combination Diet Regular Diet; Safe Tray - with utensils    DVT Prophylaxis: Pneumatic Compression Devices and Ambulate every shift  Walker Catheter: Not present  Lines: None     Cardiac Monitoring: ACTIVE order. Indication: Drug overdose (24 hours)  Code Status: Full Code      Clinically Significant Risk Factors                              # Overweight: Estimated body mass index is 26.7 kg/m  as calculated from the following:    Height as of this encounter: 1.803 m (5' 10.98\").    Weight as of this encounter: 86.8 kg (191 lb 5.8 oz)., PRESENT ON ADMISSION     # Financial/Environmental Concerns: unable to afford rent/mortgage;unemployed         Social Drivers of Health    Food Insecurity: High Risk (3/14/2025)    Food Insecurity     Within the past 12 months, did you worry that your food would run out before you got money to buy more?: Yes     Within the past 12 months, did the food you bought just not last and you didn t have money to get more?: Yes   Depression: At risk (2/26/2025)    [facility]    PHQ-2     PHQ-2 TOTAL SCORE: 6   Housing Stability: High Risk (3/14/2025)    Housing Stability     Do you have housing? : No     Are you worried about losing your housing?: Yes   Tobacco Use: Medium Risk (3/1/2025)    [facility]    Patient History     Smoking Tobacco Use: Former     Smokeless Tobacco Use: Never   Transportation Needs: High Risk (3/14/2025)    Transportation Needs     Within the past 12 months, has lack of transportation kept you from medical appointments, getting your medicines, non-medical meetings or appointments, work, or from getting things that you need?: Yes   Social Connections: Socially Isolated (2/23/2025)    Received from [facility]    Social Connections     Do you often feel " lonely or isolated from those around you?: 4          Disposition Plan     Medically Ready for Discharge: Anticipated Today             MARLENA JOHNSON MD  Hospitalist Service, GOLD TEAM 22  [facility]  Securely message with [employer] (more info)  Text page via [facility] Paging/Directory   See signed in provider for up to date coverage information  ______________________________________________________________________    Interval History   NAION. This morning patient continues to feel anxious. Withdrawal sx seemed to have improved (tachycardia, tachypnea, tremors, restlessness). No cp, palpitations, SOB, columba, coughing, abd pain, n/v.    Physical Exam   Vital Signs: Temp: 98.4  F (36.9  C) Temp src: Oral BP: 102/61 Pulse: 67   Resp: 18 SpO2: 100 % O2 Device: None (Room air)    Weight: 191 lbs 5.75 oz    General Appearance: NAD, laying comfortably in bed  Respiratory: CTAB, No coughing, wheezing, rales  Cardiovascular: Normal S1, S2. No murmurs  GI: Abd soft, nontender.  Skin: No skin lesions. No rashes  Other: No FND. Resting tremor improved    Medical Decision Making       45 MINUTES SPENT BY ME on the date of service doing chart review, history, exam, documentation & further activities per the note.      Data         Imaging results reviewed over the past 24 hrs:   No results found for this or any previous visit (from the past 24 hours).

## 2025-03-17 NOTE — CONSULTS
Psychiatry Follow up   Consult date: March 17, 2025         Reason for Consult, requesting source:    Reason for Consult: Active Suicidal Ideation with a Plan  Requesting source: Dillon Dawkins    Labs and imaging reviewed.         HPI:     Today, he endorses having a plan to overdose on fentanyl, notably on his birthday ([birth year]) similarly to last year. He states that he had purchased a gun with the intent to shoot himself in the head, however, gave this to a friend. When asked about his support system, he states he has none, is without family, and does not have anybody in his life that he trusts or could depend on. Overall endorses feelings of hopelessness and guilt; states that things could change but doesn't know how, he is willing to make these changes, but is purely responsible for his current situation. Endorses being houseless and denies having options available to him for short-term housing. Says the plan after last admission was to go to chem dep treatment but he isn't interested.     He endorses using fentanyl daily, xanax daily, methamphetamine around once-per-week, and daily THC; endorses continued use of Methadone alongside this. He notes that he does not have interest in chemical dependency treatment, as he has attempted this multiple times in the past without results. Patient is amenable to inpatient psychiatry treatment and further is amenable to as consult by the ECT team. He is concerned about memory loss as side effect.         Past Psychiatric History:   Depression, anxiety. Recent admission at [facility] voluntarily for suicidal ideation 2/23/25-3/6/25. ECT was considered and even planned during this admission, but he decided against pursuing at that time.      Multiple prior suicide attempts: recent OD on clonazepam in Feb 2025, previous attempt by cutting wrist lacerating artery        Substance Use and History:   Opioids, recently fentanyl; methamphetamine, benzodiazepines,  "cannabis. On methadone maintenance.      For the past year, he has been smoking fentanyl. Now up to 2g daily. Using fentanyl while on methadone treatment.      Reports he was previously prescribed clonazepam which he \"supplemented\" with illicit alprazolam. He is no longer prescribed any benzos and gets alprazolam off the street    Social History     Tobacco Use    Smoking status: Every Day    Smokeless tobacco: Never    Tobacco comments:     Switched to e cigarettes   Substance Use Topics    Alcohol use: No           Past Medical History:   PAST MEDICAL HISTORY:   Past Medical History:   Diagnosis Date    Hep C w/o coma, chronic (H) 2011    Substance abuse (H)     Testicular cancer (H)        PAST SURGICAL HISTORY:   Past Surgical History:   Procedure Laterality Date    GENITOURINARY SURGERY  1996    Testicular CA     TESTICLE SURGERY Bilateral              Family History:      Hep C w/o coma, chronic (H) 2011    Substance abuse (H)      Testicular cancer (H)            Social History:     Current Living arrangement: without housing  Relationships: reports not significant relationships  Occupation: Currently unemployed - previously a .   Local Support: Denies having support         Physical ROS:   The 10 point Review of Systems is negative other than noted in the HPI or here.           Medications:     Current Facility-Administered Medications   Medication Dose Route Frequency Provider Last Rate Last Admin    desvenlafaxine succinate (PRISTIQ) 24 hr tablet 25 mg  25 mg Oral Daily Chun Stewart MD   25 mg at 03/17/25 0817    folic acid (FOLVITE) tablet 1 mg  1 mg Oral Daily Chun Stewart MD   1 mg at 03/17/25 0817    hydrocortisone (CORTAID) 1 % cream   Topical BID Dillon Dawkins MD   Given at 03/17/25 0821    methadone (DOLOPHINE-INTENSOL) 10 MG/ML (HIGH CONC) solution 105 mg  105 mg Oral Daily Chun Stewart MD   105 mg at 03/17/25 0817    mirtazapine (REMERON) " "tablet 30 mg  30 mg Oral At Bedtime Chun Stewart MD   30 mg at 03/16/25 2242    multivitamin w/minerals (THERA-VIT-M) tablet 1 tablet  1 tablet Oral Daily Chun Stewart MD   1 tablet at 03/17/25 0817    PHENobarbital tablet 129.6 mg  129.6 mg Oral TID Dillon Dawkins MD   129.6 mg at 03/17/25 0817    sodium chloride (PF) 0.9% PF flush 3 mL  3 mL Intracatheter Q8H Chun Stewart MD   3 mL at 03/16/25 1600    thiamine (B-1) tablet 100 mg  100 mg Oral Daily Chun Stewart MD   100 mg at 03/17/25 0817              Allergies:     Allergies   Allergen Reactions    Iodinated Contrast Media Hives and Rash     Reports he is allergic to Iodine contrast     Reports he is allergic to Iodine contrast       Reports he is allergic to Iodine contrast    Reports he is allergic to Iodine contrast      Reports he is allergic to Iodine contrast   Reports he is allergic to Iodine contrast    Iodine Hives    Lisinopril Hives and Rash    Lithium Hives    Topiramate Hives    Escitalopram Rash     Rash around mouth    Iodine-131 Hives    Trazodone Anxiety     Trazodone causes RLS for pt    Olanzapine Rash     Pt reported taking approximately one year ago. Developed rash around mouth.          Labs:   No results found for this or any previous visit (from the past 48 hours).       Physical and Psychiatric Examination:     /76 (BP Location: Left arm)   Pulse 58   Temp 97.7  F (36.5  C) (Oral)   Resp 18   Ht 1.803 m (5' 10.98\")   Wt 86.8 kg (191 lb 5.8 oz)   SpO2 98%   BMI 26.70 kg/m    Weight is 191 lbs 5.75 oz  Body mass index is 26.7 kg/m .    Physical Exam:  I have reviewed the physical exam as documented by by the medical team and agree with findings and assessment and have no additional findings to add at this time.         MSE:   Appearance: awake, alert and scattered abrasions/scabs, dressed in hospital scrub pants and street hoodie.   Attitude:  cooperative  Eye " Contact:  good  Mood:  depressed  Affect:  mood congruent and slightly restricted. Occasionally tearful.   Speech:  clear, coherent  Psychomotor Behavior:  no evidence of tardive dyskinesia, dystonia, or tics  Thought Process:  logical, linear, and goal oriented  Associations:  no loose associations  Thought Content:  no evidence of psychotic thought, active suicidal ideation present, and plan for suicide present (though not in hospital room)  Insight:  good  Judgement:  intact  Oriented to:  time, person, and place  Attention Span and Concentration:  intact  Recent and Remote Memory:  intact     EKG: taken 2/7/25. Qtc 408 ms.          DSM-5 Diagnosis:     Major depressive disorder, recurrent, severe without psychosis  Opioid use disorder, severe  Opioid withdrawal  Benzodiazepine use disorder, severe  Benzodiazepine withdrawal  Stimulant (methamphetamine) use disorder  Methamphetamine withdrawal  Cannabis use disorder          Assessment:     This is a 54 year old male with a long history of polysubstance abuse, depression, and anxiety who is admitted for suicidal ideation with a plan, and withdrawal from benzos, fentanyl, and amphetamine.      Initially withdrawal treated with CIWA, then changed to phenobarbital. Vitals good now and patient appears comfortable. Withdrawal being effectively managed by oral meds.     Continues to have active SI with plan including to end life on his upcoming birthday. Endorses chronic stressors such as polysubstance abuse and homelessness. Denies desire for Chemical Dependency treatment, as it has not helped in the past, but is amenable to treatment in Inpatient Psychiatry. He denies any other complaints at this time.             Summary of Recommendations:   Continue Phenobarb as ordered  Agree with stopping bupropion due to seizure risk. Due to his long history of benzo abuse, would not recommend resuming this medication.  Agree with stopping temazepam while getting  phenobarb  Transfer to  psych once medically stable for suicidal ideation. He is voluntary.      Philipep Esparza  MS2 [facility] Medical Student      I was present with the medical student who participated in the service and in the documentation of the services provided. I have verified the history and personally performed the physical exam and medical decision making, as documented by the student/resident and edited by me      Dilshad Bailey MD    Department of Psychiatry  Please Vocera if questions     Total time spent in chart review, patient interview and coordination of care; 51 minutes - all time was spent on the date of the encounter that I saw patient

## 2025-03-17 NOTE — CONSULTS
Care Management Initial Consult    General Information  Assessment completed with: Patient,    Type of CM/SW Visit: Initial Assessment    Primary Care Provider verified and updated as needed:  (Patient does not have PCP. Primarily utilizes [facility])   Readmission within the last 30 days: no previous admission in last 30 days      Reason for Consult: discharge planning, mental health concerns  Advance Care Planning: Advance Care Planning Reviewed: no concerns identified          Communication Assessment  Patient's communication style: spoken language (English or Bilingual)    Hearing Difficulty or Deaf: no   Wear Glasses or Blind: no    Cognitive  Cognitive/Neuro/Behavioral: WDL           Mood/Behavior: restless, sad, calm, cooperative          Living Environment:   People in home: alone     Current living Arrangements: homeless, shelter (Patient was staying in a sober home for one week prior to hospitalization.)      Able to return to prior arrangements: no       Family/Social Support:  Care provided by: self  Provides care for: no one  Marital Status: Single  Support system: None          Description of Support System:  (None)    Support Assessment: Lacks adequate emotional support, Limited social contact and support, Minimal outside structure and leisure time activities    Current Resources:   Patient receiving home care services: No        Community Resources: OP Mental Health (Patient has a mental health )  Equipment currently used at home: none  Supplies currently used at home: None    Employment/Financial:  Employment Status: disabled        Financial Concerns: unable to afford rent/mortgage   Referral to Financial Worker: No       Does the patient's insurance plan have a 3 day qualifying hospital stay waiver?  Yes     Which insurance plan 3 day waiver is available? Alternative insurance waiver    Will the waiver be used for post-acute placement? No    Lifestyle & Psychosocial  Needs:  Social Drivers of Health     Food Insecurity: High Risk (3/14/2025)    Food Insecurity     Within the past 12 months, did you worry that your food would run out before you got money to buy more?: Yes     Within the past 12 months, did the food you bought just not last and you didn t have money to get more?: Yes   Depression: At risk (2/26/2025)    [facility]    PHQ-2     PHQ-2 TOTAL SCORE: 6   Housing Stability: High Risk (3/14/2025)    Housing Stability     Do you have housing? : No     Are you worried about losing your housing?: Yes   Tobacco Use: Medium Risk (3/1/2025)    Received from [facility]    Patient History     Smoking Tobacco Use: Former     Smokeless Tobacco Use: Never     Passive Exposure: Not on file   Financial Resource Strain: Low Risk  (3/14/2025)    Financial Resource Strain     Within the past 12 months, have you or your family members you live with been unable to get utilities (heat, electricity) when it was really needed?: No   Alcohol Use: Not on file (12/8/2024)   Transportation Needs: High Risk (3/14/2025)    Transportation Needs     Within the past 12 months, has lack of transportation kept you from medical appointments, getting your medicines, non-medical meetings or appointments, work, or from getting things that you need?: Yes   Physical Activity: Not on file   Interpersonal Safety: Low Risk  (3/14/2025)    Interpersonal Safety     Do you feel physically and emotionally safe where you currently live?: Yes     Within the past 12 months, have you been hit, slapped, kicked or otherwise physically hurt by someone?: No     Within the past 12 months, have you been humiliated or emotionally abused in other ways by your partner or ex-partner?: No   Stress: Not on file   Social Connections: Socially Isolated (2/23/2025)    Received from [facility]    Social Connections      Do you often feel lonely or isolated from those around you?: 4   Health Literacy: Not on file       Functional Status:  Prior to admission patient needed assistance:   Dependent ADLs:: Independent  Dependent IADLs:: Transportation  Assesssment of Functional Status: At functional baseline    Mental Health Status:  Mental Health Status: Current Concern  Mental Health Management: Medication (Patient has mental health  but is not engaged in any other mental health services. IP  is current recommendation.)    Chemical Dependency Status:  Chemical Dependency Status: Current Concern  Chemical Dependency Management: Previous treatment (Patient reports having been to treatment several times.)          Values/Beliefs:  Spiritual, Cultural Beliefs, Bahai Practices, Values that affect care: no               Discussed  Partnership in Safe Discharge Planning  document with patient/family: No    Additional Information:    SW met with patient at bedside and introduced self/role. SW completed Initial Assessment. Please see above for assessment information.    SW reviewed demographic information (address, patient phone number, emergency contacts, insurance), no changes necessary.    Current recommendation is for patient to transfer to [facility]. Patient agreeable to this and notes that this would be helpful for him. Patient quiet during assessment and became teary eyed at one point. SW offered activities for distraction and patient open to coloring pages with crayons. SW spoke with charge RN who noted that this would be okay to provide to patient. SW provided coloring pages and pack of crayons.    Spoke with patient's mental health , Stephanie [facility] with [facility] (P: [phone]). Stephanie asked about patient going to IP  unit as well as civil commitment. EDWARDO informed her that the plan is for patient to go to [facility] but that patient is voluntary so we will not be pursuing commitment. Stephanie  "verbalized understanding and requested to be kept updated.       The following things must be completed in order for patient to transfer to IP MH:    1) Patient must be medically stable and independent. Provider to write \"patient medically stable for transfer to IP MH\" in daily progress note.  2) Psychiatry consult in the last 48 hours that recommends an IP MH stay.  3) Charge RN to call Psych Intake (P: [phone]) to add patient to wait list.     Social Work/Care management does not assist with transfers to IP MH.      Next Steps:     Will follow peripherally to keep MH  updated and to assist patient with discharge plan in the event he no longer meets criteria for IP MH.        AKIKO JenningsW, LSW  8 Med Surg   [facility] [place]  Phone: [phone]  Vocera: Searchable by name or group: 8 Med Surg Vocera      "

## 2025-03-17 NOTE — PLAN OF CARE
Goal Outcome Evaluation:      Plan of Care Reviewed With: patient          Outcome Evaluation: Patient plans to transfer to [facility] once bed is available.

## 2025-03-17 NOTE — PLAN OF CARE
"/64 (BP Location: Left arm)   Pulse 60   Temp 97.9  F (36.6  C) (Oral)   Resp 16   Ht 1.803 m (5' 10.98\")   Wt 86.8 kg (191 lb 5.8 oz)   SpO2 97%   BMI 26.70 kg/m    O2>90% ORA, denies feeling SOB, lightheadedness. Lungs clear, equal bilateral    Output: Voids spontaneously, denies difficulties, continent of bowel and bladder   Last BM: 3/14/25   Activity: Independent   Up for meals? Yes   Skin: Rash on L hand and wrist, back of neck, hydrocortisone cream used   Pain: Denies   CMS: A&Ox4   Dressing: N/A   Diet: Reg, denies nausea and vomiting    LDA: N/A   Plan: Pt waiting for MH placement   Additional Info: 1:1 attendant for SI        Goal Outcome Evaluation:      Plan of Care Reviewed With: patient    Overall Patient Progress: no change Overall Patient Progress: no change    Outcome Evaluation: Bedside attendent reinstated for SI this shift.      "

## 2025-03-17 NOTE — TELEPHONE ENCOUNTER
Writer received call from IVELISSE Giron worker requesting info on pt's location. Due to missing TIERNEY writer transferred call to unit. No pt info provided.

## 2025-03-17 NOTE — TELEPHONE ENCOUNTER
S:  8 MED SURG *[id number] , Hospitalist Dr Johnson  calling at 3:12 PM about a 54 year old/Male presenting with SI. Pt is on medical d/t seizure precautions while recieveing tx for benzo detox.    B: Pt arrived via  on medical . Presenting problem, stressors: upcoming birthday, currently detoxing.     Pt affect in ED: Depressed  Pt Dx: Major Depressive Disorder and Substance Use Disorder: opiates, benzos  Previous IP[facility] hx? Yes: [facility] in 2018  Pt endorses SI with a plan to overdose on fentanyl    Hx of suicide attempt? Yes: abut a year ago.   Pt denies SIB  Pt denies HI   Pt denies hallucinations .   Pt RARS Score: 3    Hx of aggression/violence, sexual offenses, legal concerns, Epic care plan? describe: No  Current concerns for aggression this visit? No  Does pt have a history of Civil Commitment? No  Is Pt their own guardian? Yes    Pt is prescribed medication. Is patient medication compliant? Yes  Pt endorses OP services: methadone clinic  CD concerns:  currently on medical for benzo detox.  Acute or chronic medical concerns: No  Does Pt present with specific needs, assistive devices, or exclusionary criteria? None      Pt is ambulatory  Pt is able to perform ADLs independently      A: Pt to be reviewed for IP admission. Pt is Voluntary  Preferred placement: [facility] ONLY    COVID Symptoms: No  If yes, COVID test required   Utox: Positive for amphetamines, cannabinoids, fentanyl    CMP: Abnormalities: AST: 56; ALT: 84  CBC: Abnormalities: platelet count: 97  HCG: N/A    R: Patient cleared and ready for behavioral bed placement: Yes  Pt placed on IPMH worklist? Yes    Does Patient need a Transfer Center request created? Yes, writer completed Transfer Center request at: 3:23 PM

## 2025-03-17 NOTE — TELEPHONE ENCOUNTER
R: [place]  Access Inpatient Bed Call Log 3/17/2025 8:36:17 AM  Intake has called facilities that have not updated the bed status within the last 12 hours.                   [facility] is posting 0 beds.               Pt remains on the work list pending appropriate bed availability.

## 2025-03-17 NOTE — PLAN OF CARE
"VS: /61 (BP Location: Left arm)   Pulse 67   Temp 98.4  F (36.9  C) (Oral)   Resp 18   Ht 1.803 m (5' 10.98\")   Wt 86.8 kg (191 lb 5.8 oz)   SpO2 100%   BMI 26.70 kg/m      O2: Stable on room air, no SOB or CP.    Neuro: AOx4    Bowel/Bladder: Continent of bowel and bladder    LBM: 3/14   Diet: Regular diet-safe tray    Skin: Refused skin assessment    Pain: Denies pain    Activity: independent   Dressings: none   LDA: R PIV-SL   Equipment: Call light within reach able to make needs known   Plan: Continue with plan of care    Additional Info: Patient on tele- NSR with episodes of bradycardia.  Nicotine lozenge used        Ailyn Rust RN on 3/17/2025 at 2:04 AM       Goal Outcome Evaluation:      Plan of Care Reviewed With: patient    Overall Patient Progress: improvingOverall Patient Progress: improving    Outcome Evaluation: No acute changes overnight.      "

## 2025-03-18 ENCOUNTER — TELEPHONE (OUTPATIENT)
Dept: BEHAVIORAL HEALTH | Facility: CLINIC | Age: 55
End: 2025-03-18
Payer: COMMERCIAL

## 2025-03-18 ENCOUNTER — HOSPITAL ENCOUNTER (INPATIENT)
Facility: CLINIC | Age: 55
End: 2025-03-18
Attending: PSYCHIATRY & NEUROLOGY | Admitting: PSYCHIATRY & NEUROLOGY
Payer: COMMERCIAL

## 2025-03-18 VITALS
DIASTOLIC BLOOD PRESSURE: 65 MMHG | TEMPERATURE: 97.5 F | WEIGHT: 196.21 LBS | HEIGHT: 71 IN | BODY MASS INDEX: 27.47 KG/M2 | SYSTOLIC BLOOD PRESSURE: 111 MMHG | RESPIRATION RATE: 18 BRPM | OXYGEN SATURATION: 100 % | HEART RATE: 59 BPM

## 2025-03-18 DIAGNOSIS — F33.2 MDD (MAJOR DEPRESSIVE DISORDER), RECURRENT SEVERE, WITHOUT PSYCHOSIS (H): ICD-10-CM

## 2025-03-18 DIAGNOSIS — F13.20 SEVERE BENZODIAZEPINE USE DISORDER (H): ICD-10-CM

## 2025-03-18 DIAGNOSIS — F41.1 GAD (GENERALIZED ANXIETY DISORDER): ICD-10-CM

## 2025-03-18 DIAGNOSIS — F11.93 OPIOID WITHDRAWAL (H): Primary | ICD-10-CM

## 2025-03-18 DIAGNOSIS — F19.980 SUBSTANCE-INDUCED ANXIETY DISORDER (H): ICD-10-CM

## 2025-03-18 PROCEDURE — 99207 PR NO BILLABLE SERVICE THIS VISIT: CPT | Performed by: HOSPITALIST

## 2025-03-18 PROCEDURE — 250N000013 HC RX MED GY IP 250 OP 250 PS 637: Performed by: STUDENT IN AN ORGANIZED HEALTH CARE EDUCATION/TRAINING PROGRAM

## 2025-03-18 PROCEDURE — 99239 HOSP IP/OBS DSCHRG MGMT >30: CPT | Performed by: HOSPITALIST

## 2025-03-18 PROCEDURE — 124N000002 HC R&B MH UMMC

## 2025-03-18 PROCEDURE — 250N000013 HC RX MED GY IP 250 OP 250 PS 637: Performed by: INTERNAL MEDICINE

## 2025-03-18 PROCEDURE — 250N000013 HC RX MED GY IP 250 OP 250 PS 637

## 2025-03-18 RX ORDER — MAGNESIUM HYDROXIDE/ALUMINUM HYDROXICE/SIMETHICONE 120; 1200; 1200 MG/30ML; MG/30ML; MG/30ML
30 SUSPENSION ORAL EVERY 4 HOURS PRN
Status: ACTIVE | OUTPATIENT
Start: 2025-03-18

## 2025-03-18 RX ORDER — BENZOCAINE/MENTHOL 6 MG-10 MG
LOZENGE MUCOUS MEMBRANE 2 TIMES DAILY
Status: DISPENSED | OUTPATIENT
Start: 2025-03-18

## 2025-03-18 RX ORDER — PHENOBARBITAL 60 MG/1
60 TABLET ORAL 3 TIMES DAILY
Status: DISCONTINUED | OUTPATIENT
Start: 2025-03-19 | End: 2025-03-18

## 2025-03-18 RX ORDER — PHENOBARBITAL 64.8 MG/1
64.8 TABLET ORAL 3 TIMES DAILY
Status: ON HOLD | DISCHARGE
Start: 2025-03-19

## 2025-03-18 RX ORDER — PHENOBARBITAL 64.8 MG/1
64.8 TABLET ORAL 3 TIMES DAILY
Status: DISCONTINUED | OUTPATIENT
Start: 2025-03-19 | End: 2025-03-19

## 2025-03-18 RX ORDER — PHENOBARBITAL 97.2 MG/1
97.2 TABLET ORAL ONCE
Status: COMPLETED | OUTPATIENT
Start: 2025-03-18 | End: 2025-03-18

## 2025-03-18 RX ORDER — MULTIPLE VITAMINS W/ MINERALS TAB 9MG-400MCG
1 TAB ORAL DAILY
Status: ON HOLD | DISCHARGE
Start: 2025-03-19

## 2025-03-18 RX ORDER — BENZOCAINE/MENTHOL 6 MG-10 MG
LOZENGE MUCOUS MEMBRANE 2 TIMES DAILY
Status: ON HOLD | DISCHARGE
Start: 2025-03-18

## 2025-03-18 RX ORDER — MIRTAZAPINE 30 MG/1
30 TABLET, FILM COATED ORAL AT BEDTIME
Status: DISCONTINUED | OUTPATIENT
Start: 2025-03-18 | End: 2025-03-20

## 2025-03-18 RX ORDER — LANOLIN ALCOHOL/MO/W.PET/CERES
100 CREAM (GRAM) TOPICAL DAILY
Status: ON HOLD | DISCHARGE
Start: 2025-03-19

## 2025-03-18 RX ORDER — PHENOBARBITAL 100 MG/1
100 TABLET ORAL ONCE
Status: DISCONTINUED | OUTPATIENT
Start: 2025-03-18 | End: 2025-03-18

## 2025-03-18 RX ORDER — PHENOBARBITAL 32.4 MG/1
97.2 TABLET ORAL ONCE
Status: DISCONTINUED | OUTPATIENT
Start: 2025-03-18 | End: 2025-03-18 | Stop reason: HOSPADM

## 2025-03-18 RX ORDER — PHENOBARBITAL 32.4 MG/1
64.8 TABLET ORAL 3 TIMES DAILY
Status: DISCONTINUED | OUTPATIENT
Start: 2025-03-19 | End: 2025-03-18 | Stop reason: HOSPADM

## 2025-03-18 RX ORDER — FOLIC ACID 1 MG/1
1 TABLET ORAL DAILY
Status: ON HOLD | DISCHARGE
Start: 2025-03-19

## 2025-03-18 RX ORDER — ACETAMINOPHEN 325 MG/1
650 TABLET ORAL EVERY 4 HOURS PRN
Status: ACTIVE | OUTPATIENT
Start: 2025-03-18

## 2025-03-18 RX ORDER — POLYETHYLENE GLYCOL 3350 17 G/17G
17 POWDER, FOR SOLUTION ORAL DAILY PRN
Status: ACTIVE | OUTPATIENT
Start: 2025-03-18

## 2025-03-18 RX ORDER — HYDROXYZINE HYDROCHLORIDE 25 MG/1
25 TABLET, FILM COATED ORAL EVERY 4 HOURS PRN
Status: DISCONTINUED | OUTPATIENT
Start: 2025-03-18 | End: 2025-03-23

## 2025-03-18 RX ORDER — DESVENLAFAXINE 25 MG/1
25 TABLET, EXTENDED RELEASE ORAL DAILY
Status: DISCONTINUED | OUTPATIENT
Start: 2025-03-19 | End: 2025-03-25

## 2025-03-18 RX ORDER — DESVENLAFAXINE 25 MG/1
25 TABLET, EXTENDED RELEASE ORAL DAILY
Status: ON HOLD | DISCHARGE
Start: 2025-03-19

## 2025-03-18 RX ORDER — METHADONE HYDROCHLORIDE 10 MG/ML
105 CONCENTRATE ORAL DAILY
Status: DISCONTINUED | OUTPATIENT
Start: 2025-03-19 | End: 2025-03-26

## 2025-03-18 RX ORDER — PHENOBARBITAL 97.2 MG/1
97.2 TABLET ORAL ONCE
Status: ON HOLD | DISCHARGE
Start: 2025-03-18 | End: 2025-03-18

## 2025-03-18 RX ORDER — MULTIPLE VITAMINS W/ MINERALS TAB 9MG-400MCG
1 TAB ORAL DAILY
Status: DISPENSED | OUTPATIENT
Start: 2025-03-19

## 2025-03-18 RX ORDER — FOLIC ACID 1 MG/1
1 TABLET ORAL DAILY
Status: DISPENSED | OUTPATIENT
Start: 2025-03-19

## 2025-03-18 RX ADMIN — FOLIC ACID 1 MG: 1 TABLET ORAL at 08:05

## 2025-03-18 RX ADMIN — METHADONE HYDROCHLORIDE 105 MG: 10 CONCENTRATE ORAL at 08:05

## 2025-03-18 RX ADMIN — Medication 1 TABLET: at 08:05

## 2025-03-18 RX ADMIN — HYDROCORTISONE: 1 CREAM TOPICAL at 21:12

## 2025-03-18 RX ADMIN — NICOTINE POLACRILEX 4 MG: 4 LOZENGE ORAL at 13:02

## 2025-03-18 RX ADMIN — NICOTINE POLACRILEX 4 MG: 4 LOZENGE ORAL at 14:16

## 2025-03-18 RX ADMIN — MIRTAZAPINE 30 MG: 30 TABLET, FILM COATED ORAL at 21:08

## 2025-03-18 RX ADMIN — DESVENLAFAXINE 25 MG: 25 TABLET, EXTENDED RELEASE ORAL at 08:05

## 2025-03-18 RX ADMIN — NICOTINE POLACRILEX 4 MG: 4 LOZENGE ORAL at 17:14

## 2025-03-18 RX ADMIN — NICOTINE POLACRILEX 4 MG: 4 LOZENGE ORAL at 07:02

## 2025-03-18 RX ADMIN — PHENOBARBITAL 97.2 MG: 97.2 TABLET ORAL at 21:08

## 2025-03-18 RX ADMIN — NICOTINE POLACRILEX 4 MG: 4 LOZENGE ORAL at 08:05

## 2025-03-18 RX ADMIN — PHENOBARBITAL 129.6 MG: 32.4 TABLET ORAL at 14:16

## 2025-03-18 RX ADMIN — PHENOBARBITAL 129.6 MG: 32.4 TABLET ORAL at 08:05

## 2025-03-18 RX ADMIN — THIAMINE HCL TAB 100 MG 100 MG: 100 TAB at 08:05

## 2025-03-18 RX ADMIN — NICOTINE POLACRILEX 4 MG: 4 LOZENGE ORAL at 10:23

## 2025-03-18 RX ADMIN — NICOTINE POLACRILEX 4 MG: 4 LOZENGE ORAL at 18:37

## 2025-03-18 RX ADMIN — HYDROCORTISONE: 1 CREAM TOPICAL at 08:08

## 2025-03-18 ASSESSMENT — ACTIVITIES OF DAILY LIVING (ADL)
ADLS_ACUITY_SCORE: 31
ADLS_ACUITY_SCORE: 63
ADLS_ACUITY_SCORE: 31
ADLS_ACUITY_SCORE: 37
ADLS_ACUITY_SCORE: 31
ADLS_ACUITY_SCORE: 37
ADLS_ACUITY_SCORE: 31
ADLS_ACUITY_SCORE: 37
ADLS_ACUITY_SCORE: 31

## 2025-03-18 NOTE — PROGRESS NOTES
[facility]    Medicine Progress Note - Hospitalist Service, GOLD TEAM 22    Date of Admission:  3/14/2025    Assessment & Plan   Zenon Truong is a 54 year old male with history of depression and anxiety, polysubstance abuse including benzodiazepines, fentanyl, occasional methamphetamine and THC who was recently hospitalized at [facility] on voluntary psychiatric admission from 2/23-3/6/2025.  Has been homeless for several years, was discharged to a sober home and patient states that he stopped taking all prescribed medications except his methadone and immediately relapsed on fentanyl, benzodiazepines as well as some methamphetamine and THC abuse.  Had continued to feel suicidal, but continued to maintain contact with his .  States he purchased some fentanyl with intention of overdosing, but fortunately he was in contact with his  who intervened and brought him to the ED.  Admitted to hospitalist service due to concerns for active benzodiazepine withdrawal. Initially was on CIWA protocol and requiring valium for withdrawal symptoms. Despite this, patient continued to have tachycardia, tachypnea and patient continued to be restless. Switched to phenobarb on 3/16 with plans to begin taper on 3/19. After phenobarb was started, withdrawal sx improved.       Today's updates:   -- withdrawal sx have improved after phenobarb was started for benzo withdrawal. Still holding CIWA at this time  -- Pt remains med optimized and appropriate for transfer to inpatient psych as of this time.    #Suicidal ideation with plan  #History of prior suicide attempts by prescription and illicit drug overdose  #Anxiety, depression, PTSD  #Borderline personality disorder  #Polysubstance abuse:  Recently hospitalized at [facility] on voluntary psychiatric admission from 2/23-3/6/2025.  Has been homeless for several years, was discharged to a sober home  and patient states that he stopped taking all prescribed medications except his methadone and immediately relapsed on fentanyl, benzodiazepines as well as some methamphetamine and THC abuse.  Had continued to feel suicidal, but continued to maintain contact with his .  States he purchased some fentanyl with intention of overdosing, but fortunately he was in contact with his  who intervened and brought him to the ED.  At recent hospital discharge was prescribed Pristiq 50 mg, gabapentin 600 mg TID, Remeron 30 mg and bupropion  mg but as above stopped taking all of these on the day of discharge.  The only medication he has been taking is his methadone 105 mg daily through the [facility] methadone clinic.  Last dose 3/14/2025. Had DEC assessment in ED, recommended inpatient psychiatry admission, but not until medically stabilized with respect to active benzodiazepine withdrawal.  Received phenobarbital as well as Ativan 1 mg in ED.  Continues to have suicidal thoughts and intent, feelings of hopelessness with symptoms of active benzodiazepine withdrawal as discussed below.  -Psychiatry consult, appreciate recs  -Restoril 15 mg at bedtime, Remeron 30 mg at bedtime, Pristiq at dose of 25 mg daily-- continue at dispo  -Holding bupropion given risk of lowering seizure threshold.-- continue to hold at discharge  -Plan for transfer to inpatient psych once medically improved.      #Polysubstance abuse including fentanyl, benzodiazepines, methamphetamine, THC  #Acute benzodiazepine withdrawal  #History of benzodiazepine withdrawal seizures:  Recently hospitalized at [facility] for voluntary psychiatric admission.  Discharged 3/6/2025 and immediately relapsed on fentanyl and benzodiazepines.  Has also been accessing his methadone 105 mg daily through the [facility] clinic.  States he is also used methamphetamine and some THC in the past week but his drugs of choice are fentanyl and benzodiazepines.   Denies any alcohol use.  Past history of benzodiazepine withdrawal seizures.  States he has been taking about 6 mg of Xanax daily, last taking on 3/11/2025.  Last used fentanyl on 3/13/2025.  Had purchased more fentanyl with the intent of overdosing in a suicide attempt but his  in/tervened.  Received phenobarbital as well as Ativan 1 mg in ED.  Continues to feel tremulous and having chills, feeling hopeless.  -continue PTA methadone 105 mg daily  -Thiamine, MVI, folic acid given homelessness, concerns about nutritional intake and cytopenias  -Seizure precautions  - continue phenobarb at current dose for 3 days (until 3/19), then will begin weaning daily per protocol     #Chronic hepatitis C, never treated  #Mild transaminase elevations, hepatic steatosis:  Never treated for hepatitis C.  Chronic, mild transaminase elevations with normal hepatic function.  Most recent abdominal ultrasound 2/15/2024 showed hepatic steatosis without hepatic lesions, normal gallbladder.  Denies alcohol use.  -Repeat CMP 3/15     #Chronic thrombocytopenia  #Normocytic anemia:  Chronic thrombocytopenia, moderate and stable.  Denies any history of bleeding including no history of gastric ulcers, hematemesis, melena or hematochezia.  Anemia appears to be more recent since 2025 when hemoglobin was 11.6, MCV 86.  Admission hemoglobin 10.1, platelets 78, MCV 88.  -Repeat CBC with platelets, check B12, folic acid, ferritin, iron studies, TSH 3/15     #Housing insecurity:  Patient reports being homeless for several years.  Does not have any support network, his parents are  and he is estranged from his 3 sisters.  Had been discharged to sober living following his psychiatric admission on 3/6/2025.  -Social work, care coordinator consult     #Nicotine dependence:  Vapes.  -Nicotine lozenges as needed          Diet: Combination Diet Regular Diet; Safe Tray - with utensils    DVT Prophylaxis: Pneumatic Compression Devices  "and Ambulate every shift  Walker Catheter: Not present  Lines: None     Cardiac Monitoring: ACTIVE order. Indication: Drug overdose (24 hours)  Code Status: Full Code      Clinically Significant Risk Factors                              # Overweight: Estimated body mass index is 27.38 kg/m  as calculated from the following:    Height as of this encounter: 1.803 m (5' 10.98\").    Weight as of this encounter: 89 kg (196 lb 3.4 oz)., PRESENT ON ADMISSION       # Financial/Environmental Concerns: unable to afford rent/mortgage  # Support System: poor social support noted in nursing assessment  # Housing Instability: noted in nursing assessment         Social Drivers of Health    Food Insecurity: High Risk (3/14/2025)    Food Insecurity     Within the past 12 months, did you worry that your food would run out before you got money to buy more?: Yes     Within the past 12 months, did the food you bought just not last and you didn t have money to get more?: Yes   Depression: At risk (2/26/2025)    Received from [facility]    PHQ-2     PHQ-2 TOTAL SCORE: 6   Housing Stability: High Risk (3/14/2025)    Housing Stability     Do you have housing? : No     Are you worried about losing your housing?: Yes   Tobacco Use: Medium Risk (3/1/2025)    Received from [facility]    Patient History     Smoking Tobacco Use: Former     Smokeless Tobacco Use: Never   Transportation Needs: High Risk (3/14/2025)    Transportation Needs     Within the past 12 months, has lack of transportation kept you from medical appointments, getting your medicines, non-medical meetings or appointments, work, or from getting things that you need?: Yes   Social Connections: Socially Isolated (2/23/2025)    Received from [facility]    Social Connections     Do you often feel lonely or isolated from those around you?: 4          Disposition Plan     Medically " Ready for Discharge: Anticipated Today             Luis Alfredo Zuleta MD  Hospitalist Service, GOLD TEAM 22  [facility]  Securely message with [employer] (more info)  Text page via [facility] Paging/Directory   See signed in provider for up to date coverage information  ______________________________________________________________________    Interval History     No acute overnight events reports.  Pt denies any present complaints and is aware of pending inpatient psych bed, awaiting bed availability.  No new complaints during MD visit this AM.    Physical Exam   Vital Signs: Temp: 98  F (36.7  C) Temp src: Axillary BP: 99/46 Pulse: 59   Resp: 17 SpO2: 99 % O2 Device: None (Room air)    Weight: 196 lbs 3.35 oz    General Appearance: NAD, laying comfortably in bed  Respiratory: CTAB, No coughing, wheezing, rales  Cardiovascular: Normal S1, S2. No murmurs  GI: Abd soft, nontender.  Skin: No skin lesions. No rashes  Other: No FND. Resting tremor improved    Medical Decision Making       45 MINUTES SPENT BY ME on the date of service doing chart review, history, exam, documentation & further activities per the note.      Data         Imaging results reviewed over the past 24 hrs:   No results found for this or any previous visit (from the past 24 hours).

## 2025-03-18 NOTE — CONSULTS
"    Psychiatry Follow up   Consult date: March 17, 2025         Reason for Consult, requesting source:    Reason for Consult: Active Suicidal Ideation with a Plan  Requesting source: Dillon Dawkins    Labs and imaging reviewed.         HPI:     Today, Zenon reports feeling ongoing symptoms of withdrawal with tremors in both hands and an internal feeling of anxiety. States he significantly prefers phenobarb taper to benzos. Continues to endorse SI, states that it's gotten worse because of just overall feeling hopeless about his circumstances. Has been struggling with the urge to go use as soon as he leaves, which causes him distress as he really wants to stay sober. Becomes tearful when talking about this, but appears reassured and more bright when discussing these as urges rather than true thoughts to his character. Still wants to go to IP Psychiatry and thinks he would benefit from treatment, worries about his safety if he were discharged.            Past Psychiatric History:   Depression, anxiety. Recent admission at [facility] voluntarily for suicidal ideation 2/23/25-3/6/25. ECT was considered and even planned during this admission, but he decided against pursuing at that time.      Multiple prior suicide attempts: recent OD on clonazepam in Feb 2025, previous attempt by cutting wrist lacerating artery        Substance Use and History:   Opioids, recently fentanyl; methamphetamine, benzodiazepines, cannabis. On methadone maintenance.      For the past year, he has been smoking fentanyl. Now up to 2g daily. Using fentanyl while on methadone treatment.      Reports he was previously prescribed clonazepam which he \"supplemented\" with illicit alprazolam. He is no longer prescribed any benzos and gets alprazolam off the street    Social History     Tobacco Use    Smoking status: Every Day    Smokeless tobacco: Never    Tobacco comments:     Switched to e cigarettes   Substance Use Topics    Alcohol " use: No           Past Medical History:   PAST MEDICAL HISTORY:   Past Medical History:   Diagnosis Date    Hep C w/o coma, chronic (H) 2011    Substance abuse (H)     Testicular cancer (H)        PAST SURGICAL HISTORY:   Past Surgical History:   Procedure Laterality Date    GENITOURINARY SURGERY  1996    Testicular CA     TESTICLE SURGERY Bilateral              Family History:      Hep C w/o coma, chronic (H) 2011    Substance abuse (H)      Testicular cancer (H)            Social History:     Current Living arrangement: without housing  Relationships: reports not significant relationships  Occupation: Currently unemployed - previously a .   Local Support: Denies having support         Physical ROS:   The 10 point Review of Systems is negative other than noted in the HPI or here.           Medications:     Current Facility-Administered Medications   Medication Dose Route Frequency Provider Last Rate Last Admin    desvenlafaxine succinate (PRISTIQ) 24 hr tablet 25 mg  25 mg Oral Daily Chun Stewart MD   25 mg at 03/18/25 0805    folic acid (FOLVITE) tablet 1 mg  1 mg Oral Daily Chun Stewart MD   1 mg at 03/18/25 0805    hydrocortisone (CORTAID) 1 % cream   Topical BID Dillon Dawkins MD   Given at 03/18/25 0808    methadone (DOLOPHINE-INTENSOL) 10 MG/ML (HIGH CONC) solution 105 mg  105 mg Oral Daily Chun Stewart MD   105 mg at 03/18/25 0805    mirtazapine (REMERON) tablet 30 mg  30 mg Oral At Bedtime Chun Stewart MD   30 mg at 03/17/25 2212    multivitamin w/minerals (THERA-VIT-M) tablet 1 tablet  1 tablet Oral Daily Chun Stewart MD   1 tablet at 03/18/25 0805    PHENobarbital tablet 129.6 mg  129.6 mg Oral TID Dillon Dawkins MD   129.6 mg at 03/18/25 0805    sodium chloride (PF) 0.9% PF flush 3 mL  3 mL Intracatheter Q8H Chun Stewart MD   3 mL at 03/16/25 1600    thiamine (B-1) tablet 100 mg  100 mg Oral Daily  "Chun Stewart MD   100 mg at 03/18/25 0805              Allergies:     Allergies   Allergen Reactions    Iodinated Contrast Media Hives and Rash     Reports he is allergic to Iodine contrast     Reports he is allergic to Iodine contrast       Reports he is allergic to Iodine contrast    Reports he is allergic to Iodine contrast      Reports he is allergic to Iodine contrast   Reports he is allergic to Iodine contrast    Iodine Hives    Lisinopril Hives and Rash    Lithium Hives    Topiramate Hives    Escitalopram Rash     Rash around mouth    Iodine-131 Hives    Trazodone Anxiety     Trazodone causes RLS for pt    Olanzapine Rash     Pt reported taking approximately one year ago. Developed rash around mouth.          Labs:   No results found for this or any previous visit (from the past 48 hours).       Physical and Psychiatric Examination:     BP 99/46 (BP Location: Left arm)   Pulse 59   Temp 98  F (36.7  C) (Axillary)   Resp 17   Ht 1.803 m (5' 10.98\")   Wt 89 kg (196 lb 3.4 oz)   SpO2 99%   BMI 27.38 kg/m    Weight is 196 lbs 3.35 oz  Body mass index is 27.38 kg/m .    Physical Exam:  I have reviewed the physical exam as documented by by the medical team and agree with findings and assessment and have no additional findings to add at this time.         MSE:   Appearance: awake, alert and scattered abrasions/scabs, dressed in spice scrubs  Attitude:  cooperative, engaged  Eye Contact:  good  Mood:  depressed  Affect:  mood congruent and slightly restricted. Occasionally tearful.   Speech:  clear, coherent  Psychomotor Behavior:  no evidence of tardive dyskinesia, dystonia, or tics  Thought Process:  logical, linear, and goal oriented  Associations:  no loose associations  Thought Content:  no evidence of psychotic thought, active suicidal ideation present, and plan for suicide present (though not in hospital room)  Insight:  good  Judgement:  intact  Oriented to:  time, person, and " place  Attention Span and Concentration:  intact  Recent and Remote Memory:  intact     EKG: taken 2/7/25. Qtc 408 ms.          DSM-5 Diagnosis:     Major depressive disorder, recurrent, severe without psychosis  Opioid use disorder, severe  Opioid withdrawal  Benzodiazepine use disorder, severe  Benzodiazepine withdrawal  Stimulant (methamphetamine) use disorder  Methamphetamine withdrawal  Cannabis use disorder          Assessment:     This is a 54 year old male with a long history of polysubstance abuse, depression, and anxiety who is admitted for suicidal ideation with a plan, and withdrawal from benzos, fentanyl, and amphetamine.  Initially withdrawal treated with CIWA, then changed to phenobarbital. Vitals good now and patient appears comfortable. Withdrawal being effectively managed by oral meds, now on oral phenobarb.     Continues to have active SI with plan including to end life on his upcoming birthday. Endorses chronic stressors such as polysubstance abuse and homelessness, ongoing feelings of despair regarding current quality of life. Denies desire for Chemical Dependency treatment, as it has not helped in the past, but continues to be strongly interested in inpatient psychiatry. He is currently medically stabilized and waiting for IP placement pending bed availability.          Summary of Recommendations:   No med changes today.     Continue Phenobarb as ordered, continue PO  Stopped buproprion due to seizure risk and would not recommend resuming. Continue to hold temazepam while getting phenobarb.  Transfer to IP psych for SI once bed becomes available.     Patient was staffed with my attending physician.    Suad Coyle MD  Psychiatry Resident, PGY2

## 2025-03-18 NOTE — PLAN OF CARE
[id number]    Goal Outcome Evaluation:      Plan of Care Reviewed With: patient    Overall Patient Progress: no changeOverall Patient Progress: no change    Pt is alert and oriented. Regular diet. Continent of bowel and bladder. Independent in the room. Pt is on tele,Sinus cooper. BP 99/46-provider paged.  Denies pain or shortness of breath. 1:1 sitter. Continue with POC

## 2025-03-18 NOTE — TELEPHONE ENCOUNTER
R: [facility] ONLY .. ACCEPTED TO UNIT 22 UNDER PROVIDER DANAE    3:52p Received call from Psychiatry Provider Danae for reviewing of pt. Psychiatry Provider accepts pt to unit 22.  Intake inquired about if they would like to complete doc to doc d/t pt being on medical, Provider declined.  Intake to update work lists.     4:09p [facility] Completed.     4:10p Called Unit 22 CRN to inform pt is in queue for the unit. CRN informed unit will call [facility] for report when they can admit pt.    4:14p Called [facility] surg CRN [place] to inform pt is in queue for Unit 22/Danae. 22 CRN informed unit will call [facility] for report when they can admit pt.    4:15p  Intake notes all work lists updated with pt's acceptance.      [place]  Access Inpatient Bed Call Log 03/18/2025 @ 8:10 AM:  Intake has called facilities that have not updated the bed status within the last 12 hours.               [facility]:    [facility] is posting 0 beds.    Pt remains on work list pending appropriate bed availability.

## 2025-03-18 NOTE — CARE PLAN
VS: Temp: 97.9  F (36.6  C) Temp src: Oral BP: 102/64 Pulse: 60   Resp: 16 SpO2: 97 % O2 Device: None (Room air)      O2: Sating >90% on RA. Lung sounds clear. Denies chest pain and SOB.   Output: Voids spontaneously and adequately.    Last BM: 3. Bowel sounds active x4. Passing flatus.    Activity: Independent    Skin: Rash to hand   Pain: Denies   CMS: A&Ox4. Denies N/T.    Dressing: None   Diet: Regular. Appetite was good. Denies N/V.    LDA: None   Equipment: Independent   Plan:  Waiting for MH placement.   Additional Info:
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
## 2025-03-18 NOTE — DISCHARGE INSTRUCTIONS
ADDITIONAL SUPPORTS:  Call or text 696 - National Suicide & Crisis Lifeline - if you feel like you may be in crisis or having thoughts of suicide.    [employer] [place] [employer] [facility] [place] ([place]) provides support groups and educational programs. Visit www.namimn.org Helpline at 1-589.979.7460 or 413-602-3881 for further information.   [facility] ([employer] [place] [employer]) improves the lives of children and adults with mental illnesses and their families by providing free classes on mental illnesses andsupport groups for adults with mental illnesses, parents and family members.   For more information:  Phone: [phone]  Toll free: 1-659-LQDQ-HELPS  Website: www.namihelps.org      [facility] [place] [facility] provides a duio-ke-sxoq approach to mental health recovery, support and wellness. Calls are answered by our team of professionally trained Certified Peer Specialists, who have first hand experience living with a mental health condition.   Open Monday-Saturday, 5pm to 10pm. Call [phone].       You may contact the [facility] for brief, short-term solution focused therapy support with your mental health goals. Call [phone] for more information or to schedule. (Virtual Appointments)          [facility]: Thank you for your interest in [facility]. Currently, patients are experiencing long waits for intake when referred within [facility]. Please know that you may contact your insurance carrier member services to learn more about scheduling in network therapy. Your insurance company will have lists of in network therapists that are not within [facility] and may have more immediate availability.       Get care started with an ongoing therapist by calling to schedule your intake at one of the following clinics:    [facility]: [phone]  [facility]  [phone]  [facility] [phone]  [facility] [phone]  [facility] [phone]  [facility]  [phone]   [facility] in [facility] [phone]  [facility] [phone]    Call an [facility] Behavioral Coordinator at [phone] for assistance in scheduling mental health appointments (Psychiatry/medication management, therapy, support groups, neuropsych testing, intake for programmatic care such as IOP/PHP program, etc.)

## 2025-03-18 NOTE — H&P
"  ----------------------------------------------------------------------------------------------------------  [facility]   Psychiatry History and Physical    Name: Zenon Truong   MRN#: [id number]  Age: 54 year old YOB: 1970    Date of Admission: 3/18/2025  Attending Physician: Charles Fink MD     Contacts:     Primary Outpatient Psychiatrist: none  Primary Physician: No Ref-Primary, Physician  Therapist: none   [facility] : Stephanie Bailey with [facility] (P: [phone])   Probation/: none   Family Members: none      Chief Concern:     SI with intent and plan in the context of polysubstance use      History of Present Illness:     Zenon Truong is a 54 year old male with previous psychiatric diagnoses of  polysubstance use disorder [benzodiazepines, opiates, methamphetamine, THC, nicotine], severe depression with suicidal ideation, borderline personality disorder and PTSD  admitted from the [facility] on 03/18/2025 due to concern for SI with plan and intent in the context of medication non adherence, substance use and housing/financial insecurity.     [facility]/DEC Assessment:  Pt presenting in the ER today with his , Stephanie Bailey due to worsening of depression, anxiety and suicidal ideations.  Stephanie shared Pt has been suicidal for entire week as he has been sending her text messages with suicidal comments and telling her he has been also using substances.  Stephanie reported Pt made suicidal comments to her today with plans to either jumping into oncoming car or overdosing on Fentanyl which lead him to the ER visit today. Pt remarked, \"Because I was going to kill myself.\" as his reason for visiting the ER today.  Pt endorsed active suicidal ideations with intent and plan to overdosing on Fentanyl. Pt noted he purchased Fentanyl and going to mix it with water and drink to kill himself. Pt has been " homeless but staying at sober housing called, [facility] in [place] for a week. Pt reported he has been using Fentanyl 2 to 3x weekly and his last use was 2 days ago. Pt reported using Xanax daily and his last use was 2 days ago. Pt reported his doctor discontinued Klonopin prescription 10 days ago. Pt stopped taking all other psychiatric medications 1 week ago. Pt has no current outpatient mental health service providers. Pt was not able to identify particular events, stressors or triggers leading to his current mental health crisis. However, Pt shared being hopeless as he has chronic suicidal ideations, struggle with depression, anxiety and substance abuse as stressors. Pt endorsed increased depression, isolation, cry, worry, racing thoughts and anxiety. Pt shared he mostly worried about uncertain future and everything.     ED/Hospital Course:  Patient presented to the ED in [facility] and was admitted for detox. He was initiated on CIWA protocol with symptom triggered Valium however he was persistently tachycardic and tachypneic and was switched to phenobarbital taper with improvement in symptoms. ECT was consulted. Zenon Truong was medically cleared for admission to inpatient psychiatric unit.     Patient interview:  Patient endorses current SI with no intent, plan. Endorses chronic passive SI for 30 years. Reports current the SI is the worse its ever been. Reports feeling sense of urgency to act on it but does not have intent or plan while in the hospital. Reports depressed mood, low energy, poor appetite, poor sleep, and motivation.     Denies AVH. Denies hx of shreyas and symptoms of shreyas. Denies HI.     Endorses nightmares 2/2 loss of fiance via suicide. Reports being on prazosin wasn't working but unclear of the dose. Reports nightmares 2-3x a week. Endorses flashbacks with physical disturbances.    Endorses high anxiety. Endorses panic attacks with sweating, chest pain, shortness of breath at  least once a day.     Reports current medication regimen is helpful. Previously on gabapentin 600mg TID for anxiety and reports this was was helpful for ruminations. Reports mirtazapine has been helpful for sleep, was not helping with mood or anxiety.  Reports just started Pristiq help with mood and anxiety.     Still contemplating ECT due to concern for short term memory loss.     Patient endorses cramping, runny nose, chills and cold sweats.        Psychiatric Review of Systems:     See HPI.      Medical Review of Systems:     The Review of Systems is negative other than what is noted in the HPI.     Psychiatric History:     Prior diagnoses: Previous psychiatric diagnoses include Depression, anxiety, borderline personality disorder, PTSD     Hospitalizations: Recent admission at [facility] voluntarily for suicidal ideation 2/23/25-3/6/25. ECT was considered and even planned during this admission, but he decided against pursuing at that time.     Court Commitments: MI/CD 2018 through [facility]     Suicide attempts: At least 3 lifetime suicide attempts by overdoses : recent OD on clonazepam in Feb 2025, previous attempt by cutting wrist lacerating artery.     Self-injurious behavior: self harm in adolescence     Violence towards others: in [facility]     ECT/TMS: Had 2 treatments of bilateral ECT in 2018 at [facility] --> severe memory loss     Past medications:   --- Antidepressants: Lexapro (rash), Remeron 30 mg, Effexor  mg,  --- Antipsychotics: Zyprexa (rash),   --- Mood stabilizers: Lithium (hives),   --- Stimulants: None recorded  --- Sedatives/sleep/other: Topamax (rash), trazodone (restless legs), methadone, melatonin,     Substance Use History:     Alcohol: Endorses daily use       Nicotine: Denies     Illicit Substances: Opioids, recently fentanyl; methamphetamine, benzodiazepines, cannabis. On methadone maintenance. For the past year, he has been smoking fentanyl. Now up to 2g daily. Using  "fentanyl while on methadone treatment. Reports he was previously prescribed clonazepam which he \"supplemented\" with illicit alprazolam. He is no longer prescribed any benzos and gets alprazolam off the street. reports using between 6-10 mg of Xanax. Hx of withdrawal seizures from benzos.     Chemical Dependency Treatment: Multiple CD treatment programs      Social History:     Family/Relationships: Denies having support. Parents dead, estranged from 3 sisters, one sibling .was in brief contact when his son turned 18 but none since     Living Situation: without housing, but has been staying at sober housing called, [facility] in [place] for a week now     Education: GED age 18     Occupation: Currently unemployed - previously a .     Legal:Bullied in [facility], also aggressive/violent at [facility] - , had been sent to residential for 21 months for drug sale      Guns: Pt denied     Abuse/Trauma: Meera completed suicide in  leading to depressive episode with SI and ECT        Past Medical/Surgical History:     Past Medical History:   Diagnosis Date    Hep C w/o coma, chronic (H)     Substance abuse (H)     Testicular cancer (H)          Past Surgical History:   Procedure Laterality Date    GENITOURINARY SURGERY      Testicular CA     TESTICLE SURGERY Bilateral         Family History:     Family History   Problem Relation Age of Onset    Cancer Mother     Mental Illness Mother     Cancer Father     Cancer Sister     Depression Mother     Alcoholism Father     Substance Abuse Sister      Mother: Attempted suicide multiple times [id number]  Sister: Drug/alcohol,  of pancreatitis  -Father: Alcohol use     Allergies:      Allergies   Allergen Reactions    Iodinated Contrast Media Hives and Rash     Reports he is allergic to Iodine contrast     Reports he is allergic to Iodine contrast       Reports he is allergic to Iodine contrast    Reports he is allergic to Iodine " contrast      Reports he is allergic to Iodine contrast   Reports he is allergic to Iodine contrast    Iodine Hives    Lisinopril Hives and Rash    Lithium Hives    Topiramate Hives    Escitalopram Rash     Rash around mouth    Iodine-131 Hives    Trazodone Anxiety     Trazodone causes RLS for pt    Olanzapine Rash     Pt reported taking approximately one year ago. Developed rash around mouth.        Medications:     Medications Prior to Admission   Medication Sig Dispense Refill Last Dose/Taking    [START ON 3/19/2025] desvenlafaxine succinate (PRISTIQ) 25 MG 24 hr tablet Take 1 tablet (25 mg) by mouth daily.       [START ON 3/19/2025] folic acid (FOLVITE) 1 MG tablet Take 1 tablet (1 mg) by mouth daily.       hydrocortisone (CORTAID) 1 % external cream Apply topically 2 times daily.       methadone (DOLOPHINE-INTENSOL) 10 MG/ML (HIGH CONC) solution Take 105 mg by mouth daily.       mirtazapine (REMERON) 30 MG tablet Take 1 tablet (30 mg) by mouth at bedtime 30 tablet 2     [START ON 3/19/2025] multivitamin w/minerals (THERA-VIT-M) tablet Take 1 tablet by mouth daily.       nicotine (COMMIT) 2 MG lozenge Place 2 lozenges (4 mg) inside cheek every hour as needed for nicotine withdrawal symptoms 100 lozenge 4     [START ON 3/19/2025] PHENobarbital 64.8 MG tablet Take 1 tablet (64.8 mg) by mouth 3 times daily.       PHENobarbital 97.2 MG tablet Take 1 tablet (97.2 mg) by mouth once for 1 dose.       [START ON 3/19/2025] thiamine (B-1) 100 MG tablet Take 1 tablet (100 mg) by mouth daily.          See current inpatient medications below.     Vitals and Physical Exam:     There were no vitals taken for this visit.    See ED assessment note by ED physician on 3/14/25.     Labs and Imaging:     No results found for this or any previous visit (from the past 72 hours).     Mental Status Examination:     Oriented to:  Grossly Oriented  General:  Awake, Alert, and scattered abrasions and scabs   Appearance:  Grooming is  "adequate  Behavior/Attitude:  Calm and Cooperative  Eye Contact: Appropriate  Psychomotor: No evidence of tics, dystonia, or tardive dyskinesia  no catatonia present  Speech:  appropriate volume/tone, spontaneous, and with good articulation  Language: Fluent in English with appropriate syntax and vocabulary.  Mood:  \"down\"  Affect:  congruent with mood and dysphoric   Thought Process:  linear, coherent, and goal directed  Thought Content:   suicidal ideation (passive), No HI, does not appear to be responding to internal stimuli, No VH, and No AH; No apparent delusions  Associations:  intact  Insight:  fair   Judgment:  impaired   Impulse control: fair  Attention Span:  adequate for conversation  Concentration:  grossly intact  Recent and Remote Memory:  grossly intact  Fund of Knowledge:  within normal limits   Muscle Strength and Tone: normal  Gait and Station: Normal     Psychiatric Assessment:   Zenon Truong is a 54 year old male with previous psychiatric diagnoses of  polysubstance use disorder [benzodiazepines, opiates, methamphetamine, THC, nicotine], severe depression with suicidal ideation, borderline personality disorder and PTSD  admitted from the [facility] on 03/18/2025 due to concern for SI with plan and intent in the context of medication non adherence, substance use and housing/financial insecurity. On admission, he presented with symptoms of SI with intent and plan, hopelessness, poor motivation, low energy, anhedonia, impulsivity and irritability. There is genetic predisposition given family hx of substance use disorder. Patient has struggled with homelessness and severe substance use for years. This current admission is likely precipitated by recent psychiatric hospitalization at end of of February followed by relapse soon after discharge to a sober house. Perpetuating factors include ongoing substance use, medication non adherence, longstanding functional impairment, personality traits,  lack of " perceived support, deconditioning and housing and financial insecurity. Patient's support system includes  . Based on patient's history and current symptoms, criteria are met for primary diagnosis of MDD without psychotic features, opioid use disorder, benzodiazepine use disorder, stimulant use disorder and cannabis use disorder. Patient warrants inpatient hospitalization to maintain their safety and would benefit from medication optimization and outpatient referral/resources.      In regards to management, patient will continue Mirtazapine 30mg for sleep which alvaro reports to be helpful and Desvenlafaxine 25mg for depression which was started a couple of weeks. Given severity of depression symptoms, ECT consult was obtained and can commence 3/24 if patient is medically approved. Patient did express some reservation due to short term memory loss from previous sessions. Methadone will be continued for maintenance. Patient will continue Phenobarb for 64.8mg TID x 3 days at current dose and will wean per protocol.        Psychiatric Plan by Diagnosis     #Major depressive disorder, recurrent, severe without psychosis  - continue Mirtazapine 30mg po at bedtime  - continue Desvenlafaxine 25mg po every day   - Plan to start ECT 3/24/25     #Opioid use disorder, severe  - continue Methadone 105mg every day   - Thiamine, MVI, folic acid     #Benzodiazepine use disorder, severe  - continue phenobarb taper     #Stimulant (methamphetamine) use disorder  #Cannabis use disorder      Pertinent Labs/Monitoring:   - n/a     Additional Plans:  - Patient will be treated in therapeutic milieu with appropriate individual and group therapies as described     Psychiatric Hospital Course:    Zenon Truong was transferred from medicine and  was managed for benzodiazepine withdrawal. He was initally started on CIWA with symptom triggered Valium however  remained tahcycardiac and tachypneic and was swtiched to  phenobarbbital with improvement. He was medically cleared and admitted to [facility] with attending Charles Menjivar MD as a voluntary patient.  The patient was placed under status 15 (15 minute checks)  to ensure patient safety. Additionally, suicide precautions were placed. A CBC, CMP, TSH, Vitamin B12, folate, Utox, NETO, ferritin and iron panel were obtained while on medicine and unremarkable except for chronic thrombocytopenia and mild transaminase elevation. The following PTA medications were continued: Pristiq 25mg for anxiety/depression and Remeron 30mg for sleep. The following PTA meds were stopped: Bupropion and gabapentin at previous hospital admission.     The risks, benefits, alternatives, and side effects were discussed and understood by the patient.      Medical Assessment and Plan     Medical diagnoses to be addressed this admission:      #Chronic hepatitis C, never treated  #Mild transaminase elevations, hepatic steatosis:  Never treated for hepatitis C.  Chronic, mild transaminase elevations with normal hepatic function.  Most recent abdominal ultrasound 2/15/2024 showed hepatic steatosis without hepatic lesions, normal gallbladder.  Denies current alcohol use.  - monitor outpatient     #Chronic thrombocytopenia  #Normocytic anemia,  now resolved  Chronic thrombocytopenia, moderate and stable.  Denies any history of bleeding including no history of gastric ulcers, hematemesis, melena or hematochezia.  Anemia appears to be more recent since 2/23/2025 when hemoglobin was 11.6, MCV 86.  Hgb 14.1, Plts 97, MCV 86, ferritin, iron, TIBC wnl on 3/15.     Medical course: Patient was physically examined by the ED prior to being transferred to the unit and was found to be medically stable and appropriate for admission.     Consults:  none     Checklist   Legal Status: Orders Placed This Encounter      Voluntary    Safety Assessment:   Behavioral Orders   Procedures    Code 1 - Restrict to Unit     Routine Programming     As clinically indicated    Seizure precautions    Status 15     Every 15 minutes.    Withdrawal precautions     Risk Assessment:  Risk for harm is moderate.  Risk factors: SI, maladaptive coping, substance use, impulsive, and past behaviors  Protective factors: engaged in treatment     SIO: No     Dispo: TBD. Disposition pending clinical stabilization, medication optimization and development of an appropriate discharge plan.     Attestations:     Patient to be staffed with the attending physician in the morning.  Evelio Chavarria DO  Psychiatry Resident Physician       I, Charles Fink , have personally performed an examination of this patient and I have reviewed the resident's documentation.  I have edited the note to reflect all relevant changes.  I have discussed this patient with the house staff on 3/19/2025.  I agree with resident findings and plan in today's note and yesterdays resident H&P.  I have reviewed all vitals and laboratory findings.      Charles Menjivar MD

## 2025-03-18 NOTE — PLAN OF CARE
"Goal Outcome Evaluation:    VS: BP 99/46 (BP Location: Left arm)   Pulse 59   Temp 98  F (36.7  C) (Axillary)   Resp 17   Ht 1.803 m (5' 10.98\")   Wt 89 kg (196 lb 3.4 oz)   SpO2 99%   BMI 27.38 kg/m      O2: 99% on RA.    Neuro: A&Ox4.    Bowel/Bladder: Contin. B&B.    Diet: Regular diet safe-tray.    Skin: Pt refused full skin assessment. x3 small itchy rash like bumps on hand being managed with scheduled hydrocortisone cream.    Pain: Withdrawal being managed w scheduled Phenobarb.    Activity: Independent.    Dressings: N/A.    LDA: N/A.    Equipment: Personal belongings.    Plan: Pt on 1:1 for SI. Continue POC.    Additional Info: Pt transferring to [facility] this evening. Report given a 1830 and transport scheduled for 1845.      Yonatan Garnica RN   "

## 2025-03-18 NOTE — CONSULTS
IP MH Referral Acuity Rating Score (RARS)    LMHP complete at referral to IP MH, with DEC; and, daily while awaiting IP MH placement. Call score to PPS.  CRITERIA SCORING   New 74 HH and Involuntary for IP MH (not adolescent) 0/3   Boarding over 24 hours 1/1   Vulnerable adult at least 55+ with multiple co morbidities; or, Patient age 11 or under 0/1   Suicide ideation without relief of precipitating factors 1/1   Current plan for suicide 1/1   Current plan for homicide 0/1   Imminent risk or actual attempt to seriously harm another without relief of factors precipitating the attempt 0/1   Severe dysfunction in daily living (ex: complete neglect for self care, extreme disruption in vegetative function, extreme deterioration in social interactions) 1/1   Recent (last 2 weeks) or current physical aggression in the ED 0/1   Restraints or seclusion episode in ED 0/1   Verbal aggression, agitation, yelling, etc., while in the ED 0/1   Active psychosis with psychomotor agitation or catatonia 0/1   Need for constant or near constant redirection (from leaving, from others, etc).  0/1   Intrusive or disruptive behaviors 0/1   TOTAL 4

## 2025-03-19 LAB
ATRIAL RATE - MUSE: 59 BPM
DIASTOLIC BLOOD PRESSURE - MUSE: NORMAL MMHG
HOLD SPECIMEN: NORMAL
INTERPRETATION ECG - MUSE: NORMAL
P AXIS - MUSE: 68 DEGREES
PR INTERVAL - MUSE: 154 MS
QRS DURATION - MUSE: 96 MS
QT - MUSE: 400 MS
QTC - MUSE: 396 MS
R AXIS - MUSE: 65 DEGREES
SYSTOLIC BLOOD PRESSURE - MUSE: NORMAL MMHG
T AXIS - MUSE: 55 DEGREES
VENTRICULAR RATE- MUSE: 59 BPM

## 2025-03-19 PROCEDURE — 99232 SBSQ HOSP IP/OBS MODERATE 35: CPT | Mod: GC | Performed by: PSYCHIATRY & NEUROLOGY

## 2025-03-19 PROCEDURE — 93005 ELECTROCARDIOGRAM TRACING: CPT

## 2025-03-19 PROCEDURE — 82306 VITAMIN D 25 HYDROXY: CPT

## 2025-03-19 PROCEDURE — 36415 COLL VENOUS BLD VENIPUNCTURE: CPT

## 2025-03-19 PROCEDURE — 124N000002 HC R&B MH UMMC

## 2025-03-19 PROCEDURE — 250N000013 HC RX MED GY IP 250 OP 250 PS 637

## 2025-03-19 PROCEDURE — 97150 GROUP THERAPEUTIC PROCEDURES: CPT | Mod: GO

## 2025-03-19 RX ORDER — PHENOBARBITAL 64.8 MG/1
64.8 TABLET ORAL AT BEDTIME
Status: DISCONTINUED | OUTPATIENT
Start: 2025-03-19 | End: 2025-03-20

## 2025-03-19 RX ORDER — CLONIDINE HYDROCHLORIDE 0.1 MG/1
0.1 TABLET ORAL AT BEDTIME
Status: DISCONTINUED | OUTPATIENT
Start: 2025-03-19 | End: 2025-03-19 | Stop reason: DRUGHIGH

## 2025-03-19 RX ORDER — CLONIDINE HYDROCHLORIDE 0.1 MG/1
0.1 TABLET ORAL 3 TIMES DAILY
Status: DISCONTINUED | OUTPATIENT
Start: 2025-03-19 | End: 2025-03-27

## 2025-03-19 RX ORDER — PHENOBARBITAL 32.4 MG/1
32.4 TABLET ORAL EVERY MORNING
Status: DISCONTINUED | OUTPATIENT
Start: 2025-03-20 | End: 2025-03-21

## 2025-03-19 RX ORDER — PHENOBARBITAL 64.8 MG/1
64.8 TABLET ORAL 3 TIMES DAILY
Status: DISCONTINUED | OUTPATIENT
Start: 2025-03-19 | End: 2025-03-19

## 2025-03-19 RX ADMIN — THIAMINE HCL TAB 100 MG 100 MG: 100 TAB at 08:11

## 2025-03-19 RX ADMIN — MIRTAZAPINE 30 MG: 30 TABLET, FILM COATED ORAL at 20:25

## 2025-03-19 RX ADMIN — FOLIC ACID 1 MG: 1 TABLET ORAL at 08:11

## 2025-03-19 RX ADMIN — NICOTINE POLACRILEX 4 MG: 4 LOZENGE ORAL at 12:57

## 2025-03-19 RX ADMIN — CLONIDINE HYDROCHLORIDE 0.1 MG: 0.1 TABLET ORAL at 13:14

## 2025-03-19 RX ADMIN — HYDROCORTISONE: 1 CREAM TOPICAL at 21:09

## 2025-03-19 RX ADMIN — PHENOBARBITAL 64.8 MG: 64.8 TABLET ORAL at 13:14

## 2025-03-19 RX ADMIN — HYDROCORTISONE: 1 CREAM TOPICAL at 08:16

## 2025-03-19 RX ADMIN — NICOTINE POLACRILEX 4 MG: 4 LOZENGE ORAL at 15:55

## 2025-03-19 RX ADMIN — CLONIDINE HYDROCHLORIDE 0.1 MG: 0.1 TABLET ORAL at 20:24

## 2025-03-19 RX ADMIN — DESVENLAFAXINE 25 MG: 25 TABLET, EXTENDED RELEASE ORAL at 08:15

## 2025-03-19 RX ADMIN — NICOTINE POLACRILEX 4 MG: 4 LOZENGE ORAL at 20:24

## 2025-03-19 RX ADMIN — NICOTINE POLACRILEX 4 MG: 4 LOZENGE ORAL at 11:14

## 2025-03-19 RX ADMIN — NICOTINE POLACRILEX 4 MG: 4 LOZENGE ORAL at 08:43

## 2025-03-19 RX ADMIN — PHENOBARBITAL 64.8 MG: 64.8 TABLET ORAL at 21:09

## 2025-03-19 RX ADMIN — METHADONE HYDROCHLORIDE 105 MG: 10 CONCENTRATE ORAL at 08:15

## 2025-03-19 RX ADMIN — Medication 1 TABLET: at 08:11

## 2025-03-19 RX ADMIN — PHENOBARBITAL 64.8 MG: 64.8 TABLET ORAL at 08:11

## 2025-03-19 ASSESSMENT — ACTIVITIES OF DAILY LIVING (ADL)
ORAL_HYGIENE: INDEPENDENT
ADLS_ACUITY_SCORE: 26
LAUNDRY: WITH SUPERVISION
ADLS_ACUITY_SCORE: 26
ADLS_ACUITY_SCORE: 26
HYGIENE/GROOMING: INDEPENDENT
ADLS_ACUITY_SCORE: 26
DRESS: INDEPENDENT

## 2025-03-19 NOTE — PLAN OF CARE
"ADMISSION NOTE:  Admitted this 54 year old from medical unit for SI. Patient was in medical d/t seizure precautions while receiving treatment for benzo detox. He was recently admitted at [facility] for SI from 2/23 - 3/6/2025. Patient has had multiple prior suicide attempts and recently Od'd on Clonazepam in Feb 2025 and previously attempted suicide by cutting wrist. Patient has used opioids, Meth, Benzos Cannabis and has been on methadone maintenance. Hx of depression, anxiety, polysubstance abuse.    On arrival to the unit, patient was cooperative with such, oriented to unit and his room. When asked the reason for this admission, patient said, \" I was gonna kill myself with Fentanyl so I need treatment.\" Denies SIB endorses \"a little SI.\" Reports anxiety and feeling clammy. Patient contracts for safety. Admitted as voluntary and on Seizure and Withdrawal Precautions. Seizure pads placed on the floor by his bed. He is on MSSA and scored a 6 at 2200. No behavioral concerns.            "

## 2025-03-19 NOTE — PROGRESS NOTES
"CLINICAL NUTRITION SERVICES - ASSESSMENT NOTE    RECOMMENDATIONS FOR MDs/PROVIDERS TO ORDER:  None at this time    Malnutrition Status:    Patient does not meet two of the established criteria necessary for diagnosing malnutrition but is at risk for malnutrition    Registered Dietitian Interventions:  -Ensure Enlive with meals  -Ginger ale with meals    Future/Additional Recommendations:  Monitor PO intake, supplement use, weight trends.      REASON FOR ASSESSMENT  Positive admission nutrition risk screen    SUBJECTIVE INFORMATION  Assessed patient in room.    NUTRITION HISTORY  Pt reports ~1 year of poor appetite and weight loss due to poor mental health and substance use. Pt is agreeable to ONS to support weight repletion and ginger ale for ongoing nausea 2/2 withdrawals. Pt has no teeth but declined offer for modified textures, pt would like to self-select tolerated foods on regular diet.     CURRENT NUTRITION ORDERS  Diet: Regular  Supplement:  none  Allergies: NKFA    CURRENT INTAKE/TOLERANCE  Minimal documentation, pt arrived yesterday.     LABS  Nutrition-relevant labs: Reviewed    MEDICATIONS  Nutrition-relevant medications: Reviewed    ANTHROPOMETRICS  Height: 180.3 cm (5' 10.98\")  Most Recent Weight:    IBW: 75.5 kg   % IBW: 117%  BMI (kg/m ): Overweight BMI 25-29.9  Weight History:   Wt Readings from Last 15 Encounters:   03/18/25 89 kg (196 lb 3.4 oz)   12/06/24 85.6 kg (188 lb 12.8 oz)    11/21/24 89.5 kg (197 lb 4.8 oz)    07/25/24 96.5 kg (212 lb 12.8 oz)    08/02/18 102.5 kg (226 lb)   7.7% weight loss in 8 months    Dosing Weight: 89 kg, based on actual wt    ASSESSED NUTRITION NEEDS  Estimated Energy Needs: 2225 - 2670+ kcals/day (25 - 30 kcals/kg)  Justification: Maintenance vs Repletion  Estimated Protein Needs: 89 - 106 grams protein/day (1 - 1.2 grams of pro/kg)  Justification: Repletion  Estimated Fluid Needs: 1 mL/kcal  Justification: Maintenance    SYSTEM FINDINGS    Poor dentition - pt has " no teeth  Skin/wounds: none  GI symptoms: mild nausea 2/2 withdrawals per pt    MALNUTRITION  % Intake: Decreased intake does not meet criteria  % Weight Loss: Weight loss does not meet criteria   Subcutaneous Fat Loss: Orbital: Mild  Muscle Loss: None observed  Fluid Accumulation/Edema: None noted  Malnutrition Diagnosis: Patient does not meet two of the established criteria necessary for diagnosing malnutrition but is at risk for malnutrition  Malnutrition Present on Admission: No    NUTRITION DIAGNOSIS  Inadequate oral intake related to decreased appetite as evidenced by pt report, weight loss and evident muscle loss.     INTERVENTIONS  Manage composition of oral intake  Medical food supplement therapy    Goals  Patient to consume % of nutritionally adequate meal trays TID, or the equivalent with supplements/snacks.     Monitoring/Evaluation  Progress toward goals will be monitored and evaluated per policy.    Jenny Adam MPH, RDN, LD  Behavioral Health Adult & Pediatric Dietitian  BEH Clinical Dietitian Ze, Teams, or Desk: [phone]  Weekend/Holiday Vocera: Weekend Holiday Clinical Dietitian [Multi Site Groups]
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
## 2025-03-19 NOTE — PROGRESS NOTES
Rehab Group    Start time: 1015  End time: 1110  Patient time total: 50 minutes    attended full group     #3 attended   Group Type: occupational therapy   Group Topic Covered: coping skills     Group Session Detail:  OT clinic     Patient Response/Contribution:  cooperative with task, attentive, and actively engaged     Patient Detail:    Pt actively participated in occupational therapy clinic to facilitate coping skill exploration, creative expression within personally meaningful activities, and clinical observation of social, cognitive, and kinesthetic performance skills. Pt response: Upon arrival to group, pt was oriented to group materials and project options, and was subsequently independent to initiate, gather materials, sequence, and adjust to workspace demands as needed. Initially selected a cognitive, goal-directed task. Demonstrated fair attention to task, though verbalized having difficulty concentrating about group home through group. Writer offered pt a mint for alerting sensory input, which he was appreciative of. He then transitioned to a detailed, goal-directed task, and his ability to focus appeared to improve while working on a more structured task. Able to ask for assistance as needed, and appeared comfortable interacting with peers and staff, though spent a majority of this group quietly focused on his task. Calm, pleasant, and engaged. Will continue to assess.      [id number] OT Group (2 or more in attendance)      Patient Active Problem List   Diagnosis    Opioid use disorder, severe, on maintenance therapy (H)    Suicidal ideation    Severe benzodiazepine use disorder (H)    Substance-induced anxiety disorder (H)    Benzodiazepine withdrawal without complication (H)    Cannabis use disorder, moderate, dependence (H)    Cigarette nicotine dependence with withdrawal    Moderate episode of recurrent major depressive disorder (H)    Homelessness    Opioid withdrawal (H)    MDD (major depressive  disorder), recurrent severe, without psychosis (H)    ALISSON (generalized anxiety disorder)

## 2025-03-19 NOTE — PLAN OF CARE
Problem: Suicide Risk  Goal: Absence of Self-Harm  Outcome: Progressing   Goal Outcome Evaluation:       Pt has been out of his room for most of the shift. Pt attended groups. Med compliant. MSSA scores were 1 and 1. Pt not in acute withdrawal, on a phenobarbital taper. Pt endorses depression at 6/10 and anxiety 7/10. Pt endorses chronic, passive SI thoughts. Pt denies SIB/HI and hallucinations. Pt met with SW without issues. Pt is calm and polite. No issues with eating or drinking.

## 2025-03-19 NOTE — DISCHARGE SUMMARY
"                                                                                                                 ----------------------------------------------------------------------------------------------------------  [facility]   Psychiatric Discharge Summary      Zenon Truong MRN# [id number]   Age: 54 year old YOB: 1970     Date of Admission:  3/18/2025  Date of Discharge:  {DISCHARGE DATE:413070}  Admitting Physician:  Charles Menjivar MD  Discharge Physician:  ***, MD  **Update date of service and hit refresh**    This document serves as a transfer of care to Zenon Truong's outpatient providers.     Events Leading to Hospitalization:     Zenon Truong is a 54 year old male with previous psychiatric diagnoses of  polysubstance use disorder [benzodiazepines, opiates, methamphetamine, THC, nicotine], severe depression with suicidal ideation, borderline personality disorder and PTSD  admitted from the OSH on 03/18/2025 due to concern for SI with plan and intent in the context of medication non adherence, substance use and housing/financial insecurity.      [facility]/DEC Assessment:  Pt presenting in the ER today with his , Stephanie Bailey due to worsening of depression, anxiety and suicidal ideations.  Stephanie shared Pt has been suicidal for entire week as he has been sending her text messages with suicidal comments and telling her he has been also using substances.  Stephanie reported Pt made suicidal comments to her today with plans to either jumping into oncoming car or overdosing on Fentanyl which lead him to the ER visit today. Pt remarked, \"Because I was going to kill myself.\" as his reason for visiting the ER today.  Pt endorsed active suicidal ideations with intent and plan to overdosing on Fentanyl. Pt noted he purchased Fentanyl and going to mix it with water and drink to kill himself. Pt has been homeless but staying " at [facility] in [place] for a week. Pt reported he has been using Fentanyl 2 to 3x weekly and his last use was 2 days ago. Pt reported using Xanax daily and his last use was 2 days ago. Pt reported his doctor discontinued Klonopin prescription 10 days ago. Pt stopped taking all other psychiatric medications 1 week ago. Pt has no current outpatient mental health service providers. Pt was not able to identify particular events, stressors or triggers leading to his current mental health crisis. However, Pt shared being hopeless as he has chronic suicidal ideations, struggle with depression, anxiety and substance abuse as stressors. Pt endorsed increased depression, isolation, cry, worry, racing thoughts and anxiety. Pt shared he mostly worried about uncertain future and everything.      ED/Hospital Course:  Patient presented to the ED in [facility] and was admitted for detox. He was initiated on CIWA protocol with symptom triggered Valium however he was persistently tachycardic and tachypneic and was switched to phenobarbital taper with improvement in symptoms. ECT was consulted. Zenon Truong was medically cleared for admission to inpatient psychiatric unit.      Patient interview:  Patient endorses current SI with no intent, plan. Endorses chronic passive SI for 30 years. Reports current the SI is the worse its ever been. Reports feeling sense of urgency to act on it but does not have intent or plan while in the hospital. Reports depressed mood, low energy, poor appetite, poor sleep, and motivation.      Denies AVH. Denies hx of shreyas and symptoms of shreyas. Denies HI.      Endorses nightmares 2/2 loss of fiance via suicide. Reports being on prazosin wasn't working but unclear of the dose. Reports nightmares 2-3x a week. Endorses flashbacks with physical disturbances.     Endorses high anxiety. Endorses panic attacks with sweating, chest pain, shortness of breath at least once a day.       Reports current medication regimen is helpful. Previously on gabapentin 600mg TID for anxiety and reports this was was helpful for ruminations. Reports mirtazapine has been helpful for sleep, was not helping with mood or anxiety.  Reports just started Pristiq help with mood and anxiety.      Still contemplating ECT due to concern for short term memory loss.      Patient endorses cramping, runny nose, chills and cold sweats.     See H&P by ***, MD on *** for additional details.      Diagnoses:   Primary Psychiatric Diagnosis  ***    Secondary Psychiatric Diagnoses  ***  ***    Psychiatric Assessment:   *** (Can be mostly copied from progress note - modify so in past tense)       Psychiatric Hospital Course:     Zenon Truong was admitted to [facility] *** as a voluntary patient/on a 72 hour hold***. The patient was placed under { :045543} to ensure patient safety.  Medication Trials and Changes: risks, benefits, alternatives, and side effects were discussed and understood by the patient*** and other caregivers***.  PTA *** was discontinued due to ***.    ***  ***  Level of medication adherence:  Behaviors: The patient was safe and appropriate*** and did not require chemical/physical restraints during admission. He was cooperative*** with cares, had ***good group attendance, and {WAS / WAS NO:426750} visible in the milieu.  Change in psychiatric symptoms: Over the course of this hospitalization the patient's symptoms of *** improved.  Collateral information was obtained from *** and notable for ***.   Zenon was { :784293} to { :944675}. At the time of discharge he was determined to not be a danger to himself or others.     Risk Assessment:      Today Zenon Truong denies/reports ***. Patient has notable risk factors for self-harm, including {SUICIDE RISK FACTORS:009437}. However, risk is mitigated by {SUICIDE PROTECTIVE FACTORS:648116}. Therefore, based on all available evidence including the factors cited  "above, he does not appear to be at imminent risk for self-harm, does not meet criteria for a 72-hr hold, and therefore remains appropriate for ongoing outpatient level of care. Additional steps taken to minimize risk include: medication optimization, close psychiatric follow up and provision of crisis resources ***. Voluntary referral for {PSYCHIATRY LEVELS OF CARE:421746} was offered, he {ACCEPTED/DECLINED:634366} this offer.     Psychiatric Examination:     Mental Status Exam:  Oriented to:  {PSYCHORIENTATION:869222}  General:  {General:960875}  Appearance:  {Appearance:080396}  Behavior/Attitude:  {Behavior:761024}  Eye Contact: {EyeContact:603296}  Psychomotor: {PSYCHMOT:794597} {Catatonia:004463}  Speech:  {Speech:803447}  Language: Fluent in English with appropriate syntax and vocabulary.  Mood:  \"***\"  Affect:  {Affect:635101}  Thought Process:  {ThoughtProcess:827657}  Thought Content:   {ThoughtContent:382221}; {Delusions:028999}  Associations:  {Association:173612}  Insight:  {Quality:431562} due to ***  Judgment:  {Quality:294144} due to ***  Impulse control: {Quality:089640}  Attention Span:  {Attention:889820}  Concentration:  {Concentration:784492}  Recent and Remote Memory:  {Memory:913771}  Fund of Knowledge: {Intellect:950560}  Muscle Strength and Tone: { :082419}  Gait and Station: { :013445}     Medical Hospital Course:   Zenon Truong was medically cleared by the ED prior to admission to the unit. PTA medications were continued on admission***.     Medical Diagnoses addressed:  # ***    Consults: ***    Labs were notable for the following:  (Copy form \"Data this admission\" from progress note)     Discharge Medications:     Current Discharge Medication List        CONTINUE these medications which have NOT CHANGED    Details   desvenlafaxine succinate (PRISTIQ) 25 MG 24 hr tablet Take 1 tablet (25 mg) by mouth daily.    Associated Diagnoses: ALISSON (generalized anxiety disorder); MDD (major " depressive disorder), recurrent severe, without psychosis (H)      folic acid (FOLVITE) 1 MG tablet Take 1 tablet (1 mg) by mouth daily.    Associated Diagnoses: Homelessness      hydrocortisone (CORTAID) 1 % external cream Apply topically 2 times daily.    Associated Diagnoses: Rash      methadone (DOLOPHINE-INTENSOL) 10 MG/ML (HIGH CONC) solution Take 105 mg by mouth daily.      mirtazapine (REMERON) 30 MG tablet Take 1 tablet (30 mg) by mouth at bedtime  Qty: 30 tablet, Refills: 2    Associated Diagnoses: Substance-induced anxiety disorder (H)      multivitamin w/minerals (THERA-VIT-M) tablet Take 1 tablet by mouth daily.    Associated Diagnoses: Homelessness      nicotine (COMMIT) 2 MG lozenge Place 2 lozenges (4 mg) inside cheek every hour as needed for nicotine withdrawal symptoms  Qty: 100 lozenge, Refills: 4    Associated Diagnoses: Cigarette nicotine dependence with withdrawal      PHENobarbital 64.8 MG tablet Take 1 tablet (64.8 mg) by mouth 3 times daily.    Associated Diagnoses: Benzodiazepine withdrawal without complication (H)      thiamine (B-1) 100 MG tablet Take 1 tablet (100 mg) by mouth daily.    Associated Diagnoses: Homelessness              Discharge Plan:   Medications as above  Psychiatric Appointments: ***   Psychotherapy Appointments: ***  Referrals: ***  Medical follow up: ***     Attestations:     {ATTESTATIONS:954651}    treated for hepatitis C.  Chronic, mild transaminase elevations with normal hepatic function.  Most recent abdominal ultrasound 2/15/2024 showed hepatic steatosis without hepatic lesions, normal gallbladder.  Denies current alcohol use.  - monitor outpatient     #Chronic thrombocytopenia  #Normocytic anemia, now resolved  Chronic thrombocytopenia, moderate and stable.  Denies any history of bleeding including no history of gastric ulcers, hematemesis, melena or hematochezia.  Anemia appears to be more recent since 2/23/2025 when hemoglobin was 11.6, MCV 86.  Hgb 14.1, Plts 97, MCV 86, ferritin, iron, TIBC wnl on 3/15.      #Concern for malnutrition  Seen by dietician team, patient does not meet two of the established criteria necessary for diagnosing malnutrition but is at risk for malnutrition.   - ensure enlive ordered        Medical course: Patient was physically examined by the ED prior to being transferred to the unit and was found to be medically stable and appropriate for admission.      Consults:  Medicine (for clearance for ECT)  Addiction med (for methadone dosage and withdrawal symptoms)     Labs were notable for the following:  - CBC with chronic thrombocytopenia (platelet count 78)   - CMP with mildly elevated AST (47) and ALT (84)   - TSH normal  - UDS positive for amphetamines, cannabinoids, fentanyl  - Vit D in process  - Lipids not ordered this admission (but within normal limits on 2/26/2025)  - Vit B12 normal  - Folate normal  - EKG sinus bradycardia, QTc 396     Discharge Medications:     Current Discharge Medication List        CONTINUE these medications which have NOT CHANGED    Details   desvenlafaxine succinate (PRISTIQ) 25 MG 24 hr tablet Take 1 tablet (25 mg) by mouth daily.    Associated Diagnoses: ALISSON (generalized anxiety disorder); MDD (major depressive disorder), recurrent severe, without psychosis (H)      folic acid (FOLVITE) 1 MG tablet Take 1 tablet (1 mg) by mouth daily.     Associated Diagnoses: Homelessness      hydrocortisone (CORTAID) 1 % external cream Apply topically 2 times daily.    Associated Diagnoses: Rash      methadone (DOLOPHINE-INTENSOL) 10 MG/ML (HIGH CONC) solution Take 105 mg by mouth daily.      mirtazapine (REMERON) 30 MG tablet Take 1 tablet (30 mg) by mouth at bedtime  Qty: 30 tablet, Refills: 2    Associated Diagnoses: Substance-induced anxiety disorder (H)      multivitamin w/minerals (THERA-VIT-M) tablet Take 1 tablet by mouth daily.    Associated Diagnoses: Homelessness      nicotine (COMMIT) 2 MG lozenge Place 2 lozenges (4 mg) inside cheek every hour as needed for nicotine withdrawal symptoms  Qty: 100 lozenge, Refills: 4    Associated Diagnoses: Cigarette nicotine dependence with withdrawal      PHENobarbital 64.8 MG tablet Take 1 tablet (64.8 mg) by mouth 3 times daily.    Associated Diagnoses: Benzodiazepine withdrawal without complication (H)      thiamine (B-1) 100 MG tablet Take 1 tablet (100 mg) by mouth daily.    Associated Diagnoses: Homelessness              Discharge Plan:   Medications as above  Psychiatric Appointments: ***   Psychotherapy Appointments: ***  Referrals: ***  Medical follow up: ***     Attestations:     {ATTESTATIONS:936181}    Dionne was medically cleared by the ED prior to admission to the unit.     Medical Diagnoses addressed:  #Palpitations  3/21 patient reporting short episodes (few seconds) of palpitations occurring intermittently over the last month. Does have shortness of breath with this, no chest pain or other symptoms. He is not sure if it is associated with anxiety.   - repeat EKG ordered showed sinus bradycardia with no significant change to prior EKG      #Chronic hepatitis C, never treated  #Mild transaminase elevations, hepatic steatosis:  Never treated for hepatitis C.  Chronic, mild transaminase elevations with normal hepatic function.  Most recent abdominal ultrasound 2/15/2024 showed hepatic steatosis without hepatic lesions, normal gallbladder.  Denies current alcohol use.  - monitor outpatient     #Chronic thrombocytopenia  #Normocytic anemia, now resolved  Chronic thrombocytopenia, moderate and stable.  Denies any history of bleeding including no history of gastric ulcers, hematemesis, melena or hematochezia.  Anemia appears to be more recent since 2/23/2025 when hemoglobin was 11.6, MCV 86.  Hgb 14.1, Plts 97, MCV 86, ferritin, iron, TIBC wnl on 3/15.      #Concern for malnutrition  Seen by dietician team, patient does not meet two of the established criteria necessary for diagnosing malnutrition but is at risk for malnutrition.   - ensure enlive ordered        Medical course: Patient was physically examined by the ED prior to being transferred to the unit and was found to be medically stable and appropriate for admission.      Consults:  Medicine (for clearance for ECT)  Addiction med (for methadone dosage and withdrawal symptoms)     Labs were notable for the following:  - CBC with chronic thrombocytopenia (platelet count 78)   - CMP with mildly elevated AST (47) and ALT (84)   - TSH normal  - UDS positive for amphetamines, cannabinoids, fentanyl  - Vit D in process  - Lipids not ordered this admission (but  within normal limits on 2/26/2025)  - Vit B12 normal  - Folate normal  - EKG sinus bradycardia, QTc 396     Discharge Medications:     Current Discharge Medication List        START taking these medications    Details   buPROPion (WELLBUTRIN XL) 300 MG 24 hr tablet Take 1 tablet (300 mg) by mouth daily.  Qty: 30 tablet, Refills: 0    Associated Diagnoses: MDD (major depressive disorder), recurrent severe, without psychosis (H)      busPIRone (BUSPAR) 15 MG tablet Take 1 tablet (15 mg) by mouth 2 times daily.  Qty: 60 tablet, Refills: 0    Associated Diagnoses: MDD (major depressive disorder), recurrent severe, without psychosis (H)      gabapentin (NEURONTIN) 600 MG tablet Take 2 tablets (1,200 mg) by mouth 3 times daily.  Qty: 180 tablet, Refills: 0    Associated Diagnoses: Substance-induced anxiety disorder (H)           CONTINUE these medications which have CHANGED    Details   methadone (DOLOPHINE-INTENSOL) 10 MG/ML (HIGH CONC) solution Take 13.5 mLs (135 mg) by mouth daily.    Associated Diagnoses: Opioid withdrawal (H)           CONTINUE these medications which have NOT CHANGED    Details   folic acid (FOLVITE) 1 MG tablet Take 1 tablet (1 mg) by mouth daily.    Associated Diagnoses: Homelessness      hydrocortisone (CORTAID) 1 % external cream Apply topically 2 times daily.    Associated Diagnoses: Rash      multivitamin w/minerals (THERA-VIT-M) tablet Take 1 tablet by mouth daily.    Associated Diagnoses: Homelessness      nicotine (COMMIT) 2 MG lozenge Place 2 lozenges (4 mg) inside cheek every hour as needed for nicotine withdrawal symptoms  Qty: 100 lozenge, Refills: 4    Associated Diagnoses: Cigarette nicotine dependence with withdrawal      thiamine (B-1) 100 MG tablet Take 1 tablet (100 mg) by mouth daily.    Associated Diagnoses: Homelessness           STOP taking these medications       desvenlafaxine succinate (PRISTIQ) 25 MG 24 hr tablet Comments:   Reason for Stopping:         mirtazapine  (REMERON) 30 MG tablet Comments:   Reason for Stopping:         PHENobarbital 64.8 MG tablet Comments:   Reason for Stopping:         PHENobarbital 97.2 MG tablet Comments:   Reason for Stopping:                Discharge Plan:   Medications as above    Psychiatric Appointments:   [facility]  [address] [place] [address]  P: [phone] F: [phone]  4/24/25 1pm, in person appointment     Psychotherapy Appointments:   [facility]  [address] [place] [id number]  P: [phone]     [facility]  [address] [place], [place]  P: [phone]    Referrals: ---    Medical follow up: ---     Attestations:     This patient was seen and discussed with my attending physician.  Rupinder Maurice MD  [facility] Psychiatry Resident  04/16/2025

## 2025-03-19 NOTE — PLAN OF CARE
03/19/25 1438   Individualization/Patient Specific Goals   Patient Personal Strengths insight into illness/situation;motivated for recovery   Patient Vulnerabilities substance abuse/addiction;housing insecurity   Interprofessional Rounds   Participants CTC;nursing;psychiatrist   Behavioral Team Discussion   Progress New admit   Anticipated length of stay 5-7 days   Continued Stay Criteria/Rationale New admit- medication management, safe disposition   Medical/Physical See H&P   Precautions See below   Plan Psychiatric assessment/Medication management. Therapeutic Milieu. Individual care planning and after care planning. Patient to participate in unit groups and activities. Individual and group support on unit.   Rationale for change in precautions or plan N/A   Safety Plan Per unit protocol         PRECAUTIONS AND SAFETY    Behavioral Orders   Procedures    Code 1 - Restrict to Unit    Routine Programming     As clinically indicated    Seizure precautions    Status 15     Every 15 minutes.    Withdrawal precautions       Safety  Safety WDL: WDL

## 2025-03-19 NOTE — PLAN OF CARE
Problem: Sleep Disturbance  Goal: Adequate Sleep/Rest  Intervention: Promote Sleep/Rest  Flowsheets (Taken 3/19/2025 0024)  Sleep/Rest Enhancement:   awakenings minimized   regular sleep/rest pattern promoted   noise level reduced       Patient appeared to sleep 6.75 hours this shift. He was on MSSA and was not awakened for it. No PRN medications given. No behavioral concerns noted. EKG and Vitamin D level is scheduled today in the morning.

## 2025-03-19 NOTE — PROVIDER NOTIFICATION
03/18/25 2200 03/19/25 0713   Modified Selective Severity Assessment (MSSA)   Eating Disturbances 0 0   Tremor 3 3   Sleep Disturbance 0 0   Clouding of Sensorium 0 0   Hallucinations 0 0   Quality of Contact 0 0   Agitation 0 0   Paroxysmal Sweats 1 0   Temperature 1 1   Pulse 1 0   Total MSSA Score 6 4     Pt was awake at this time. Has slight tremors to bilateral hands. MSSA score at 0713 was 4. No PRN medications required.

## 2025-03-19 NOTE — DISCHARGE SUMMARY
"[facility]  Hospitalist Discharge Summary      Date of Admission:  3/14/2025  Date of Discharge:  3/18/2025  7:57 PM  Discharging Provider: Luis Alfredo Zuleta MD  Discharge Service: Hospitalist Service, GOLD TEAM 22    Discharge Diagnoses     #Suicidal ideation with plan  #History of prior suicide attempts by prescription and illicit drug overdose  #Anxiety, depression, PTSD  #Borderline personality disorder  #Polysubstance abuse   #Polysubstance abuse including fentanyl, benzodiazepines, methamphetamine, THC  #Acute benzodiazepine withdrawal  #History of benzodiazepine withdrawal seizures:  #Chronic hepatitis C, never treated  #Mild transaminase elevations, hepatic steatosis:  #Chronic thrombocytopenia  #Normocytic anemia  #Housing insecurity  #Nicotine dependence    Clinically Significant Risk Factors     # Overweight: Estimated body mass index is 27.38 kg/m  as calculated from the following:    Height as of this encounter: 1.803 m (5' 10.98\").    Weight as of this encounter: 89 kg (196 lb 3.4 oz).       Follow-ups Needed After Discharge   Follow-up Appointments       Follow Up and recommended labs and tests      Follow up with primary care provider in 1 week.  The following labs/tests are recommended: CBC, CMP.            {Additional follow-up instructions/to-do's for PCP    :    Unresulted Labs Ordered in the Past 30 Days of this Admission       No orders found from 2/12/2025 to 3/15/2025.        These results will be followed up by     Discharge Disposition   Discharged to home  Condition at discharge: Stable    Hospital Course   Zenon Truong is a 54 year old male with history of depression and anxiety, polysubstance abuse including benzodiazepines, fentanyl, occasional methamphetamine and THC who was recently hospitalized at [facility] on voluntary psychiatric admission from 2/23-3/6/2025.  Has been homeless for several years, was discharged to a sober " home and patient states that he stopped taking all prescribed medications except his methadone and immediately relapsed on fentanyl, benzodiazepines as well as some methamphetamine and THC abuse.  Had continued to feel suicidal, but continued to maintain contact with his .  States he purchased some fentanyl with intention of overdosing, but fortunately he was in contact with his  who intervened and brought him to the ED.  Admitted to hospitalist service due to concerns for active benzodiazepine withdrawal. Initially was on CIWA protocol and requiring valium for withdrawal symptoms. Despite this, patient continued to have tachycardia, tachypnea and patient continued to be restless. Switched to phenobarb on 3/16 with plans to begin taper on 3/19. After phenobarb was started, withdrawal sx improved.       Today's updates:   -- withdrawal sx have improved after phenobarb was started for benzo withdrawal. Still holding CIWA at this time  -- Pt remains med optimized and appropriate for transfer to inpatient psych as of this time.  - Transferred to Inpatient psych in the evening on 3/18/25.    #Suicidal ideation with plan  #History of prior suicide attempts by prescription and illicit drug overdose  #Anxiety, depression, PTSD  #Borderline personality disorder  #Polysubstance abuse:  Recently hospitalized at [facility] on voluntary psychiatric admission from 2/23-3/6/2025.  Has been homeless for several years, was discharged to a sober home and patient states that he stopped taking all prescribed medications except his methadone and immediately relapsed on fentanyl, benzodiazepines as well as some methamphetamine and THC abuse.  Had continued to feel suicidal, but continued to maintain contact with his .  States he purchased some fentanyl with intention of overdosing, but fortunately he was in contact with his  who intervened and brought him to the ED.  At recent  hospital discharge was prescribed Pristiq 50 mg, gabapentin 600 mg TID, Remeron 30 mg and bupropion  mg but as above stopped taking all of these on the day of discharge.  The only medication he has been taking is his methadone 105 mg daily through the [facility] methadone clinic.  Last dose 3/14/2025. Had DEC assessment in ED, recommended inpatient psychiatry admission, but not until medically stabilized with respect to active benzodiazepine withdrawal.  Received phenobarbital as well as Ativan 1 mg in ED.  Continues to have suicidal thoughts and intent, feelings of hopelessness with symptoms of active benzodiazepine withdrawal as discussed below.  -Psychiatry consult, appreciate recs  -Restoril 15 mg at bedtime, Remeron 30 mg at bedtime, Pristiq at dose of 25 mg daily-- continue at dispo  -Holding bupropion given risk of lowering seizure threshold.-- continue to hold at discharge  -Plan for transfer to inpatient psych once medically improved.      #Polysubstance abuse including fentanyl, benzodiazepines, methamphetamine, THC  #Acute benzodiazepine withdrawal  #History of benzodiazepine withdrawal seizures:  Recently hospitalized at [facility] for voluntary psychiatric admission.  Discharged 3/6/2025 and immediately relapsed on fentanyl and benzodiazepines.  Has also been accessing his methadone 105 mg daily through the [facility] clinic.  States he is also used methamphetamine and some THC in the past week but his drugs of choice are fentanyl and benzodiazepines.  Denies any alcohol use.  Past history of benzodiazepine withdrawal seizures.  States he has been taking about 6 mg of Xanax daily, last taking on 3/11/2025.  Last used fentanyl on 3/13/2025.  Had purchased more fentanyl with the intent of overdosing in a suicide attempt but his  in/tervened.  Received phenobarbital as well as Ativan 1 mg in ED.  Continues to feel tremulous and having chills, feeling hopeless.  -continue PTA methadone 105 mg  daily  -Thiamine, MVI, folic acid given homelessness, concerns about nutritional intake and cytopenias  -Seizure precautions  - continue phenobarb at current dose for 3 days (until 3/19), then will begin weaning daily per protocol     #Chronic hepatitis C, never treated  #Mild transaminase elevations, hepatic steatosis:  Never treated for hepatitis C.  Chronic, mild transaminase elevations with normal hepatic function.  Most recent abdominal ultrasound 2/15/2024 showed hepatic steatosis without hepatic lesions, normal gallbladder.  Denies alcohol use.  -Repeat CMP 3/15     #Chronic thrombocytopenia  #Normocytic anemia:  Chronic thrombocytopenia, moderate and stable.  Denies any history of bleeding including no history of gastric ulcers, hematemesis, melena or hematochezia.  Anemia appears to be more recent since 2025 when hemoglobin was 11.6, MCV 86.  Admission hemoglobin 10.1, platelets 78, MCV 88.  -Repeat CBC with platelets, check B12, folic acid, ferritin, iron studies, TSH 3/15     #Housing insecurity:  Patient reports being homeless for several years.  Does not have any support network, his parents are  and he is estranged from his 3 sisters.  Had been discharged to sober living following his psychiatric admission on 3/6/2025.  -Social work, care coordinator consult     #Nicotine dependence:  Vapes.  -Nicotine lozenges as needed    Consultations This Hospital Stay   DIAGNOSTIC EVALUATION CENTER (DEC) ASSESSMENT ORDER  PSYCHIATRY IP CONSULT  CARE MANAGEMENT / SOCIAL WORK IP CONSULT  MEDICATION HISTORY IP PHARMACY CONSULT  PSYCHIATRY IP CONSULT  ECT IP CONSULT    Code Status   Full Code    Time Spent on this Encounter   I, Luis Alfredo Zuleta MD, personally saw the patient today and spent greater than 30 minutes discharging this patient.       Luis Alfredo Zuleta MD  [facility] [address]  [address] [place] [id number]  Phone: [phone]  Fax:  [phone]  ______________________________________________________________________    Physical Exam   Vital Signs: Temp: 97.5  F (36.4  C) Temp src: Axillary BP: 111/65     Resp: 18 SpO2: 100 % O2 Device: None (Room air)    Weight: 196 lbs 3.35 oz    See progress note same day , 3/18/25       Primary Care Physician   Physician No Ref-Primary    Discharge Orders      Follow Up and recommended labs and tests    Follow up with primary care provider in 1 week.  The following labs/tests are recommended: CBC, CMP.     Reason for your hospital stay    You were admitted to the hospital for benzodiazepine withdrawal and suicidal ideation. You are transferring to inpatient psychiatry per our Psychiatry team's recommendations.     Activity - Up ad radha     Diet    Follow this diet upon discharge: Current Diet:Orders Placed This Encounter      Combination Diet Regular Diet; Safe Tray - with utensils       Significant Results and Procedures   No results found for this or any previous visit.    Discharge Medications   Discharge Medication List as of 3/18/2025  7:57 PM        START taking these medications    Details   hydrocortisone (CORTAID) 1 % external cream Apply topically 2 times daily.Transitional      multivitamin w/minerals (THERA-VIT-M) tablet Take 1 tablet by mouth daily., Transitional      !! PHENobarbital 64.8 MG tablet Take 1 tablet (64.8 mg) by mouth 3 times daily., Transitional      !! PHENobarbital 97.2 MG tablet Take 1 tablet (97.2 mg) by mouth once for 1 dose., Transitional      thiamine (B-1) 100 MG tablet Take 1 tablet (100 mg) by mouth daily., Transitional       !! - Potential duplicate medications found. Please discuss with provider.        CONTINUE these medications which have CHANGED    Details   desvenlafaxine succinate (PRISTIQ) 25 MG 24 hr tablet Take 1 tablet (25 mg) by mouth daily., Transitional      folic acid (FOLVITE) 1 MG tablet Take 1 tablet (1 mg) by mouth daily., Transitional            CONTINUE these medications which have NOT CHANGED    Details   methadone (DOLOPHINE-INTENSOL) 10 MG/ML (HIGH CONC) solution Take 105 mg by mouth daily., Historical      mirtazapine (REMERON) 30 MG tablet Take 1 tablet (30 mg) by mouth at bedtime, Disp-30 tablet, R-2, E-Prescribe      nicotine (COMMIT) 2 MG lozenge Place 2 lozenges (4 mg) inside cheek every hour as needed for nicotine withdrawal symptoms, Disp-100 lozenge, R-4, E-Prescribe           STOP taking these medications       buPROPion (WELLBUTRIN XL) 300 MG 24 hr tablet Comments:   Reason for Stopping:         gabapentin (NEURONTIN) 600 MG tablet Comments:   Reason for Stopping:         hydrOXYzine (VISTARIL) 50 MG capsule Comments:   Reason for Stopping:         methocarbamol (ROBAXIN) 500 MG tablet Comments:   Reason for Stopping:             Allergies   Allergies   Allergen Reactions    Iodinated Contrast Media Hives and Rash     Reports he is allergic to Iodine contrast     Reports he is allergic to Iodine contrast       Reports he is allergic to Iodine contrast    Reports he is allergic to Iodine contrast      Reports he is allergic to Iodine contrast   Reports he is allergic to Iodine contrast    Iodine Hives    Lisinopril Hives and Rash    Lithium Hives    Topiramate Hives    Escitalopram Rash     Rash around mouth    Iodine-131 Hives    Trazodone Anxiety     Trazodone causes RLS for pt    Olanzapine Rash     Pt reported taking approximately one year ago. Developed rash around mouth.

## 2025-03-19 NOTE — PLAN OF CARE
Initial meeting note:    Therapist introduced self to patient and discussed psychotherapy service available to patient.     Pt response: Pt expressed interest in meeting 1:1.    Plan: Made plan to meet with Pt later this week.

## 2025-03-19 NOTE — PLAN OF CARE
A               Admission:  I am responsible for any personal items that are not sent to the safe or pharmacy.  [facility] is not responsible for loss, theft or damage of any property in my possession.    Signature:  _________________________________ Date: _______  Time: _____                                              Staff Signature:  ____________________________ Date: ________  Time: _____      2nd Staff person, if patient is unable/unwilling to sign:    Signature: ________________________________ Date: ________  Time: _____     Discharge:  [facility] has returned all of my personal belongings:    Signature: _________________________________ Date: ________  Time: _____                                          Staff Signature:  ____________________________ Date: ________  Time: _____        BELONGINGS:    In Backpack: 3 Sweaters, 6 Underwear,2 Pairs of Socks, 4 Shirts,1 Pants, 1 Pair of Shoes    In Locker: 1 Hat, 1 Hoodie, 2 Shirts, 1 Pants, 1 Shoes, 1 Earpods, 1 Lighter, 1 Chapstick, 1 Key, 1 Socks, 1 Underwear, 1 Jacket, 1 Sweater, 2 Vapes, 1 Phone    To Security: Cash $20.50, Credit Cards (EBT 1337, [employer] 2641)

## 2025-03-19 NOTE — PHARMACY-ADMISSION MEDICATION HISTORY
Please see Admission Medication History note completed on 3/14 under previous encounter for information regarding prior to admission medications.    Diana Phoenix, PharmD  *[phone]

## 2025-03-19 NOTE — PROGRESS NOTES
"  ----------------------------------------------------------------------------------------------------------  [facility]  Psychiatry Progress Note  Hospital Day #1     Interim History:     The patient's care was discussed with the treatment team and chart notes were reviewed.    Vitals: VSS  Sleep: 6.75 hours (03/19/25 0630)  Scheduled medications: Took all scheduled medications as prescribed  Psychiatric PRN medications: No psychiatric prns given    Staff Report:   No acute events or safety concerns overnight. Noted to have a slight tremor in both hands with MSSA score of 4. Please see staff notes for details.      Subjective:     Patient Interview:  Zenon is feeling \"ok\" today. Reports getting interrupted sleep last night but generally ok. He has been having suicidal thoughts that he can't get rid of, which has come to the point where he has a \"strong urge to act but something is keeping him from going through with it\". He states that he wants to die with the means and desire to do so but is not able to act on it. He has purchased guns and fentanyl, with his most recent plan to mix fentanyl with water and overdose. He has overdosed on clonazepam in the past and tried cutting his wrists. His SI has been going on for a long time and he has not been able to get rid of it. He says he has come to a point where he has a strong urge to act on it but cannot go through with it. He has a stronger desire to live than to die but does feel hopeless. He says he has \"messed up his life with drugs and other things\". He has been living on the street and in and out of places for the last 10 years, including some transitional facilities and IRTS programs.     He says he \"can't find any hope\" and \"wants to find something to live for but just can't seem to find anything\". He denies relationships, he is estranged from his family and feels isolated/alone. He doesn't know what to do anymore, " anything he tries to do is countered by negative thoughts such as  you're going to fail.  He says its been  A long time since I've had anything to look forward to or gives me hope.  He feels like he's digging the hole deeper and deeper, unable to  see the light.  He does report being Catholic with a fear of going to hell and states this might be the only reason he hasn't been able to go through with his desire to end his life. He does not attend [employer] services but would like to use spiritual services while here.     He reports that he has been using a lot of fentanyl and benzos. He previously had a prescription for benzos but had been using them for non prescribed reasons. He was also using clonazepam which he had a prescription for but tried to overdose on it. This was in 2025 and he was hospitalized at [facility] following this suicide attempt. During that hospitalization, they proposed ECT. This made him quite scared as he had done ECT in the past ~2018 and had profound memory loss as a result. He was unable to remember why he was at the hospital and did not recovery these memories. His memory loss from this event still persists.    His fiance  by suicide in 2019 and he has significant trauma from this event. He shared details about finding her body and having recurrent nightmares about this event.  He has difficulty remember her face prior to finding her body and expressed significant emotional distress about this.     He reports feeling lost and nervous all the time. He has panic attacks daily that do not go away and can last for hours at a time. He reports that it feels like someone is sitting on his chest and he can't breath. He feels constantly on edge and nervous all the time which makes it difficult to function doing his daily activities like showering and sleeping. Clonazepam has helped in the past for anxiety and has been on high dose gabapentin that he found helpful. He feels like a  burden and waste of everyone's time. He doesn't like being in the hospital as he feels like he doesn't belong here and he is taking a bed away from someone who deserves it more.     Appetite has been horrible and his sleep has not been good. He wakes up every half hour to an hour. Sleep medication not helpful, he has tried benzos for sleep that helped in the past. He cannot take trazodone as he has restless legs as a side effect.     He has been continuing to have withdrawal side effects including nausea, cold sweats, worsened insomnia and overall hopelessness. Clonidine has been helpful in the past, he is interested in this option. He does report some lower pulse as a side effect, reportedly with HR in the 40's. He does have a history of withdrawal seizures, and reports having  stars bursting  in bilateral temporal peripheral vision and an odd taste in his mouth. Same prodrome as previous seizures. His last seizure was 6 months ago.      ROS:  Patient has nausea, chills (from fentanyl withdrawal), and insomnia (falling asleep)  Patient denies pain       Objective:     Vitals:  BP (!) 139/99   Pulse 69   Temp 97.7  F (36.5  C) (Oral)   Resp 18   SpO2 100%     Allergies:  Allergies   Allergen Reactions    Iodinated Contrast Media Hives and Rash     Reports he is allergic to Iodine contrast     Reports he is allergic to Iodine contrast       Reports he is allergic to Iodine contrast    Reports he is allergic to Iodine contrast      Reports he is allergic to Iodine contrast   Reports he is allergic to Iodine contrast    Iodine Hives    Lisinopril Hives and Rash    Lithium Hives    Topiramate Hives    Escitalopram Rash     Rash around mouth    Iodine-131 Hives    Trazodone Anxiety     Trazodone causes RLS for pt    Olanzapine Rash     Pt reported taking approximately one year ago. Developed rash around mouth.       Current Medications:  Scheduled:  Current Facility-Administered Medications   Medication Dose Route  Frequency Provider Last Rate Last Admin    acetaminophen (TYLENOL) tablet 650 mg  650 mg Oral Q4H PRN Patti Bernard MD        alum & mag hydroxide-simethicone (MAALOX) suspension 30 mL  30 mL Oral Q4H PRN Patti Bernard MD        cloNIDine (CATAPRES) tablet 0.1 mg  0.1 mg Oral TID Tracy Castillo MD   0.1 mg at 03/19/25 1314    desvenlafaxine succinate (PRISTIQ) 24 hr tablet 25 mg  25 mg Oral Daily Patti Bernard MD   25 mg at 03/19/25 0815    folic acid (FOLVITE) tablet 1 mg  1 mg Oral Daily Patti Bernard MD   1 mg at 03/19/25 0811    hydrocortisone (CORTAID) 1 % cream   Topical BID Patti Bernard MD   Given at 03/19/25 0816    hydrOXYzine HCl (ATARAX) tablet 25 mg  25 mg Oral Q4H PRN Patti Bernard MD        melatonin tablet 3 mg  3 mg Oral At Bedtime PRN Patti Bernard MD        methadone (DOLOPHINE-INTENSOL) 10 MG/ML (HIGH CONC) solution 105 mg  105 mg Oral Daily Patti Bernard MD   105 mg at 03/19/25 0815    mirtazapine (REMERON) tablet 30 mg  30 mg Oral At Bedtime Patti Bernard MD   30 mg at 03/18/25 2108    multivitamin w/minerals (THERA-VIT-M) tablet 1 tablet  1 tablet Oral Daily Patti Bernard MD   1 tablet at 03/19/25 0811    nicotine (NICORETTE) lozenge 4 mg  4 mg Buccal Q1H PRN Patti Bernard MD   4 mg at 03/19/25 1257    PHENobarbital tablet 64.8 mg  64.8 mg Oral TID Tracy Castillo MD   64.8 mg at 03/19/25 1314    polyethylene glycol (MIRALAX) Packet 17 g  17 g Oral Daily PRN Patti Bernard MD        thiamine (B-1) tablet 100 mg  100 mg Oral Daily Patti Bernard MD   100 mg at 03/19/25 0811       PRN:  Current Facility-Administered Medications   Medication Dose Route Frequency Provider Last Rate Last Admin    acetaminophen (TYLENOL) tablet 650 mg  650 mg Oral Q4H PRN Patti Bernard MD        alum & mag hydroxide-simethicone (MAALOX) suspension 30 mL  30 mL Oral Q4H PRN Marco Antonio,  MD Patti        cloNIDine (CATAPRES) tablet 0.1 mg  0.1 mg Oral TID Tracy Castillo MD   0.1 mg at 03/19/25 1314    desvenlafaxine succinate (PRISTIQ) 24 hr tablet 25 mg  25 mg Oral Daily Patti Bernard MD   25 mg at 03/19/25 0815    folic acid (FOLVITE) tablet 1 mg  1 mg Oral Daily Patti Bernard MD   1 mg at 03/19/25 0811    hydrocortisone (CORTAID) 1 % cream   Topical BID Patti Bernard MD   Given at 03/19/25 0816    hydrOXYzine HCl (ATARAX) tablet 25 mg  25 mg Oral Q4H PRN Patti Bernard MD        melatonin tablet 3 mg  3 mg Oral At Bedtime PRN Patti Bernard MD        methadone (DOLOPHINE-INTENSOL) 10 MG/ML (HIGH CONC) solution 105 mg  105 mg Oral Daily Patti Bernard MD   105 mg at 03/19/25 0815    mirtazapine (REMERON) tablet 30 mg  30 mg Oral At Bedtime Patti Bernard MD   30 mg at 03/18/25 2108    multivitamin w/minerals (THERA-VIT-M) tablet 1 tablet  1 tablet Oral Daily Patti Bernard MD   1 tablet at 03/19/25 0811    nicotine (NICORETTE) lozenge 4 mg  4 mg Buccal Q1H PRN Patti Bernard MD   4 mg at 03/19/25 1257    PHENobarbital tablet 64.8 mg  64.8 mg Oral TID Tracy Castillo MD   64.8 mg at 03/19/25 1314    polyethylene glycol (MIRALAX) Packet 17 g  17 g Oral Daily PRN Patti Bernard MD        thiamine (B-1) tablet 100 mg  100 mg Oral Daily Patti Bernard MD   100 mg at 03/19/25 0811       Labs and Imaging:  New results:   Recent Results (from the past 24 hours)   Extra Purple Top Tube    Collection Time: 03/19/25  8:20 AM   Result Value Ref Range    Hold Specimen [facility]    EKG 12-lead, complete    Collection Time: 03/19/25  8:57 AM   Result Value Ref Range    Systolic Blood Pressure  mmHg    Diastolic Blood Pressure  mmHg    Ventricular Rate 59 BPM    Atrial Rate 59 BPM    GA Interval 154 ms    QRS Duration 96 ms     ms    QTc 396 ms    P Axis 68 degrees    R AXIS 65 degrees    T Axis 55 degrees     "Interpretation ECG       Sinus bradycardia  Early repolarization  Otherwise normal ECG  No previous ECGs available         Data this admission:  - CBC with chronic thrombocytopenia (platelet count 78)   - CMP with mildly elevated AST (47) and ALT (84)   - TSH normal  - UDS positive for amphetamines, cannabinoids, fentanyl  - Vit D in process  - Lipids not ordered this admission (but within normal limits on 2/26/2025)  - Vit B12 normal  - Folate normal  - EKG normal sinus rhythm, QTc 396     Mental Status Exam:     Oriented to:  Grossly Oriented, Person/Self, and Situation  General:  Awake and Alert, answers questions openly. Sitting on edge of bed, intermittently tearful.   Appearance:  appears older than stated age. Edentulous. Slightly disheveled.   Behavior/Attitude:  Calm, Cooperative, and Open. Intermittently overcome with emotion, tearful and trembling voice.    Eye Contact: Appropriate  Psychomotor: Slowed no catatonia present  Speech:   Appropriate volume/tone, spontaneous, and with good articulation  Language: Fluent in English with appropriate syntax and vocabulary.  Mood:  \"hopeless\"  Affect:   restricted affect but does intermittently becomes tearful when discussing traumatizing events    Thought Process:  linear, coherent. Ruminative as he returns to the idea of hopelessness and having \"nothing to live for\" throughout conversation.   Thought Content:   suicidal ideation with plan (without intent); No apparent delusions  Associations:  intact  Insight:  fair due to his ability to distinguish   Judgment:  fair due to being engaged in treatment and being cooperative here  Impulse control: fair  Attention Span:  adequate for conversation  Concentration:  grossly intact  Recent and Remote Memory:  not formally assessed but   Fund of Knowledge: average, estimated  Muscle Strength and Tone: normal  Gait and Station: Normal     Psychiatric Assessment     Zenon Truong is a 54 year old male with previous " psychiatric diagnoses of polysubstance use disorder [benzodiazepines, opiates, methamphetamine, THC, nicotine], severe depression with suicidal ideation, borderline personality disorder and PTSD admitted 03/18/2025 due to concern for SI with plan and intent in the context of medication non adherence, substance use and housing/financial insecurity. On admission, he presented with symptoms of SI with intent and plan, hopelessness, poor motivation, low energy, anhedonia, impulsivity and irritability.     Had a recent hospitalization at [facility] 2/23-3/6/2025 for suicidal ideation and benzodiazepine overdose. ECT was considered and planned during that admission, but he decided against pursuing it at that time. Discharged on bupropion, Mirtazapine increased to 30 mgs, methadone and gabapentin. His Prazosin was continued as well. Did not continue taking his meds after leaving.    There is genetic predisposition given family hx of substance use disorder. Patient has struggled with homelessness and severe substance use for years. This current admission is likely precipitated by recent psychiatric hospitalization at end of February followed by relapse soon after discharge to a sober house. Perpetuating factors include ongoing substance use, medication non adherence, longstanding functional impairment, personality traits, lack of perceived support, deconditioning and housing and financial insecurity. Patient's support system includes  . Based on patient's history and current symptoms, criteria are met for primary diagnosis of MDD without psychotic features, opioid use disorder, benzodiazepine use disorder, stimulant use disorder and cannabis use disorder. Patient warrants inpatient hospitalization to maintain their safety and would benefit from medication optimization and outpatient referral/resources.       In regards to management, patient will continue Mirtazapine 30mg for sleep which patient reports to be  helpful and Desvenlafaxine 25mg for depression which was started a couple of weeks. Given severity of depression symptoms, ECT consult was obtained and can commence 3/24 if patient is medically approved. Patient did express some reservation due to short term memory loss from previous sessions. Methadone will be continued for maintenance. Patient will continue Phenobarb for 64.8mg TID x 3 days at current dose and will wean per protocol (protocol per [facility]) with last dose on 3/19. He will also be started on clonidine 0.1mg TID for withdrawal symptoms.     Today's Changes:   - Start clonidine 0.1 mg TID  - Finish phenobarbital taper today   - medicine consult for ECT     Psychiatric Plan by Diagnosis      #Major depressive disorder, recurrent, severe without psychosis  - Continue Mirtazapine 30mg po at bedtime  - Continue Desvenlafaxine 25mg po every day   - Plan to start ECT 3/24/25      #Opioid use disorder, severe  - Continue Methadone 105mg every day   - Thiamine, MVI, folic acid      #Benzodiazepine use disorder, severe  - Continue phenobarb taper   - Start clonidine 0.1mg TID  for withdrawal symptoms      #Stimulant (methamphetamine) use disorder  #Cannabis use disorder     Additional Plans:  - Patient will be treated in therapeutic milieu with appropriate individual and group therapies as described     Psychiatric Hospital Course:    Zenon Truong was admitted to [facility] as a voluntary patient.   Medications:  3/14: hold bupropion due to risk of lower seizure threshold  3/14: Continue PTA pristiq 25mg and remeron 30mg for sleep  3/18: start clonidine 0.1mg TID     The risks, benefits, alternatives, and side effects were discussed and understood by the patient.     Medical Assessment and Plan     #Chronic hepatitis C, never treated  #Mild transaminase elevations, hepatic steatosis:  Never treated for hepatitis C.  Chronic, mild transaminase elevations with normal hepatic function.  Most recent abdominal  ultrasound 2/15/2024 showed hepatic steatosis without hepatic lesions, normal gallbladder.  Denies current alcohol use.  - monitor outpatient      #Chronic thrombocytopenia  #Normocytic anemia,  now resolved  Chronic thrombocytopenia, moderate and stable.  Denies any history of bleeding including no history of gastric ulcers, hematemesis, melena or hematochezia.  Anemia appears to be more recent since 2/23/2025 when hemoglobin was 11.6, MCV 86.  Hgb 14.1, Plts 97, MCV 86, ferritin, iron, TIBC wnl on 3/15.      Medical course: Patient was physically examined by the ED prior to being transferred to the unit and was found to be medically stable and appropriate for admission.      Consults:  Medicine (for clearance for ECT)     Checklist     Legal Status: Voluntary     Safety Assessment:   Behavioral Orders   Procedures    Code 1 - Restrict to Unit    Routine Programming     As clinically indicated    Seizure precautions    Status 15     Every 15 minutes.    Withdrawal precautions       Risk Assessment:  Risk for harm is low to moderate.  Risk factors: SI, maladaptive coping, substance use, impulsive, and past behaviors  Protective factors: engaged in treatment     SIO: No    Disposition: Pending stabilization, medication optimization, & development of a safe discharge plan.     Attestations     Mikayla Latham, MS3    I was present with the medical student who participated in the service and in the documentation of the note.  I have verified the history and personally performed the physical exam and medical decision making. I agree with the assessment and plan of care as documented in the note.    This patient was seen and discussed with my attending physician.  Tracy Castillo MD   Psychiatry Resident Physician    This patient has been seen and evaluated by me, Charles Fink.  I have discussed this patient with the psychiatry resident and I agree with the findings and plan in this note.    I have reviewed  today's vital signs, medications, labs and imaging.     Charles Menjivar MD

## 2025-03-20 VITALS
HEART RATE: 69 BPM | TEMPERATURE: 98.5 F | DIASTOLIC BLOOD PRESSURE: 66 MMHG | RESPIRATION RATE: 18 BRPM | SYSTOLIC BLOOD PRESSURE: 104 MMHG | OXYGEN SATURATION: 99 %

## 2025-03-20 LAB — VIT D+METAB SERPL-MCNC: 31 NG/ML (ref 20–50)

## 2025-03-20 PROCEDURE — 97150 GROUP THERAPEUTIC PROCEDURES: CPT | Mod: GO

## 2025-03-20 PROCEDURE — 99232 SBSQ HOSP IP/OBS MODERATE 35: CPT | Mod: GC | Performed by: PSYCHIATRY & NEUROLOGY

## 2025-03-20 PROCEDURE — 250N000013 HC RX MED GY IP 250 OP 250 PS 637

## 2025-03-20 PROCEDURE — 124N000002 HC R&B MH UMMC

## 2025-03-20 RX ORDER — MIRTAZAPINE 45 MG/1
45 TABLET, FILM COATED ORAL AT BEDTIME
Status: DISPENSED | OUTPATIENT
Start: 2025-03-20

## 2025-03-20 RX ORDER — PHENOBARBITAL 64.8 MG/1
64.8 TABLET ORAL
Status: DISCONTINUED | OUTPATIENT
Start: 2025-03-20 | End: 2025-03-21

## 2025-03-20 RX ADMIN — HYDROCORTISONE: 1 CREAM TOPICAL at 07:59

## 2025-03-20 RX ADMIN — CLONIDINE HYDROCHLORIDE 0.1 MG: 0.1 TABLET ORAL at 07:58

## 2025-03-20 RX ADMIN — THIAMINE HCL TAB 100 MG 100 MG: 100 TAB at 07:57

## 2025-03-20 RX ADMIN — NICOTINE POLACRILEX 4 MG: 4 LOZENGE ORAL at 16:42

## 2025-03-20 RX ADMIN — Medication 1 TABLET: at 07:57

## 2025-03-20 RX ADMIN — FOLIC ACID 1 MG: 1 TABLET ORAL at 07:58

## 2025-03-20 RX ADMIN — NICOTINE POLACRILEX 4 MG: 4 LOZENGE ORAL at 08:23

## 2025-03-20 RX ADMIN — PHENOBARBITAL 32.4 MG: 32.4 TABLET ORAL at 07:58

## 2025-03-20 RX ADMIN — NICOTINE POLACRILEX 4 MG: 4 LOZENGE ORAL at 18:06

## 2025-03-20 RX ADMIN — MIRTAZAPINE 45 MG: 45 TABLET, FILM COATED ORAL at 22:34

## 2025-03-20 RX ADMIN — DESVENLAFAXINE 25 MG: 25 TABLET, EXTENDED RELEASE ORAL at 07:57

## 2025-03-20 RX ADMIN — NICOTINE POLACRILEX 4 MG: 4 LOZENGE ORAL at 13:07

## 2025-03-20 RX ADMIN — NICOTINE POLACRILEX 4 MG: 4 LOZENGE ORAL at 19:11

## 2025-03-20 RX ADMIN — METHADONE HYDROCHLORIDE 105 MG: 10 CONCENTRATE ORAL at 07:59

## 2025-03-20 RX ADMIN — NICOTINE POLACRILEX 4 MG: 4 LOZENGE ORAL at 11:21

## 2025-03-20 RX ADMIN — NICOTINE POLACRILEX 4 MG: 4 LOZENGE ORAL at 07:11

## 2025-03-20 RX ADMIN — CLONIDINE HYDROCHLORIDE 0.1 MG: 0.1 TABLET ORAL at 13:07

## 2025-03-20 RX ADMIN — HYDROCORTISONE: 1 CREAM TOPICAL at 19:13

## 2025-03-20 RX ADMIN — CLONIDINE HYDROCHLORIDE 0.1 MG: 0.1 TABLET ORAL at 19:09

## 2025-03-20 RX ADMIN — PHENOBARBITAL 64.8 MG: 64.8 TABLET ORAL at 19:09

## 2025-03-20 ASSESSMENT — ACTIVITIES OF DAILY LIVING (ADL)
ADLS_ACUITY_SCORE: 26
ADLS_ACUITY_SCORE: 26
ORAL_HYGIENE: INDEPENDENT
DRESS: INDEPENDENT
ADLS_ACUITY_SCORE: 26
HYGIENE/GROOMING: INDEPENDENT
ADLS_ACUITY_SCORE: 26
ADLS_ACUITY_SCORE: 26
LAUNDRY: WITH SUPERVISION
ADLS_ACUITY_SCORE: 26
DRESS: INDEPENDENT
ADLS_ACUITY_SCORE: 26
ORAL_HYGIENE: INDEPENDENT
ADLS_ACUITY_SCORE: 26
LAUNDRY: WITH SUPERVISION
ADLS_ACUITY_SCORE: 26
HYGIENE/GROOMING: INDEPENDENT

## 2025-03-20 NOTE — PLAN OF CARE
Goal Outcome Evaluation:    Plan of Care Reviewed With: patient      Problem: Suicide Risk  Goal: Absence of Self-Harm  Outcome: Progressing  Note: Zenon presented with a flat, depressed affect. He was calm and pleasant on approach. Endorsed high anxiety and moderate depression - rated as a 5 and 8 respectively. Reported SI with no plan or intent. Contracted for safety. MSSA was a 4 and 1. He was socially withdrawn. Napped and attended select groups during the shift.      Temp: 98.6  F (37  C) Temp src: Oral BP: 116/75 Pulse: 77   Resp: 16 SpO2: 100 % O2 Device: None (Room air)

## 2025-03-20 NOTE — PLAN OF CARE
Team Note Due:  Wednesday    Assessment/Intervention/Current Symtoms and Care Coordination:  Chart review and met with team, discussed pt progress, symptomology, and response to treatment.  Discussed the discharge plan and any potential impediments to discharge.       Discharge Plan or Goal:  TBD     Barriers to Discharge:  Medication Management  ECT     Referral Status:  None currently      Legal Status:  Voluntary       Contacts (include TIERNEY status):  Methadone Management: [facility] daily     /ACT Team: TIERNEY signed  Name/Clinic: [facility]    Number: [phone]      Upcoming Meetings and Dates/Important Information and next steps:  None currently

## 2025-03-20 NOTE — PROGRESS NOTES
Rehab Group    Start time: 1020  End time: 1205  Patient time total: 30 minutes    attended partial group    #6 attended   Group Type: occupational therapy   Group Topic Covered: coping skills     Group Session Detail:  OT clinic     Patient Response/Contribution:  cooperative with task, attentive, and actively engaged     Patient Detail:    Pt actively participated in occupational therapy clinic to facilitate coping skill exploration, creative expression within personally meaningful activities, and clinical observation of social, cognitive, and kinesthetic performance skills. Pt response: Independent to initiate, gather materials, sequence, and adjust to workspace demands as needed. Demonstrated good attention to task and attention to detail for selected detailed, goal-directed task. Able to ask for assistance as needed, and appeared comfortable interacting with peers and staff, though spent a majority of this group quietly focused on his task. Calm and polite in brief interactions. Affect appeared flat.      [id number] OT Group (2 or more in attendance)      Patient Active Problem List   Diagnosis    Opioid use disorder, severe, on maintenance therapy (H)    Suicidal ideation    Severe benzodiazepine use disorder (H)    Substance-induced anxiety disorder (H)    Benzodiazepine withdrawal without complication (H)    Cannabis use disorder, moderate, dependence (H)    Cigarette nicotine dependence with withdrawal    Moderate episode of recurrent major depressive disorder (H)    Homelessness    Opioid withdrawal (H)    MDD (major depressive disorder), recurrent severe, without psychosis (H)    ALISSON (generalized anxiety disorder)

## 2025-03-20 NOTE — PROGRESS NOTES
----------------------------------------------------------------------------------------------------------  [facility]  Psychiatry Progress Note  Hospital Day #2     Interim History:     The patient's care was discussed with the treatment team and chart notes were reviewed.    Vitals: VSS  Sleep: 6.75 hours (03/20/25 0618)  Scheduled medications: Took all scheduled medications as prescribed  Psychiatric PRN medications: No psychiatric prns given    Staff Report:   No acute events or safety concerns overnight. Seen by dietician- recommend ensure and ginger ale with meals. Rated anxiety 4/10 with no PRNs taken. MSSA score 2 and 3. No withdrawal symptoms reported. Please see staff notes for details.      Subjective:     Patient Interview:  Zenon reports that his mind is racing- having random intrusive suicidal thoughts which make it difficult to concentrate. He reports having thoughts of hanging himself, shooting himself, cutting his wrists. Denies any attachment to these thoughts or plans.   These thoughts are consuming his mind, and he is attempting to self correct his thought process with great difficulty.  Seems like I ve gotten to this age, as I ve gotten older, I don t know if I have anything left, [ ] if I have anything left to do with my life, its over.  Endorses feeling discouraged. He is worried about getting out and going right back to this hopeless feeling, feeling like there s nothing left for him. Says he has no one or nothing. His only belongings are in the hospital. During the interview, he reports it was difficult to identify words to speak due to racing thoughts.  Driving me crazy.  Doodling has been helping but he is having difficulty reading or watching TV due to his anxiety and racing thoughts.     Zenon has been having a difficult time sleeping- specifically staying asleep. He is waking up 5-6 times throughout the night. He denies nightmares since he  "has been here. He reports that the lack of sleep has not been helping his \"racing thoughts\".   He says when he tries to go back to sleep, he lays down and can t catch his breath, feeling very anxious and panicky but can t identify why. Clonidine has not been helping. Reports trying 100 mg hydroxyzine 4x a day in the past which did not help with his anxiety. Denies having to use the restroom frequently at night. For other sleep medications, he can t take trazodone because of RLS. Didn t like Seroquel, didn t like the feeling, would prefer to not sleep than take this. Has taken ambien in the past. He is open to increasing mirtazapine to 45 mg.     He reports having chills, occasional goose bumps, and cold sweats - improving today. Denies N/V, tremors, myalgias. Still having  Star bursting  in bilateral temporal peripheral vision, endorses this as prodrome for seizure in the past.     We discussed day by day approach to his phenobarbital taper- he will let us know how he feels each day. We also discussed the delay in ECT due to the phenobarbital taper. He is agreeable to this plan     ROS:  Patient has chills and insomnia (falling asleep and or staying asleep)  Patient denies acute concerns     Objective:     Vitals:  /81 (BP Location: Left arm, Patient Position: Sitting, Cuff Size: Adult Regular)   Pulse 60   Temp 97  F (36.1  C) (Temporal)   Resp 16   SpO2 100%     Allergies:  Allergies   Allergen Reactions    Iodinated Contrast Media Hives and Rash     Reports he is allergic to Iodine contrast     Reports he is allergic to Iodine contrast       Reports he is allergic to Iodine contrast    Reports he is allergic to Iodine contrast      Reports he is allergic to Iodine contrast   Reports he is allergic to Iodine contrast    Iodine Hives    Lisinopril Hives and Rash    Lithium Hives    Topiramate Hives    Escitalopram Rash     Rash around mouth    Iodine-131 Hives    Trazodone Anxiety     Trazodone causes RLS " for pt    Olanzapine Rash     Pt reported taking approximately one year ago. Developed rash around mouth.       Current Medications:  Scheduled:  Current Facility-Administered Medications   Medication Dose Route Frequency Provider Last Rate Last Admin    acetaminophen (TYLENOL) tablet 650 mg  650 mg Oral Q4H PRN Patti Bernard MD        alum & mag hydroxide-simethicone (MAALOX) suspension 30 mL  30 mL Oral Q4H PRN Patti Bernard MD        cloNIDine (CATAPRES) tablet 0.1 mg  0.1 mg Oral TID Tracy Castillo MD   0.1 mg at 03/20/25 0758    desvenlafaxine succinate (PRISTIQ) 24 hr tablet 25 mg  25 mg Oral Daily Patti Bernard MD   25 mg at 03/20/25 0757    folic acid (FOLVITE) tablet 1 mg  1 mg Oral Daily Patti Bernard MD   1 mg at 03/20/25 0758    hydrocortisone (CORTAID) 1 % cream   Topical BID Patti Bernard MD   Given at 03/20/25 0759    hydrOXYzine HCl (ATARAX) tablet 25 mg  25 mg Oral Q4H PRN Patti Bernard MD        melatonin tablet 3 mg  3 mg Oral At Bedtime PRN Patti Bernard MD        methadone (DOLOPHINE-INTENSOL) 10 MG/ML (HIGH CONC) solution 105 mg  105 mg Oral Daily Patti Bernard MD   105 mg at 03/20/25 0759    midazolam 5 mg/mL (VERSED) intranasal solution 10 mg  10 mg Intranasal Once PRN Tracy Castillo MD        And    mucosal atomization device #  device 1 Device  1 Device Inhalation DOES NOT GO TO Tracy Thomson MD        mirtazapine (REMERON) tablet 30 mg  30 mg Oral At Bedtime Patti Bernard MD   30 mg at 03/19/25 2025    multivitamin w/minerals (THERA-VIT-M) tablet 1 tablet  1 tablet Oral Daily Patti Bernard MD   1 tablet at 03/20/25 0757    nicotine (NICORETTE) lozenge 4 mg  4 mg Buccal Q1H PRN Patti Bernard MD   4 mg at 03/20/25 0711    PHENobarbital tablet 32.4 mg  32.4 mg Oral Tracy Palacios MD   32.4 mg at 03/20/25 0758    PHENobarbital tablet 64.8 mg  64.8 mg Oral  At Bedtime Tracy Castillo MD   64.8 mg at 03/19/25 2109    polyethylene glycol (MIRALAX) Packet 17 g  17 g Oral Daily PRN Patti Bernard MD        thiamine (B-1) tablet 100 mg  100 mg Oral Daily Patti Bernard MD   100 mg at 03/20/25 0757       PRN:  Current Facility-Administered Medications   Medication Dose Route Frequency Provider Last Rate Last Admin    acetaminophen (TYLENOL) tablet 650 mg  650 mg Oral Q4H PRN Patti Bernard MD        alum & mag hydroxide-simethicone (MAALOX) suspension 30 mL  30 mL Oral Q4H PRN Patti Bernard MD        cloNIDine (CATAPRES) tablet 0.1 mg  0.1 mg Oral TID Tracy Castillo MD   0.1 mg at 03/20/25 0758    desvenlafaxine succinate (PRISTIQ) 24 hr tablet 25 mg  25 mg Oral Daily Patti Bernard MD   25 mg at 03/20/25 0757    folic acid (FOLVITE) tablet 1 mg  1 mg Oral Daily Patti Bernard MD   1 mg at 03/20/25 0758    hydrocortisone (CORTAID) 1 % cream   Topical BID Patti Bernard MD   Given at 03/20/25 0759    hydrOXYzine HCl (ATARAX) tablet 25 mg  25 mg Oral Q4H PRN Patti Bernard MD        melatonin tablet 3 mg  3 mg Oral At Bedtime PRN Patti Bernard MD        methadone (DOLOPHINE-INTENSOL) 10 MG/ML (HIGH CONC) solution 105 mg  105 mg Oral Daily Patti Bernard MD   105 mg at 03/20/25 0759    midazolam 5 mg/mL (VERSED) intranasal solution 10 mg  10 mg Intranasal Once PRN Tracy Castillo MD        And    mucosal atomization device #  device 1 Device  1 Device Inhalation DOES NOT GO TO MAR Tracy Castillo MD        mirtazapine (REMERON) tablet 30 mg  30 mg Oral At Bedtime Patti Bernard MD   30 mg at 03/19/25 2025    multivitamin w/minerals (THERA-VIT-M) tablet 1 tablet  1 tablet Oral Daily Patti Bernard MD   1 tablet at 03/20/25 0757    nicotine (NICORETTE) lozenge 4 mg  4 mg Buccal Q1H PRN Patti Bernard MD   4 mg at 03/20/25 0711    PHENobarbital  tablet 32.4 mg  32.4 mg Oral QAM Tracy Castillo MD   32.4 mg at 03/20/25 0758    PHENobarbital tablet 64.8 mg  64.8 mg Oral At Bedtime Tracy Castillo MD   64.8 mg at 03/19/25 2109    polyethylene glycol (MIRALAX) Packet 17 g  17 g Oral Daily PRN Patti Bernard MD        thiamine (B-1) tablet 100 mg  100 mg Oral Daily Patti Bernard MD   100 mg at 03/20/25 0757       Labs and Imaging:  New results:   Recent Results (from the past 24 hours)   Extra Purple Top Tube    Collection Time: 03/19/25  8:20 AM   Result Value Ref Range    Hold Specimen JIC    EKG 12-lead, complete    Collection Time: 03/19/25  8:57 AM   Result Value Ref Range    Systolic Blood Pressure  mmHg    Diastolic Blood Pressure  mmHg    Ventricular Rate 59 BPM    Atrial Rate 59 BPM    CO Interval 154 ms    QRS Duration 96 ms     ms    QTc 396 ms    P Axis 68 degrees    R AXIS 65 degrees    T Axis 55 degrees    Interpretation ECG       Sinus bradycardia  Early repolarization  Otherwise normal ECG  No previous ECGs available         Data this admission:  - CBC with chronic thrombocytopenia (platelet count 78)   - CMP with mildly elevated AST (47) and ALT (84)   - TSH normal  - UDS positive for amphetamines, cannabinoids, fentanyl  - Vit D in process  - Lipids not ordered this admission (but within normal limits on 2/26/2025)  - Vit B12 normal  - Folate normal  - EKG normal sinus rhythm, QTc 396     Mental Status Exam:     Oriented to:  Grossly Oriented, Person/Self, and Situation  General:  Awake and Alert, answers questions openly.   Appearance:  appears older than stated age Edentulous. Slightly disheveled, dressed in personal clothing.   Behavior/Attitude:  Calm, Cooperative, and Open  Eye Contact: Appropriate  Psychomotor: Normal no catatonia present  Speech:  appropriate volume/tone, spontaneous, and with good articulation  Language: Fluent in English with appropriate syntax and vocabulary.  Mood:   "\"anxious\"  Affect:   restricted affect but does intermittently becomes tearful when discussing traumatizing events or difficult emotions   Thought Process:   linear, coherent. Ruminative as he returns to the idea of hopelessness and having \"nothing to live for\" throughout conversation.   Thought Content:   suicidal ideation with plan (without intent); No apparent delusions  Associations:  intact  Insight:  fair due to his ability to recognize rational thoughts from irrational thought, like the scenarios of killing himself. He does still express significant depression which limits his insight into his hopelessness and outlook/goals for treatment.   Judgment:  fair due to being engaged in groups/treatment here and pleasant/ cooperative. He does express the desire to die but is able to separate his thoughts from reality.   Impulse control: fair due to prior to admission behaviors but has remained safe and cooperative here  Attention Span:  adequate for conversation  Concentration:  grossly intact to conversation   Recent and Remote Memory:  not formally assessed but intact to conversation   Fund of Knowledge: average, estimated  Muscle Strength and Tone: normal  Gait and Station: Normal     Psychiatric Assessment     Zenon Truong is a 54 year old male with previous psychiatric diagnoses of polysubstance use disorder [benzodiazepines, opiates, methamphetamine, THC, nicotine], severe depression with suicidal ideation, borderline personality disorder and PTSD admitted 03/18/2025 due to concern for SI with plan and intent in the context of medication non adherence, substance use and housing/financial insecurity. On admission, he presented with symptoms of SI with intent and plan, hopelessness, poor motivation, low energy, anhedonia, impulsivity and irritability.      Had a recent hospitalization at [facility] 2/23-3/6/2025 for suicidal ideation and benzodiazepine overdose. ECT was considered and planned during that " admission, but he decided against pursuing it at that time. Discharged on bupropion, Mirtazapine increased to 30 mgs, methadone and gabapentin. His Prazosin was continued as well. Did not continue taking his meds after leaving.     There is genetic predisposition given family hx of substance use disorder. Patient has struggled with homelessness and severe substance use for years. This current admission is likely precipitated by recent psychiatric hospitalization at end of February followed by relapse soon after discharge to a sober house. Perpetuating factors include ongoing substance use, medication non adherence, longstanding functional impairment, personality traits, lack of perceived support, deconditioning and housing and financial insecurity. Patient's support system includes  . Based on patient's history and current symptoms, criteria are met for primary diagnosis of MDD without psychotic features, opioid use disorder, benzodiazepine use disorder, stimulant use disorder and cannabis use disorder. Patient warrants inpatient hospitalization to maintain their safety and would benefit from medication optimization and outpatient referral/resources.       In regards to management, patient was continued Mirtazapine 30mg for sleep which patient reports to be helpful and Desvenlafaxine 25mg for depression which was started a couple of weeks. Given severity of depression symptoms, ECT consult was obtained and can commence 3/24 if patient is medically approved. Patient did express some reservation due to short term memory loss from previous sessions. Methadone will be continued for maintenance. He was started on clonidine 0.1mg TID for withdrawal. Will continue to wean phenobarbital based on symptoms. Mirtazapine was increased to 45mg on 3/20 due to ongoing insomnia.      Today's Changes:   - Increase mirtazapine to 45mg at bedtime  - Continue phenobarb taper. Will assess daily and alter taper as needed.    - Discontinued medicine consult today (will wait to re consult closer to planned ECT start)   - Plan to start ECT after finishing phenobarb taper     Psychiatric Plan by Diagnosis      #Major depressive disorder, recurrent, severe without psychosis  - Increased Mirtazapine to 45mg po at bedtime  - Continue Desvenlafaxine 25mg po every day   - Plan to start ECT after phenobarbital stops      #Opioid use disorder, severe  - Continue Methadone 105mg every day   - Thiamine, MVI, folic acid      #Benzodiazepine use disorder, severe  - Continue phenobarb taper   - Start clonidine 0.1mg TID  for withdrawal symptoms      #Stimulant (methamphetamine) use disorder  #Cannabis use disorder     Additional Plans:  - Patient will be treated in therapeutic milieu with appropriate individual and group therapies as described     Psychiatric Hospital Course:      Zenon Truong was admitted to [facility] as a voluntary patient.   Medications:  3/14: hold bupropion due to risk of lower seizure threshold  3/14: Continue PTA pristiq 25mg and remeron 30mg for sleep  3/18: start clonidine 0.1mg TID   3/20: Increase mirtazipine to 45mg daily for continued insomnia     The risks, benefits, alternatives, and side effects were discussed and understood by the patient.     Medical Assessment and Plan     #Chronic hepatitis C, never treated  #Mild transaminase elevations, hepatic steatosis:  Never treated for hepatitis C.  Chronic, mild transaminase elevations with normal hepatic function.  Most recent abdominal ultrasound 2/15/2024 showed hepatic steatosis without hepatic lesions, normal gallbladder.  Denies current alcohol use.  - monitor outpatient      #Chronic thrombocytopenia  #Normocytic anemia,  now resolved  Chronic thrombocytopenia, moderate and stable.  Denies any history of bleeding including no history of gastric ulcers, hematemesis, melena or hematochezia.  Anemia appears to be more recent since 2/23/2025 when hemoglobin was  11.6, MCV 86.  Hgb 14.1, Plts 97, MCV 86, ferritin, iron, TIBC wnl on 3/15.     #Concern for malnutrition  Seen by dietician team, patient does not meet two of the established criteria necessary for diagnosing malnutrition but is at risk for malnutrition.   - ensure enlive ordered     Medical course: Patient was physically examined by the ED prior to being transferred to the unit and was found to be medically stable and appropriate for admission.      Consults:  Medicine (for clearance for ECT)     Checklist     Legal Status: Voluntary     Safety Assessment:   Behavioral Orders   Procedures    Code 1 - Restrict to Unit    Routine Programming     As clinically indicated    Seizure precautions    Status 15     Every 15 minutes.    Withdrawal precautions       Risk Assessment:  Risk for harm is low to moderate.  Risk factors: SI, maladaptive coping, substance use, impulsive, and past behaviors  Protective factors: engaged in treatment     SIO: No    Disposition: Pending stabilization, ECT, medication optimization, & development of a safe discharge plan.     Attestations     Mikayla Latham, MS3  [facility] Medical Student     I was present with the medical student who participated in the service and in the documentation of the note.  I have verified the history and personally performed the physical exam and medical decision making. I agree with the assessment and plan of care as documented in the note.    This patient was seen and discussed with my attending physician.  Tracy Castillo MD    Psychiatry Resident Physician      This patient has been seen and evaluated by me, Charles Fink.  I have discussed this patient with the psychiatry resident and I agree with the findings and plan in this note.    I have reviewed today's vital signs, medications, labs and imaging.     Charles Menjivar MD

## 2025-03-20 NOTE — PLAN OF CARE
BEH IP Unit Acuity Rating Score (UARS)  Patient is given one point for every criteria they meet.    CRITERIA SCORING   On a 72 hour hold, court hold, committed, stay of commitment, or revocation. 0    Patient LOS on BEH unit exceeds 20 days. 0  LOS: 2   Patient under guardianship, 55+, otherwise medically complex, or under age 11. 0   Suicide ideation without relief of precipitating factors. 1   Current plan for suicide. 1   Current plan for homicide. 0   Imminent risk or actual attempt to seriously harm another without relief of factors precipitating the attempt. 0   Severe dysfunction in daily living (ex: complete neglect for self care, extreme disruption in vegetative function, extreme deterioration in social interactions). 1   Recent (last 7 days) or current physical aggression in the ED or on unit. 0   Restraints or seclusion episode in past 72 hours. 0   Recent (last 7 days) or current verbal aggression, agitation, yelling, etc., while in the ED or unit. 0   Active psychosis. 0   Need for constant or near constant redirection (from leaving, from others, etc).  0   Intrusive or disruptive behaviors. 0   Patient requires 3 or more hours of individualized nursing care per 8-hour shift (i.e. for ADLs, meds, therapeutic interventions). 0   TOTAL 3

## 2025-03-20 NOTE — PLAN OF CARE
Goal Outcome Evaluation:  Problem: Sleep Disturbance  Goal: Adequate Sleep/Rest  Outcome: Progressing     Problem: Alcohol Withdrawal  Goal: Alcohol Withdrawal Symptom Control  3/20/2025 0622 by Maria Luisa Mayberry RN  Outcome: Progressing     Patient received asleep at the start of the shift, even and non-labored respirations noted during safety checks. MSSA not completed this shift, pt slept through. Slept for 6.75 hours.

## 2025-03-20 NOTE — CONSULTS
"  [facility]  Internal Medicine Consult    Zenon Truong MRN# [id number]   Age: 54 year old YOB: 1970     Date of Admission: 3/18/2025  Date of Consult:  3/20/2025    Requesting Service: Behavioral Health - Charles Menjivar MD  Reason for Consult: Pre ECT Medicine Evaluation   Indication for ECT: ***    Chief Complaint: ***  HPI: ***    Review of Systems:   Cardiovascular: {CV:500::\"negative\"}  Pulmonary: {Resp:400::\"No shortness of breath, dyspnea on exertion, cough, or hemoptysis\"}  Neurological: {Neur:900::\"negative\"}    Past Medical History:   Prior Anesthesia: {YES/NO:63}  If yes, any complications: {YES/NO:63}    Prior ECT: {YES/NO:63}  If yes,   {DATE/MONTH/YEAR:076274}  Response: ***  Side Effects: ***    Cardiovascular: CAD {YES/NO:63}, MI {YES/NO:63}, HTN {YES/NO:63}, CHF {YES/NO:63}, pacemaker or ICD {YES/NO:63}  Pulmonary: Asthma/COPD {YES/NO:63}, Prior respiratory failure or need for emergent intubation {YES/NO:63}, On theophylline {YES/NO:63} (note that theophylline use is a contraindication to proceeding with ECT, needs to be tapered off prior)  Neurological: Brain tumor {YES/NO:63}, TIA/CVA {YES/NO:63}, Dementia {YES/NO:63}, Neuromuscular Disease (including post polio syndrome) {YES/NO:63}, Seizures and/or Epilepsy {YES/NO:63}, Head Injury {YES/NO:63}, Intracranial Hemorrhage {YES/NO:63}    Past Surgical History:   Past Surgical History:   Procedure Laterality Date    GENITOURINARY SURGERY  1996    Testicular CA     TESTICLE SURGERY Bilateral        Allergies:      Allergies   Allergen Reactions    Iodinated Contrast Media Hives and Rash     Reports he is allergic to Iodine contrast     Reports he is allergic to Iodine contrast       Reports he is allergic to Iodine contrast    Reports he is allergic to Iodine contrast      Reports he is allergic to Iodine contrast   Reports he is allergic to Iodine contrast    Iodine Hives    Lisinopril Hives and Rash "    Lithium Hives    Topiramate Hives    Escitalopram Rash     Rash around mouth    Iodine-131 Hives    Trazodone Anxiety     Trazodone causes RLS for pt    Olanzapine Rash     Pt reported taking approximately one year ago. Developed rash around mouth.       Medication list reviewed.    Physical Exam:  /81 (BP Location: Left arm, Patient Position: Sitting, Cuff Size: Adult Regular)   Pulse 60   Temp 97  F (36.1  C) (Temporal)   Resp 16   SpO2 100%    Cardiovascular: RRR. S1, S2. No murmurs, rubs, gallops.   Pulmonary: Effort normal. Lungs CTAB with no wheezing, rales, rhonchi.   Neurological: A&Ox3. No focal deficits. PERRLA. ***    Data:  EKG:{EKG RATE:894651}, { :4182399}   {EKG FINDINGS:660307}  Head Imaging: ***  Labs:   CBC:  Recent Labs   Lab Test 03/15/25  0718   WBC 4.9   RBC 4.81   HGB 14.1   HCT 41.2   MCV 86   MCH 29.3   MCHC 34.2   RDW 13.1   PLT 97*     CMP:  Recent Labs   Lab Test 03/15/25  0718      POTASSIUM 4.0   CHLORIDE 99   JOVITA 9.2   CO2 24   BUN 10.9   CR 0.74   GLC 93   AST 56*   ALT 84*   BILITOTAL 0.5   ALBUMIN 4.1   PROTTOTAL 6.6   ALKPHOS 75     HCG:   {HCG RESULT (PF):830859}    Recommendations:   Diuretics and oral hypoglycemics should be held the morning of ECT.   All other antihypertensive medications can be continued.     Patient {DOES/DOES NOT:926720} have absolute medical contraindications to proceeding with ECT at this time. Treatment plan per psychiatry.       Eitan Pierce  Internal Medicine [facility]

## 2025-03-20 NOTE — PLAN OF CARE
SHIFT NURSING PLAN OF CARE    Problem: Adult Behavioral Health Plan of Care  Goal: Adheres to Safety Considerations for Self and Others  Outcome: Progressing     Problem: Suicide Risk  Goal: Absence of Self-Harm  3/19/2025 1942 by Bárbara Danielson RN  Outcome: Progressing    Problem: Alcohol Withdrawal  Goal: Alcohol Withdrawal Symptom Control  Outcome: Progressing   Goal Outcome Evaluation:    Pt has been intermittently visible in the milieu; socially isolative and kept to himself; spent the shift watching TV in the lounge and resting in bed; denied pain and physical symptoms; rated anxiety 4/10 with no PRN indication; denied SI/SIB,AVH, depression and HI; calm and pleasant on approach; scored 3 and 2 on MSSA; VSS; medication compliant; denied medication side effect; no withdrawal symptoms are noted; appetite and hygiene are good.

## 2025-03-21 PROCEDURE — 93010 ELECTROCARDIOGRAM REPORT: CPT | Performed by: INTERNAL MEDICINE

## 2025-03-21 PROCEDURE — 250N000013 HC RX MED GY IP 250 OP 250 PS 637

## 2025-03-21 PROCEDURE — 93005 ELECTROCARDIOGRAM TRACING: CPT

## 2025-03-21 PROCEDURE — 124N000002 HC R&B MH UMMC

## 2025-03-21 PROCEDURE — 90832 PSYTX W PT 30 MINUTES: CPT

## 2025-03-21 RX ORDER — GABAPENTIN 600 MG/1
600 TABLET ORAL 3 TIMES DAILY
Status: DISCONTINUED | OUTPATIENT
Start: 2025-03-21 | End: 2025-03-26

## 2025-03-21 RX ORDER — PHENOBARBITAL 32.4 MG/1
32.4 TABLET ORAL ONCE
Status: COMPLETED | OUTPATIENT
Start: 2025-03-22 | End: 2025-03-22

## 2025-03-21 RX ORDER — PHENOBARBITAL 32.4 MG/1
32.4 TABLET ORAL ONCE
Status: DISCONTINUED | OUTPATIENT
Start: 2025-03-22 | End: 2025-03-21

## 2025-03-21 RX ORDER — PHENOBARBITAL 32.4 MG/1
32.4 TABLET ORAL 2 TIMES DAILY
Status: COMPLETED | OUTPATIENT
Start: 2025-03-21 | End: 2025-03-21

## 2025-03-21 RX ADMIN — NICOTINE POLACRILEX 4 MG: 4 LOZENGE ORAL at 06:46

## 2025-03-21 RX ADMIN — NICOTINE POLACRILEX 4 MG: 4 LOZENGE ORAL at 10:26

## 2025-03-21 RX ADMIN — NICOTINE POLACRILEX 4 MG: 4 LOZENGE ORAL at 20:40

## 2025-03-21 RX ADMIN — FOLIC ACID 1 MG: 1 TABLET ORAL at 07:43

## 2025-03-21 RX ADMIN — THIAMINE HCL TAB 100 MG 100 MG: 100 TAB at 07:43

## 2025-03-21 RX ADMIN — NICOTINE POLACRILEX 4 MG: 4 LOZENGE ORAL at 08:16

## 2025-03-21 RX ADMIN — MIRTAZAPINE 45 MG: 45 TABLET, FILM COATED ORAL at 20:24

## 2025-03-21 RX ADMIN — GABAPENTIN 600 MG: 600 TABLET, FILM COATED ORAL at 13:15

## 2025-03-21 RX ADMIN — NICOTINE POLACRILEX 4 MG: 4 LOZENGE ORAL at 18:31

## 2025-03-21 RX ADMIN — PHENOBARBITAL 32.4 MG: 32.4 TABLET ORAL at 07:44

## 2025-03-21 RX ADMIN — NICOTINE POLACRILEX 4 MG: 4 LOZENGE ORAL at 16:44

## 2025-03-21 RX ADMIN — DESVENLAFAXINE 25 MG: 25 TABLET, EXTENDED RELEASE ORAL at 07:43

## 2025-03-21 RX ADMIN — CLONIDINE HYDROCHLORIDE 0.1 MG: 0.1 TABLET ORAL at 07:43

## 2025-03-21 RX ADMIN — HYDROCORTISONE: 1 CREAM TOPICAL at 19:26

## 2025-03-21 RX ADMIN — Medication 1 TABLET: at 07:43

## 2025-03-21 RX ADMIN — PHENOBARBITAL 32.4 MG: 32.4 TABLET ORAL at 19:25

## 2025-03-21 RX ADMIN — GABAPENTIN 600 MG: 600 TABLET, FILM COATED ORAL at 19:25

## 2025-03-21 RX ADMIN — CLONIDINE HYDROCHLORIDE 0.1 MG: 0.1 TABLET ORAL at 19:25

## 2025-03-21 RX ADMIN — NICOTINE POLACRILEX 4 MG: 4 LOZENGE ORAL at 14:35

## 2025-03-21 RX ADMIN — CLONIDINE HYDROCHLORIDE 0.1 MG: 0.1 TABLET ORAL at 13:16

## 2025-03-21 RX ADMIN — NICOTINE POLACRILEX 4 MG: 4 LOZENGE ORAL at 12:23

## 2025-03-21 RX ADMIN — METHADONE HYDROCHLORIDE 105 MG: 10 CONCENTRATE ORAL at 07:45

## 2025-03-21 ASSESSMENT — ACTIVITIES OF DAILY LIVING (ADL)
ORAL_HYGIENE: INDEPENDENT
ADLS_ACUITY_SCORE: 26
HYGIENE/GROOMING: INDEPENDENT
DRESS: INDEPENDENT
ADLS_ACUITY_SCORE: 26
LAUNDRY: WITH SUPERVISION
ADLS_ACUITY_SCORE: 26
DRESS: INDEPENDENT
ADLS_ACUITY_SCORE: 26
LAUNDRY: WITH SUPERVISION
ADLS_ACUITY_SCORE: 26
HYGIENE/GROOMING: INDEPENDENT
ADLS_ACUITY_SCORE: 26
ORAL_HYGIENE: INDEPENDENT
ADLS_ACUITY_SCORE: 26

## 2025-03-21 NOTE — PLAN OF CARE
BEH IP Unit Acuity Rating Score (UARS)  Patient is given one point for every criteria they meet.    CRITERIA SCORING   On a 72 hour hold, court hold, committed, stay of commitment, or revocation. 0    Patient LOS on BEH unit exceeds 20 days. 0  LOS: 3   Patient under guardianship, 55+, otherwise medically complex, or under age 11. 0   Suicide ideation without relief of precipitating factors. 1   Current plan for suicide. 1   Current plan for homicide. 0   Imminent risk or actual attempt to seriously harm another without relief of factors precipitating the attempt. 0   Severe dysfunction in daily living (ex: complete neglect for self care, extreme disruption in vegetative function, extreme deterioration in social interactions). 1   Recent (last 7 days) or current physical aggression in the ED or on unit. 0   Restraints or seclusion episode in past 72 hours. 0   Recent (last 7 days) or current verbal aggression, agitation, yelling, etc., while in the ED or unit. 0   Active psychosis. 0   Need for constant or near constant redirection (from leaving, from others, etc).  0   Intrusive or disruptive behaviors. 0   Patient requires 3 or more hours of individualized nursing care per 8-hour shift (i.e. for ADLs, meds, therapeutic interventions). 0   TOTAL 3

## 2025-03-21 NOTE — PLAN OF CARE
Problem: Adult Behavioral Health Plan of Care  Goal: Adheres to Safety Considerations for Self and Others  Outcome: Progressing  Flowsheets (Taken 3/20/2025 2107)  Adheres to Safety Considerations for Self and Others: making progress toward outcome  Intervention: Develop and Maintain Individualized Safety Plan  Recent Flowsheet Documentation  Taken 3/20/2025 1740 by Hannah Ballesteros RN  Safety Measures:   environmental rounds completed   clinical history reviewed   Goal Outcome Evaluation:    Plan of Care Reviewed With: patient          Pt presented with a flat and restricted affect, appearing calm, guarded, and withdrawn. He was occasionally visible on the unit, but primarily remained isolated in his room, resting in bed. Pt denied experiencing any mental health concerns, including, SI/SIB/AVH, anxiety, depression, and hallucinations. He reported good appetite, consumed sufficient food and fluids. No angry outbursts were observed. Pt's MSSA score was 3 at 1600 and decreased to 1 at 2000. Pt is medication compliant. Received prn nicotine lozenge x 2. No report of pain.

## 2025-03-21 NOTE — PROGRESS NOTES
"  ----------------------------------------------------------------------------------------------------------  [facility]  Psychiatry Progress Note  Hospital Day #3     Interim History:     The patient's care was discussed with the treatment team and chart notes were reviewed.    Vitals: VSS. /88  Sleep: 6.75 hours (03/21/25 0618)  Scheduled medications: Took all scheduled medications as prescribed  Psychiatric PRN medications: No psychiatric prns given    Staff Report:   No acute events or safety concerns overnight. Participating in groups. MSSA score 1-3. Please see staff notes for details.      Subjective:     Patient Interview:  Zenon is feeling \"bit of a mixed bag\" today. Reports feeling anxious in general but specifically about ECT. He is having \"old school thinking about ECT where the person is convulsing\". Understands what ECT is and that its not that but still feeling nervous about it.  Says he is tired of this  cycle.  Feels like he's gotten in a hole that he can't dig out of it. He says he got a couple broken hours of sleep last night. Didn't notice any difference in sleep last night after medication changes. In the past, he used cannabis a lot to help with sleep but he wants to transition away from that. He says he feels fortunate he hasn't had serious health complications and wanting to stop before they happen. He says he also gets paranoid and palpitations w/ cannabis use.  He says palpitations have been occurring sporadically for the past month or so.  Endorses mild shortness of breath that he's unsure if anxiety or not. Has some shortness of breath with palpitation episodes. They haven't changed in frequency, sporadic as far as he can tell. Denies chest pain, feelings of passing out. Distraction techniques have helped a little. We discussed alerting nursing staff if he were to have palpitations with chest pain or shortness of breath and he is " agreeable to this plan.   Withdrawal symptoms improving, still endorsing chills and nausea. Endorses using a lot of fentanyl. Phenobarb taper is helping. Discussed phenobarb taper and its interactions with other medications. He is understanding of the plan. He was also using gabapentin 600mg TID prior to admission which helped with anxiety. He would like to restart this.   Tomorrow is his birthday. He previously had plans to end his life on his birthday. These are hard days for him.    ROS:  Patient has nausea, chills, and insomnia (falling asleep and staying asleep). Has intermittent episodes of palpitations that last a few seconds with some shortness of breath associated with this. No chest pain, lightheadedness, dizziness.      Objective:     Vitals:  /66 (BP Location: Right arm, Patient Position: Sitting, Cuff Size: Adult Regular)   Pulse 69   Temp (P) 97.9  F (36.6  C) (Oral)   Resp 18   SpO2 (P) 100%     Allergies:  Allergies   Allergen Reactions    Iodinated Contrast Media Hives and Rash     Reports he is allergic to Iodine contrast     Reports he is allergic to Iodine contrast       Reports he is allergic to Iodine contrast    Reports he is allergic to Iodine contrast      Reports he is allergic to Iodine contrast   Reports he is allergic to Iodine contrast    Iodine Hives    Lisinopril Hives and Rash    Lithium Hives    Topiramate Hives    Escitalopram Rash     Rash around mouth    Iodine-131 Hives    Trazodone Anxiety     Trazodone causes RLS for pt    Olanzapine Rash     Pt reported taking approximately one year ago. Developed rash around mouth.       Current Medications:  Scheduled:  Current Facility-Administered Medications   Medication Dose Route Frequency Provider Last Rate Last Admin    acetaminophen (TYLENOL) tablet 650 mg  650 mg Oral Q4H PRN Patti Bernard MD        alum & mag hydroxide-simethicone (MAALOX) suspension 30 mL  30 mL Oral Q4H PRN Patti Bernard MD         cloNIDine (CATAPRES) tablet 0.1 mg  0.1 mg Oral TID Tracy Castillo MD   0.1 mg at 03/21/25 0743    desvenlafaxine succinate (PRISTIQ) 24 hr tablet 25 mg  25 mg Oral Daily Patti Bernard MD   25 mg at 03/21/25 0743    folic acid (FOLVITE) tablet 1 mg  1 mg Oral Daily Patti Bernard MD   1 mg at 03/21/25 0743    hydrocortisone (CORTAID) 1 % cream   Topical BID Patti Bernard MD   Given at 03/20/25 1913    hydrOXYzine HCl (ATARAX) tablet 25 mg  25 mg Oral Q4H PRN Patti Bernard MD        melatonin tablet 3 mg  3 mg Oral At Bedtime PRN Patti Bernard MD        methadone (DOLOPHINE-INTENSOL) 10 MG/ML (HIGH CONC) solution 105 mg  105 mg Oral Daily Patti Bernard MD   105 mg at 03/21/25 0745    midazolam 5 mg/mL (VERSED) intranasal solution 10 mg  10 mg Intranasal Once PRN Tracy Castillo MD        And    mucosal atomization device #  device 1 Device  1 Device Inhalation DOES NOT GO TO Tracy Thomson MD        mirtazapine (REMERON) tablet 45 mg  45 mg Oral At Bedtime Demetri Sarabia MD   45 mg at 03/20/25 2234    multivitamin w/minerals (THERA-VIT-M) tablet 1 tablet  1 tablet Oral Daily Patti Bernard MD   1 tablet at 03/21/25 0743    nicotine (NICORETTE) lozenge 4 mg  4 mg Buccal Q1H PRN Patti Bernard MD   4 mg at 03/21/25 0646    PHENobarbital tablet 32.4 mg  32.4 mg Oral QAM Tracy Castillo MD   32.4 mg at 03/21/25 0744    PHENobarbital tablet 64.8 mg  64.8 mg Oral Daily at 8 pm Tracy Castillo MD   64.8 mg at 03/20/25 1909    polyethylene glycol (MIRALAX) Packet 17 g  17 g Oral Daily PRN Patti Bernard MD        thiamine (B-1) tablet 100 mg  100 mg Oral Daily Patti Bernard MD   100 mg at 03/21/25 0743       PRN:  Current Facility-Administered Medications   Medication Dose Route Frequency Provider Last Rate Last Admin    acetaminophen (TYLENOL) tablet 650 mg  650 mg Oral Q4H PRN Marco Antonio  MD Patti        alum & mag hydroxide-simethicone (MAALOX) suspension 30 mL  30 mL Oral Q4H PRN Patti Bernard MD        cloNIDine (CATAPRES) tablet 0.1 mg  0.1 mg Oral TID Tracy Castillo MD   0.1 mg at 03/21/25 0743    desvenlafaxine succinate (PRISTIQ) 24 hr tablet 25 mg  25 mg Oral Daily Patti Bernard MD   25 mg at 03/21/25 0743    folic acid (FOLVITE) tablet 1 mg  1 mg Oral Daily Patti Bernard MD   1 mg at 03/21/25 0743    hydrocortisone (CORTAID) 1 % cream   Topical BID Patti Bernard MD   Given at 03/20/25 1913    hydrOXYzine HCl (ATARAX) tablet 25 mg  25 mg Oral Q4H PRN Patti Bernard MD        melatonin tablet 3 mg  3 mg Oral At Bedtime PRN Patti Bernard MD        methadone (DOLOPHINE-INTENSOL) 10 MG/ML (HIGH CONC) solution 105 mg  105 mg Oral Daily Patti Bernard MD   105 mg at 03/21/25 0745    midazolam 5 mg/mL (VERSED) intranasal solution 10 mg  10 mg Intranasal Once PRN Tracy Castillo MD        And    mucosal atomization device #  device 1 Device  1 Device Inhalation DOES NOT GO TO MAR Tracy Castillo MD        mirtazapine (REMERON) tablet 45 mg  45 mg Oral At Bedtime Demetri Sarabia MD   45 mg at 03/20/25 2234    multivitamin w/minerals (THERA-VIT-M) tablet 1 tablet  1 tablet Oral Daily Patti Bernard MD   1 tablet at 03/21/25 0743    nicotine (NICORETTE) lozenge 4 mg  4 mg Buccal Q1H PRN Patti Bernard MD   4 mg at 03/21/25 0646    PHENobarbital tablet 32.4 mg  32.4 mg Oral QAM Tracy Castillo MD   32.4 mg at 03/21/25 0744    PHENobarbital tablet 64.8 mg  64.8 mg Oral Daily at 8 pm Tracy Castillo MD   64.8 mg at 03/20/25 1909    polyethylene glycol (MIRALAX) Packet 17 g  17 g Oral Daily PRN Patti Bernard MD        thiamine (B-1) tablet 100 mg  100 mg Oral Daily Patti Bernard MD   100 mg at 03/21/25 3878       Labs and Imaging:  New results:   No results found for this  "or any previous visit (from the past 24 hours).    Data this admission:  - CBC with chronic thrombocytopenia (platelet count 78)   - CMP with mildly elevated AST (47) and ALT (84)   - TSH normal  - UDS positive for amphetamines, cannabinoids, fentanyl  - Vit D in process  - Lipids not ordered this admission (but within normal limits on 2/26/2025)  - Vit B12 normal  - Folate normal  - EKG sinus bradycardia, QTc 396     Mental Status Exam:     Oriented to:  Grossly Oriented, Person/Self, and Situation  General:  Awake and Alert, answers questions openly.   Appearance:  appears older than stated age Edentulous. Slightly disheveled, dressed in personal clothing.   Behavior/Attitude:  Calm, Cooperative, and Open  Eye Contact: Appropriate  Psychomotor: Normal no catatonia present  Speech:  appropriate volume/tone, spontaneous, and with good articulation  Language: Fluent in English with appropriate syntax and vocabulary.  Mood:  \"Anxious\"  Affect:   restricted affect   Thought Process:   linear, coherent. Ruminative as he returns to the idea of hopelessness and having \"nothing to live for\" throughout conversation.   Thought Content:   suicidal ideation with plan (without intent); No apparent delusions  Associations:  intact  Insight:  fair due to his ability to recognize rational thoughts from irrational thought, like the scenarios of killing himself. He does still express significant depression which limits his insight into his hopelessness and outlook/goals for treatment.   Judgment:  fair due to being engaged in groups/treatment here and pleasant/ cooperative. He does express the desire to die but is able to separate his thoughts from reality.   Impulse control: fair due to prior to admission behaviors but has remained safe and cooperative here  Attention Span:  adequate for conversation  Concentration:  grossly intact to conversation   Recent and Remote Memory:  not formally assessed but intact to conversation   Fund " of Knowledge: average, estimated  Muscle Strength and Tone: normal  Gait and Station: Normal     Psychiatric Assessment     Zenon Truong is a 54 year old male with previous psychiatric diagnoses of polysubstance use disorder [benzodiazepines, opiates, methamphetamine, THC, nicotine], severe depression with suicidal ideation, borderline personality disorder and PTSD admitted 03/18/2025 due to concern for SI with plan and intent in the context of medication non adherence, substance use and housing/financial insecurity. On admission, he presented with symptoms of SI with intent and plan, hopelessness, poor motivation, low energy, anhedonia, impulsivity and irritability.      Had a recent hospitalization at [facility] 2/23-3/6/2025 for suicidal ideation and benzodiazepine overdose. ECT was considered and planned during that admission, but he decided against pursuing it at that time. Discharged on bupropion, Mirtazapine increased to 30 mgs, methadone and gabapentin. His Prazosin was continued as well. Did not continue taking his meds after leaving.     There is genetic predisposition given family hx of substance use disorder. Patient has struggled with homelessness and severe substance use for years. This current admission is likely precipitated by recent psychiatric hospitalization at end of February followed by relapse soon after discharge to a sober house. Perpetuating factors include ongoing substance use, medication non adherence, longstanding functional impairment, personality traits, lack of perceived support, deconditioning and housing and financial insecurity. Patient's support system includes  . Based on patient's history and current symptoms, criteria are met for primary diagnosis of MDD without psychotic features, opioid use disorder, benzodiazepine use disorder, stimulant use disorder and cannabis use disorder. Patient warrants inpatient hospitalization to maintain their safety and would  benefit from medication optimization and outpatient referral/resources.       In regards to management, patient was continued on Mirtazapine and Desvenlafaxine. Prior to admission while on the hospitalist service, given severity of depression symptoms, ECT consult was obtained and plan is to commence after phenobarbital taper complete if patient is medically cleared. Patient did express some reservation due to short term memory loss from previous sessions, but remains hopeful it could be helpful. Will continue to wean phenobarbital based on symptoms. Mirtazapine was increased to 45 mg on 3/20 due to ongoing insomnia. Will also restart PTA gabapentin 600mg TID for ongoing anxiety- he reports this was helpful in the past.      Today's changes:  - Restart PTA gabapentin 600mg TID for anxiety  - Repeat EKG for palpitations        Psychiatric Plan by Diagnosis      #Major depressive disorder, recurrent, severe without psychosis  - Continue Mirtazapine 45 mg po at bedtime  - Continue Desvenlafaxine 25mg po every day   - Plan to start ECT after phenobarbital taper completed      #Opioid use disorder, severe  - Continue Methadone 105mg every day   - Thiamine, MVI, folic acid      #Benzodiazepine use disorder, severe  - Continue phenobarb taper   - Continue clonidine 0.1mg TID for withdrawal symptoms      #Stimulant (methamphetamine) use disorder  #Cannabis use disorder     Additional Plans:  - Patient will be treated in therapeutic milieu with appropriate individual and group therapies as described     Psychiatric Hospital Course:      Zenon Truong was admitted to [facility] as a voluntary patient.   Medications and other updates:  3/14: hold bupropion due to risk of lower seizure threshold  3/14: Continue PTA pristiq 25mg and remeron 30mg for sleep  3/18: start clonidine 0.1mg TID   3/20: Increase mirtazapine to 45 mg daily for continued insomnia   3/21: restart PTA gabapentin 600mg TID for anxiety   3/21: continuing  phenobarb taper (today is 32.4 mg BID)     The risks, benefits, alternatives, and side effects were discussed and understood by the patient.      Medical Assessment and Plan     #Palpitations  3/21 patient reporting short episodes (few seconds) of palpitations occurring intermittently over the last month. Does have shortness of breath with this, no chest pain or other symptoms. He is not sure if it is associated with anxiety.   - repeat EKG ordered    #Chronic hepatitis C, never treated  #Mild transaminase elevations, hepatic steatosis:  Never treated for hepatitis C.  Chronic, mild transaminase elevations with normal hepatic function.  Most recent abdominal ultrasound 2/15/2024 showed hepatic steatosis without hepatic lesions, normal gallbladder.  Denies current alcohol use.  - monitor outpatient      #Chronic thrombocytopenia  #Normocytic anemia,  now resolved  Chronic thrombocytopenia, moderate and stable.  Denies any history of bleeding including no history of gastric ulcers, hematemesis, melena or hematochezia.  Anemia appears to be more recent since 2/23/2025 when hemoglobin was 11.6, MCV 86.  Hgb 14.1, Plts 97, MCV 86, ferritin, iron, TIBC wnl on 3/15.      #Concern for malnutrition  Seen by dietician team, patient does not meet two of the established criteria necessary for diagnosing malnutrition but is at risk for malnutrition.   - ensure enlive ordered     Medical course: Patient was physically examined by the ED prior to being transferred to the unit and was found to be medically stable and appropriate for admission.      Consults:  Medicine (for clearance for ECT)     Checklist     Legal Status: Voluntary     Safety Assessment:   Behavioral Orders   Procedures    Code 1 - Restrict to Unit    Routine Programming     As clinically indicated    Seizure precautions    Status 15     Every 15 minutes.    Suicide precautions: Suicide Risk: LOW; Clinical rationale to override score: lack of access to a plan for  self-harm     .     Order Specific Question:   Suicide Risk     Answer:   LOW     Order Specific Question:   Clinical rationale to override score:     Answer:   lack of access to a plan for self-harm    Withdrawal precautions       Risk Assessment:  Risk for harm is low to moderate  Risk factors: SI, maladaptive coping, substance use, impulsive, and past behaviors  Protective factors: engaged in treatment     SIO: no    Disposition: Pending stabilization, ECT, medication optimization, & development of a safe discharge plan.      Attestations     Mikayla Latham, MS3  [facility] Medical Student     I was present with the medical student who participated in the service and in the documentation of the note.  I have verified the history and personally performed the physical exam and medical decision making. I agree with the assessment and plan of care as documented in the note.    This patient was seen and discussed with my attending physician.  Tracy Castillo MD   Psychiatry Resident Physician     This patient has been seen and evaluated by me, Charles Fink.  I have discussed this patient with the psychiatry resident and I agree with the findings and plan in this note.    I have reviewed today's vital signs, medications, labs and imaging.     Charles Menjivar MD

## 2025-03-21 NOTE — PROGRESS NOTES
Zenon attended part of morning group, until was pulled out to meet with staff.  Zenon was the only participant in the first part of session.  He was open and pleasant.  He attended about 10-15 minutes.  No charge.     TRUDY MercadoBC  Board-Certified Music Therapist

## 2025-03-21 NOTE — PLAN OF CARE
IP Treatment Plan    Client's Name: Zenon Truong  YOB: 1970      Treatment Plan Date: March 21, 2025      Anticipated number of sessions for this episode of care: 5-6    Current Concerns/Problem Areas:    Goal 1: Identify behaviors that sabotage growth and incorporate healthier behaviors  Increase use of mindfulness practice or grounding from 0 times/hour to 1 time/hour      Intervention(s)    Coached on coping techniques/relaxation skills to help improve distress tolerance and managing intense emotions. , Identified and practiced coping skills, Engaged in relaxation training (e.g. meditation, progressive muscle relaxation, etc.), Identified stress relief practices, and Explored strategies for self-soothing

## 2025-03-21 NOTE — CONSULTS
SPIRITUAL HEALTH SERVICES  SPIRITUAL ASSESSMENT consult Note  [facility] ([facility]) [facility]     REFERRAL SOURCE: Routine consult    Zenon declined a visit at this time and agreed that Monday would be good to follow-up.     PLAN: Will follow-up with Zenon on Monday per request. Spiritual health services remains available for any follow-up or requests. For further visits please place spiritual health consults.    America Nuñez [facility]  Associate   Pager: [phone]

## 2025-03-21 NOTE — PLAN OF CARE
Goal Outcome Evaluation:  Problem: Sleep Disturbance  Goal: Adequate Sleep/Rest  Outcome: Progressing     Pt slept for the most part of the night, even and non-labored respirations observed in all safety checks. Pt appeared to have slept for 6.75 hours.

## 2025-03-21 NOTE — PLAN OF CARE
"Individual Therapy Note      Date of Service: March 21, 2025    Patient: Zenon goes by \"Zenon,\" uses he/him pronouns    Individuals Present: PADMINI Rios    Session start: 1045  Session end: 1115  Session duration in minutes: 30      Modality Used: Person Centered, Rapport Building, and Motivational Interviewing    Goals: Identify behaviors that sabotage growth and incorporate healthier behaviors  Increase use of mindfulness practice or grounding from 0 times/hour to 1 time/hour     Patient Description of current symptoms: Mentally and physically exhausted from SI, racing thoughts     Mental Status Exam:   Attitude: cooperative  Eye Contact: fair  Mood: anxious, sad , and depressed  Affect: restricted range  Speech: clear, coherent  Psychomotor Behavior: no evidence of tardive dyskinesia, dystonia, or tics  Thought Process:  logical and linear  Associations: no loose associations  Thought Content: passive suicidal ideation present  Insight: good  Judgement: intact  Attention Span and Concentration: fair    Pt progress: Met with Pt in interview room to process feelings around hospitalization. Pt reports he feels like a failure, is having racing thoughts and feels suicide is his \"only option.\" Pt also reports that completing suicide would be against his values and he is motivated to get better. Discussed his hx of SI, that he thinks about it most of the day. Does not feel he can control his ruminating behavior. Engaged in grounding exercise and timed Pt to not think engage in SI for 1 minute, then 90 seconds which he was able to do while naming things in the room. Reports he felt good afterward seeing he was able to distract himself. Made plan for Pt to practice this for 1 minute every hour today. Will follow up on Monday.    Treatment Objective(s) Addressed:   The focus of this session was on rapport building, orienting the patient to therapy, identifying and practicing coping strategies, " identifying treatment goals, and building self-esteem     Progress Towards Goals and Assessment of Patient:   Unable to assess progress towards goals - first meeting with Pt.     Therapeutic Intervention(s):   Provided active listening, unconditional positive regard, and validation.   Understand and identify reasons for hospitalization, how to use the hospital to create change and prevent future hospitalizations , Explored motivation for behavioral change, Coached on coping techniques/relaxation skills to help improve distress tolerance and managing intense emotions. , Identified and practiced coping skills, Identified stress relief practices, and Using CBT tools and instruction to improve insight, awareness, identifying unhealthy patterns and self-talk and replacing with healthier patterns and self-talk    Safety Plan: Not yet started.    Plan/next step: Made plan to meet again early next week.    60713 - Psychotherapy (with patient) - 30 (16-37*) min    Patient Active Problem List   Diagnosis    Opioid use disorder, severe, on maintenance therapy (H)    Suicidal ideation    Severe benzodiazepine use disorder (H)    Substance-induced anxiety disorder (H)    Benzodiazepine withdrawal without complication (H)    Cannabis use disorder, moderate, dependence (H)    Cigarette nicotine dependence with withdrawal    Moderate episode of recurrent major depressive disorder (H)    Homelessness    Opioid withdrawal (H)    MDD (major depressive disorder), recurrent severe, without psychosis (H)    ALISSON (generalized anxiety disorder)

## 2025-03-21 NOTE — PLAN OF CARE
Goal Outcome Evaluation:    Plan of Care Reviewed With: patient      Problem: Suicide Risk  Goal: Absence of Self-Harm  Outcome: Progressing  Note: Zenon presented with a flat, depressed affect. He approached writer crying this morning, stating he was suicidal. Pt stated he was hopeless for the future, he has no supports and nothing to look forward to. Writer offered emotional support. Pt contracted for safety, stated he has no plan or intent while in the hospital. He reported no medication has helped with his depression and he fears he will lose his memory with ECT. He paced the blanchard and sat in the lounge during the shift. He was socially withdrawn and didn't interact with peers. MSSA was a 1 and 1.    Temp: (P) 97.9  F (36.6  C) Temp src: (P) Oral BP: 129/88 Pulse: 69   Resp: 18 SpO2: (P) 100 % O2 Device: (P) None (Room air)

## 2025-03-22 PROCEDURE — 250N000013 HC RX MED GY IP 250 OP 250 PS 637

## 2025-03-22 PROCEDURE — 124N000002 HC R&B MH UMMC

## 2025-03-22 PROCEDURE — 97150 GROUP THERAPEUTIC PROCEDURES: CPT | Mod: GO

## 2025-03-22 RX ADMIN — NICOTINE POLACRILEX 4 MG: 4 LOZENGE ORAL at 11:02

## 2025-03-22 RX ADMIN — THIAMINE HCL TAB 100 MG 100 MG: 100 TAB at 07:51

## 2025-03-22 RX ADMIN — CLONIDINE HYDROCHLORIDE 0.1 MG: 0.1 TABLET ORAL at 13:14

## 2025-03-22 RX ADMIN — HYDROXYZINE HYDROCHLORIDE 25 MG: 25 TABLET, FILM COATED ORAL at 11:18

## 2025-03-22 RX ADMIN — GABAPENTIN 600 MG: 600 TABLET, FILM COATED ORAL at 13:14

## 2025-03-22 RX ADMIN — DESVENLAFAXINE 25 MG: 25 TABLET, EXTENDED RELEASE ORAL at 07:50

## 2025-03-22 RX ADMIN — NICOTINE POLACRILEX 4 MG: 4 LOZENGE ORAL at 06:52

## 2025-03-22 RX ADMIN — MIRTAZAPINE 45 MG: 45 TABLET, FILM COATED ORAL at 20:23

## 2025-03-22 RX ADMIN — NICOTINE POLACRILEX 4 MG: 4 LOZENGE ORAL at 15:09

## 2025-03-22 RX ADMIN — NICOTINE POLACRILEX 4 MG: 4 LOZENGE ORAL at 19:16

## 2025-03-22 RX ADMIN — NICOTINE POLACRILEX 4 MG: 4 LOZENGE ORAL at 20:23

## 2025-03-22 RX ADMIN — CLONIDINE HYDROCHLORIDE 0.1 MG: 0.1 TABLET ORAL at 19:21

## 2025-03-22 RX ADMIN — NICOTINE POLACRILEX 4 MG: 4 LOZENGE ORAL at 08:15

## 2025-03-22 RX ADMIN — METHADONE HYDROCHLORIDE 105 MG: 10 CONCENTRATE ORAL at 07:51

## 2025-03-22 RX ADMIN — HYDROCORTISONE: 1 CREAM TOPICAL at 20:25

## 2025-03-22 RX ADMIN — Medication 1 TABLET: at 07:50

## 2025-03-22 RX ADMIN — NICOTINE POLACRILEX 4 MG: 4 LOZENGE ORAL at 13:15

## 2025-03-22 RX ADMIN — FOLIC ACID 1 MG: 1 TABLET ORAL at 07:51

## 2025-03-22 RX ADMIN — PHENOBARBITAL 32.4 MG: 32.4 TABLET ORAL at 19:21

## 2025-03-22 RX ADMIN — CLONIDINE HYDROCHLORIDE 0.1 MG: 0.1 TABLET ORAL at 07:51

## 2025-03-22 RX ADMIN — NICOTINE POLACRILEX 4 MG: 4 LOZENGE ORAL at 16:47

## 2025-03-22 RX ADMIN — GABAPENTIN 600 MG: 600 TABLET, FILM COATED ORAL at 07:51

## 2025-03-22 RX ADMIN — GABAPENTIN 600 MG: 600 TABLET, FILM COATED ORAL at 19:21

## 2025-03-22 ASSESSMENT — ACTIVITIES OF DAILY LIVING (ADL)
ADLS_ACUITY_SCORE: 26
LAUNDRY: WITH SUPERVISION
ADLS_ACUITY_SCORE: 26
ADLS_ACUITY_SCORE: 26
DRESS: INDEPENDENT
ADLS_ACUITY_SCORE: 26
LAUNDRY: WITH SUPERVISION
ADLS_ACUITY_SCORE: 26
HYGIENE/GROOMING: INDEPENDENT
ORAL_HYGIENE: INDEPENDENT
ADLS_ACUITY_SCORE: 26
HYGIENE/GROOMING: INDEPENDENT
ADLS_ACUITY_SCORE: 26
ADLS_ACUITY_SCORE: 26
DRESS: INDEPENDENT
ORAL_HYGIENE: INDEPENDENT
ADLS_ACUITY_SCORE: 26

## 2025-03-22 NOTE — PLAN OF CARE
Problem: Sleep Disturbance  Goal: Adequate Sleep/Rest  Outcome: Progressing   Goal Outcome Evaluation:       Pt sleeping when writer assumed shift. Pt slept most of the night shift and writer observed pt breathing with no distress with even breathing pattern. No prn administered during the night shift. No behavior outburst observed or reported and 15 minutes safety checks completed per protocol.    Pt slept 6.75 hours.

## 2025-03-22 NOTE — PLAN OF CARE
Goal Outcome Evaluation:    Plan of Care Reviewed With: patient      Problem: Suicide Risk  Goal: Absence of Self-Harm  Outcome: Progressing  Note: Zenon presented with a flat, depressed affect. He endorsed high anxiety and depression. Denied SI. Reported he feels hopeless and stated he feels like he is still withdrawing, complaining of a runny nose and malaise. Pt wondered if his phenobarbital was being tapered too fast. Writer reassured pt. MSSA was a 1 at 0800. Pt questioned why his wellbutrin was not ordered. After talking with the on-call, writer informed him it was held due to it lowering his seizure threshold and the plan to pursue ECT, which he is agreement with. Pt stated he feels the benefits of ECT helping his depression outweigh the risks of memory loss. He sat in the lounge for the majority of the shift, worked on [employer] and attended group. Requested and received hydroxyzine for anxiety at 1118 which he stated was ineffective. At 1300, pt stated he felt like punching a wall and feels unable to focus. MSSA was a 5. Scheduled gabapentin and clonidine given. He was sleeping on reassessment.    Temp: 97  F (36.1  C) Temp src: Temporal BP: 129/72 Pulse: 66     SpO2: 100 % O2 Device: None (Room air)

## 2025-03-22 NOTE — PLAN OF CARE
"  Problem: Suicide Risk  Goal: Absence of Self-Harm  Intervention: Assess Risk to Self and Maintain Safety  Recent Flowsheet Documentation  Taken 3/21/2025 1920 by Hannah Ballesteros RN  Self-Harm Prevention: environmental self-harm risks assessed      Problem: Alcohol Withdrawal  Goal: Alcohol Withdrawal Symptom Control  Intervention: Minimize or Manage Alcohol Withdrawal Symptoms  Recent Flowsheet Documentation  Taken 3/21/2025 1748 by Hannah Ballesteros RN  [employer] Precautions: awake/alert before oral intake  Seizure Precautions: clutter-free environment maintained   Goal Outcome Evaluation:progressing.    Plan of Care Reviewed With: patient          Met with pt in his room, resting in bed. He presented with a flat and restricted affect. He reported feeling depressed and anxious, rating his anxiety at 8/10 and depression at 4/10. His mood appeared guarded, withdrawn, and cooperative, with thoughts appearing hopelessness. Pt was offered PRN anti-anxiety medication but declined, stating that \"I will just sleep it off.\" He denied experiencing SI/SIB/HI and hallucinations. Pt declined encouragement to participate in OT group and other unit activities. Pt spent most of the shift in bed. However, he ate his meal in the dining room, and consumed sufficient amount. Pt is medication adherent. MSSA score was 1 at 1600. He declined MSSA assessment at 2000, no notable signs of withdrawal observed. No behavioral or angry outbursts were observed or reported. No report of pain.?           "

## 2025-03-22 NOTE — CARE PLAN
Rehab Group    Start time: 1015  End time: 1100  Patient time total: 30 minutes (Pt was billed for only 1 unit as he was alone in group for part of the session.)    attended partial group     #3 attended   Group Type: occupational therapy   Group Topic Covered: balanced lifestyle, cognitive activities, coping skills, educational support, healthy leisure time, substance use/recovery , self-care, self-esteem, and social skills       Group Session Detail:  Education and group discussion provided on the benefits of positive thinking with hands on Affirmation and/or Gratitude Calendars for concentration, organization/planning, sequencing, direction following, coping, mood stabilization, reality-based activity, follow through, fostering hope for a sustainable recovery, building self-esteem, improving insight and self-awareness, and socialization.       Patient Response/Contribution:  organized, socially appropriate, and actively engaged       Patient Detail:  Pt was pleasant and fully engaged in the group activity.  He was social upon approach and completed his full calendar with dates, affirmations in each box and adding decoration.  Pt shared aloud a couple of the affirmations that resonated most to him.          [id number] OT Group (2 or more in attendance)      Patient Active Problem List   Diagnosis    Opioid use disorder, severe, on maintenance therapy (H)    Suicidal ideation    Severe benzodiazepine use disorder (H)    Substance-induced anxiety disorder (H)    Benzodiazepine withdrawal without complication (H)    Cannabis use disorder, moderate, dependence (H)    Cigarette nicotine dependence with withdrawal    Moderate episode of recurrent major depressive disorder (H)    Homelessness    Opioid withdrawal (H)    MDD (major depressive disorder), recurrent severe, without psychosis (H)    ALISSON (generalized anxiety disorder)

## 2025-03-23 PROCEDURE — 250N000013 HC RX MED GY IP 250 OP 250 PS 637

## 2025-03-23 PROCEDURE — 124N000002 HC R&B MH UMMC

## 2025-03-23 RX ORDER — HYDROXYZINE HYDROCHLORIDE 50 MG/1
50 TABLET, FILM COATED ORAL EVERY 6 HOURS PRN
Status: DISCONTINUED | OUTPATIENT
Start: 2025-03-23 | End: 2025-03-25

## 2025-03-23 RX ORDER — PHENOBARBITAL 32.4 MG/1
32.4 TABLET ORAL ONCE
Status: COMPLETED | OUTPATIENT
Start: 2025-03-23 | End: 2025-03-23

## 2025-03-23 RX ADMIN — GABAPENTIN 600 MG: 600 TABLET, FILM COATED ORAL at 19:12

## 2025-03-23 RX ADMIN — NICOTINE POLACRILEX 4 MG: 4 LOZENGE ORAL at 17:39

## 2025-03-23 RX ADMIN — THIAMINE HCL TAB 100 MG 100 MG: 100 TAB at 07:37

## 2025-03-23 RX ADMIN — NICOTINE POLACRILEX 4 MG: 4 LOZENGE ORAL at 20:45

## 2025-03-23 RX ADMIN — GABAPENTIN 600 MG: 600 TABLET, FILM COATED ORAL at 07:37

## 2025-03-23 RX ADMIN — METHADONE HYDROCHLORIDE 105 MG: 10 CONCENTRATE ORAL at 07:39

## 2025-03-23 RX ADMIN — NICOTINE POLACRILEX 4 MG: 4 LOZENGE ORAL at 09:29

## 2025-03-23 RX ADMIN — Medication 1 TABLET: at 07:37

## 2025-03-23 RX ADMIN — MIRTAZAPINE 45 MG: 45 TABLET, FILM COATED ORAL at 20:10

## 2025-03-23 RX ADMIN — NICOTINE POLACRILEX 4 MG: 4 LOZENGE ORAL at 19:03

## 2025-03-23 RX ADMIN — HYDROCORTISONE: 1 CREAM TOPICAL at 19:13

## 2025-03-23 RX ADMIN — NICOTINE POLACRILEX 4 MG: 4 LOZENGE ORAL at 15:35

## 2025-03-23 RX ADMIN — NICOTINE POLACRILEX 4 MG: 4 LOZENGE ORAL at 06:49

## 2025-03-23 RX ADMIN — NICOTINE POLACRILEX 4 MG: 4 LOZENGE ORAL at 07:59

## 2025-03-23 RX ADMIN — NICOTINE POLACRILEX 4 MG: 4 LOZENGE ORAL at 12:51

## 2025-03-23 RX ADMIN — DESVENLAFAXINE 25 MG: 25 TABLET, EXTENDED RELEASE ORAL at 07:37

## 2025-03-23 RX ADMIN — CLONIDINE HYDROCHLORIDE 0.1 MG: 0.1 TABLET ORAL at 07:37

## 2025-03-23 RX ADMIN — NICOTINE POLACRILEX 4 MG: 4 LOZENGE ORAL at 11:26

## 2025-03-23 RX ADMIN — PHENOBARBITAL 32.4 MG: 32.4 TABLET ORAL at 14:33

## 2025-03-23 RX ADMIN — GABAPENTIN 600 MG: 600 TABLET, FILM COATED ORAL at 13:20

## 2025-03-23 RX ADMIN — CLONIDINE HYDROCHLORIDE 0.1 MG: 0.1 TABLET ORAL at 13:20

## 2025-03-23 RX ADMIN — HYDROXYZINE HYDROCHLORIDE 25 MG: 25 TABLET, FILM COATED ORAL at 11:03

## 2025-03-23 RX ADMIN — FOLIC ACID 1 MG: 1 TABLET ORAL at 07:37

## 2025-03-23 ASSESSMENT — ACTIVITIES OF DAILY LIVING (ADL)
ADLS_ACUITY_SCORE: 26
HYGIENE/GROOMING: INDEPENDENT
ADLS_ACUITY_SCORE: 26
ADLS_ACUITY_SCORE: 26
HYGIENE/GROOMING: INDEPENDENT
ADLS_ACUITY_SCORE: 26
ADLS_ACUITY_SCORE: 26
LAUNDRY: WITH SUPERVISION
ADLS_ACUITY_SCORE: 26
ORAL_HYGIENE: INDEPENDENT
ADLS_ACUITY_SCORE: 26
DRESS: INDEPENDENT
ADLS_ACUITY_SCORE: 26
LAUNDRY: WITH SUPERVISION
ORAL_HYGIENE: INDEPENDENT
DRESS: INDEPENDENT
ADLS_ACUITY_SCORE: 26

## 2025-03-23 NOTE — PLAN OF CARE
"Goal Outcome Evaluation:    Plan of Care Reviewed With: patient      Problem: Suicide Risk  Goal: Absence of Self-Harm  Outcome: Progressing  Note: Zenon presented with a flat, blunted affect. Reported depression was \"not bad\" today, rated as low. He denied SI. Endorsed high anxiety. Pt reported withdrawal symptoms of feeling hot and cold. He stated he thinks his methadone dose may be too low. Reported he used to be on 180 mg of methadone. MSSA was a 2 at 0800. He requested and received hydroxyzine 25 mg for anxiety at 1100, which pt reported was ineffective. Writer messaged on-call and hydroxyzine was increased to 50 mg. Around 1300, pt was irritable. He complained of feeling hot and cold, agitated and unable to sit still. MSSA was a 5. When writer brought him his scheduled clonidine and gabapentin, pt stated, \"I don't care. It's not like those are going to do shit anyway. And I don't want to do ECT either.\" Writer checked on him an hour later and pt stated medications were ineffective. Writer messaged on-call. A one time dose of phenobarbital 32.4mg was ordered and given to pt at 1433.      Temp: 97  F (36.1  C) Temp src: Temporal BP: 126/82 Pulse: 60     SpO2: 100 % O2 Device: None (Room air)         "

## 2025-03-23 NOTE — PLAN OF CARE
"  Problem: Suicide Risk  Goal: Absence of Self-Harm  Outcome: Progressing  Behavior Management: impulse control promoted  Intervention: Promote Psychosocial Wellbeing  Recent Flowsheet Documentation  Taken 3/22/2025 1809 by Hannah Ballesteros RN  Supportive Measures:   active listening utilized   self-reflection promoted   Goal Outcome Evaluation:    Plan of Care Reviewed With: patient          Pt remained mostly isolated and withdrawn to his room, spending most of his time laying in bed. During a check-in, he reported feeling sad, depressed, and agitated, stating, \"It's my fucking birthday. I'm supposed to be happy, but I'm not.\" His affect was congruent with his mood. In an effort to boost his spirits, nurse obtained some treats, including ice cream, candy, and a slice of cake from the cafeteria. Pt appeared pleased with the gesture and expressed gratitude towards nurse. Despite this, pt's overall demeanor remained withdrawn. He did come out to eat his meal in the dining room, sitting alone and not engaging with others, and promptly returned to his room after eating. Pt was medication compliant and on time at the window for his hs medication. He was later observed walking in the hallway. He stated he was getting some steps in. He denied pain, thoughts of self-harm or harm to others, and hallucinations. Overall pt was behaviorally controlled. He went to sleep early.               "

## 2025-03-23 NOTE — PLAN OF CARE
Problem: Sleep Disturbance  Goal: Adequate Sleep/Rest  Outcome: Progressing   Goal Outcome Evaluation:    Patient slept 6.5hour during the night. Safety checked was done every 15 minute. No PRN was given. No concerned noted.

## 2025-03-24 PROCEDURE — 97150 GROUP THERAPEUTIC PROCEDURES: CPT | Mod: GO

## 2025-03-24 PROCEDURE — 250N000013 HC RX MED GY IP 250 OP 250 PS 637

## 2025-03-24 PROCEDURE — 124N000002 HC R&B MH UMMC

## 2025-03-24 PROCEDURE — 99232 SBSQ HOSP IP/OBS MODERATE 35: CPT | Mod: GC | Performed by: PSYCHIATRY & NEUROLOGY

## 2025-03-24 RX ORDER — PHENOBARBITAL 32.4 MG/1
32.4 TABLET ORAL ONCE
Status: COMPLETED | OUTPATIENT
Start: 2025-03-24 | End: 2025-03-24

## 2025-03-24 RX ADMIN — NICOTINE POLACRILEX 4 MG: 4 LOZENGE ORAL at 13:14

## 2025-03-24 RX ADMIN — CLONIDINE HYDROCHLORIDE 0.1 MG: 0.1 TABLET ORAL at 07:42

## 2025-03-24 RX ADMIN — NICOTINE POLACRILEX 4 MG: 4 LOZENGE ORAL at 05:20

## 2025-03-24 RX ADMIN — NICOTINE POLACRILEX 4 MG: 4 LOZENGE ORAL at 11:38

## 2025-03-24 RX ADMIN — NICOTINE POLACRILEX 4 MG: 4 LOZENGE ORAL at 20:04

## 2025-03-24 RX ADMIN — DESVENLAFAXINE 25 MG: 25 TABLET, EXTENDED RELEASE ORAL at 07:43

## 2025-03-24 RX ADMIN — NICOTINE POLACRILEX 4 MG: 4 LOZENGE ORAL at 09:56

## 2025-03-24 RX ADMIN — GABAPENTIN 600 MG: 600 TABLET, FILM COATED ORAL at 07:43

## 2025-03-24 RX ADMIN — NICOTINE POLACRILEX 4 MG: 4 LOZENGE ORAL at 16:45

## 2025-03-24 RX ADMIN — Medication 1 TABLET: at 07:43

## 2025-03-24 RX ADMIN — METHADONE HYDROCHLORIDE 105 MG: 10 CONCENTRATE ORAL at 07:42

## 2025-03-24 RX ADMIN — NICOTINE POLACRILEX 4 MG: 4 LOZENGE ORAL at 18:15

## 2025-03-24 RX ADMIN — THIAMINE HCL TAB 100 MG 100 MG: 100 TAB at 07:43

## 2025-03-24 RX ADMIN — MIRTAZAPINE 45 MG: 45 TABLET, FILM COATED ORAL at 20:04

## 2025-03-24 RX ADMIN — PHENOBARBITAL 32.4 MG: 32.4 TABLET ORAL at 13:56

## 2025-03-24 RX ADMIN — NICOTINE POLACRILEX 4 MG: 4 LOZENGE ORAL at 15:24

## 2025-03-24 RX ADMIN — HYDROCORTISONE: 1 CREAM TOPICAL at 20:06

## 2025-03-24 RX ADMIN — NICOTINE POLACRILEX 4 MG: 4 LOZENGE ORAL at 07:54

## 2025-03-24 RX ADMIN — HYDROXYZINE HYDROCHLORIDE 50 MG: 50 TABLET, FILM COATED ORAL at 11:40

## 2025-03-24 RX ADMIN — NICOTINE POLACRILEX 4 MG: 4 LOZENGE ORAL at 07:00

## 2025-03-24 RX ADMIN — GABAPENTIN 600 MG: 600 TABLET, FILM COATED ORAL at 20:04

## 2025-03-24 RX ADMIN — GABAPENTIN 600 MG: 600 TABLET, FILM COATED ORAL at 13:56

## 2025-03-24 RX ADMIN — FOLIC ACID 1 MG: 1 TABLET ORAL at 07:43

## 2025-03-24 ASSESSMENT — ACTIVITIES OF DAILY LIVING (ADL)
ADLS_ACUITY_SCORE: 26
DRESS: INDEPENDENT
ADLS_ACUITY_SCORE: 26
DRESS: PROMPTS
ADLS_ACUITY_SCORE: 26
HYGIENE/GROOMING: INDEPENDENT
ADLS_ACUITY_SCORE: 26
LAUNDRY: WITH SUPERVISION
ADLS_ACUITY_SCORE: 26
ADLS_ACUITY_SCORE: 26
ORAL_HYGIENE: INDEPENDENT
HYGIENE/GROOMING: INDEPENDENT
ORAL_HYGIENE: INDEPENDENT
ADLS_ACUITY_SCORE: 36
ADLS_ACUITY_SCORE: 36
ADLS_ACUITY_SCORE: 26
ADLS_ACUITY_SCORE: 26
ADLS_ACUITY_SCORE: 36
ADLS_ACUITY_SCORE: 36
ADLS_ACUITY_SCORE: 26
LAUNDRY: WITH SUPERVISION

## 2025-03-24 NOTE — PLAN OF CARE
"  Problem: Adult Behavioral Health Plan of Care  Goal: Adheres to Safety Considerations for Self and Others  Flowsheets (Taken 3/23/2025 1953)  Adheres to Safety Considerations for Self and Others: making progress toward outcome   Goal Outcome Evaluation:    Plan of Care Reviewed With: patient          Pt remained isolated and withdrawn to his room, spending most of his time in bed. He briefly came out of his room for meal and medication administration, but did not engage with peers or staff. Pt presented with a flat affect and reported feeling agitated, and did not provide further explanation for his agitation. He denied experiencing suicidal and homicidal ideation, and reported no pain. He has been compliant with his medication regimen. No angry outbursts or safety concerns were noted during the shift. His MSSA scores were 4 and 3. Pt declined his scheduled Clonidine, siting his \"phenobarbital\" is discontinue, therefore does not need Clonidine. Provider was informed.                 "

## 2025-03-24 NOTE — PLAN OF CARE
Occupational Therapy   Brief Assessment   [facility]    Information is based on chart review, interdisciplinary team correspondence and writer's observations and interactions with the patient in groups and on the unit.      Data     03/19/25 1100   General Information   Date Initially Attended OT 03/19/25   Clinical Impression   Affect Restricted   Orientation Oriented to person, place and time   Appearance and ADLs General cleanliness observed in most areas   Attention to Internal Stimuli No observed signs   Interaction Skills Interacts appropriately with staff;Interacts appropriately with peers   Ability to Communicate Needs Independent   Verbal Content Articulate;Clear;Appropriate to topic   Ability to Maintain Boundaries Maintains appropriate physical boundaries;Maintains appropriate verbal boundaries   Participation Independently participates   Concentration Concentrates 20-30 minutes   Ability to Concentrate With structure   Follows and Comprehends Directions Independently follows multi-step directions   Memory Delayed and immediate recall intact;Needs further assessment   Organization Independently organizes all tasks   Decision Making Independent   Planning and Problem Solving Independently plans ahead   Ability to Apply and Learn Concepts Needs further assessment   Frustrations / Stress Tolerance Needs further assessment   Level of Insight Identifies needs with structure/support   Self Esteem Takes risks with support and encouragement;Accepts positive feedback   Social Supports Unable to identify any supports   BE[facility] OT Care Plan Goals   OT Care Plan coping with symptoms       Assessment    Anticipate patient will benefit from a safe, structured environment focusing on recovery strategies while stabilizing on medication.  Patient appears to have limited knowledge of and follow through with coping skills and self-management strategies, thus contributing to exacerbation of mental health symptoms.  Patient's  insight into their mental illness is fair at this time.  Patient would benefit from OT groups for exploration of coping skills and leisure interests for symptom and stress management, exploration of and practice using relapse prevention strategies, and opportunities for: Self-care skills, social interaction skills, self-management skills and ADL/work/leisure/processing skills training.      Plan    Care plan goals have been initiated (see flowsheet above).  Plan to offer graded occupation-based activities to support progress towards goals and independence with ADLs/IADLs in the community upon discharge.  Continue to correspond with interdisciplinary team regarding ongoing treatment plan, potential changes in patient's status, and discharge planning.     Paige Mtz OT, OTR/L  3/24/2025

## 2025-03-24 NOTE — PLAN OF CARE
Problem: Adult Behavioral Health Plan of Care  Goal: Optimized Coping Skills in Response to Life Stressors  Outcome: Not Progressing  Note: Patient spends most of shift out in the lounge. He reports feeling restless with internal agitation. States that he continues to have a lot of sweating and runny nose with his withdrawal symptoms. Concerned about Wellbutrin haven been discontinued. Pt MSSA at 0800 was 4. Hydroxyzine 50mg was given at 1140 per his request. Lavender was also given. Reports that his auras continue. States that he has frequent bursts of light around him. Consult from addiction team was ordered. Patient feels comforted by that . His 1400 clonidine was held due to pulse being 53. He is med compliant. Lisa Mederos RN    Goal Outcome Evaluation:

## 2025-03-24 NOTE — PLAN OF CARE
"Individual Therapy Note      Pt progress: Checked in with Pt to discuss progress on mental health symptoms and engage in discussion around coping skills. Pt reports grounding exercises did not help his agitation and anxiety over the weekend. Reports he feels \"horrible\" today, could be due to stopping Wellbutrin or anxiety about starting ECT this week. Provided active listening, validation and words of encouragement. Encouraged Pt to continue to try grounding exercises which Pt agreed to keep trying.       No Charge (0-15 min)        "

## 2025-03-24 NOTE — PLAN OF CARE

## 2025-03-24 NOTE — PLAN OF CARE
Team Note Due:  Wednesday    Assessment/Intervention/Current Symtoms and Care Coordination:  Chart review and met with team, discussed pt progress, symptomology, and response to treatment.  Discussed the discharge plan and any potential impediments to discharge.       Discharge Plan or Goal:  TBD     Barriers to Discharge:  Medication Management  ECT     Referral Status:  None currently      Legal Status:  Voluntary       Contacts (include TIERNEY status):  Methadone Management: [facility] daily     /ACT Team: TIERNEY signed  Name/Clinic: [facility]    Number: [phone]      Upcoming Meetings and Dates/Important Information and next steps:  None currently

## 2025-03-24 NOTE — PLAN OF CARE
Problem: Sleep Disturbance  Goal: Adequate Sleep/Rest  3/24/2025 0121 by Hannah Ballesteros, RN  Outcome: Progressing  3/24/2025 0121 by Hannah Ballesteros, RN  Outcome: Progressing   Goal Outcome Evaluation:    Plan of Care Reviewed With: patient          Pt declined to participate in MSSA at midnight. When approached by nurse, he became agitated and angrily yelled, requesting to be left alone. At 0500 MSAA score was 2. He slept for approximately 5.25 hours, RR wnl. No c/o pain or safety concerns.

## 2025-03-24 NOTE — PROGRESS NOTES
"  ----------------------------------------------------------------------------------------------------------  [facility]  Psychiatry Progress Note  Hospital Day #6     Interim History:     The patient's care was discussed with the treatment team and chart notes were reviewed.    Vitals: VSS  Sleep: 5.25 hours (03/24/25 0630)  Scheduled medications: Did not take scheduled third dose of clonidine on 3/23, otherwise took medications as scheduled   Psychiatric PRN medications: hydroxyzine 25mg 3/22, hydroxyzine 25mg 3/23,     Staff Report:   No acute events or safety concerns overnight.  Zenon had withdrawal symptoms on Sunday with hot/cold chills so on-call resident was contacted and a dose of phenobarbital 32.4mg was ordered.  MSSA scores this weekend between 1-5. Please see staff notes for details.      Subjective:     Patient Interview:  Zenon Truong is feeling \"agitated\". He reports weekend was long. Feeling an overwhelming urge to do something but cannot decide  what to do . He is feeling very restless. Describes feeling as intense agitation,  ready to put his fist through a wall . He has been trying self-talk and distraction techniques to get his mind off it to no effect. He reports he doesn't want to hurt anyone. This is a distressing feeling for him and this feeling got worse over the weekend. He has been having ongoing frequent panic feelings, says it feels like  pressure on his chest .     His birthday was Saturday and he says it   sucked  but recognizes that he would have done fentanyl if he was not here.  He tried the hydroxyzine over the weekend, to no effect. Says it was like  spitting on a wildfire . Phenobarb yesterday helped a lot.     He has had similar symptoms in the past but never felt this intense before. He has been going through withdrawals daily with pins and needles feeling, chills, sweats. He previously had these symptoms when he was at a " methadone clinic while he was going up on his dosage. He says that going up on methadone helped at the time. He was on  methadone for 13 years until 2023 where they tapered him off in 2 weeks and transitioned to 8 mg Suboxone. This started his current high use of fentanyl. He was previously opioid free for 5 years.  He thinks increasing methadone would be a big help, as he currently gets only 4 hours of relief w/ 105 mg methadone which lasts until around 12-1 pm each day. He is open to being seen by addiction medicine. His goal is to get started with ECT.     We discussed a nicotine patch but he is allergic to adhesive in nicotine patch so has been using the nicotine lozenges. He had a question about adjuvant Ativan along with phenobarb taper due to the symptoms he has been having. We discussed that given his history of opioid/benzo use, Ativan may not be the best option so discussing with addiction medicine may be the best option to come up with a reasonable plan for his symptoms and tapering phenobarb for starting ECT.     Wellbutrin PTA stopped because of seizure risk. He is still having  Star bursts  in his vision, worsening the last few days. Same as previous prodrome for seizures. Endorses not having  much left in him  and is worried about being asked to leave before ECT starting. We reassured him that he will not be kicked out and ECT will be occurring, but just a matter of when.     ROS:  Patient has nausea, chills, and insomnia (falling asleep and staying asleep), occasional palpitations and chest pressure  Patient denies shortness of breath, sedation     Objective:     Vitals:  /66 (BP Location: Left arm, Patient Position: Sitting, Cuff Size: Adult Regular)   Pulse 56   Temp 98.3  F (36.8  C) (Oral)   Resp 16   Wt 93.1 kg (205 lb 4.8 oz)   SpO2 100%   BMI 28.65 kg/m      Allergies:  Allergies   Allergen Reactions    Iodinated Contrast Media Hives and Rash     Reports he is allergic to  Iodine contrast     Reports he is allergic to Iodine contrast       Reports he is allergic to Iodine contrast    Reports he is allergic to Iodine contrast      Reports he is allergic to Iodine contrast   Reports he is allergic to Iodine contrast    Iodine Hives    Lisinopril Hives and Rash    Lithium Hives    Topiramate Hives    Escitalopram Rash     Rash around mouth    Iodine-131 Hives    Trazodone Anxiety     Trazodone causes RLS for pt    Olanzapine Rash     Pt reported taking approximately one year ago. Developed rash around mouth.       Current Medications:  Scheduled:  Current Facility-Administered Medications   Medication Dose Route Frequency Provider Last Rate Last Admin    acetaminophen (TYLENOL) tablet 650 mg  650 mg Oral Q4H PRN Patti Bernard MD        alum & mag hydroxide-simethicone (MAALOX) suspension 30 mL  30 mL Oral Q4H PRN Patti Bernard MD        cloNIDine (CATAPRES) tablet 0.1 mg  0.1 mg Oral TID Tracy Castillo MD   0.1 mg at 03/24/25 0742    desvenlafaxine succinate (PRISTIQ) 24 hr tablet 25 mg  25 mg Oral Daily Patti Bernard MD   25 mg at 03/24/25 0743    folic acid (FOLVITE) tablet 1 mg  1 mg Oral Daily Patti Bernard MD   1 mg at 03/24/25 0743    gabapentin (NEURONTIN) tablet 600 mg  600 mg Oral TID Tracy Castillo MD   600 mg at 03/24/25 0743    hydrocortisone (CORTAID) 1 % cream   Topical BID Patti Bernard MD   Given at 03/23/25 1913    hydrOXYzine HCl (ATARAX) tablet 50 mg  50 mg Oral Q6H PRN Demetri Sarabia MD        melatonin tablet 3 mg  3 mg Oral At Bedtime PRN Patti Bernard MD        methadone (DOLOPHINE-INTENSOL) 10 MG/ML (HIGH CONC) solution 105 mg  105 mg Oral Daily Patti Bernard MD   105 mg at 03/24/25 0742    midazolam 5 mg/mL (VERSED) intranasal solution 10 mg  10 mg Intranasal Once PRN Tracy Castillo MD        And    mucosal atomization device #  device 1 Device  1 Device Inhalation DOES  NOT GO TO Tracy Thomson MD        mirtazapine (REMERON) tablet 45 mg  45 mg Oral At Bedtime Demetri Sarabia MD   45 mg at 03/23/25 2010    multivitamin w/minerals (THERA-VIT-M) tablet 1 tablet  1 tablet Oral Daily Patti Bernard MD   1 tablet at 03/24/25 0743    nicotine (NICORETTE) lozenge 4 mg  4 mg Buccal Q1H PRN Patti Bernard MD   4 mg at 03/24/25 0754    polyethylene glycol (MIRALAX) Packet 17 g  17 g Oral Daily PRN Patti Bernard MD        thiamine (B-1) tablet 100 mg  100 mg Oral Daily Patti Bernard MD   100 mg at 03/24/25 0743       PRN:  Current Facility-Administered Medications   Medication Dose Route Frequency Provider Last Rate Last Admin    acetaminophen (TYLENOL) tablet 650 mg  650 mg Oral Q4H PRN Patti Bernard MD        alum & mag hydroxide-simethicone (MAALOX) suspension 30 mL  30 mL Oral Q4H PRN Patti Bernard MD        cloNIDine (CATAPRES) tablet 0.1 mg  0.1 mg Oral TID Tracy Castillo MD   0.1 mg at 03/24/25 0742    desvenlafaxine succinate (PRISTIQ) 24 hr tablet 25 mg  25 mg Oral Daily Patti Bernard MD   25 mg at 03/24/25 0743    folic acid (FOLVITE) tablet 1 mg  1 mg Oral Daily Patti Bernard MD   1 mg at 03/24/25 0743    gabapentin (NEURONTIN) tablet 600 mg  600 mg Oral TID Tracy Castillo MD   600 mg at 03/24/25 0743    hydrocortisone (CORTAID) 1 % cream   Topical BID Patti Bernard MD   Given at 03/23/25 1913    hydrOXYzine HCl (ATARAX) tablet 50 mg  50 mg Oral Q6H PRN Demetri Sarabia MD        melatonin tablet 3 mg  3 mg Oral At Bedtime PRN Patti Bernard MD        methadone (DOLOPHINE-INTENSOL) 10 MG/ML (HIGH CONC) solution 105 mg  105 mg Oral Daily Patti Bernard MD   105 mg at 03/24/25 0742    midazolam 5 mg/mL (VERSED) intranasal solution 10 mg  10 mg Intranasal Once PRN Tracy Castillo MD        And    mucosal atomization device #  device 1 Device  1 Device  "Inhalation DOES NOT GO TO MAR Tracy Castillo MD        mirtazapine (REMERON) tablet 45 mg  45 mg Oral At Bedtime Demetri Sarabia MD   45 mg at 03/23/25 2010    multivitamin w/minerals (THERA-VIT-M) tablet 1 tablet  1 tablet Oral Daily Patti Bernard MD   1 tablet at 03/24/25 0743    nicotine (NICORETTE) lozenge 4 mg  4 mg Buccal Q1H PRN Patti Bernard MD   4 mg at 03/24/25 0754    polyethylene glycol (MIRALAX) Packet 17 g  17 g Oral Daily PRN Patti Bernard MD        thiamine (B-1) tablet 100 mg  100 mg Oral Daily Patti Bernard MD   100 mg at 03/24/25 0743       Labs and Imaging:  New results:   No results found for this or any previous visit (from the past 24 hours).    Data this admission:  - CBC with chronic thrombocytopenia (platelet count 78)   - CMP with mildly elevated AST (47) and ALT (84)   - TSH normal  - UDS positive for amphetamines, cannabinoids, fentanyl  - Vit D in process  - Lipids not ordered this admission (but within normal limits on 2/26/2025)  - Vit B12 normal  - Folate normal  - EKG sinus bradycardia, QTc 396     Mental Status Exam:     Oriented to:  Grossly Oriented, Person/Self, and Situation  General:  Awake and Alert, answers questions openly.   Appearance:  Appears older than stated age Edentulous. Slightly disheveled, dressed in personal clothing.   Behavior/Attitude:  generally calm but does express desire to \"run his fist through the wall\", is able to recognize that he will not act on this desire.   Eye Contact: Appropriate  Psychomotor: Normal no catatonia present  Speech:  appropriate volume/tone, spontaneous, and with good articulation  Language: Fluent in English with appropriate syntax and vocabulary.  Mood:  \"Terrible\"  Affect:   restricted affect   Thought Process:   linear, coherent. Ruminative as he returns to the idea of hopelessness and having \"nothing to live for\" throughout conversation.   Thought Content:   suicidal ideation with " plan (without intent); No apparent delusions  Associations:  intact  Insight:  fair due to his ability to recognize rational thoughts from irrational thought, like the scenarios of killing himself. He does still express significant depression which limits his insight into his hopelessness and outlook/goals for treatment.   Judgment:  fair due to being engaged in groups/treatment here and cooperative. He does express the desire to die but is able to separate his thoughts from reality.   Impulse control: fair due to prior to admission behaviors but has remained safe and cooperative here, able to recognize impulses and has not acted on them during his admission   Attention Span:  adequate for conversation  Concentration:  grossly intact to conversation   Recent and Remote Memory:  not formally assessed but intact to conversation   Fund of Knowledge: average, estimated  Muscle Strength and Tone: normal  Gait and Station: Normal     Psychiatric Assessment     Zenon Truong is a 54 year old male with previous psychiatric diagnoses of polysubstance use disorder [benzodiazepines, opiates, methamphetamine, THC, nicotine], severe depression with suicidal ideation, borderline personality disorder and PTSD admitted 03/18/2025 due to concern for SI with plan and intent in the context of medication non adherence, substance use and housing/financial insecurity. On admission, he presented with symptoms of SI with intent and plan, hopelessness, poor motivation, low energy, anhedonia, impulsivity and irritability.      Had a recent hospitalization at [facility] 2/23-3/6/2025 for suicidal ideation and benzodiazepine overdose. ECT was considered and planned during that admission, but he decided against pursuing it at that time. Discharged on bupropion, Mirtazapine increased to 30 mgs, methadone and gabapentin. His Prazosin was continued as well. Did not continue taking his meds after leaving.     There is genetic predisposition given  family hx of substance use disorder. Patient has struggled with homelessness and severe substance use for years. This current admission is likely precipitated by recent psychiatric hospitalization at end of February followed by relapse soon after discharge to a sober house. Perpetuating factors include ongoing substance use, medication non adherence, longstanding functional impairment, personality traits, lack of perceived support, deconditioning and housing and financial insecurity. Patient's support system includes  . Based on patient's history and current symptoms, criteria are met for primary diagnosis of MDD without psychotic features, opioid use disorder, benzodiazepine use disorder, stimulant use disorder and cannabis use disorder. Patient warrants inpatient hospitalization to maintain their safety and would benefit from medication optimization and outpatient referral/resources.       In regards to management, patient was continued on Mirtazapine and Desvenlafaxine. Prior to admission while on the hospitalist service, given severity of depression symptoms, ECT consult was obtained and plan is to commence after phenobarbital taper complete if patient is medically cleared. He did express some reservation due to short term memory loss from previous sessions, but remains hopeful it could be helpful. Will continue to wean phenobarbital based on symptoms. Mirtazapine was increased to 45 mg on 3/20 due to ongoing insomnia. Will also restart PTA gabapentin 600mg TID for ongoing anxiety- he reports this was helpful in the past.     He does report that he was on 180 mg methadone for 13 years and relapsed on opioids when this was tapered. He has been on PTA 105mg methadone but given his ongoing symptoms of withdrawal and discomfort, we will consult addiction medicine for additional input on methadone dosage and other options.         Today's changes:  - will give one tome phenobarbital dose of 32.4mg due  to significant discomfort that seem at least in part due to withdrawal  - Consult addiction medicine given complex withdrawal and ongoing symptoms       Psychiatric Plan by Diagnosis      #Major depressive disorder, recurrent, severe without psychosis  - Continue Mirtazapine 45 mg po at bedtime  - Continue Desvenlafaxine 25mg po every day   - Plan to start ECT after phenobarbital taper completed      #Opioid use disorder, severe  - Continue Methadone 105mg every day   - addiction team consulted  - Thiamine, MVI, folic acid      #Benzodiazepine use disorder, severe  - Continue phenobarb taper   - Continue clonidine 0.1mg TID for withdrawal symptoms      #Stimulant (methamphetamine) use disorder  #Cannabis use disorder     Additional Plans:  - Patient will be treated in therapeutic milieu with appropriate individual and group therapies as described.     Psychiatric Hospital Course:      Zenon Truong was admitted to [facility] as a voluntary patient.   Medications and other updates:  3/14: hold bupropion due to risk of lower seizure threshold  3/14: Continue PTA pristiq 25mg and remeron 30mg for sleep  3/18: start clonidine 0.1mg TID   3/20: Increase mirtazapine to 45 mg daily for continued insomnia   3/21: restart PTA gabapentin 600mg TID for anxiety   3/21: continuing phenobarb taper (today is 32.4 mg BID)  3/23: no scheduled phenobarb received 32.4mg due to ongoing withdrawal symptoms  3/24: give one time phenobarbital dose of 32.4mg due to significant discomfort that seem at least in part due to withdrawal  3/24: consult addiction medicine for assistance with methadone dose and ongoing withdrawal symptoms   3/24: discussed re-initiating wellbutrin. However, patient reporting symptoms that he feels were similar to auras he has had prior to seizures (this has been worse the last couple days), so hesitant to do this at this time.     The risks, benefits, alternatives, and side effects were discussed and  understood by the patient.     Medical Assessment and Plan     #Palpitations  3/21 patient reporting short episodes (few seconds) of palpitations occurring intermittently over the last month. Does have shortness of breath with this, no chest pain or other symptoms. He is not sure if it is associated with anxiety.   - repeat EKG ordered showed sinus bradycardia with no significant change to prior EKG     #Chronic hepatitis C, never treated  #Mild transaminase elevations, hepatic steatosis:  Never treated for hepatitis C.  Chronic, mild transaminase elevations with normal hepatic function.  Most recent abdominal ultrasound 2/15/2024 showed hepatic steatosis without hepatic lesions, normal gallbladder.  Denies current alcohol use.  - monitor outpatient      #Chronic thrombocytopenia  #Normocytic anemia,  now resolved  Chronic thrombocytopenia, moderate and stable.  Denies any history of bleeding including no history of gastric ulcers, hematemesis, melena or hematochezia.  Anemia appears to be more recent since 2/23/2025 when hemoglobin was 11.6, MCV 86.  Hgb 14.1, Plts 97, MCV 86, ferritin, iron, TIBC wnl on 3/15.      #Concern for malnutrition  Seen by dietician team, patient does not meet two of the established criteria necessary for diagnosing malnutrition but is at risk for malnutrition.   - ensure enlive ordered     Medical course: Patient was physically examined by the ED prior to being transferred to the unit and was found to be medically stable and appropriate for admission.      Consults:  Medicine once off phenobarb (for clearance for ECT)  Addiction med (for methadone dosage and withdrawal symptoms)      Checklist     Legal Status: Voluntary     Safety Assessment:   Behavioral Orders   Procedures    Code 1 - Restrict to Unit    Routine Programming     As clinically indicated    Seizure precautions    Status 15     Every 15 minutes.    Suicide precautions: Suicide Risk: LOW; Clinical rationale to override  score: lack of access to a plan for self-harm     .     Order Specific Question:   Suicide Risk     Answer:   LOW     Order Specific Question:   Clinical rationale to override score:     Answer:   lack of access to a plan for self-harm    Withdrawal precautions       Risk Assessment:  Risk for harm is low to moderate  Risk factors: SI, maladaptive coping, substance use, impulsive, and past behaviors  Protective factors: engaged in treatment     SIO: no     Disposition:  Pending stabilization, ECT, medication optimization, & development of a safe discharge plan.     Attestations     Mikayla Latham, MS3  [facility] Medical Student     I was present with the medical student who participated in the service and in the documentation of the note.  I have verified the history and personally performed the physical exam and medical decision making. I agree with the assessment and plan of care as documented in the note.    This patient was seen and discussed with my attending physician.  Tracy Castillo MD   Psychiatry Resident Physician      This patient has been seen and evaluated by me, Charles Fink.  I have discussed this patient with the psychiatry resident and I agree with the findings and plan in this note.    I have reviewed today's vital signs, medications, labs and imaging.     Charles Menjivar MD

## 2025-03-24 NOTE — PROGRESS NOTES
"  Rehab Group    Start time: 1030  End time: 1200  Patient time total: 35 minutes    attended partial group    #6 attended   Group Type: occupational therapy   Group Topic Covered: cognitive activities and coping skills     Group Session Detail:  OT clinic     Patient Response/Contribution:  cooperative with task, organized, attentive, and actively engaged     Patient Detail:    Pt actively participated in occupational therapy clinic to facilitate coping skill exploration, creative expression within personally meaningful activities, and clinical observation of social, cognitive, and kinesthetic performance skills. Pt response: Independent to initiate, gather materials, sequence, and adjust to workspace demands as needed. Demonstrated good focus, planning, and attention to detail for selected structured creative expression task. Able to ask for assistance as needed, and occasionally socialized with peers and staff. Politely requested to take [employer] puzzles to work on in his free time outside of group, which writer encouraged him to do. Reported feeling \"restless and anxious\" and was encouraged to mention this to his treatment team. Calm, pleasant, and polite in interactions.      [id number] OT Group (2 or more in attendance)      Patient Active Problem List   Diagnosis    Opioid use disorder, severe, on maintenance therapy (H)    Suicidal ideation    Severe benzodiazepine use disorder (H)    Substance-induced anxiety disorder (H)    Benzodiazepine withdrawal without complication (H)    Cannabis use disorder, moderate, dependence (H)    Cigarette nicotine dependence with withdrawal    Moderate episode of recurrent major depressive disorder (H)    Homelessness    Opioid withdrawal (H)    MDD (major depressive disorder), recurrent severe, without psychosis (H)    ALISSON (generalized anxiety disorder)       "

## 2025-03-25 LAB
ATRIAL RATE - MUSE: 50 BPM
DIASTOLIC BLOOD PRESSURE - MUSE: NORMAL MMHG
INTERPRETATION ECG - MUSE: NORMAL
P AXIS - MUSE: 64 DEGREES
PR INTERVAL - MUSE: 144 MS
QRS DURATION - MUSE: 112 MS
QT - MUSE: 436 MS
QTC - MUSE: 397 MS
R AXIS - MUSE: 49 DEGREES
SYSTOLIC BLOOD PRESSURE - MUSE: NORMAL MMHG
T AXIS - MUSE: 40 DEGREES
VENTRICULAR RATE- MUSE: 50 BPM

## 2025-03-25 PROCEDURE — 124N000002 HC R&B MH UMMC

## 2025-03-25 PROCEDURE — 99232 SBSQ HOSP IP/OBS MODERATE 35: CPT | Mod: GC | Performed by: PSYCHIATRY & NEUROLOGY

## 2025-03-25 PROCEDURE — 250N000013 HC RX MED GY IP 250 OP 250 PS 637

## 2025-03-25 RX ORDER — HYDROXYZINE HYDROCHLORIDE 50 MG/1
50 TABLET, FILM COATED ORAL 4 TIMES DAILY PRN
Status: DISPENSED | OUTPATIENT
Start: 2025-03-25

## 2025-03-25 RX ORDER — DESVENLAFAXINE 25 MG/1
50 TABLET, EXTENDED RELEASE ORAL DAILY
Status: DISPENSED | OUTPATIENT
Start: 2025-03-26

## 2025-03-25 RX ORDER — PHENOBARBITAL 32.4 MG/1
32.4 TABLET ORAL ONCE
Status: COMPLETED | OUTPATIENT
Start: 2025-03-25 | End: 2025-03-25

## 2025-03-25 RX ADMIN — MIRTAZAPINE 45 MG: 45 TABLET, FILM COATED ORAL at 21:29

## 2025-03-25 RX ADMIN — NICOTINE POLACRILEX 4 MG: 4 LOZENGE ORAL at 21:29

## 2025-03-25 RX ADMIN — NICOTINE POLACRILEX 4 MG: 4 LOZENGE ORAL at 19:14

## 2025-03-25 RX ADMIN — NICOTINE POLACRILEX 4 MG: 4 LOZENGE ORAL at 16:01

## 2025-03-25 RX ADMIN — PHENOBARBITAL 32.4 MG: 32.4 TABLET ORAL at 10:17

## 2025-03-25 RX ADMIN — NICOTINE POLACRILEX 4 MG: 4 LOZENGE ORAL at 09:11

## 2025-03-25 RX ADMIN — NICOTINE POLACRILEX 4 MG: 4 LOZENGE ORAL at 12:53

## 2025-03-25 RX ADMIN — CLONIDINE HYDROCHLORIDE 0.1 MG: 0.1 TABLET ORAL at 19:14

## 2025-03-25 RX ADMIN — NICOTINE POLACRILEX 4 MG: 4 LOZENGE ORAL at 06:41

## 2025-03-25 RX ADMIN — HYDROXYZINE HYDROCHLORIDE 50 MG: 50 TABLET, FILM COATED ORAL at 04:27

## 2025-03-25 RX ADMIN — NICOTINE POLACRILEX 4 MG: 4 LOZENGE ORAL at 11:13

## 2025-03-25 RX ADMIN — FOLIC ACID 1 MG: 1 TABLET ORAL at 07:46

## 2025-03-25 RX ADMIN — NICOTINE POLACRILEX 4 MG: 4 LOZENGE ORAL at 18:19

## 2025-03-25 RX ADMIN — GABAPENTIN 600 MG: 600 TABLET, FILM COATED ORAL at 19:14

## 2025-03-25 RX ADMIN — DESVENLAFAXINE 25 MG: 25 TABLET, EXTENDED RELEASE ORAL at 07:46

## 2025-03-25 RX ADMIN — GABAPENTIN 600 MG: 600 TABLET, FILM COATED ORAL at 07:46

## 2025-03-25 RX ADMIN — Medication 1 TABLET: at 07:46

## 2025-03-25 RX ADMIN — NICOTINE POLACRILEX 4 MG: 4 LOZENGE ORAL at 17:22

## 2025-03-25 RX ADMIN — NICOTINE POLACRILEX 4 MG: 4 LOZENGE ORAL at 07:53

## 2025-03-25 RX ADMIN — THIAMINE HCL TAB 100 MG 100 MG: 100 TAB at 07:47

## 2025-03-25 RX ADMIN — CLONIDINE HYDROCHLORIDE 0.1 MG: 0.1 TABLET ORAL at 07:46

## 2025-03-25 RX ADMIN — HYDROXYZINE HYDROCHLORIDE 50 MG: 50 TABLET, FILM COATED ORAL at 19:14

## 2025-03-25 RX ADMIN — HYDROXYZINE HYDROCHLORIDE 50 MG: 50 TABLET, FILM COATED ORAL at 17:23

## 2025-03-25 RX ADMIN — NICOTINE POLACRILEX 4 MG: 4 LOZENGE ORAL at 14:15

## 2025-03-25 RX ADMIN — HYDROCORTISONE: 1 CREAM TOPICAL at 19:17

## 2025-03-25 RX ADMIN — METHADONE HYDROCHLORIDE 105 MG: 10 CONCENTRATE ORAL at 07:47

## 2025-03-25 ASSESSMENT — ACTIVITIES OF DAILY LIVING (ADL)
ADLS_ACUITY_SCORE: 26
HYGIENE/GROOMING: INDEPENDENT
ADLS_ACUITY_SCORE: 26
DRESS: INDEPENDENT
LAUNDRY: WITH SUPERVISION
DRESS: INDEPENDENT
ADLS_ACUITY_SCORE: 26
ADLS_ACUITY_SCORE: 26
HYGIENE/GROOMING: INDEPENDENT
ADLS_ACUITY_SCORE: 26
ORAL_HYGIENE: INDEPENDENT
ADLS_ACUITY_SCORE: 26
LAUNDRY: WITH SUPERVISION
ORAL_HYGIENE: INDEPENDENT

## 2025-03-25 NOTE — PROGRESS NOTES
"  ----------------------------------------------------------------------------------------------------------  [facility]  Psychiatry Progress Note  Hospital Day #7     Interim History:     The patient's care was discussed with the treatment team and chart notes were reviewed.    Vitals: VSS. /74  Sleep: 5.25 hours (03/25/25 0630)  Scheduled medications: Did not take afternoon and evening dose of clonidine due to low HR (below 60). Took other scheduled meds.  Psychiatric PRN medications: Hydroxyzine 50mg     Staff Report:   No acute events or safety concerns overnight.   Please see staff notes for details.   - has appeared restless/uncomfortable  - was given TIPP skills sheet  - was talking about wanting to leave today due to discomfort  - \"starbursts\" in vision have improved  - MSSA 8 this morning     Subjective:     Patient Interview:  Zenon had a rough night, couldn't sleep. He was restless, anxious. Feels like he is in a constant withdrawal. Was using \"illicit Xanax\" prior to admission that was likely higher in dose than reported. He was using them frequently ; \"eating them like M&Ms.   He described how he was on methadone for around 12 years in the past, and was very consistent w/ methadone at the time. Says in the 12 years he probably only missed \"a handful of doses\". He says that he was using a lot of fentanyl on the street and \"how potent it is\".   We discussed methadone dosing and the addiction medicine team. His MSSA scale this morning was 8 and he says this was from his BP being high. He says he didn't use clonidine due to resting pulse of 40's.  He would like to get a dose of phenobarb today for symptoms. \"Just want something to get some relief\". He says he is frustrated at how bad he is feeling, especially at 3am this morning when he wanted to sleep so badly but was tossing and turning. He got frustrated and slammed his door. He took hydroxyzine at 0400 " but it did not do much.     He was wondering about his dose of pristiq as he remembers being on 100mg in the past.     ROS:  Patient has chills and insomnia (falling asleep), anxiety, restlessness  Patient denies nausea and pain       Objective:     Vitals:  BP (!) 141/100   Pulse 60   Temp 97.5  F (36.4  C) (Temporal)   Resp 12   Wt 92.8 kg (204 lb 8 oz)   SpO2 100%   BMI 28.53 kg/m      Allergies:  Allergies   Allergen Reactions    Iodinated Contrast Media Hives and Rash     Reports he is allergic to Iodine contrast     Reports he is allergic to Iodine contrast       Reports he is allergic to Iodine contrast    Reports he is allergic to Iodine contrast      Reports he is allergic to Iodine contrast   Reports he is allergic to Iodine contrast    Iodine Hives    Lisinopril Hives and Rash    Lithium Hives    Topiramate Hives    Escitalopram Rash     Rash around mouth    Iodine-131 Hives    Trazodone Anxiety     Trazodone causes RLS for pt    Olanzapine Rash     Pt reported taking approximately one year ago. Developed rash around mouth.       Current Medications:  Scheduled:  Current Facility-Administered Medications   Medication Dose Route Frequency Provider Last Rate Last Admin    acetaminophen (TYLENOL) tablet 650 mg  650 mg Oral Q4H PRN Patti Bernard MD        alum & mag hydroxide-simethicone (MAALOX) suspension 30 mL  30 mL Oral Q4H PRN Patti Bernard MD        cloNIDine (CATAPRES) tablet 0.1 mg  0.1 mg Oral TID Tracy Castillo MD   0.1 mg at 03/25/25 0746    [START ON 3/26/2025] desvenlafaxine succinate (PRISTIQ) 24 hr tablet 50 mg  50 mg Oral Daily Tracy Castillo MD        folic acid (FOLVITE) tablet 1 mg  1 mg Oral Daily Patti Bernard MD   1 mg at 03/25/25 0746    gabapentin (NEURONTIN) tablet 600 mg  600 mg Oral TID Tracy Castillo MD   600 mg at 03/25/25 0746    hydrocortisone (CORTAID) 1 % cream   Topical BID Patti Bernard MD   Given at 03/24/25  2006    hydrOXYzine HCl (ATARAX) tablet 50 mg  50 mg Oral Q6H PRN Demetri Sarabia MD   50 mg at 03/25/25 0427    melatonin tablet 3 mg  3 mg Oral At Bedtime PRN Patti Bernard MD        methadone (DOLOPHINE-INTENSOL) 10 MG/ML (HIGH CONC) solution 105 mg  105 mg Oral Daily Patti Bernard MD   105 mg at 03/25/25 0747    midazolam 5 mg/mL (VERSED) intranasal solution 10 mg  10 mg Intranasal Once PRN Tracy Castillo MD        And    mucosal atomization device #  device 1 Device  1 Device Inhalation DOES NOT GO TO MAR Tracy Castillo MD        mirtazapine (REMERON) tablet 45 mg  45 mg Oral At Bedtime Demetri Sarabia MD   45 mg at 03/24/25 2004    multivitamin w/minerals (THERA-VIT-M) tablet 1 tablet  1 tablet Oral Daily Patti Bernard MD   1 tablet at 03/25/25 0746    nicotine (NICORETTE) lozenge 4 mg  4 mg Buccal Q1H PRN Patti Bernard MD   4 mg at 03/25/25 1113    polyethylene glycol (MIRALAX) Packet 17 g  17 g Oral Daily PRN Patti Bernard MD        thiamine (B-1) tablet 100 mg  100 mg Oral Daily Patti Bernard MD   100 mg at 03/25/25 0747       PRN:  Current Facility-Administered Medications   Medication Dose Route Frequency Provider Last Rate Last Admin    acetaminophen (TYLENOL) tablet 650 mg  650 mg Oral Q4H PRN Patti Bernard MD        alum & mag hydroxide-simethicone (MAALOX) suspension 30 mL  30 mL Oral Q4H PRN Patti Bernard MD        cloNIDine (CATAPRES) tablet 0.1 mg  0.1 mg Oral TID Tracy Castillo MD   0.1 mg at 03/25/25 0746    [START ON 3/26/2025] desvenlafaxine succinate (PRISTIQ) 24 hr tablet 50 mg  50 mg Oral Daily Tracy Castillo MD        folic acid (FOLVITE) tablet 1 mg  1 mg Oral Daily Patti Bernard MD   1 mg at 03/25/25 0746    gabapentin (NEURONTIN) tablet 600 mg  600 mg Oral TID Tracy Castillo MD   600 mg at 03/25/25 0746    hydrocortisone (CORTAID) 1 % cream   Topical BID  Patti Bernard MD   Given at 03/24/25 2006    hydrOXYzine HCl (ATARAX) tablet 50 mg  50 mg Oral Q6H PRN Demetri Sarabia MD   50 mg at 03/25/25 0427    melatonin tablet 3 mg  3 mg Oral At Bedtime PRN Patti Bernard MD        methadone (DOLOPHINE-INTENSOL) 10 MG/ML (HIGH CONC) solution 105 mg  105 mg Oral Daily Patti Bernard MD   105 mg at 03/25/25 0747    midazolam 5 mg/mL (VERSED) intranasal solution 10 mg  10 mg Intranasal Once PRN Tracy Castillo MD        And    mucosal atomization device #  device 1 Device  1 Device Inhalation DOES NOT GO TO Tracy Thomson MD        mirtazapine (REMERON) tablet 45 mg  45 mg Oral At Bedtime Demetri Sarabia MD   45 mg at 03/24/25 2004    multivitamin w/minerals (THERA-VIT-M) tablet 1 tablet  1 tablet Oral Daily Patti Bernard MD   1 tablet at 03/25/25 0746    nicotine (NICORETTE) lozenge 4 mg  4 mg Buccal Q1H PRN Patti Bernard MD   4 mg at 03/25/25 1113    polyethylene glycol (MIRALAX) Packet 17 g  17 g Oral Daily PRN Patti Bernard MD        thiamine (B-1) tablet 100 mg  100 mg Oral Daily Patti Bernard MD   100 mg at 03/25/25 0747       Labs and Imaging:  New results:   No results found for this or any previous visit (from the past 24 hours).    Data this admission:  - CBC with chronic thrombocytopenia (platelet count 78)   - CMP with mildly elevated AST (47) and ALT (84)   - TSH normal  - UDS positive for amphetamines, cannabinoids, fentanyl  - Vit D in process  - Lipids not ordered this admission (but within normal limits on 2/26/2025)  - Vit B12 normal  - Folate normal  - EKG sinus bradycardia, QTc 396     Mental Status Exam:     Oriented to:  Grossly Oriented, Person/Self, and Situation  General:  Awake and Alert, answers questions openly.   Appearance:  Appears older than stated age Edentulous. Slightly disheveled, dressed in personal clothing.   Behavior/Attitude:  generally calm but does  "express frustration with his withdrawal symptoms and occasionally becomes overwhelmed (like when he slammed the door overnight) but is able to calm and redirect, is able to recognize that he will not act out of frustration.   Eye Contact: Appropriate  Psychomotor: Normal no catatonia present  Speech:  appropriate volume/tone, spontaneous, and with good articulation  Language: Fluent in English with appropriate syntax and vocabulary.  Mood:  \"Restless\"  Affect:   restricted affect   Thought Process:   linear, coherent. Appears less ruminative around \"having nothing to live for\" and expresses that he is hopeful that his withdrawal symptoms will hit a peak and improve.   Thought Content:   suicidal ideation with plan (without intent); No apparent delusions  Associations:  intact  Insight:  fair due to his ability to recognize rational thoughts from irrational thought, like the scenarios of killing himself. He does still express significant depression which limits his insight into his hopelessness and outlook/goals for treatment.   Judgment:  fair due to being engaged in groups/treatment here and cooperative. He does express the desire to die but is able to separate his thoughts from reality.   Impulse control: fair due to prior to admission behaviors but has remained safe and cooperative here, able to recognize impulses and has not acted on them during his admission   Attention Span:  adequate for conversation  Concentration:  grossly intact to conversation   Recent and Remote Memory:  not formally assessed but intact to conversation   Fund of Knowledge: average, estimated  Muscle Strength and Tone: normal  Gait and Station: Normal        Psychiatric Assessment     Zenon Truong is a 54 year old male with previous psychiatric diagnoses of polysubstance use disorder [benzodiazepines, opiates, methamphetamine, THC, nicotine], severe depression with suicidal ideation, borderline personality disorder and PTSD admitted " 03/18/2025 due to concern for SI with plan and intent in the context of medication non adherence, substance use and housing/financial insecurity. On admission, he presented with symptoms of SI with intent and plan, hopelessness, poor motivation, low energy, anhedonia, impulsivity and irritability.      Had a recent hospitalization at [facility] 2/23-3/6/2025 for suicidal ideation and benzodiazepine overdose. ECT was considered and planned during that admission, but he decided against pursuing it at that time. Discharged on bupropion, Mirtazapine increased to 30 mgs, methadone and gabapentin. His Prazosin was continued as well. Did not continue taking his meds after leaving.     There is genetic predisposition given family hx of substance use disorder. Patient has struggled with homelessness and severe substance use for years. This current admission is likely precipitated by recent psychiatric hospitalization at end of February followed by relapse soon after discharge to a sober house. Perpetuating factors include ongoing substance use, medication non adherence, longstanding functional impairment, personality traits, lack of perceived support, deconditioning and housing and financial insecurity. Patient's support system includes  . Based on patient's history and current symptoms, criteria are met for primary diagnosis of MDD without psychotic features, opioid use disorder, benzodiazepine use disorder, stimulant use disorder and cannabis use disorder. Patient warrants inpatient hospitalization to maintain their safety and would benefit from medication optimization and outpatient referral/resources.       In regards to management, patient was continued on Mirtazapine and Desvenlafaxine. Prior to admission while on the hospitalist service, given severity of depression symptoms, ECT consult was obtained and plan is to commence after phenobarbital taper complete if patient is medically cleared. He did express  some reservation due to short term memory loss from previous sessions, but remains hopeful it could be helpful. Will continue to wean phenobarbital based on symptoms. Mirtazapine was increased to 45 mg on 3/20 due to ongoing insomnia. Will also restart PTA gabapentin 600mg TID for ongoing anxiety- he reports this was helpful in the past. Per ECT team, hold gabapentin after 6pm the day before session but is not prohibitory towards starting ECT.      He does report that he was on 180 mg methadone for 13 years and relapsed on opioids when this was tapered. He has been on PTA 105mg methadone but given his ongoing symptoms of withdrawal and discomfort, addiction medicine consulted on 3/24 for additional input on methadone dosage and other options.     Per chart review, he was previously on 50mg desvenlafaxine succinate which was increased to 100mg on 11/21/2024. He was discharged from [facility] from his voluntary psychiatric admissionn from 2/18- 3/6 with 50mg desvenlafaxine succinate. However, he stopped taking his psychiatric medications after discharge.  On his admission here on 3/14, he was restarted on 25mg desvenlafaxine succinate as he had not been on it since prior discharge.      Today's changes:  - Increase Desvenlafaxine succinate to 50mg daily   - One time phenobarb dose 32.4 due to MSSA score of 8 this morning/overall symptoms   - To be seen by addiction med today  - Plan for gabapentin for ECT: hold doses after 6pm the night before ECT sessions but does not need to be off completely     Psychiatric Plan by Diagnosis      #Major depressive disorder, recurrent, severe without psychosis  - Continue Mirtazapine 45 mg po at bedtime  - Increase Desvenlafaxine to 50mg po every day   - Plan to start ECT after phenobarbital taper completed      #Opioid use disorder, severe  - Continue Methadone 105mg every day   - Addiction team consulted  - Thiamine, MVI, folic acid      #Benzodiazepine use disorder, severe  -  Continue phenobarb taper (now giving one time doses as necessary due to significant symptoms)  - Continue clonidine 0.1mg TID for withdrawal symptoms      #Stimulant (methamphetamine) use disorder  #Cannabis use disorder     Additional Plans:  - Patient will be treated in therapeutic milieu with appropriate individual and group therapies as described.     Psychiatric Hospital Course:      Zenon Truong was admitted to [facility] as a voluntary patient.   Medications and other updates:  3/14: hold bupropion due to risk of lower seizure threshold  3/14: Continue PTA desvenlafaxine 25mg and mirtazipine 30mg for sleep  3/18: start clonidine 0.1mg TID   3/20: Increase mirtazapine to 45 mg daily for continued insomnia   3/21: restart PTA gabapentin 600mg TID for anxiety   3/21: continuing phenobarb taper (today is 32.4 mg BID)  3/23: no scheduled phenobarb received 32.4mg due to ongoing withdrawal symptoms  3/24: give one time phenobarbital dose of 32.4mg due to significant discomfort that seem at least in part due to withdrawal  3/24: consult addiction medicine for assistance with methadone dose and ongoing withdrawal symptoms   3/24: discussed re-initiating wellbutrin. However, patient reporting symptoms that he feels were similar to auras he has had prior to seizures (this has been worse the last couple days), so hesitant to do this at this time.  3/25: one time phenobarbital dose of 32.4mg given significant withdrawal symptoms  3/25: increase desvenlafaxine to 50mg daily (per chart review, was prescribed this dose outpatient)     The risks, benefits, alternatives, and side effects were discussed and understood by the patient.     Medical Assessment and Plan     #Palpitations  3/21 patient reporting short episodes (few seconds) of palpitations occurring intermittently over the last month. Does have shortness of breath with this, no chest pain or other symptoms. He is not sure if it is associated with anxiety.   -  repeat EKG ordered showed sinus bradycardia with no significant change to prior EKG      #Chronic hepatitis C, never treated  #Mild transaminase elevations, hepatic steatosis:  Never treated for hepatitis C.  Chronic, mild transaminase elevations with normal hepatic function.  Most recent abdominal ultrasound 2/15/2024 showed hepatic steatosis without hepatic lesions, normal gallbladder.  Denies current alcohol use.  - monitor outpatient      #Chronic thrombocytopenia  #Normocytic anemia,  now resolved  Chronic thrombocytopenia, moderate and stable.  Denies any history of bleeding including no history of gastric ulcers, hematemesis, melena or hematochezia.  Anemia appears to be more recent since 2/23/2025 when hemoglobin was 11.6, MCV 86.  Hgb 14.1, Plts 97, MCV 86, ferritin, iron, TIBC wnl on 3/15.      #Concern for malnutrition  Seen by dietician team, patient does not meet two of the established criteria necessary for diagnosing malnutrition but is at risk for malnutrition.   - ensure enlive ordered     Medical course: Patient was physically examined by the ED prior to being transferred to the unit and was found to be medically stable and appropriate for admission.      Consults:  Medicine once off phenobarb (for clearance for ECT)  Addiction med (for methadone dosage and withdrawal symptoms)      Checklist     Legal Status: Voluntary     Safety Assessment:   Behavioral Orders   Procedures    Code 1 - Restrict to Unit    Routine Programming     As clinically indicated    Seizure precautions    Status 15     Every 15 minutes.    Suicide precautions: Suicide Risk: LOW; Clinical rationale to override score: lack of access to a plan for self-harm     .     Order Specific Question:   Suicide Risk     Answer:   LOW     Order Specific Question:   Clinical rationale to override score:     Answer:   lack of access to a plan for self-harm    Withdrawal precautions       Risk Assessment:  Risk for harm is low to  moderate  Risk factors: SI, maladaptive coping, substance use, impulsive, and past behaviors  Protective factors: engaged in treatment     SIO: none     Disposition:  Pending stabilization, ECT, medication optimization, & development of a safe discharge plan.     Attestations     Mikayla Latham, MS3  [facility] Medical Student     I was present with the medical student who participated in the service and in the documentation of the note.  I have verified the history and personally performed the physical exam and medical decision making. I agree with the assessment and plan of care as documented in the note.    This patient was seen and discussed with my attending physician.  Tracy Castillo MD   Psychiatry Resident Physician      This patient has been seen and evaluated by me, Charles Fink.  I have discussed this patient with the psychiatry resident and I agree with the findings and plan in this note.    I have reviewed today's vital signs, medications, labs and imaging.     Charles Menjivar MD

## 2025-03-25 NOTE — PLAN OF CARE
"  Problem: Suicide Risk  Goal: Absence of Self-Harm  Intervention: Assess Risk to Self and Maintain Safety  Recent Flowsheet Documentation  Taken 3/25/2025 1708 by Hannah Ballesteros RN  Behavior Management: impulse control promoted  Self-Harm Prevention: environmental self-harm risks assessed  Enhanced Safety Measures: room near unit station   Goal Outcome Evaluation:    Plan of Care Reviewed With: patient          Pt was observed pacing back and forth on the unit, appearing restless, irritable, and tense. His affect was congruent with his mood, and he reported feeling \"angry and agitated.\" He endorsed a high anxiety level, rated 10/10. At 1700, pt was administered 50mg of PRN Hydroxyzine. However, he approached nurse an hour later, stating that the medication had not alleviated his symptoms. He requested Seroquel, and the Provider was notified. An additional 50mg of Hydroxyzine was ordered and administered.    He received his scheduled medication as ordered. Later, he reported that his symptoms had subsided. MSSA scores were 6 at 1600 and declined 2000 assessment. No self-injurious behavior was observed during this shift. No report of pain    "

## 2025-03-25 NOTE — PLAN OF CARE
BEH IP Unit Acuity Rating Score (UARS)  Patient is given one point for every criteria they meet.    CRITERIA SCORING   On a 72 hour hold, court hold, committed, stay of commitment, or revocation. 0    Patient LOS on BEH unit exceeds 20 days. 0  LOS: 7   Patient under guardianship, 55+, otherwise medically complex, or under age 11. 0   Suicide ideation without relief of precipitating factors. 1   Current plan for suicide. 1   Current plan for homicide. 0   Imminent risk or actual attempt to seriously harm another without relief of factors precipitating the attempt. 0   Severe dysfunction in daily living (ex: complete neglect for self care, extreme disruption in vegetative function, extreme deterioration in social interactions). 1   Recent (last 7 days) or current physical aggression in the ED or on unit. 0   Restraints or seclusion episode in past 72 hours. 0   Recent (last 7 days) or current verbal aggression, agitation, yelling, etc., while in the ED or unit. 0   Active psychosis. 0   Need for constant or near constant redirection (from leaving, from others, etc).  0   Intrusive or disruptive behaviors. 0   Patient requires 3 or more hours of individualized nursing care per 8-hour shift (i.e. for ADLs, meds, therapeutic interventions). 0   TOTAL 3

## 2025-03-25 NOTE — PLAN OF CARE
"  Problem: Adult Behavioral Health Plan of Care  Goal: Adheres to Safety Considerations for Self and Others  Outcome: Progressing     Problem: Suicide Risk  Goal: Absence of Self-Harm  Outcome: Progressing  Intervention: Assess Risk to Self and Maintain Safety  Recent Flowsheet Documentation  Taken 3/25/2025 0055 by Branden Watkins RN  Behavior Management: other (see comments)  Self-Harm Prevention: other (see comments)  Enhanced Safety Measures: other (see comments)  Intervention: Promote Psychosocial Wellbeing  Recent Flowsheet Documentation  Taken 3/25/2025 0055 by Branden Watkins RN  Supportive Measures: other (see comments)  Sleep/Rest Enhancement:   noise level reduced   visualization  Family/Support System Care: other (see comments)  Intervention: Establish Safety Plan and Continuity of Care  Recent Flowsheet Documentation  Taken 3/25/2025 0055 by Branden Watkins RN  Safe Transition Promotion: other (see comments)     Problem: Sleep Disturbance  Goal: Adequate Sleep/Rest  Outcome: Progressing  Intervention: Promote Sleep/Rest  Recent Flowsheet Documentation  Taken 3/25/2025 0055 by Branden Watkins RN  Sleep/Rest Enhancement:   noise level reduced   visualization   Goal Outcome Evaluation:  Patient was calm, and quietly sleeping at the beginning of the shift until 0355 when he came out of room and requested for nicotine lozenge. Writer told patient that he cannot have the nicotine lozenge at the time. Patient stated that he can't sleep, and he's very anxious. Writer offered hydroxyzine which patient declined saying that \"hydroxyzine can't do anything. Patient went back to his room and angrily hit the door using his fist, and then slammed it shut. Patient again came out of room 15 minutes later, and sat at the lounge for a few minutes, calmed down, and requested hydroxyzine that he had declined earlier. Patient rated anxiety at 8/10, however; denied pain, depression, hallucination, SI/SIB and " other mental illness symptoms. Patient declined vitals, and continues to be on withdrawal precautions (MSSA) while awake. Patient slept for 5.25 hours this shift, No other concerns at this time, team will continue to implement plan of care.

## 2025-03-25 NOTE — PLAN OF CARE
Team Note Due:  Wednesday    Assessment/Intervention/Current Symtoms and Care Coordination:  Chart review and met with team, discussed pt progress, symptomology, and response to treatment.  Discussed the discharge plan and any potential impediments to discharge.    -Called and left a voicemail with Zenon's , Stephanie. Received a call back, and she discussed treatment plans for Zenon. She expressed concern for Zenon, stating he is stuck in a cycle and does not follow through with treatment recommendations. She reported Zenon has a history of being recommended for ECT, but he gets scared and does not follow through. She discussed Zenon's suicidal ideation, stating Zenon has bought multiple guns and has talked about running in front of a car multiple times. She reported in 2021, Zenon had two attempts at [place] and was placed on commitment after. She is worried Zenon's suicidal ideation will continue if he leaves and he will not be safe. She recommended placing Zenon on a hold and petitioning for commitment if he tries to leave. This writer will follow up with the team regarding Stephanie's concerns, and will keep Stephanie updated on Zenon's progress and treatment.        Discharge Plan or Goal:  TBD     Barriers to Discharge:  Medication Management  ECT     Referral Status:  None currently      Legal Status:  Voluntary       Contacts (include TIERNEY status):  Methadone Management: [facility] daily     /ACT Team: TIERNEY signed  Name/Clinic: Stephanie [facility]    Number: [phone]      Upcoming Meetings and Dates/Important Information and next steps:  Visit with  4/3 9am

## 2025-03-25 NOTE — PLAN OF CARE
"  Problem: Depressive Signs/Symptoms  Goal: Improved Mood Symptoms (Depressive Signs/Symptoms)  Outcome: Not Progressing   Goal Outcome Evaluation:    Plan of Care Reviewed With: patient      \"I'm the same.\" Depression is 8/10, anxiety is 7/10. States has racing thoughts, \"I can't concentrate, I have a 10 second attention span.\" Patient feels very restless and anxious. MSSA this morning was 8 . Received Phenobarbital at 1017. With some relief of withdrawal symptoms. BP was elevated at that time. Patient states has a runny nose \"all the time.\" Patient states has suicidal thoughts with a plan to cut wrist, OD, hang self, [place]. Patient does not have SI plan in the hospital and contracts for safety here. Thoughts are \"muddled, I feel confused. Sometimes I don't know which way to walk.\" Denies paranoia. Attends groups, \"that helps me get my mind off myself.\" States is social with peers. At 1400 BP was 143/102-57. Patient is irritable and angry. Pulse was too low for Clonidine and declined the Gabapentin. Declined MSSA. Declined Hydroxyzine. Laying in bed currently. Frequently requests Nicotine Gum. Dr. Fink was updated with patient's elevated BP and patient's mood.            "

## 2025-03-25 NOTE — PLAN OF CARE
"  Problem: Suicide Risk  Goal: Absence of Self-Harm  Intervention: Assess Risk to Self and Maintain Safety  Recent Flowsheet Documentation  Taken 3/24/2025 1838 by Hannah Ballesteros RN  Behavior Management: impulse control promoted  Self-Harm Prevention: environmental self-harm risks assessed  Enhanced Safety Measures: room near unit station   Goal Outcome Evaluation:    Plan of Care Reviewed With: patient          Pt was primarily observed resting in bed in his room. He approached nurse at the desk only to request for nicotine gum, and was in the dinning room for meal but chose to sit alone and did not engage with others. He presented with a blunted and labile affect, and his mood presented as irritable, withdrawn, and guarded. He expressed frustration that his withdrawal symptoms were not being effectively addressed by his Provider, stating that the prescribed medications were ineffective. Nurse encouraged him to discuss his concerns regarding his medication regimen with the Provider. In response, pt stated, \"I'm just going to leave tomorrow.\" He took his gabapentin, but declined scheduled Clonidine 0.1mg, citing ineffectiveness and a pulse rate below 60, as per orders. He also declined subsequent attempts to check his pulse. Pt denied experiencing pain but endorsed anxiety and depression, rating both as 7/10. Despite this, he declined interventions for these symptoms.           "

## 2025-03-26 PROCEDURE — 97150 GROUP THERAPEUTIC PROCEDURES: CPT | Mod: GO

## 2025-03-26 PROCEDURE — 99253 IP/OBS CNSLTJ NEW/EST LOW 45: CPT

## 2025-03-26 PROCEDURE — 99254 IP/OBS CNSLTJ NEW/EST MOD 60: CPT | Mod: GC | Performed by: STUDENT IN AN ORGANIZED HEALTH CARE EDUCATION/TRAINING PROGRAM

## 2025-03-26 PROCEDURE — 250N000013 HC RX MED GY IP 250 OP 250 PS 637

## 2025-03-26 PROCEDURE — 124N000002 HC R&B MH UMMC

## 2025-03-26 PROCEDURE — 99233 SBSQ HOSP IP/OBS HIGH 50: CPT | Mod: GC | Performed by: PSYCHIATRY & NEUROLOGY

## 2025-03-26 RX ORDER — NALOXONE HYDROCHLORIDE 0.4 MG/ML
0.4 INJECTION, SOLUTION INTRAMUSCULAR; INTRAVENOUS; SUBCUTANEOUS
Status: ACTIVE | OUTPATIENT
Start: 2025-03-26

## 2025-03-26 RX ORDER — NALOXONE HYDROCHLORIDE 0.4 MG/ML
0.2 INJECTION, SOLUTION INTRAMUSCULAR; INTRAVENOUS; SUBCUTANEOUS
Status: ACTIVE | OUTPATIENT
Start: 2025-03-26

## 2025-03-26 RX ORDER — GABAPENTIN 600 MG/1
600 TABLET ORAL 3 TIMES DAILY
Status: DISCONTINUED | OUTPATIENT
Start: 2025-03-26 | End: 2025-03-27

## 2025-03-26 RX ORDER — METHADONE HYDROCHLORIDE 10 MG/ML
115 CONCENTRATE ORAL DAILY
Status: DISCONTINUED | OUTPATIENT
Start: 2025-03-27 | End: 2025-03-27

## 2025-03-26 RX ORDER — LOPERAMIDE HYDROCHLORIDE 2 MG/1
2 CAPSULE ORAL 4 TIMES DAILY PRN
Status: ACTIVE | OUTPATIENT
Start: 2025-03-26

## 2025-03-26 RX ORDER — METHADONE HYDROCHLORIDE 10 MG/ML
10 CONCENTRATE ORAL ONCE
Status: COMPLETED | OUTPATIENT
Start: 2025-03-26 | End: 2025-03-26

## 2025-03-26 RX ORDER — DICYCLOMINE HYDROCHLORIDE 10 MG/1
10 CAPSULE ORAL 4 TIMES DAILY PRN
Status: DISPENSED | OUTPATIENT
Start: 2025-03-26

## 2025-03-26 RX ADMIN — NICOTINE POLACRILEX 4 MG: 4 LOZENGE ORAL at 14:46

## 2025-03-26 RX ADMIN — GABAPENTIN 600 MG: 600 TABLET, FILM COATED ORAL at 07:52

## 2025-03-26 RX ADMIN — HYDROXYZINE HYDROCHLORIDE 50 MG: 50 TABLET, FILM COATED ORAL at 17:04

## 2025-03-26 RX ADMIN — NICOTINE POLACRILEX 4 MG: 4 LOZENGE ORAL at 19:01

## 2025-03-26 RX ADMIN — NICOTINE POLACRILEX 4 MG: 4 LOZENGE ORAL at 17:51

## 2025-03-26 RX ADMIN — NICOTINE POLACRILEX 4 MG: 4 LOZENGE ORAL at 16:52

## 2025-03-26 RX ADMIN — CLONIDINE HYDROCHLORIDE 0.1 MG: 0.1 TABLET ORAL at 13:33

## 2025-03-26 RX ADMIN — NICOTINE POLACRILEX 4 MG: 4 LOZENGE ORAL at 11:09

## 2025-03-26 RX ADMIN — THIAMINE HCL TAB 100 MG 100 MG: 100 TAB at 07:52

## 2025-03-26 RX ADMIN — METHADONE HYDROCHLORIDE 105 MG: 10 CONCENTRATE ORAL at 07:53

## 2025-03-26 RX ADMIN — CLONIDINE HYDROCHLORIDE 0.1 MG: 0.1 TABLET ORAL at 07:52

## 2025-03-26 RX ADMIN — Medication 1 TABLET: at 07:52

## 2025-03-26 RX ADMIN — NICOTINE POLACRILEX 4 MG: 4 LOZENGE ORAL at 12:38

## 2025-03-26 RX ADMIN — NICOTINE POLACRILEX 4 MG: 4 LOZENGE ORAL at 20:16

## 2025-03-26 RX ADMIN — GABAPENTIN 600 MG: 600 TABLET, FILM COATED ORAL at 19:00

## 2025-03-26 RX ADMIN — DESVENLAFAXINE 50 MG: 25 TABLET, EXTENDED RELEASE ORAL at 07:52

## 2025-03-26 RX ADMIN — NICOTINE POLACRILEX 4 MG: 4 LOZENGE ORAL at 07:55

## 2025-03-26 RX ADMIN — NICOTINE POLACRILEX 4 MG: 4 LOZENGE ORAL at 15:52

## 2025-03-26 RX ADMIN — NICOTINE POLACRILEX 4 MG: 4 LOZENGE ORAL at 13:42

## 2025-03-26 RX ADMIN — GABAPENTIN 600 MG: 600 TABLET, FILM COATED ORAL at 13:34

## 2025-03-26 RX ADMIN — NICOTINE POLACRILEX 4 MG: 4 LOZENGE ORAL at 06:34

## 2025-03-26 RX ADMIN — METHADONE HYDROCHLORIDE 10 MG: 10 CONCENTRATE ORAL at 14:46

## 2025-03-26 RX ADMIN — FOLIC ACID 1 MG: 1 TABLET ORAL at 07:52

## 2025-03-26 RX ADMIN — NICOTINE POLACRILEX 4 MG: 4 LOZENGE ORAL at 09:21

## 2025-03-26 ASSESSMENT — ACTIVITIES OF DAILY LIVING (ADL)
HYGIENE/GROOMING: INDEPENDENT
ADLS_ACUITY_SCORE: 26
DRESS: INDEPENDENT
ADLS_ACUITY_SCORE: 26
DRESS: INDEPENDENT
ADLS_ACUITY_SCORE: 26
LAUNDRY: WITH SUPERVISION
ADLS_ACUITY_SCORE: 26
ORAL_HYGIENE: INDEPENDENT
ORAL_HYGIENE: INDEPENDENT
HYGIENE/GROOMING: INDEPENDENT
ADLS_ACUITY_SCORE: 26
ADLS_ACUITY_SCORE: 26
LAUNDRY: WITH SUPERVISION
ADLS_ACUITY_SCORE: 26
ADLS_ACUITY_SCORE: 26

## 2025-03-26 NOTE — PLAN OF CARE
Problem: Adult Behavioral Health Plan of Care  Goal: Adheres to Safety Considerations for Self and Others  Outcome: Progressing     Problem: Suicide Risk  Goal: Absence of Self-Harm  Outcome: Progressing    Pt's pulse was 55 around 1600; notified the on call resident. Pt endorsed having high anxiety rated 9/10 and depression 5/10. Pt's COWS scored was 7 when assessed at 1600. Pt was restless, stuffy nose, tremors, and anxious. Pt denied having SI/SIB/HI. Pt reported of having VH; stated that he sees star burst and auras. Pt spent his time out in a lounge area and pacing in a hallway. Around 1700, pt was irritable and anxious about his situation and stated that doctor are not doing much for his withdrawal. Pt stated that he feels like he needs an Ativan but the doctor are refusing to order it. At 1704, pt received prn Atarax for his high anxiety; reported ineffective. Pt's appetite is good. No loose stool reported in this shift. Pt requested and received prn Nicotine lozenges in this shift. Pt was slamming his room's door around 1845 while waiting for Gabapentin. Pt stated that he is going to leave tomorrow and use fentanyl once he is out from here. Pt declined to take his scheduled Clonidine, Remeron, and Hydrocortisone cream.     Temp: 98.2  F (36.8  C) Temp src: Oral BP: (!) 141/99 Pulse: 55 (notified the oncall resident)   Resp: 16 SpO2: 100 % O2 Device: None (Room air)       Goal Outcome Evaluation:    Plan of Care Reviewed With: patient

## 2025-03-26 NOTE — PROGRESS NOTES
CLINICAL NUTRITION SERVICES - REASSESSMENT NOTE     RECOMMENDATIONS FOR MDs/PROVIDERS TO ORDER:  None at this time    Malnutrition Status:    Patient does not meet two of the established criteria necessary for diagnosing malnutrition    Registered Dietitian Interventions:  -Discontinue ginger ale with meals  -Ensure Enlive with meals    Future/Additional Recommendations:  RD to sign off at this time, but will remain available by consult if new nutrition problem arises.       SUBJECTIVE INFORMATION  Assessed patient in room.    CURRENT NUTRITION ORDERS  Diet: Regular (ginger ale with meals)  Supplement:  none    CURRENT INTAKE/TOLERANCE  Eating and drinking adequately per RN notes.  Pt reports improved appetite and no longer experiencing nausea since previous visit. Pt is requesting to re-order Ensure with meals, pt had staff discontinue it and wishes he still had it as he is not feeling full from meals and does not want double portions.      NEW FINDINGS  Weight:   Wt Readings from Last 15 Encounters:   03/25/25 92.8 kg (204 lb 8 oz)   03/23/25 93.1 kg (205 lb 4.8 oz)      03/18/25 89 kg (196 lb 3.4 oz)   08/06/24 93.9 kg (207 lb)   08/02/18 102.5 kg (226 lb)   06/09/15 90.7 kg (200 lb)   Weight stable since admission    Skin/wounds: none  GI symptoms: Pt denied N/V/D/C, nausea has resolved per pt  Nutrition-relevant labs: Reviewed  Nutrition-relevant medications: Reviewed    MALNUTRITION  % Intake: No decreased intake noted  % Weight Loss: None noted  Subcutaneous Fat Loss: None observed  Muscle Loss: None observed  Fluid Accumulation/Edema: None noted  Malnutrition Diagnosis: Patient does not meet two of the established criteria necessary for diagnosing malnutrition  Malnutrition Present on Admission: No    EVALUATION OF THE PROGRESS TOWARD GOALS   Previous Goals  Patient to consume % of nutritionally adequate meal trays TID, or the equivalent with supplements/snacks.  Evaluation: Met    Previous Nutrition  Diagnosis  Inadequate oral intake related to decreased appetite as evidenced by pt report, weight loss and evident muscle loss.   Evaluation: Resolved    NUTRITION DIAGNOSIS  No nutrition diagnosis at this time    INTERVENTIONS  Manage composition of oral intake  Medical food supplement therapy    Goals  Patient to consume % of nutritionally adequate meal trays TID, or the equivalent with supplements/snacks.     Monitoring/Evaluation      Progress toward goals will be monitored and evaluated per policy.    Jenny Adam MPH, RDN, LD  Behavioral Health Adult & Pediatric Dietitian  BEH Clinical Dietitian Ze, Teams, or Desk: [phone]  Weekend/Holiday Vocera: Weekend Holiday Clinical Dietitian [Multi Site Groups]

## 2025-03-26 NOTE — PLAN OF CARE
Problem: Alcohol Withdrawal  Goal: Alcohol Withdrawal Symptom Control  Outcome: Progressing     Problem: Depressive Signs/Symptoms  Goal: Optimized Energy Level (Depressive Signs/Symptoms)  Outcome: Progressing  Intervention: Optimize Energy Level  Recent Flowsheet Documentation  Taken 3/26/2025 0924 by Serena Etienne RN  Patient Performed Hygiene: dressed  Activity (Behavioral Health): up ad radha   Goal Outcome Evaluation:    Plan of Care Reviewed With: patient          Patient continues on withdrawal precaution, switch from MSSA to COWS per provider for opoid withdrawal. Scored 4 this shift. Denies any GI upset. Noted to be somewhat restless. Paced [address] way most of the shift. Tremors improved. Reported one episode of loose bowel movement earlier in the shift.   Patient is medication compliant.  Received two doses of clonidine this shift as vitals were within parameters. Frequent request for nicotine lozenge.   Patient denies SI/HI and SIB. Denies any hallucinations. Contracts for safety. Met with addiction medicine. Medication adjustment done this shift.   Staff will continue to monitor and offer support as needed.

## 2025-03-26 NOTE — CONSULTS
[facility]  Consult Note - Hospitalist Service  Date of Admission:  3/18/2025  Consult Requested by: Tracy Castillo MD   Reason for Consult: ECT clearance    Assessment & Plan     [facility]  Internal Medicine Consult    Zenon Truong MRN# [id number]   Age: 55 year old YOB: 1970     Date of Admission: 3/18/2025  Date of Consult: 3/26/2025    Requesting Service: Behavioral Health - Charles Menjivar MD  Reason for Consult: Pre-ECT Medicine Evaluation   Indication for ECT: Major depressive disorder, recurrent, severe without psychosis     Chief Complaint: ECT clearance  HPI: ECT clearance    Review of Systems:   Cardiovascular: NEGATIVE for irregular heart beat, chest pain, dyspnea on exertion, orthopnea, lower extremity edema, syncope or near-syncope    Pulmonary: NEGATIVE for shortness of breath, dyspnea on exertion, cough, or hemoptysis  Neurological: Positive for seizures with benzodiazepine withdrawal, NEGATIVE for headaches, stroke,or paralysis    Past medical history:   Prior anesthesia: Yes  If yes, any complications: No    Prior ECT: Yes, 218  Side Effects: Memory Concerns     Cardiovascular history:   CAD: No  MI: No  HTN: No  CHF: No  Pacemaker or ICD: No    Pulmonary history:   Asthma/COPD: No    Neurological history:   Brain tumor: No  TIA/CVA: No  Dementia: No  Seizures and/or epilepsy: Yes, in the past with benzodiazepine withdrawal  TBI: No  Intracranial hemorrhage: No    Past surgical history:   Past Surgical History:   Procedure Laterality Date    GENITOURINARY SURGERY  1996    Testicular CA     TESTICLE SURGERY Bilateral        Allergies:   Allergies   Allergen Reactions    Iodinated Contrast Media Hives and Rash     Reports he is allergic to Iodine contrast     Reports he is allergic to Iodine contrast       Reports he is allergic to Iodine contrast    Reports he is allergic to Iodine contrast       Reports he is allergic to Iodine contrast   Reports he is allergic to Iodine contrast    Iodine Hives    Lisinopril Hives and Rash    Lithium Hives    Topiramate Hives    Escitalopram Rash     Rash around mouth    Iodine-131 Hives    Trazodone Anxiety     Trazodone causes RLS for pt    Olanzapine Rash     Pt reported taking approximately one year ago. Developed rash around mouth.       Exam:  General: Appears stated age, no acute distress  Head: Normocephalic  ENT: ENT exam normal  Cardiovascular: S1/S2, Normal rate and rhythm   Respiratory: Normal respiratory effort, lung fields clear to auscultation  Neuropsych: Speaks clearly, answers questions appropriately, motor/sensory innervation of extremities grossly intact, pupils reactive       Data:  EKG: Bradycardia, Sinus bradycardia  CBC:   Recent Labs   Lab Test 03/15/25  0718   WBC 4.9   RBC 4.81   HGB 14.1   HCT 41.2   MCV 86   MCH 29.3   MCHC 34.2   RDW 13.1   PLT 97*     CMP:   Recent Labs   Lab Test 03/15/25  0718      POTASSIUM 4.0   CHLORIDE 99   JOVITA 9.2   CO2 24   BUN 10.9   CR 0.74   GLC 93   AST 56*   ALT 84*   BILITOTAL 0.5   ALBUMIN 4.1   PROTTOTAL 6.6   ALKPHOS 75       Assessment, plan and, recommendations:   Diagnoses/Other    #History of seizures with benzodiazepine withdrawal  Patient had noted that he was recently taking benzodiazepines, approximately 6 mg of Xanax daily.  Was admitted and treated for benzo withdrawal on 3/14/2025 to 3/18/2025. Treated with barbital.  Spoke with neurology  to assess if this recent benzodiazepine use would be a contraindication to ECT, which they had stated considering past seizures have been provoked, unlikely, however he would have to defer this question to psychiatry being that ECT follows within their specialty.     Defer to psychiatry for decision of proceeding with ECT with recent benzodiazepine withdrawal/use.     Otherwise, no absolute medical contraindications to proceeding with ECT at this time.  Treatment plan per psychiatry.       Medical Decision Making       55 MINUTES SPENT BY ME on the date of service doing chart review, history, exam, documentation & further activities per the note.      Data   ------------------------- PAST 24 HR DATA REVIEWED -----------------------------------------------

## 2025-03-26 NOTE — CONSULTS
"    Initial Psychiatric Consult   Consult date: March 26, 2025         Reason for Consult, requesting source:    Reason for Consult: Addiction management  Requesting source: Charles Menjivar    Labs and imaging reviewed.         HPI:   Zenon Truong is a 54 year old male with a psychiatric history including depression, anxiety, polysubstance abuse (fentanyl, benzos, meth, cannabis) who came to the ED on 3/17 due to concern for SI with plan and intent in the context of medication non adherence, substance use and housing/financial insecurity  Hwho was recently hospitalized at [facility] voluntarily for suicidal ideation 2/23/25-3/6/25. He was homeless for several years prior to this admission, and discharged to sober house. Upon discharge, he had stopped all meds except methadone and relapsed on fentanyl, benzos, meth, and cannabis. He was suicidal at this time, and had planned to OD but was in contact with his  who brought him to the ED. He was admitted to inpatient medical on 3/14 where CL service became involved in his care-  Initially withdrawal treated with CIWA, then changed to phenobarbital with progressive taper down. He was transferred on 3/18 to [facility] on [address] under Dr. Fink with last oral dose of phenobarb planned for that day, however has continued to feel symptomatic from withdrawal. Psychiatry CL was consulted for addiction management support given ongoing symptomology.     Interview:   Zenon was seen talking to unit staff in OT group earlier today and was agreeable to meet today. Notes he feels like he is crawling out of his own skin, feeling constantly restless and can't sit still or sleep well. ROS notable for restlessness and fidgetting, racing thoughts (\"can't even complete a sudoku puzzle for more than one block before I lose my train of thought\"), diarrhea, sweating on upper half of his body. Feels like phenobarb was helpful and recent small doses drop " his anxiety from 10 to an 8/10. Clonidine is not helpful for him, feels like he doesn't even take it a good amount because his resting heart rate is can be too low. Feels like methadone has been helpful for withdrawal symptoms relating to fentanyl, however does start to wear off by afternoon. When asked specifically whether symptoms feel more like benzodiazepine withdrawal or fentanyl, patient states benzodiazepines at first however corrects to feeling like it is more related to methadone after noting his urges for using often surround fentanyl.  [place] that phenobarb was helpful and felt the best at 90mg dose, however has been worse since then. Notes he has had urges to check himself out of the hospital to go use and sometimes feels like bashing his head against a wall to deal with frustration of body restlessness, however holds back due to committing to the desire of recovery and improving his circumstances. Finds himself feeling more restless and worried at times when he thinks about his SI and internal worries, which makes him feel worse, but thinks he hasn't even had much time to think about these given how much he's feeling restless with current symptoms. Continues to endorse symptoms of depression.            Past Psychiatric History:   Depression, anxiety. Recent admission at [facility] voluntarily for suicidal ideation 2/23/25-3/6/25. ECT was considered and even planned during this admission, but he decided against pursuing at that time.      Multiple prior suicide attempts: recent OD on clonazepam in Feb 2025, previous attempt by cutting wrist lacerating artery        Substance Use and History:   Opioids, recently fentanyl; methamphetamine, benzodiazepines,  cannabis. On methadone maintenance, has been on 105 for a while but has historically been -200mg.      For the past year, he has been smoking fentanyl. Now up to 2g daily. Using fentanyl while on methadone treatment.      Reports he was  "previously prescribed clonazepam which he \"supplemented\" with illicit alprazolam. He is no longer prescribed any benzos and gets alprazolam off the street    Social History     Tobacco Use    Smoking status: Every Day    Smokeless tobacco: Never    Tobacco comments:     Switched to e cigarettes   Substance Use Topics    Alcohol use: No           Past Medical History:   PAST MEDICAL HISTORY:   Past Medical History:   Diagnosis Date    Hep C w/o coma, chronic (H) 2011    Substance abuse (H)     Testicular cancer (H)        PAST SURGICAL HISTORY:   Past Surgical History:   Procedure Laterality Date    GENITOURINARY SURGERY  1996    Testicular CA     TESTICLE SURGERY Bilateral              Family History:    Did not discuss today        Social History:     Current Living arrangement: without housing  Relationships: reports not significant relationships  Occupation: Currently unemployed - previously a .   Local Support: Denies having support         Physical ROS:   The 10 point Review of Systems is negative other than noted in the HPI or here.           Medications:     Current Facility-Administered Medications   Medication Dose Route Frequency Provider Last Rate Last Admin    cloNIDine (CATAPRES) tablet 0.1 mg  0.1 mg Oral TID Tracy Castillo MD   0.1 mg at 03/26/25 0752    desvenlafaxine succinate (PRISTIQ) 24 hr tablet 50 mg  50 mg Oral Daily Tracy Castillo MD   50 mg at 03/26/25 0752    folic acid (FOLVITE) tablet 1 mg  1 mg Oral Daily Patti Bernard MD   1 mg at 03/26/25 0752    gabapentin (NEURONTIN) tablet 600 mg  600 mg Oral TID Tracy Castillo MD   600 mg at 03/26/25 0752    hydrocortisone (CORTAID) 1 % cream   Topical BID Patti Bernard MD   Given at 03/25/25 1917    methadone (DOLOPHINE) solution 10 mg  10 mg Oral Once Suad Coyle MD        [START ON 3/27/2025] methadone (DOLOPHINE-INTENSOL) 10 MG/ML (HIGH CONC) solution 115 mg  115 mg Oral Daily " "Suad Coyle MD        mirtazapine (REMERON) tablet 45 mg  45 mg Oral At Bedtime Demetri Sarabia MD   45 mg at 03/25/25 2129    multivitamin w/minerals (THERA-VIT-M) tablet 1 tablet  1 tablet Oral Daily Patti Bernard MD   1 tablet at 03/26/25 0752    thiamine (B-1) tablet 100 mg  100 mg Oral Daily Patti Bernard MD   100 mg at 03/26/25 0752              Allergies:     Allergies   Allergen Reactions    Iodinated Contrast Media Hives and Rash     Reports he is allergic to Iodine contrast     Reports he is allergic to Iodine contrast       Reports he is allergic to Iodine contrast    Reports he is allergic to Iodine contrast      Reports he is allergic to Iodine contrast   Reports he is allergic to Iodine contrast    Iodine Hives    Lisinopril Hives and Rash    Lithium Hives    Topiramate Hives    Escitalopram Rash     Rash around mouth    Iodine-131 Hives    Trazodone Anxiety     Trazodone causes RLS for pt    Olanzapine Rash     Pt reported taking approximately one year ago. Developed rash around mouth.          Labs:   No results found for this or any previous visit (from the past 48 hours).       Physical and Psychiatric Examination:     /89 (BP Location: Right arm, Patient Position: Sitting)   Pulse 61   Temp 97.9  F (36.6  C) (Oral)   Resp 12   Wt 92.8 kg (204 lb 8 oz)   SpO2 99%   BMI 28.53 kg/m    Weight is 204 lbs 8 oz  Body mass index is 28.53 kg/m .    Physical Exam:  I have reviewed the physical exam as documented by by the medical team and agree with findings and assessment and have no additional findings to add at this time.         MSE:   Appearance: awake, alert and adequately groomed, dressed in street clothes  Attitude:  cooperative, engaged, motivated  Eye Contact:  looking around room, able to sustain gaze for 2-3 seconds at a time  Mood:  \"awful with this restlessness\"  Affect:  restricted affect but does brighten a couple times throughout the conversation "   Speech:  clear, coherent  Language:   Psychomotor Behavior:  fidgeting, restless. Has to sit on hands to stay still. Constantly shifting weight, however notably can stay seated for entirety of meeting. No evidence of akithisia, TD. Very subtle tremors in fingers.   Thought Process:  logical, linear, and goal oriented. Grounded. Aware of own limitations. Focused on recovery.   Associations:  no loose associations  Thought Content:  no evidence of suicidal ideation or homicidal ideation and no evidence of psychotic thought  Abnormal Perceptions:   Insight:  good  Judgement:  intact  Oriented to:  time, person, and place  Attention Span and Concentration:  intact  Recent and Remote Memory:  intact     EKG:          DSM-5 Diagnosis:   MDD, severe, without psychotic features    Opioid Use Disorder, severe, in withdrawal    Benzodiazepine Use Disorder, s/p phenobarbital    Stimulate Use Disorder, remote    Cannabis Use Disorder, remote    BPD, by history    PTSD, by history          Assessment:     This is a 54 year old male with a long history of polysubstance abuse, depression, and anxiety who is admitted for suicidal ideation with a plan, and withdrawal from benzos, fentanyl (up to 2mg/day), and amphetamine. He was initially seen by Psych CL while on medical and was started on a phenobarb taper which he was tolerating well. Transferred to IP Psych and notably stopped was scheduled to stop phenobarb taper on the same day.  ECT consult was obtained and plan is to commence after phenobarbital taper complete if patient is medically cleared, however unfortunately has continued to struggle with feelings of significant restlessness, difficulty sitting still, racing thoughts, impulsivity, diarrhea, runny nose.     Per evaluation, symptoms appear more consistent with opioid withdrawal than benzodiazepine withdrawal, especially in setting of having phenobarbital still in his system. Appears under treated with current Methadone  dose, which is within reason given that he was using fentanyl on top of his PTA dose. Per chart review has historically needed up to 180mg in the past but not for a few years, would recommend titrating up by 10mg every f2-3 days and assessing for tolerance until appropraite dose reached. It would be expected that, with the right dose, he will not have a wearing off effect in the afternoons. BID dosing could be considered, however would not recommend as this is not done on an outpatient setting. He is not interested in Suboxone per our discussion, so no benefit to switching. He did express an interest in increasing Gabapentin as well which he has historically reported as beneficial, will defer this to primary. Given reported symptoms, did add in some comfort PRNs for him as well.          Summary of Recommendations:     Symptoms appear more consistent with opioid withdrawal.   Changed MSSA to COWS  Increase Methadone to 115mg (+10mg) today.  Added one time dose of Methadone 10mg today.   Added some comfort PRNs (Imodium, Bentyl). Could consider adding flexeril if reporting muscle spasms and aches. Deferred adding zofran as not reporting nausea, would prefer to minimize Qtc agents while increasing methadone if possible.   Would not recommend adding any additional phenobarb at this time.   Could consider increasing Gabapentin to 900mg tomorrow to assist with anxiety management, deferred today to reduce total med changes per day.    Please don't hesitate to reach out for any additional questions or concerns.    Patient seen and staffed with Dr. Leo Coyle MD  Psychiatry Resident, PGY2            Professor  Department of Psychiatry  Please Vocera if questions    Total time spent in chart review, patient interview and coordination of care; 53 minutes - all time was spent on the date of the encounter that I saw patient

## 2025-03-26 NOTE — PLAN OF CARE
03/26/25 1340   Individualization/Patient Specific Goals   Patient Personal Strengths insight into illness/situation;motivated for recovery   Patient Vulnerabilities substance abuse/addiction;housing insecurity   Interprofessional Rounds   Participants CTC;nursing;psychiatrist   Behavioral Team Discussion   Progress Ongoing symptoms   Anticipated length of stay 5-7 days   Continued Stay Criteria/Rationale Medication management, possible ECT, safe disposition   Medical/Physical See H&P   Precautions See below   Plan Psychiatric assessment/Medication management. Therapeutic Milieu. Individual care planning and after care planning. Patient to participate in unit groups and activities. Individual and group support on unit.   Rationale for change in precautions or plan N/A   Safety Plan Per unit protocol         PRECAUTIONS AND SAFETY    Behavioral Orders   Procedures    Code 1 - Restrict to Unit    Routine Programming     As clinically indicated    Seizure precautions    Status 15     Every 15 minutes.    Suicide precautions: Suicide Risk: LOW; Clinical rationale to override score: lack of access to a plan for self-harm     .     Order Specific Question:   Suicide Risk     Answer:   LOW     Order Specific Question:   Clinical rationale to override score:     Answer:   lack of access to a plan for self-harm    Withdrawal precautions       Safety  Safety WDL: WDL  Patient Location: patient room, own, dining room, group room, hallway  Observed Behavior: other (see comments) (Restless)  Observed Behavior (Comment): sleeping  Safety Measures: safety rounds completed  Diversional Activity: music  Suicidality: Status 15  Withdrawal: monitor withdrawal process  Seizure precautions: clutter free environment

## 2025-03-26 NOTE — PLAN OF CARE
Brief Psychotherapy Note    Therapist checked in with Pt to remind Pt about psychotherapy service available.    Pt response: Pt not interested currently in meeting 1:1.    Plan: Writer will remain available to Pt and continue to check in for 1:1 sessions.

## 2025-03-26 NOTE — PROGRESS NOTES
Rehab Group    Start time: 1015  End time: 1115  Patient time total: 45 minutes    attended full group    #4 attended   Group Type: occupational therapy   Group Topic Covered: coping skills     Group Session Detail:  OT clinic     Patient Response/Contribution:  cooperative with task, organized, attentive, and actively engaged     Patient Detail:    Pt actively participated in occupational therapy clinic to facilitate coping skill exploration, creative expression within personally meaningful activities, and clinical observation of social, cognitive, and kinesthetic performance skills. Pt response: Independent to initiate, gather materials, sequence, and adjust to workspace demands as needed. Demonstrated good focus, planning, and attention to detail for selected structured, detailed, creative expression task. Organized in his task approach. Able to ask for assistance as needed, and intermittently socialized with peers and staff. Calm, pleasant, and engaged. Affect appeared restricted, though did brighten briefly in interactions at times.      [id number] OT Group (2 or more in attendance)      Patient Active Problem List   Diagnosis    Opioid use disorder, severe, on maintenance therapy (H)    Suicidal ideation    Severe benzodiazepine use disorder (H)    Substance-induced anxiety disorder (H)    Benzodiazepine withdrawal without complication (H)    Cannabis use disorder, moderate, dependence (H)    Cigarette nicotine dependence with withdrawal    Moderate episode of recurrent major depressive disorder (H)    Homelessness    Opioid withdrawal (H)    MDD (major depressive disorder), recurrent severe, without psychosis (H)    ALISSON (generalized anxiety disorder)

## 2025-03-26 NOTE — PROGRESS NOTES
"  ----------------------------------------------------------------------------------------------------------  [facility]  Psychiatry Progress Note  Hospital Day #8     Interim History:     The patient's care was discussed with the treatment team and chart notes were reviewed.    Vitals: VSS. /89.   Sleep: 6.25 hours (03/26/25 0609)  Scheduled medications: Did not take night clonidine or gabapentin. Took other scheduled meds  Psychiatric PRN medications: hydroxyzine 50mg x3    Staff Report:   No acute events or safety concerns overnight.  - Had a restless night   - Reports runny nose  Please see staff notes for details.      Subjective:     Patient Interview:  Zenon had a restless night. Said he felt horrible last night and the medications did not help.  [place] like he was \"coming out of his skin\". Wanted to sign himself out last night to go get high just to get some relief, but had the insight to understand that thought process and the harm it would cause him, and stayed. He expresses that he wants to stay and \"do the right thing\". He has been walking window to window, pacing up and down the [place], reports his tool box for distraction is empty now. He can't find relief from the physical symptoms and feels like he's at fault for this feeling due to his history of drug use. He says that he suspects the illicitly pressed Xanax might be the reason he's been feeling his withdrawal for so long.    Has increased thoughts of SI in the past few days due to uncertainty of the end of his withdrawal. He describes random scenarios of hanging himself, gunshot, cutting his wrists, laying in front of the light rail - he tries not to ruminate on any of these. He feels like this is endless but expresses hope this might be the plateau of his symptoms. He says he doesn't see much light at the end of the tunnel, but does see a \"pinprick of hope\" that he is holding onto. He says he \"does " "not want to die\". His first thoughts of SI were when he was 14 years old. He says that it wasn't because of anything, no traumas or anything like that. He uses drugs as a way to self medicate anxiety and chronic SI. He does express that he \"has not given up yet\" and does not want to leave.     He does report the same \"star bursts\" in his peripheral vision which are worse when he closes his eyes. These started around the time he came into the hospital. He says that these have occurred in the past right before his seizures. He is scared about seizures and that his vision will be like this permament.      Addiction medicine saw Zenon this morning so we discussed their recs to increase methadone to 115 mg daily and titrate up as needed every 2-3 days. We discussed that his symptoms are likely opoid withdrawal and to hold off phenobarbital due to this. He is in agreement with this plan. He also reports ongoing diarrhea and runny nose. These have not worsened but still present.      ROS:  Patient has \"pinprick sensation\", diarrhea, chills, and insomnia (falling asleep), visual disturbances,, runny nose   Patient denies nausea and headaches     Objective:     Vitals:  /89 (BP Location: Right arm, Patient Position: Sitting)   Pulse 61   Temp 97.9  F (36.6  C) (Oral)   Resp 12   Wt 92.8 kg (204 lb 8 oz)   SpO2 99%   BMI 28.53 kg/m      Allergies:  Allergies   Allergen Reactions    Iodinated Contrast Media Hives and Rash     Reports he is allergic to Iodine contrast     Reports he is allergic to Iodine contrast       Reports he is allergic to Iodine contrast    Reports he is allergic to Iodine contrast      Reports he is allergic to Iodine contrast   Reports he is allergic to Iodine contrast    Iodine Hives    Lisinopril Hives and Rash    Lithium Hives    Topiramate Hives    Escitalopram Rash     Rash around mouth    Iodine-131 Hives    Trazodone Anxiety     Trazodone causes RLS for pt    Olanzapine Rash     Pt " reported taking approximately one year ago. Developed rash around mouth.       Current Medications:  Scheduled:  Current Facility-Administered Medications   Medication Dose Route Frequency Provider Last Rate Last Admin    acetaminophen (TYLENOL) tablet 650 mg  650 mg Oral Q4H PRN Patti Bernard MD        alum & mag hydroxide-simethicone (MAALOX) suspension 30 mL  30 mL Oral Q4H PRN Patti Bernard MD        cloNIDine (CATAPRES) tablet 0.1 mg  0.1 mg Oral TID Tracy Castillo MD   0.1 mg at 03/26/25 0752    desvenlafaxine succinate (PRISTIQ) 24 hr tablet 50 mg  50 mg Oral Daily Tracy Castillo MD   50 mg at 03/26/25 0752    folic acid (FOLVITE) tablet 1 mg  1 mg Oral Daily aPtti Bernard MD   1 mg at 03/26/25 0752    gabapentin (NEURONTIN) tablet 600 mg  600 mg Oral TID Tracy Castillo MD   600 mg at 03/26/25 0752    hydrocortisone (CORTAID) 1 % cream   Topical BID Patti Bernard MD   Given at 03/25/25 1917    hydrOXYzine HCl (ATARAX) tablet 50 mg  50 mg Oral 4x Daily PRN Evelio hCavarria DO   50 mg at 03/25/25 1914    melatonin tablet 3 mg  3 mg Oral At Bedtime PRN Patti Bernard MD        methadone (DOLOPHINE-INTENSOL) 10 MG/ML (HIGH CONC) solution 105 mg  105 mg Oral Daily Patti Bernard MD   105 mg at 03/26/25 0753    midazolam 5 mg/mL (VERSED) intranasal solution 10 mg  10 mg Intranasal Once PRN Tracy Castillo MD        And    mucosal atomization device #  device 1 Device  1 Device Inhalation DOES NOT GO TO Tracy Thomson MD        mirtazapine (REMERON) tablet 45 mg  45 mg Oral At Bedtime Demetri Sarabia MD   45 mg at 03/25/25 2129    multivitamin w/minerals (THERA-VIT-M) tablet 1 tablet  1 tablet Oral Daily Patti Bernard MD   1 tablet at 03/26/25 0752    nicotine (NICORETTE) lozenge 4 mg  4 mg Buccal Q1H PRN Patti Bernard MD   4 mg at 03/26/25 0759    polyethylene glycol (MIRALAX) Packet 17 g  17 g  Oral Daily PRN Patti Bernard MD        thiamine (B-1) tablet 100 mg  100 mg Oral Daily Patti Bernard MD   100 mg at 03/26/25 0752       PRN:  Current Facility-Administered Medications   Medication Dose Route Frequency Provider Last Rate Last Admin    acetaminophen (TYLENOL) tablet 650 mg  650 mg Oral Q4H PRN Patti Bernard MD        alum & mag hydroxide-simethicone (MAALOX) suspension 30 mL  30 mL Oral Q4H PRN Patti Bernard MD        cloNIDine (CATAPRES) tablet 0.1 mg  0.1 mg Oral TID Tracy Castillo MD   0.1 mg at 03/26/25 0752    desvenlafaxine succinate (PRISTIQ) 24 hr tablet 50 mg  50 mg Oral Daily Tracy Castillo MD   50 mg at 03/26/25 0752    folic acid (FOLVITE) tablet 1 mg  1 mg Oral Daily Patti Bernard MD   1 mg at 03/26/25 0752    gabapentin (NEURONTIN) tablet 600 mg  600 mg Oral TID Tracy Castillo MD   600 mg at 03/26/25 0752    hydrocortisone (CORTAID) 1 % cream   Topical BID Patti Bernard MD   Given at 03/25/25 1917    hydrOXYzine HCl (ATARAX) tablet 50 mg  50 mg Oral 4x Daily PRN Evelio Chavarria DO   50 mg at 03/25/25 1914    melatonin tablet 3 mg  3 mg Oral At Bedtime PRN Patti Bernard MD        methadone (DOLOPHINE-INTENSOL) 10 MG/ML (HIGH CONC) solution 105 mg  105 mg Oral Daily Patti Bernard MD   105 mg at 03/26/25 0753    midazolam 5 mg/mL (VERSED) intranasal solution 10 mg  10 mg Intranasal Once PRN Tracy Castillo MD        And    mucosal atomization device #  device 1 Device  1 Device Inhalation DOES NOT GO TO Tracy Thomson MD        mirtazapine (REMERON) tablet 45 mg  45 mg Oral At Bedtime Demetri Sarabia MD   45 mg at 03/25/25 2129    multivitamin w/minerals (THERA-VIT-M) tablet 1 tablet  1 tablet Oral Daily Patti Bernard MD   1 tablet at 03/26/25 0752    nicotine (NICORETTE) lozenge 4 mg  4 mg Buccal Q1H PRN Patti Bernard MD   4 mg at 03/26/25 0757     "polyethylene glycol (MIRALAX) Packet 17 g  17 g Oral Daily PRN Patti Bernard MD        thiamine (B-1) tablet 100 mg  100 mg Oral Daily Patti Bernard MD   100 mg at 03/26/25 9162       Labs and Imaging:  New results:   No results found for this or any previous visit (from the past 24 hours).    Data this admission:  - CBC with chronic thrombocytopenia (platelet count 78)   - CMP with mildly elevated AST (47) and ALT (84)   - TSH normal  - UDS positive for amphetamines, cannabinoids, fentanyl  - Vit D in process  - Lipids not ordered this admission (but within normal limits on 2/26/2025)  - Vit B12 normal  - Folate normal  - EKG sinus bradycardia, QTc 396     Mental Status Exam:     Oriented to:  Grossly Oriented, Person/Self, and Situation  General:  Awake and Alert, answers questions openly.   Appearance:  Appears older than stated age Edentulous. Slightly disheveled, dressed in personal clothing.   Behavior/Attitude:  generally calm but does express frustration with his withdrawal symptoms. Constantly playing with his hands, appears jittery.   Eye Contact: Appropriate  Psychomotor: Normal no catatonia present  Speech:  appropriate volume/tone, spontaneous, and with good articulation  Language: Fluent in English with appropriate syntax and vocabulary.  Mood:  \"Restless\"  Affect:   restricted affect but does brighten a couple times throughout the conversation  Thought Process:   linear, coherent. Expresses that he is hopeful that his withdrawal symptoms are at a plateau and will improve.   Thought Content:   suicidal ideation with plan (without intent); No apparent delusions  Associations:  intact  Insight:  fair due to his ability to recognize rational thoughts from irrational thought, like the scenarios of killing himself. He does still express significant depression which limits his insight into his hopelessness and outlook/goals for treatment.   Judgment:  fair due to being engaged in " groups/treatment here and cooperative. He does express the desire to die but is able to separate his thoughts from reality.   Impulse control: fair due to prior to admission behaviors but has remained safe and cooperative here, able to recognize impulses and has not acted on them during his admission   Attention Span:  adequate for conversation  Concentration:  grossly intact to conversation   Recent and Remote Memory:  not formally assessed but intact to conversation   Fund of Knowledge: average, estimated  Muscle Strength and Tone: normal  Gait and Station: Normal     Psychiatric Assessment     Zenon Truong is a 54 year old male with previous psychiatric diagnoses of polysubstance use disorder [benzodiazepines, opiates, methamphetamine, THC, nicotine], severe depression with suicidal ideation, borderline personality disorder and PTSD admitted 03/18/2025 due to concern for SI with plan and intent in the context of medication non adherence, substance use and housing/financial insecurity. On admission, he presented with symptoms of SI with intent and plan, hopelessness, poor motivation, low energy, anhedonia, impulsivity and irritability.      Had a recent hospitalization at [facility] 2/23-3/6/2025 for suicidal ideation and benzodiazepine overdose. ECT was considered and planned during that admission, but he decided against pursuing it at that time. Discharged on bupropion, Mirtazapine increased to 30 mgs, methadone and gabapentin. His Prazosin was continued as well. Did not continue taking his meds after leaving.     There is genetic predisposition given family hx of substance use disorder. Patient has struggled with homelessness and severe substance use for years. This current admission is likely precipitated by recent psychiatric hospitalization at end of February followed by relapse soon after discharge to a sober house. Perpetuating factors include ongoing substance use, medication non adherence, longstanding  functional impairment, personality traits, lack of perceived support, deconditioning and housing and financial insecurity. Patient's support system includes  . Based on patient's history and current symptoms, criteria are met for primary diagnosis of MDD without psychotic features, opioid use disorder, benzodiazepine use disorder, stimulant use disorder and cannabis use disorder. Patient warrants inpatient hospitalization to maintain their safety and would benefit from medication optimization and outpatient referral/resources.       In regards to management, patient was continued on Mirtazapine and Desvenlafaxine. Prior to admission while on the hospitalist service, given severity of depression symptoms, ECT consult was obtained and plan is to commence after phenobarbital taper complete if patient is medically cleared. He did express some reservation due to short term memory loss from previous sessions, but remains hopeful it could be helpful. Phenobarbital taper was discontinued on 3/26 per recommendation from addiction medicine team recommendation that his symptoms were likely secondary to opiate withdrawal vs benzo withdrawal. Methadone was increased to 115 mg daily per addiction medicine recs for ongoing withdrawal symptoms.     Mirtazapine was increased to 45 mg on 3/20 due to ongoing insomnia. Will also restart PTA gabapentin 600mg TID for ongoing anxiety- he reports this was helpful in the past. Per ECT team, hold gabapentin after 6 pm the day before session but is not prohibitory towards starting ECT.      He does report that he was on 180 mg methadone for 13 years and relapsed on opioids when this was tapered. He has been on PTA 105mg methadone but given his ongoing symptoms of withdrawal and discomfort, addiction medicine consulted on 3/24 for additional input on methadone dosage and other options.      Per chart review, he was previously on 50mg desvenlafaxine succinate which was increased  to 100mg on 11/21/2024. He was discharged from [facility] from his voluntary psychiatric admission from 2/18- 3/6 with 50 mg desvenlafaxine succinate. However, he stopped taking his psychiatric medications after discharge.  On his admission here on 3/14, he was restarted on 25mg desvenlafaxine succinate as he had not been on it since prior discharge. Given this information, increased his pristiq on 3/25 to 50mg.          Today's changes:  - Increase methadone to 115 mg and titrate up as needed every 2-3 days  - Hold any further phenobarbital given symptoms like due to opioid withdrawal per addiction medicine recs   - Medicine consult for ECT clearance          Psychiatric Plan by Diagnosis      #Major depressive disorder, recurrent, severe without psychosis  - Continue Mirtazapine 45 mg po at bedtime  - Continue Desvenlafaxine to 50mg po daily  - Plan to start ECT Friday     #Opioid use disorder, severe  - 3/26: Increase Methadone to 115 mg every day   - Addiction team consulted; likely opoid withdrawal  - Thiamine, MVI, folic acid      #Benzodiazepine use disorder, severe  - 3/26: Discontinue phenobarb taper per addiction team recs   - Continue clonidine 0.1mg TID for withdrawal symptoms      #Stimulant (methamphetamine) use disorder  #Cannabis use disorder     Additional Plans:  - Patient will be treated in therapeutic milieu with appropriate individual and group therapies as described.     Psychiatric Hospital Course:      Zenon Truong was admitted to [facility] as a voluntary patient.   Medications and other updates:  3/14: hold bupropion due to risk of lower seizure threshold  3/14: Continue PTA desvenlafaxine 25mg and mirtazapine 30mg for sleep  3/18: start clonidine 0.1mg TID   3/20: Increase mirtazapine to 45 mg daily for continued insomnia   3/21: restart PTA gabapentin 600mg TID for anxiety   3/21: continuing phenobarb taper (today is 32.4 mg BID)  3/23: no scheduled phenobarb received 32.4mg due to  ongoing withdrawal symptoms  3/24: give one time phenobarbital dose of 32.4mg due to significant discomfort that seem at least in part due to withdrawal  3/24: consult addiction medicine for assistance with methadone dose and ongoing withdrawal symptoms   3/24: discussed re-initiating wellbutrin. However, patient reporting symptoms that he feels were similar to auras he has had prior to seizures (this has been worse the last couple days), so hesitant to do this at this time.  3/25: one time phenobarbital dose of 32.4mg given significant withdrawal symptoms  3/25: increase desvenlafaxine to 50mg daily (per chart review, was prescribed this dose outpatient)  3/26: increase methadone to 115 mg daily per addiction medicine recs   3/26: discontinue phenobarb taper   3/26: medicine consult for ECT clearance      The risks, benefits, alternatives, and side effects were discussed and understood by the patient.     Medical Assessment and Plan     #Palpitations  3/21 patient reporting short episodes (few seconds) of palpitations occurring intermittently over the last month. Does have shortness of breath with this, no chest pain or other symptoms. He is not sure if it is associated with anxiety.   - repeat EKG ordered showed sinus bradycardia with no significant change to prior EKG      #Chronic hepatitis C, never treated  #Mild transaminase elevations, hepatic steatosis:  Never treated for hepatitis C.  Chronic, mild transaminase elevations with normal hepatic function.  Most recent abdominal ultrasound 2/15/2024 showed hepatic steatosis without hepatic lesions, normal gallbladder.  Denies current alcohol use.  - monitor outpatient      #Chronic thrombocytopenia  #Normocytic anemia, \now resolved  Chronic thrombocytopenia, moderate and stable.  Denies any history of bleeding including no history of gastric ulcers, hematemesis, melena or hematochezia.  Anemia appears to be more recent since 2/23/2025 when hemoglobin was 11.6,  MCV 86.  Hgb 14.1, Plts 97, MCV 86, ferritin, iron, TIBC wnl on 3/15.      #Concern for malnutrition  Seen by dietician team, patient does not meet two of the established criteria necessary for diagnosing malnutrition but is at risk for malnutrition.   - ensure enlive ordered       Medical course: Patient was physically examined by the ED prior to being transferred to the unit and was found to be medically stable and appropriate for admission.      Consults:  Medicine (for clearance for ECT)  Addiction med (for methadone dosage and withdrawal symptoms)      Checklist     Legal Status: Voluntary. If patient were to ask to leave, would strongly recommend 72 hour hold \due to continued SI with plan and intermittently expressing intent     Safety Assessment:   Behavioral Orders   Procedures    Code 1 - Restrict to Unit    Routine Programming     As clinically indicated    Seizure precautions    Status 15     Every 15 minutes.    Suicide precautions: Suicide Risk: LOW; Clinical rationale to override score: lack of access to a plan for self-harm     .     Order Specific Question:   Suicide Risk     Answer:   LOW     Order Specific Question:   Clinical rationale to override score:     Answer:   lack of access to a plan for self-harm    Withdrawal precautions       Risk Assessment:  Risk for harm is low to moderate  Risk factors: SI, maladaptive coping, substance use, impulsive, and past behaviors  Protective factors: engaged in treatment     SIO: no    Disposition: Pending stabilization, ECT, medication optimization, & development of a safe discharge plan.      Attestations     Mikayla Latham, MS3  [facility] Medical Student     I was present with the medical student who participated in the service and in the documentation of the note.  I have verified the history and personally performed the physical exam and medical decision making. I agree with the assessment and plan of care as documented in the note.    This patient was seen  and discussed with my attending physician.  Tracy Castillo MD   Psychiatry Resident Physician      This patient has been seen and evaluated by me, Charles Fink.  I have discussed this patient with the psychiatry resident and I agree with the findings and plan in this note.    I have reviewed today's vital signs, medications, labs and imaging.     Charles Menjivar MD

## 2025-03-26 NOTE — PLAN OF CARE
BEH IP Unit Acuity Rating Score (UARS)  Patient is given one point for every criteria they meet.    CRITERIA SCORING   On a 72 hour hold, court hold, committed, stay of commitment, or revocation. 0    Patient LOS on BEH unit exceeds 20 days. 0  LOS: 8   Patient under guardianship, 55+, otherwise medically complex, or under age 11. 0   Suicide ideation without relief of precipitating factors. 1   Current plan for suicide. 1   Current plan for homicide. 0   Imminent risk or actual attempt to seriously harm another without relief of factors precipitating the attempt. 0   Severe dysfunction in daily living (ex: complete neglect for self care, extreme disruption in vegetative function, extreme deterioration in social interactions). 1   Recent (last 7 days) or current physical aggression in the ED or on unit. 0   Restraints or seclusion episode in past 72 hours. 0   Recent (last 7 days) or current verbal aggression, agitation, yelling, etc., while in the ED or unit. 0   Active psychosis. 0   Need for constant or near constant redirection (from leaving, from others, etc).  0   Intrusive or disruptive behaviors. 0   Patient requires 3 or more hours of individualized nursing care per 8-hour shift (i.e. for ADLs, meds, therapeutic interventions). 0   TOTAL 3

## 2025-03-26 NOTE — PLAN OF CARE
Goal Outcome Evaluation:  Problem: Sleep Disturbance  Goal: Adequate Sleep/Rest  Outcome: Progressing     Pt asleep at the start of the shift, respirations appeared even and non labored. No distress noted or reported. Appeared to have slept for 6.75  hours.

## 2025-03-27 VITALS
TEMPERATURE: 97.8 F | WEIGHT: 200.1 LBS | BODY MASS INDEX: 27.92 KG/M2 | RESPIRATION RATE: 16 BRPM | SYSTOLIC BLOOD PRESSURE: 102 MMHG | DIASTOLIC BLOOD PRESSURE: 62 MMHG | OXYGEN SATURATION: 100 % | HEART RATE: 62 BPM

## 2025-03-27 PROCEDURE — 250N000013 HC RX MED GY IP 250 OP 250 PS 637

## 2025-03-27 PROCEDURE — 99233 SBSQ HOSP IP/OBS HIGH 50: CPT | Performed by: STUDENT IN AN ORGANIZED HEALTH CARE EDUCATION/TRAINING PROGRAM

## 2025-03-27 PROCEDURE — 250N000013 HC RX MED GY IP 250 OP 250 PS 637: Performed by: STUDENT IN AN ORGANIZED HEALTH CARE EDUCATION/TRAINING PROGRAM

## 2025-03-27 PROCEDURE — 124N000002 HC R&B MH UMMC

## 2025-03-27 PROCEDURE — 99233 SBSQ HOSP IP/OBS HIGH 50: CPT | Mod: GC | Performed by: PSYCHIATRY & NEUROLOGY

## 2025-03-27 PROCEDURE — 97150 GROUP THERAPEUTIC PROCEDURES: CPT | Mod: GO

## 2025-03-27 RX ORDER — CLONIDINE HYDROCHLORIDE 0.1 MG/1
0.1 TABLET ORAL 3 TIMES DAILY
Status: DISPENSED | OUTPATIENT
Start: 2025-03-27

## 2025-03-27 RX ORDER — METHADONE HYDROCHLORIDE 10 MG/ML
10 CONCENTRATE ORAL ONCE
Status: COMPLETED | OUTPATIENT
Start: 2025-03-27 | End: 2025-03-27

## 2025-03-27 RX ORDER — METHADONE HYDROCHLORIDE 10 MG/ML
125 CONCENTRATE ORAL DAILY
Status: DISCONTINUED | OUTPATIENT
Start: 2025-03-28 | End: 2025-03-27

## 2025-03-27 RX ORDER — CLONIDINE HYDROCHLORIDE 0.2 MG/1
0.2 TABLET ORAL 3 TIMES DAILY
Status: CANCELLED | OUTPATIENT
Start: 2025-03-27

## 2025-03-27 RX ORDER — CYCLOBENZAPRINE HCL 10 MG
10 TABLET ORAL ONCE
Status: COMPLETED | OUTPATIENT
Start: 2025-03-27 | End: 2025-03-27

## 2025-03-27 RX ORDER — CLONIDINE HYDROCHLORIDE 0.2 MG/1
0.2 TABLET ORAL 3 TIMES DAILY
Status: DISCONTINUED | OUTPATIENT
Start: 2025-03-27 | End: 2025-03-27

## 2025-03-27 RX ORDER — METHADONE HYDROCHLORIDE 10 MG/ML
125 CONCENTRATE ORAL DAILY
Status: DISPENSED | OUTPATIENT
Start: 2025-03-28

## 2025-03-27 RX ADMIN — Medication 900 MG: at 13:16

## 2025-03-27 RX ADMIN — NICOTINE POLACRILEX 4 MG: 4 LOZENGE ORAL at 12:01

## 2025-03-27 RX ADMIN — THIAMINE HCL TAB 100 MG 100 MG: 100 TAB at 07:40

## 2025-03-27 RX ADMIN — NICOTINE POLACRILEX 4 MG: 4 LOZENGE ORAL at 09:15

## 2025-03-27 RX ADMIN — MIRTAZAPINE 45 MG: 45 TABLET, FILM COATED ORAL at 20:12

## 2025-03-27 RX ADMIN — NICOTINE POLACRILEX 4 MG: 4 LOZENGE ORAL at 17:12

## 2025-03-27 RX ADMIN — GABAPENTIN 600 MG: 600 TABLET, FILM COATED ORAL at 07:39

## 2025-03-27 RX ADMIN — CYCLOBENZAPRINE HYDROCHLORIDE 10 MG: 10 TABLET, FILM COATED ORAL at 20:12

## 2025-03-27 RX ADMIN — HYDROXYZINE HYDROCHLORIDE 50 MG: 50 TABLET, FILM COATED ORAL at 02:31

## 2025-03-27 RX ADMIN — CLONIDINE HYDROCHLORIDE 0.1 MG: 0.1 TABLET ORAL at 07:39

## 2025-03-27 RX ADMIN — NICOTINE POLACRILEX 4 MG: 4 LOZENGE ORAL at 07:40

## 2025-03-27 RX ADMIN — METHADONE HYDROCHLORIDE 115 MG: 10 CONCENTRATE ORAL at 07:40

## 2025-03-27 RX ADMIN — METHADONE HYDROCHLORIDE 10 MG: 10 CONCENTRATE ORAL at 12:03

## 2025-03-27 RX ADMIN — Medication 900 MG: at 20:12

## 2025-03-27 RX ADMIN — CLONIDINE HYDROCHLORIDE 0.1 MG: 0.1 TABLET ORAL at 13:16

## 2025-03-27 RX ADMIN — NICOTINE POLACRILEX 4 MG: 4 LOZENGE ORAL at 15:00

## 2025-03-27 RX ADMIN — CLONIDINE HYDROCHLORIDE 0.1 MG: 0.1 TABLET ORAL at 20:12

## 2025-03-27 RX ADMIN — NICOTINE POLACRILEX 4 MG: 4 LOZENGE ORAL at 18:25

## 2025-03-27 RX ADMIN — DESVENLAFAXINE 50 MG: 25 TABLET, EXTENDED RELEASE ORAL at 07:39

## 2025-03-27 RX ADMIN — NICOTINE POLACRILEX 4 MG: 4 LOZENGE ORAL at 19:38

## 2025-03-27 RX ADMIN — FOLIC ACID 1 MG: 1 TABLET ORAL at 07:39

## 2025-03-27 RX ADMIN — NICOTINE POLACRILEX 4 MG: 4 LOZENGE ORAL at 16:08

## 2025-03-27 RX ADMIN — NICOTINE POLACRILEX 4 MG: 4 LOZENGE ORAL at 13:09

## 2025-03-27 RX ADMIN — NICOTINE POLACRILEX 4 MG: 4 LOZENGE ORAL at 06:30

## 2025-03-27 RX ADMIN — NICOTINE POLACRILEX 4 MG: 4 LOZENGE ORAL at 10:19

## 2025-03-27 RX ADMIN — Medication 1 TABLET: at 07:39

## 2025-03-27 ASSESSMENT — ACTIVITIES OF DAILY LIVING (ADL)
ADLS_ACUITY_SCORE: 26
HYGIENE/GROOMING: INDEPENDENT
ADLS_ACUITY_SCORE: 26
ADLS_ACUITY_SCORE: 26
DRESS: INDEPENDENT
ADLS_ACUITY_SCORE: 26
ADLS_ACUITY_SCORE: 26
LAUNDRY: WITH SUPERVISION
LAUNDRY: WITH SUPERVISION
ADLS_ACUITY_SCORE: 26
ORAL_HYGIENE: INDEPENDENT
ADLS_ACUITY_SCORE: 26
ORAL_HYGIENE: INDEPENDENT
HYGIENE/GROOMING: INDEPENDENT
ADLS_ACUITY_SCORE: 26
DRESS: INDEPENDENT
ADLS_ACUITY_SCORE: 26

## 2025-03-27 NOTE — PLAN OF CARE
Problem: Depressive Signs/Symptoms  Goal: Optimized Energy Level (Depressive Signs/Symptoms)  Outcome: Progressing  Intervention: Optimize Energy Level  Recent Flowsheet Documentation  Taken 3/27/2025 1100 by Serena Etienne RN  Patient Performed Hygiene: dressed  Activity (Behavioral Health): up ad radha   Goal Outcome Evaluation:    Plan of Care Reviewed With: patient          No major concerns this shift. Out and visible in the meilu. Denies SI/SIB/HI. Denies voices. Vital signs stable this shift. Received scheduled doses of clonidine and methadone.   Attended groups. Denies any GI upset. COWS score 4.   Methadone dose increased and administration time changed to 0600 per patient's request.   Staff will continue to monitor and offer support as needed.

## 2025-03-27 NOTE — PLAN OF CARE
"SHIFT NURSING PLAN OF CARE   Problem: Suicide Risk  Goal: Absence of Self-Harm  Outcome: Progressing     Problem: Depressive Signs/Symptoms  Goal: Improved Mood Symptoms (Depressive Signs/Symptoms)  Outcome: Progressing  Flowsheets (Taken 3/27/2025 1755)  Mutually Determined Action Steps (Improved Mood Symptoms): acknowledges progress   Goal Outcome Evaluation:    Pt has been visible in the milieu; spent the shift resting in bed, watching TV in the lounge, attended group and pacing in the hallway; tense and guarded; scored 5 on COWS and continue reporting withdrawal symptoms; irritable and impatient; appeared anxious and restless; around 1900, pt asked if Flexeril is available to him and when told medication is not ordered, pt get agitated and slammed his door; pt said, \" They keep lying to me, why they didn't ordered\"; attending resident notified and received one time flexeril dose 10 mg and administered with HS medication; denied SI/SIB,AVH; reported feeling better and relaxed on the news of Flexeril order; pt encouraged to talk to attending provider tomorrow.                        "

## 2025-03-27 NOTE — PLAN OF CARE
Team Note Due:  Wednesday    Assessment/Intervention/Current Symtoms and Care Coordination:  Chart review and met with team, discussed pt progress, symptomology, and response to treatment.  Discussed the discharge plan and any potential impediments to discharge.       Discharge Plan or Goal:  TBD     Barriers to Discharge:  Medication Management  ECT     Referral Status:  None currently      Legal Status:  Voluntary       Contacts (include TIERNEY status):  Methadone Management: [facility]     /ACT Team: TIERNEY signed  Name/Clinic: [facility]    Number: [phone]      Upcoming Meetings and Dates/Important Information and next steps:  Visit with  4/3 9am

## 2025-03-27 NOTE — PROGRESS NOTES
Rehab Group    Start time: 1020  End time: 1200  Patient time total: 60 minutes    attended full group    #7 attended   Group Type: occupational therapy   Group Topic Covered: coping skills     Group Session Detail:  OT clinic     Patient Response/Contribution:  cooperative with task, organized, attentive, and actively engaged     Patient Detail:    Pt actively participated in occupational therapy clinic to facilitate coping skill exploration, creative expression within personally meaningful activities, and clinical observation of social, cognitive, and kinesthetic performance skills. Pt response: Independent to initiate, gather materials, sequence, and adjust to workspace demands as needed. Demonstrated good focus, planning, and attention to detail for selected detailed, structured, creative expression task. Organized in his task approach. Able to ask for assistance as needed, and he was increasingly social with peers and staff throughout this group in comparison to groups earlier in his admission. He reported having difficulty sleeping last night, and stated he has been awake since 1 AM. He shared that he will likely be starting ECT in the near future and appeared seemingly hopeful in discussing this. Calm, pleasant, and engaged. Affect appeared to brighten slightly in interactions. Politely thanked writer for group.      [id number] OT Group (2 or more in attendance)      Patient Active Problem List   Diagnosis    Opioid use disorder, severe, on maintenance therapy (H)    Suicidal ideation    Severe benzodiazepine use disorder (H)    Substance-induced anxiety disorder (H)    Benzodiazepine withdrawal without complication (H)    Cannabis use disorder, moderate, dependence (H)    Cigarette nicotine dependence with withdrawal    Moderate episode of recurrent major depressive disorder (H)    Homelessness    Opioid withdrawal (H)    MDD (major depressive disorder), recurrent severe, without psychosis (H)    ALISSON  (generalized anxiety disorder)

## 2025-03-27 NOTE — PLAN OF CARE
Goal Outcome Evaluation:  Problem: Sleep Disturbance  Goal: Adequate Sleep/Rest  Outcome: Progressing     Pt appeared asleep at the start of the shift, breathing quietly with no distress noted.  At 0400 pt came out to desk asking if his scheduled 0800 Methadone could be changed to 0530, writer called the resident on call, resident said to bring it up to the team this Morning. Pt became agitated  and slammed his door when updated, prn offered but he declined. Slept for 4 hours.

## 2025-03-27 NOTE — PLAN OF CARE
BEH IP Unit Acuity Rating Score (UARS)  Patient is given one point for every criteria they meet.    CRITERIA SCORING   On a 72 hour hold, court hold, committed, stay of commitment, or revocation. 0    Patient LOS on BEH unit exceeds 20 days. 0  LOS: 9   Patient under guardianship, 55+, otherwise medically complex, or under age 11. 0   Suicide ideation without relief of precipitating factors. 1   Current plan for suicide. 1   Current plan for homicide. 0   Imminent risk or actual attempt to seriously harm another without relief of factors precipitating the attempt. 0   Severe dysfunction in daily living (ex: complete neglect for self care, extreme disruption in vegetative function, extreme deterioration in social interactions). 1   Recent (last 7 days) or current physical aggression in the ED or on unit. 0   Restraints or seclusion episode in past 72 hours. 0   Recent (last 7 days) or current verbal aggression, agitation, yelling, etc., while in the ED or unit. 0   Active psychosis. 0   Need for constant or near constant redirection (from leaving, from others, etc).  0   Intrusive or disruptive behaviors. 0   Patient requires 3 or more hours of individualized nursing care per 8-hour shift (i.e. for ADLs, meds, therapeutic interventions). 0   TOTAL 3

## 2025-03-27 NOTE — PROGRESS NOTES
"  ----------------------------------------------------------------------------------------------------------  [facility]  Psychiatry Progress Note  Hospital Day #9     Interim History:     The patient's care was discussed with the treatment team and chart notes were reviewed.    Vitals: VSS.128/80.  Sleep: 4 hours (03/27/25 0600)  Scheduled medications: Did not take evening dose of clonidine. Did not take mirtazapine. Took other scheduled meds.   Psychiatric PRN medications: Hydroxyzine 50mg prn x2     Staff Report:   No acute events or safety concerns overnight.  - Had a rough night, was requesting ativan but said his doctors wouldn't order it  - Requested methadone be moved to earlier in the morning   - Said he wanted to leave overnight but cooperative this morning   Please see staff notes for details.      Subjective:     Patient Interview:  Zenon had a difficult night but says the increase in methadone has been helpful.today. He is relieved that the  star bursts  in his vision have stopped. He is wondering about waiting until Monday to start ECT to \"feel more stable\" but remains committed to treatment and does not want to abort ECT. He feels he has reached a plateau in his withdrawal and describes feeling like he has  screwed his system up.  He says he understands his desire to avoid discomfort but is actively pushing himself to engage with treatment and staff.    He reflected upon on an incident last night where he was disrespectful to staff, expresses regret, and reports apologizing. He feels like he is sometimes viewed negatively in the unit but insists he is here to better himself. He endorses feeling like a burden on resources and expresses nervousness about potential memory loss from ECT. Despite these concerns, he hopes to use his current struggles as motivation to avoid fentanyl in the future and achieve a sense of balance, stating he does not feel  in the " " s seat  of his recovery. He has been attending groups and pushing himself to do things rather than just \"lay in bed\".     When asked about the benefits of ECT, he was unable to identify any at the moment. He is concerned about the timing of methadone administration on ECT days, particularly if the procedure is scheduled later in the morning since he is worried about not getting methadone till later in the day. He reports sleeping less than two hours per night and feels he is  on the bubble  of checking out and returning to use but continues to talk himself out of it for the sake of his health. He experiences significant nighttime anxiety with diaphoresis and describes hydroxyzine as  spitting on a wildfire.  He reports feeling stimulated rather than sedated by hydroxyzine. Mirtazapine provided some relief but ultimately did not improve his sleep, which is why he did not take it last night. He continues to experience muscle aches and back pain which are keeping him up at night. These are unchanged (haven't gotten better or worse)    He spoke with addiction medicine this morning, discussing an increase in gabapentin. We discussed how his methadone dose time was adjusted and he was appreciative of this change. He reports past use of Robaxin for back pain and muscle stiffness and is interested in trying Flexeril if approved by the care team. He is now having semi-formed stools, and Imodium has been helpful.     He remains open to starting ECT tomorrow if methadone timing is not impacted. He describes his situation as difficult because he  chose  this path, though staff reassured him that addiction is not a choice anyone would make. He states he is taking life  one minute at a time.     ROS:  Patient has chills, muscle stiffness, and insomnia (staying asleep)  Patient denies diarrhea, visual disturbances      Objective:     Vitals:  /80   Pulse 55   Temp 98.2  F (36.8  C)   Resp 16   Wt 92.8 kg (204 lb 8 " oz)   SpO2 100%   BMI 28.53 kg/m      Allergies:  Allergies   Allergen Reactions    Iodinated Contrast Media Hives and Rash     Reports he is allergic to Iodine contrast     Reports he is allergic to Iodine contrast       Reports he is allergic to Iodine contrast    Reports he is allergic to Iodine contrast      Reports he is allergic to Iodine contrast   Reports he is allergic to Iodine contrast    Iodine Hives    Lisinopril Hives and Rash    Lithium Hives    Topiramate Hives    Escitalopram Rash     Rash around mouth    Iodine-131 Hives    Trazodone Anxiety     Trazodone causes RLS for pt    Olanzapine Rash     Pt reported taking approximately one year ago. Developed rash around mouth.       Current Medications:  Scheduled:  Current Facility-Administered Medications   Medication Dose Route Frequency Provider Last Rate Last Admin    acetaminophen (TYLENOL) tablet 650 mg  650 mg Oral Q4H PRN Patti Bernard MD        alum & mag hydroxide-simethicone (MAALOX) suspension 30 mL  30 mL Oral Q4H PRN Patti Bernard MD        cloNIDine (CATAPRES) tablet 0.1 mg  0.1 mg Oral TID Tracy Castillo MD   0.1 mg at 03/27/25 0739    desvenlafaxine succinate (PRISTIQ) 24 hr tablet 50 mg  50 mg Oral Daily Tracy Castillo MD   50 mg at 03/27/25 0739    dicyclomine (BENTYL) capsule 10 mg  10 mg Oral 4x Daily PRN Suad Coyle MD        folic acid (FOLVITE) tablet 1 mg  1 mg Oral Daily Patti Bernard MD   1 mg at 03/27/25 0739    gabapentin (NEURONTIN) tablet 600 mg  600 mg Oral TID Suad Coyle MD   600 mg at 03/27/25 0739    hydrocortisone (CORTAID) 1 % cream   Topical BID Patti Bernard MD   Given at 03/25/25 1917    hydrOXYzine HCl (ATARAX) tablet 50 mg  50 mg Oral 4x Daily PRN Evelio Chavarria DO   50 mg at 03/27/25 0231    loperamide (IMODIUM) capsule 2 mg  2 mg Oral 4x Daily PRN Suad Coyle MD        melatonin tablet 3 mg  3 mg Oral At Bedtime PRN Marco Antonio  MD Patti        methadone (DOLOPHINE-INTENSOL) 10 MG/ML (HIGH CONC) solution 115 mg  115 mg Oral Daily Suad Coyle MD   115 mg at 03/27/25 0740    midazolam 5 mg/mL (VERSED) intranasal solution 10 mg  10 mg Intranasal Once PRN Tracy Castillo MD        And    mucosal atomization device #  device 1 Device  1 Device Inhalation DOES NOT GO TO Tracy Thomson MD        mirtazapine (REMERON) tablet 45 mg  45 mg Oral At Bedtime Demetri Sarabia MD   45 mg at 03/25/25 2126    multivitamin w/minerals (THERA-VIT-M) tablet 1 tablet  1 tablet Oral Daily Patti Bernard MD   1 tablet at 03/27/25 0739    naloxone (NARCAN) injection 0.2 mg  0.2 mg Intravenous Q2 Min PRN Charles Menjivar MD        Or    naloxone (NARCAN) injection 0.4 mg  0.4 mg Intravenous Q2 Min PRN Charles Menjivar MD        Or    naloxone (NARCAN) injection 0.2 mg  0.2 mg Intramuscular Q2 Min PRN Charles Menjivar MD        Or    naloxone (NARCAN) injection 0.4 mg  0.4 mg Intramuscular Q2 Min PRN Charles Menjivar MD        nicotine (NICORETTE) lozenge 4 mg  4 mg Buccal Q1H PRN Patti Bernard MD   4 mg at 03/27/25 0740    polyethylene glycol (MIRALAX) Packet 17 g  17 g Oral Daily PRN Patti Bernard MD        thiamine (B-1) tablet 100 mg  100 mg Oral Daily Patti Bernard MD   100 mg at 03/27/25 0740       PRN:  Current Facility-Administered Medications   Medication Dose Route Frequency Provider Last Rate Last Admin    acetaminophen (TYLENOL) tablet 650 mg  650 mg Oral Q4H PRN Patti Bernard MD        alum & mag hydroxide-simethicone (MAALOX) suspension 30 mL  30 mL Oral Q4H PRN Patti Bernard MD        cloNIDine (CATAPRES) tablet 0.1 mg  0.1 mg Oral TID Tracy Castillo MD   0.1 mg at 03/27/25 0739    desvenlafaxine succinate (PRISTIQ) 24 hr tablet 50 mg  50 mg Oral Daily Tracy Castillo MD   50 mg at 03/27/25 0746     dicyclomine (BENTYL) capsule 10 mg  10 mg Oral 4x Daily PRN Suad Coyle MD        folic acid (FOLVITE) tablet 1 mg  1 mg Oral Daily Patti Bernard MD   1 mg at 03/27/25 0739    gabapentin (NEURONTIN) tablet 600 mg  600 mg Oral TID Suad Coyle MD   600 mg at 03/27/25 0739    hydrocortisone (CORTAID) 1 % cream   Topical BID Patti Bernard MD   Given at 03/25/25 1917    hydrOXYzine HCl (ATARAX) tablet 50 mg  50 mg Oral 4x Daily PRN Evelio Chavarria DO   50 mg at 03/27/25 0231    loperamide (IMODIUM) capsule 2 mg  2 mg Oral 4x Daily PRN Suad Coyle MD        melatonin tablet 3 mg  3 mg Oral At Bedtime PRN Patti Bernard MD        methadone (DOLOPHINE-INTENSOL) 10 MG/ML (HIGH CONC) solution 115 mg  115 mg Oral Daily Suad Coyle MD   115 mg at 03/27/25 0740    midazolam 5 mg/mL (VERSED) intranasal solution 10 mg  10 mg Intranasal Once PRN Tracy Castillo MD        And    mucosal atomization device #  device 1 Device  1 Device Inhalation DOES NOT GO TO Tracy Thomson MD        mirtazapine (REMERON) tablet 45 mg  45 mg Oral At Bedtime Demetri Sarabia MD   45 mg at 03/25/25 2129    multivitamin w/minerals (THERA-VIT-M) tablet 1 tablet  1 tablet Oral Daily Patti Bernard MD   1 tablet at 03/27/25 0739    naloxone (NARCAN) injection 0.2 mg  0.2 mg Intravenous Q2 Min PRN Charles Menjivar MD        Or    naloxone (NARCAN) injection 0.4 mg  0.4 mg Intravenous Q2 Min PRN Chrales Menjivar MD        Or    naloxone (NARCAN) injection 0.2 mg  0.2 mg Intramuscular Q2 Min PRN Charles Menjivar MD        Or    naloxone (NARCAN) injection 0.4 mg  0.4 mg Intramuscular Q2 Min PRN Charles Menjivar MD        nicotine (NICORETTE) lozenge 4 mg  4 mg Buccal Q1H PRN Patti Bernard MD   4 mg at 03/27/25 0740    polyethylene glycol (MIRALAX) Packet 17 g  17 g Oral Daily PRN Patti Bernard MD         "thiamine (B-1) tablet 100 mg  100 mg Oral Daily Patti Bernard MD   100 mg at 03/27/25 0740       Labs and Imaging:  New results:   No results found for this or any previous visit (from the past 24 hours).    Data this admission:  - CBC with chronic thrombocytopenia (platelet count 78)   - CMP with mildly elevated AST (47) and ALT (84)   - TSH normal  - UDS positive for amphetamines, cannabinoids, fentanyl  - Vit D in process  - Lipids not ordered this admission (but within normal limits on 2/26/2025)  - Vit B12 normal  - Folate normal  - EKG sinus bradycardia, QTc 396     Mental Status Exam:     Oriented to:  Grossly Oriented, Person/Self, and Situation  General:  Awake and Alert, answers questions openly.   Appearance:  Appears older than stated age Edentulous. Slightly disheveled, dressed in personal clothing.   Behavior/Attitude:  generally calm but does express frustration with his withdrawal symptoms. playing with his hands, appears jittery.   Eye Contact: Appropriate  Psychomotor: Normal no catatonia present  Speech:  appropriate volume/tone, spontaneous, and with good articulation  Language: Fluent in English with appropriate syntax and vocabulary.  Mood:  \"Restless\"  Affect:   restricted affect but does brighten a couple times throughout the conversation  Thought Process:   linear, coherent. Expresses that he is hopeful that his withdrawal symptoms are at a plateau and will improve.   Thought Content:   intermittent suicidal ideation with plan (without intent, has mentioned different methods at different times); No apparent delusions  Associations:  intact  Insight:  fair due to his ability to recognize rational thoughts from irrational thought, like the scenarios of killing himself. He does still express significant depression which limits his insight into his hopelessness and outlook/goals for treatment.   Judgment:  fair due to being engaged in groups/treatment here and cooperative. He does " express the desire to die but is able to separate his thoughts from reality.   Impulse control: fair due to prior to admission behaviors but has remained safe and cooperative here, able to recognize impulses and has not acted on them during his admission   Attention Span:  adequate for conversation  Concentration:  grossly intact to conversation   Recent and Remote Memory:  not formally assessed but intact to conversation   Fund of Knowledge: average, estimated  Muscle Strength and Tone: normal  Gait and Station: Normal     Psychiatric Assessment     Zenon Truong is a 54 year old male with previous psychiatric diagnoses of polysubstance use disorder [benzodiazepines, opiates, methamphetamine, THC, nicotine], severe depression with suicidal ideation, borderline personality disorder and PTSD admitted 03/18/2025 due to concern for SI with plan and intent in the context of medication non adherence, substance use and housing/financial insecurity. On admission, he presented with symptoms of SI with intent and plan, hopelessness, poor motivation, low energy, anhedonia, impulsivity and irritability.      Had a recent hospitalization at [facility] 2/23-3/6/2025 for suicidal ideation and benzodiazepine overdose. ECT was considered and planned during that admission, but he decided against pursuing it at that time. Did not continue taking his meds after leaving.     There is genetic predisposition given family hx of substance use disorder. Patient has struggled with homelessness and severe substance use for years. This current admission is likely precipitated by recent psychiatric hospitalization at end of February followed by relapse soon after discharge to a sober house. Perpetuating factors include ongoing substance use, medication non adherence, longstanding functional impairment, personality traits, lack of perceived support, deconditioning and housing and financial insecurity. Patient's support system includes  case  manager. Based on patient's history and current symptoms, criteria are met for primary diagnosis of MDD without psychotic features, opioid use disorder, benzodiazepine use disorder, stimulant use disorder and cannabis use disorder. Patient warrants inpatient hospitalization to maintain their safety and would benefit from medication optimization and outpatient referral/resources.      Due to his accounts of taking large amounts of fentanyl prior to admission,we discussed with addiction medicine team given duration and severity of his withdrawal. There is a high likelihood that the fentanyl he was taking was cut with other substances, including xylazine. Xylazine is an alpha 2 agonist used as a veterinary anesthetic but also commonly found in illicit opioids. There may also be other substances present in the fentanyl and xanax he was using on the streets which are contributing to the complexity of his withdrawal. We discussed increasing his gabapentin to 900mg TID given his ongoing anxiety and physical symptoms. Additionally, the recommendation from addiction medicine was to increase clonidine to 0.2mg BID given ongoing withdrawal (this will be discussed). He has been intermittently bradycardic with HR <60. HR parameter changes to <55 for giving this today. Overall, we are trying to better manage his withdrawal symptoms, given that he has been asking to leave due to the discomfort. At this time, he would be at very high risk of suicide and return to use of substances/overdose if he were to leave. That being said, team (including addiction team) feels the benefits of these medications we are using to manage symptoms outweigh risks.       Today's changes:  - Increase gabapentin to 900 TID   - Increase methadone to 125mg  - Earlier administration of methadone (0600) per patient request  - Pending recs from ECT team regarding methadone on ECT days (Friday vs Monday as start date for ECT)   - considering flexeril        Psychiatric Plan by Diagnosis      #Major depressive disorder, recurrent, severe without psychosis  Patient was continued on Mirtazapine and Desvenlafaxine. Prior to admission while on the hospitalist service, given severity of depression symptoms, ECT consult was obtained. He did express some reservation due to short term memory loss from previous sessions, but remains hopeful it could be helpful. Per chart review, he was previously on 50mg desvenlafaxine succinate which was increased to 100mg on 11/21/2024. He was discharged from [facility] from his voluntary psychiatric admission from 2/18- 3/6 with 50 mg desvenlafaxine succinate. However, he stopped taking his psychiatric medications after discharge.  On his admission here on 3/14, he was restarted on 25mg desvenlafaxine succinate as he had not been on it since prior discharge. Given this information, increased his pristiq on 3/25 to 50mg.   - Continue Mirtazapine 45 mg po at bedtime  - Continue Desvenlafaxine to 50mg po daily  - Plan to start ECT Friday vs Monday (hold gabapentin after 6 pm the day before session but is not prohibitory towards starting ECT)     #Opioid use disorder, severe  Phenobarbital taper was discontinued on 3/26 per recommendation from addiction medicine team recommendation that his symptoms were likely secondary to opiate withdrawal vs benzo withdrawal. Methadone was increased to 115 mg daily on 3/26 and to 125mg on 3/27 per addiction medicine recs for ongoing withdrawal symptoms.   He does report that he was on 180 mg methadone for 13 years and relapsed on opioids when this was tapered. He has been on PTA 105mg methadone but given his ongoing symptoms of withdrawal and discomfort, addiction medicine consulted on 3/24 for additional input on methadone dosage and other options.   - 3/26: Increase Methadone to 115 mg daily  - 3/27: increase methadone to 125mg daily   - Addiction team consulted; likely opoid withdrawal  - Thiamine, MVI, folic  acid      #Benzodiazepine use disorder, severe  Phenobarbital taper was discontinued on 3/26 per recommendation from addiction medicine team recommendation that his symptoms were likely secondary to opiate withdrawal vs benzo withdrawal.   - 3/26: Discontinue phenobarb taper per addiction team recs   - Continue clonidine 0.1mg TID for withdrawal symptoms (decreased HR parameters to hold if <55)   - Rec from addiction med: increase clonidine to 0.2mg BID   - Will hold today given other medication changes   - Check HR prior to administration. HR parameters; hold for less than 55     #Stimulant (methamphetamine) use disorder  #Cannabis use disorder     Additional Plans:  - Patient will be treated in therapeutic milieu with appropriate individual and group therapies as described.     Psychiatric Hospital Course:      Zenon Truong was admitted to [facility] as a voluntary patient. Had a recent hospitalization   Medications and other updates:  3/14: hold bupropion due to risk of lower seizure threshold  3/14: Continue PTA desvenlafaxine 25mg and mirtazapine 30mg for sleep  3/18: start clonidine 0.1mg TID   3/20: Increase mirtazapine to 45 mg daily for continued insomnia   3/21: restart PTA gabapentin 600mg TID for anxiety   3/21: continuing phenobarb taper (today is 32.4 mg BID)  3/23: no scheduled phenobarb received 32.4mg due to ongoing withdrawal symptoms  3/24: give one time phenobarbital dose of 32.4mg due to significant discomfort that seem at least in part due to withdrawal  3/24: consult addiction medicine for assistance with methadone dose and ongoing withdrawal symptoms   3/24: discussed re-initiating wellbutrin. However, patient reporting symptoms that he feels were similar to auras he has had prior to seizures (this has been worse the last couple days), so hesitant to do this at this time.  3/25: one time phenobarbital dose of 32.4mg given significant withdrawal symptoms  3/25: increase desvenlafaxine to  50mg daily (per chart review, was prescribed this dose outpatient)  3/26: increase methadone to 115 mg daily per addiction medicine recs   3/26: discontinue phenobarb taper   3/26: medicine consult for ECT clearance; Defer to psychiatry for decision of proceeding with ECT with recent benzodiazepine withdrawal/use. Otherwise, no absolute medical contraindications to proceeding with ECT at this time. Treatment plan per psychiatry.   3/27: increase gabapentin to 900 TID per addiction med team recs   3.27: increase methadone to 125mg daily per addiction med recs given ongoing withdrawal symptoms   3/27: pending ECT team recs regarding methadone on ECT days      The risks, benefits, alternatives, and side effects were discussed and understood by the patient.     Medical Assessment and Plan     #Palpitations  3/21 patient reporting short episodes (few seconds) of palpitations occurring intermittently over the last month. Does have shortness of breath with this, no chest pain or other symptoms. He is not sure if it is associated with anxiety.   - repeat EKG ordered showed sinus bradycardia with no significant change to prior EKG      #Chronic hepatitis C, never treated  #Mild transaminase elevations, hepatic steatosis:  Never treated for hepatitis C.  Chronic, mild transaminase elevations with normal hepatic function.  Most recent abdominal ultrasound 2/15/2024 showed hepatic steatosis without hepatic lesions, normal gallbladder.  Denies current alcohol use.  - monitor outpatient      #Chronic thrombocytopenia  #Normocytic anemia, \now resolved  Chronic thrombocytopenia, moderate and stable.  Denies any history of bleeding including no history of gastric ulcers, hematemesis, melena or hematochezia.  Anemia appears to be more recent since 2/23/2025 when hemoglobin was 11.6, MCV 86.  Hgb 14.1, Plts 97, MCV 86, ferritin, iron, TIBC wnl on 3/15.      #Concern for malnutrition  Seen by dietician team, patient does not meet two  of the established criteria necessary for diagnosing malnutrition but is at risk for malnutrition.   - ensure enlive ordered        Medical course: Patient was physically examined by the ED prior to being transferred to the unit and was found to be medically stable and appropriate for admission.      Consults:  Medicine (for clearance for ECT)  Addiction med (for methadone dosage and withdrawal symptoms)      Checklist     Legal Status: Voluntary   If patient were to ask to leave, would strongly recommend 72 hour hold due to continued SI with plan (has mentioned multiple different plans including overdose with fentanyl) and intermittently expressing intent     Safety Assessment:   Behavioral Orders   Procedures    Code 1 - Restrict to Unit    Routine Programming     As clinically indicated    Seizure precautions    Status 15     Every 15 minutes.    Suicide precautions: Suicide Risk: LOW; Clinical rationale to override score: lack of access to a plan for self-harm     .     Order Specific Question:   Suicide Risk     Answer:   LOW     Order Specific Question:   Clinical rationale to override score:     Answer:   lack of access to a plan for self-harm    Withdrawal precautions       Risk Assessment:  Risk for harm is low   Risk factors: SI, maladaptive coping, substance use, impulsive, and past behaviors  Protective factors: engaged in treatment     SIO: no    Disposition: Pending stabilization, ECT, medication optimization, & development of a safe discharge plan.      Attestations     Mikayla Latham, MS3  [facility] Medical Student     I was present with the medical student who participated in the service and in the documentation of the note.  I have verified the history and personally performed the physical exam and medical decision making. I agree with the assessment and plan of care as documented in the note.    This patient was seen and discussed with my attending physician.  Tracy Castillo MD   Psychiatry Resident  Physician    This patient has been seen and evaluated by me, Charles Fink.  I have discussed this patient with the psychiatry resident and I agree with the findings and plan in this note.    I have reviewed today's vital signs, medications, labs and imaging.     Charles Menjivar MD

## 2025-03-27 NOTE — CONSULTS
Psychiatry Consultation; Follow up              Reason for Consult, requesting source:    Follow up addiction  Requesting source: Charles Menjivar    Labs and imaging reviewed.               Interim history:    - Overnight notes indicate Zenon was frustrated about his medication regimen, slammed his door several times, and threatened to leave AMA and use fentanyl  - On my interview today he was initially in group room working on project  - He said he feels a little better with increased methadone and had not yet received extra 10mg dose  - Said he is interested in maximizing methadone and gabapentin as able, says he used to be on 1200mg TID\  - Reports insomnia is bothering him         Current Medications:     Current Facility-Administered Medications   Medication Dose Route Frequency Provider Last Rate Last Admin    cloNIDine (CATAPRES) tablet 0.1 mg  0.1 mg Oral TID Tracy Castillo MD   0.1 mg at 03/27/25 1316    desvenlafaxine succinate (PRISTIQ) 24 hr tablet 50 mg  50 mg Oral Daily Tracy Castillo MD   50 mg at 03/27/25 0739    folic acid (FOLVITE) tablet 1 mg  1 mg Oral Daily Patti Bernard MD   1 mg at 03/27/25 0739    gabapentin (NEURONTIN) half-tab 900 mg  900 mg Oral TID Tracy Castillo MD   900 mg at 03/27/25 1316    hydrocortisone (CORTAID) 1 % cream   Topical BID Patti Bernard MD   Given at 03/25/25 1917    [START ON 3/28/2025] methadone (DOLOPHINE-INTENSOL) 10 MG/ML (HIGH CONC) solution 125 mg  125 mg Oral Daily Tracy Castillo MD        mirtazapine (REMERON) tablet 45 mg  45 mg Oral At Bedtime Demetri Sarabia MD   45 mg at 03/25/25 2129    multivitamin w/minerals (THERA-VIT-M) tablet 1 tablet  1 tablet Oral Daily Patti Bernard MD   1 tablet at 03/27/25 0739    thiamine (B-1) tablet 100 mg  100 mg Oral Daily Patti Bernard MD   100 mg at 03/27/25 0740              MSE:   Appearance: awake, alert, adequately groomed, and  "casually dressed  Attitude:  cooperative  Eye Contact:  good  Mood:   \"a little better\"  Affect:  mood congruent and intensity is normal  Speech:  clear, coherent  Psychomotor Behavior:  no evidence of tardive dyskinesia, dystonia, or tics  Muscle strength and tone: Normal   Thought Process:  logical, linear, and goal oriented  Associations:  no loose associations  Thought Content:  no evidence of suicidal ideation or homicidal ideation and no evidence of psychotic thought  Insight:  fair  Judgement:  fair  Oriented to:  time, person, and place  Attention Span and Concentration:  fair  Recent and Remote Memory:  fair    Vital signs:  Temp: 97.8  F (36.6  C) Temp src: Oral BP: 111/68 Pulse: 58   Resp: 16 SpO2: 99 % O2 Device: None (Room air)     Weight: 90.8 kg (200 lb 1.6 oz)  Estimated body mass index is 27.92 kg/m  as calculated from the following:    Height as of 3/14/25: 1.803 m (5' 10.98\").    Weight as of this encounter: 90.8 kg (200 lb 1.6 oz).    EKG: QTc 397ms 3/21         DSM-5 Diagnosis:   Major depressive disorder, recurrent, severe without psychosis  Opioid use disorder, severe  Benzodiazepine use disorder, severe  Stimulant (methamphetamine) use disorder  Cannabis use disorder          Assessment:     Per overnight notes he continues to be very frustrated with his medication regimen. Feels uncontrolled withdrawal. Based on vitals and physical exam benzodiazepine withdrawal is not likely, though he likely does have some post-acute withdrawal anxiety remaining. Based on his symptoms and reported use, he is likely still experience fentanyl withdrawal and/or opioid cravings. Methadone dose is not covering his opioid cravings as he was using on top of methadone. Will increase methadone and gabapentin today.           Summary of Recommendations:   - Increased methadone to 125mg daily (can change morning admin time to patient's preference)  - Increase gabapentin to 900mg TID  - Would not recommend additional " phenobarbital at this time  - Will continue to follow      Dilshad Bailey MD    Department of Psychiatry  Please Vocera if questions    Total time spent in chart review, patient interview and coordination of care; 52 minutes - all time was spent on the date of the encounter that I saw patient

## 2025-03-28 PROCEDURE — 99232 SBSQ HOSP IP/OBS MODERATE 35: CPT | Mod: GC | Performed by: PSYCHIATRY & NEUROLOGY

## 2025-03-28 PROCEDURE — 250N000013 HC RX MED GY IP 250 OP 250 PS 637

## 2025-03-28 PROCEDURE — 97150 GROUP THERAPEUTIC PROCEDURES: CPT | Mod: GO

## 2025-03-28 PROCEDURE — 124N000002 HC R&B MH UMMC

## 2025-03-28 RX ORDER — CYCLOBENZAPRINE HCL 10 MG
10 TABLET ORAL 3 TIMES DAILY PRN
Status: DISCONTINUED | OUTPATIENT
Start: 2025-03-28 | End: 2025-04-16 | Stop reason: HOSPADM

## 2025-03-28 RX ADMIN — Medication 900 MG: at 13:09

## 2025-03-28 RX ADMIN — ACETAMINOPHEN 650 MG: 325 TABLET, FILM COATED ORAL at 11:08

## 2025-03-28 RX ADMIN — NICOTINE POLACRILEX 4 MG: 4 LOZENGE ORAL at 06:38

## 2025-03-28 RX ADMIN — HYDROXYZINE HYDROCHLORIDE 50 MG: 50 TABLET, FILM COATED ORAL at 17:40

## 2025-03-28 RX ADMIN — MIRTAZAPINE 45 MG: 45 TABLET, FILM COATED ORAL at 20:02

## 2025-03-28 RX ADMIN — NICOTINE POLACRILEX 4 MG: 4 LOZENGE ORAL at 18:20

## 2025-03-28 RX ADMIN — NICOTINE POLACRILEX 4 MG: 4 LOZENGE ORAL at 16:12

## 2025-03-28 RX ADMIN — CYCLOBENZAPRINE 10 MG: 10 TABLET, FILM COATED ORAL at 11:05

## 2025-03-28 RX ADMIN — NICOTINE POLACRILEX 4 MG: 4 LOZENGE ORAL at 20:37

## 2025-03-28 RX ADMIN — NICOTINE POLACRILEX 4 MG: 4 LOZENGE ORAL at 11:05

## 2025-03-28 RX ADMIN — CLONIDINE HYDROCHLORIDE 0.1 MG: 0.1 TABLET ORAL at 07:53

## 2025-03-28 RX ADMIN — Medication 900 MG: at 20:02

## 2025-03-28 RX ADMIN — LOPERAMIDE HYDROCHLORIDE 2 MG: 2 CAPSULE ORAL at 08:58

## 2025-03-28 RX ADMIN — THIAMINE HCL TAB 100 MG 100 MG: 100 TAB at 07:53

## 2025-03-28 RX ADMIN — CYCLOBENZAPRINE 10 MG: 10 TABLET, FILM COATED ORAL at 20:02

## 2025-03-28 RX ADMIN — CLONIDINE HYDROCHLORIDE 0.1 MG: 0.1 TABLET ORAL at 13:09

## 2025-03-28 RX ADMIN — FOLIC ACID 1 MG: 1 TABLET ORAL at 07:53

## 2025-03-28 RX ADMIN — DESVENLAFAXINE 50 MG: 25 TABLET, EXTENDED RELEASE ORAL at 07:53

## 2025-03-28 RX ADMIN — METHADONE HYDROCHLORIDE 125 MG: 10 CONCENTRATE ORAL at 05:47

## 2025-03-28 RX ADMIN — CLONIDINE HYDROCHLORIDE 0.1 MG: 0.1 TABLET ORAL at 20:02

## 2025-03-28 RX ADMIN — NICOTINE POLACRILEX 4 MG: 4 LOZENGE ORAL at 19:30

## 2025-03-28 RX ADMIN — Medication 1 TABLET: at 07:53

## 2025-03-28 RX ADMIN — NICOTINE POLACRILEX 4 MG: 4 LOZENGE ORAL at 12:50

## 2025-03-28 RX ADMIN — NICOTINE POLACRILEX 4 MG: 4 LOZENGE ORAL at 17:14

## 2025-03-28 RX ADMIN — NICOTINE POLACRILEX 4 MG: 4 LOZENGE ORAL at 08:58

## 2025-03-28 RX ADMIN — NICOTINE POLACRILEX 4 MG: 4 LOZENGE ORAL at 07:54

## 2025-03-28 RX ADMIN — Medication 900 MG: at 07:53

## 2025-03-28 ASSESSMENT — ACTIVITIES OF DAILY LIVING (ADL)
ADLS_ACUITY_SCORE: 26
DRESS: INDEPENDENT
ADLS_ACUITY_SCORE: 26
HYGIENE/GROOMING: INDEPENDENT
ADLS_ACUITY_SCORE: 26
LAUNDRY: WITH SUPERVISION
ADLS_ACUITY_SCORE: 26
ORAL_HYGIENE: INDEPENDENT

## 2025-03-28 NOTE — PLAN OF CARE
Team Note Due:  Wednesday    Assessment/Intervention/Current Symtoms and Care Coordination:  Chart review and met with team, discussed pt progress, symptomology, and response to treatment.  Discussed the discharge plan and any potential impediments to discharge.       Discharge Plan or Goal:  TBD     Barriers to Discharge:  Medication Management  ECT     Referral Status:  None currently      Legal Status:  Voluntary       Contacts (include TIERNEY status):  [place]ethadone Management: [facility] [place]     /ACT Team: TIERNEY signed  Name/Clinic: [facility]    Number: [phone]      Upcoming Meetings and Dates/Important Information and next steps:  Visit with  4/3 9am

## 2025-03-28 NOTE — PROGRESS NOTES
----------------------------------------------------------------------------------------------------------  [facility]  Psychiatry Progress Note  Hospital Day #10     Interim History:     The patient's care was discussed with the treatment team and chart notes were reviewed.    Vitals: VSS.128/80.  Sleep: 4.25 hours (03/28/25 0621)  Scheduled medications:  Did not take mirtazapine. Took other scheduled meds.   Psychiatric PRN medications: None    Staff Report:   No acute events or safety concerns overnight.  - Had a rough night, was requesting flexiril but said his doctors didn't order it -- Received 1x time dose  Please see staff notes for details.      Subjective:     Patient Interview:  Not much going on today, questions regarding being taken off wellbutrin. Feels bothered that he was taken off of it because his roommate is on it for ECT, feels deceived. Reports not liking pristiq, wellbutrin had been effective before for him. Hydroxyzine reportedly is excitatory for him. Feeling frustrated and questioning all of the ECT because of this. Withdrawals are getting a little better with the increase in methadone and change of time. Up at 0330 this morning. Combination of flexeril, gabapentin gave him 6 hours of consecutive sleep, he feels very thankful for this. This was a big deal for him. Feels he can't concentrate on anything right now, reports he can't stay on task or read anything because his thoughts are racing so much. Reports he has been having cravings to go get high. Discussed we took him off wellbutrin because of concern for seizure due to benzo withdrawal. He is understanding of this. Reports he is tired of this anxiety and it is very uncomfortable, leading to constant negative thinking. Feeling cynical with others and slightly abrasive, this is not who he feels he is normally. Discussed ECT and how it will hopefully help.   Reports his muscles are very tense  and his back aches a great deal. Feeling a lot of tension lately, throbbing, not relieved with position changes, mild relief for 10 minutes with hot showers. Feels like his fentanyl use is to blame. Denies side effects or problems with medications at this time. Discussed addiction team will continue to follow -- Zenon is in agreement with this plan.   Reports nothing else going on right now, difficult to get into anything else because of the ongoing withdrawals. Wondering where he goes from here, considering possible participation in IRTS program. Discussed the plan is to take it day by day and move forward with ECT. Reports he attends all the groups, laying in bed would be worse, engaging with distractions is helpful. Reports doing DBT in the past, endorses doing a little radical acceptance and laughed with staff at this remark.     ROS:  Patient has chills, muscle stiffness, and insomnia (staying asleep -- Big improvement from yesterday)   Patient denies visual disturbances      Objective:     Vitals:  /78   Pulse 62   Temp 96.8  F (36  C)   Resp 16   Wt 90.8 kg (200 lb 1.6 oz)   SpO2 97%   BMI 27.92 kg/m      Allergies:  Allergies   Allergen Reactions    Iodinated Contrast Media Hives and Rash     Reports he is allergic to Iodine contrast     Reports he is allergic to Iodine contrast       Reports he is allergic to Iodine contrast    Reports he is allergic to Iodine contrast      Reports he is allergic to Iodine contrast   Reports he is allergic to Iodine contrast    Iodine Hives    Lisinopril Hives and Rash    Lithium Hives    Topiramate Hives    Escitalopram Rash     Rash around mouth    Iodine-131 Hives    Trazodone Anxiety     Trazodone causes RLS for pt    Olanzapine Rash     Pt reported taking approximately one year ago. Developed rash around mouth.       Current Medications:  Scheduled:  Current Facility-Administered Medications   Medication Dose Route Frequency Provider Last Rate Last Admin     acetaminophen (TYLENOL) tablet 650 mg  650 mg Oral Q4H PRN Patti Bernard MD   650 mg at 03/28/25 1108    alum & mag hydroxide-simethicone (MAALOX) suspension 30 mL  30 mL Oral Q4H PRN Patti Bernard MD        cloNIDine (CATAPRES) tablet 0.1 mg  0.1 mg Oral TID Tracy Castillo MD   0.1 mg at 03/28/25 0753    cyclobenzaprine (FLEXERIL) tablet 10 mg  10 mg Oral TID PRN Patti Bernard MD   10 mg at 03/28/25 1105    desvenlafaxine succinate (PRISTIQ) 24 hr tablet 50 mg  50 mg Oral Daily Tracy Castillo MD   50 mg at 03/28/25 0753    dicyclomine (BENTYL) capsule 10 mg  10 mg Oral 4x Daily PRN Suad Coyle MD        folic acid (FOLVITE) tablet 1 mg  1 mg Oral Daily Patti Bernard MD   1 mg at 03/28/25 0753    gabapentin (NEURONTIN) half-tab 900 mg  900 mg Oral TID Tracy Castillo MD   900 mg at 03/28/25 0753    hydrocortisone (CORTAID) 1 % cream   Topical BID Patti Bernard MD   Given at 03/25/25 1917    hydrOXYzine HCl (ATARAX) tablet 50 mg  50 mg Oral 4x Daily PRN Evelio Chavarria DO   50 mg at 03/27/25 0231    loperamide (IMODIUM) capsule 2 mg  2 mg Oral 4x Daily PRN Suad Coyle MD   2 mg at 03/28/25 0858    melatonin tablet 3 mg  3 mg Oral At Bedtime PRN Patti Bernard MD        methadone (DOLOPHINE-INTENSOL) 10 MG/ML (HIGH CONC) solution 125 mg  125 mg Oral Daily Tracy Castillo MD   125 mg at 03/28/25 0547    midazolam 5 mg/mL (VERSED) intranasal solution 10 mg  10 mg Intranasal Once PRN Tracy Castillo MD        And    mucosal atomization device #  device 1 Device  1 Device Inhalation DOES NOT GO TO Tracy Thomson MD        mirtazapine (REMERON) tablet 45 mg  45 mg Oral At Bedtime Demetri Sarabia MD   45 mg at 03/27/25 2012    multivitamin w/minerals (THERA-VIT-M) tablet 1 tablet  1 tablet Oral Daily Patti Bernard MD   1 tablet at 03/28/25 0753    naloxone (NARCAN) injection 0.2 mg  0.2  mg Intravenous Q2 Min PRN Charles Menjivar MD        Or    naloxone (NARCAN) injection 0.4 mg  0.4 mg Intravenous Q2 Min PRN Charles Menjivar MD        Or    naloxone (NARCAN) injection 0.2 mg  0.2 mg Intramuscular Q2 Min PRN Charles Menjivar MD        Or    naloxone (NARCAN) injection 0.4 mg  0.4 mg Intramuscular Q2 Min PRN Charles Menjivar MD        nicotine (NICORETTE) lozenge 4 mg  4 mg Buccal Q1H PRN Patti Bernard MD   4 mg at 03/28/25 1105    polyethylene glycol (MIRALAX) Packet 17 g  17 g Oral Daily PRN Patti Bernard MD        thiamine (B-1) tablet 100 mg  100 mg Oral Daily Patti Bernard MD   100 mg at 03/28/25 0753       PRN:  Current Facility-Administered Medications   Medication Dose Route Frequency Provider Last Rate Last Admin    acetaminophen (TYLENOL) tablet 650 mg  650 mg Oral Q4H PRN Patti Bernard MD   650 mg at 03/28/25 1108    alum & mag hydroxide-simethicone (MAALOX) suspension 30 mL  30 mL Oral Q4H PRN Patti Bernard MD        cloNIDine (CATAPRES) tablet 0.1 mg  0.1 mg Oral TID Tracy Castillo MD   0.1 mg at 03/28/25 0753    cyclobenzaprine (FLEXERIL) tablet 10 mg  10 mg Oral TID PRN Patti Bernard MD   10 mg at 03/28/25 1105    desvenlafaxine succinate (PRISTIQ) 24 hr tablet 50 mg  50 mg Oral Daily Tracy Castillo MD   50 mg at 03/28/25 0753    dicyclomine (BENTYL) capsule 10 mg  10 mg Oral 4x Daily PRN Suad Coyle MD        folic acid (FOLVITE) tablet 1 mg  1 mg Oral Daily Patti Bernard MD   1 mg at 03/28/25 0753    gabapentin (NEURONTIN) half-tab 900 mg  900 mg Oral TID Tracy Castillo MD   900 mg at 03/28/25 0753    hydrocortisone (CORTAID) 1 % cream   Topical BID Patti Bernard MD   Given at 03/25/25 1917    hydrOXYzine HCl (ATARAX) tablet 50 mg  50 mg Oral 4x Daily PRN Evelio Chavarria DO   50 mg at 03/27/25 0231    loperamide (IMODIUM) capsule 2 mg  2 mg  Oral 4x Daily PRN Suad Coyle MD   2 mg at 03/28/25 0858    melatonin tablet 3 mg  3 mg Oral At Bedtime PRN Patti Bernard MD        methadone (DOLOPHINE-INTENSOL) 10 MG/ML (HIGH CONC) solution 125 mg  125 mg Oral Daily Tracy Castillo MD   125 mg at 03/28/25 0547    midazolam 5 mg/mL (VERSED) intranasal solution 10 mg  10 mg Intranasal Once PRN Tracy Castillo MD        And    mucosal atomization device #  device 1 Device  1 Device Inhalation DOES NOT GO TO MAR Tracy Castillo MD        mirtazapine (REMERON) tablet 45 mg  45 mg Oral At Bedtime Demetri Sarabia MD   45 mg at 03/27/25 2012    multivitamin w/minerals (THERA-VIT-M) tablet 1 tablet  1 tablet Oral Daily Patti Bernard MD   1 tablet at 03/28/25 0753    naloxone (NARCAN) injection 0.2 mg  0.2 mg Intravenous Q2 Min PRN Charles Menjivar MD        Or    naloxone (NARCAN) injection 0.4 mg  0.4 mg Intravenous Q2 Min PRN Charles Menjivar MD        Or    naloxone (NARCAN) injection 0.2 mg  0.2 mg Intramuscular Q2 Min PRN Charles Menjivar MD        Or    naloxone (NARCAN) injection 0.4 mg  0.4 mg Intramuscular Q2 Min PRN Charles Menjivar MD        nicotine (NICORETTE) lozenge 4 mg  4 mg Buccal Q1H PRN Patti Bernard MD   4 mg at 03/28/25 1105    polyethylene glycol (MIRALAX) Packet 17 g  17 g Oral Daily PRN Patti Bernard MD        thiamine (B-1) tablet 100 mg  100 mg Oral Daily Patti Bernard MD   100 mg at 03/28/25 0753       Labs and Imaging:  New results:   No results found for this or any previous visit (from the past 24 hours).    Data this admission:  - CBC with chronic thrombocytopenia (platelet count 78)   - CMP with mildly elevated AST (47) and ALT (84)   - TSH normal  - UDS positive for amphetamines, cannabinoids, fentanyl  - Vit D in process  - Lipids not ordered this admission (but within normal limits on 2/26/2025)  - Vit B12  "normal  - Folate normal  - EKG sinus bradycardia, QTc 396       Mental Status Exam:     Oriented to:  Grossly Oriented, Person/Self, and Situation  General:  Awake and Alert, answers questions openly.   Appearance:  Appears older than stated age Edentulous. Slightly disheveled, dressed in personal clothing.    Behavior/Attitude:  generally calm but does express frustration with his withdrawal symptoms. playing with his hands, appears jittery.    Eye Contact: Appropriate  Psychomotor: Normal no catatonia present  Speech:  appropriate volume/tone, spontaneous, and with good articulation  Language: Fluent in English with appropriate syntax and vocabulary.  Mood:  \"Frustrated\"  Affect:   restricted affect but does brighten a couple times throughout the conversation, laughed with staff at his joke towards end of interview  Thought Process:   linear, coherent. Expresses that he is hopeful that his withdrawal symptoms are at a plateau and will improve.    Thought Content:   intermittent suicidal ideation with plan (without intent, has mentioned different methods at different times); No apparent delusions   Associations:  intact  Insight:  fair due to his ability to recognize rational thoughts from irrational thought, like the scenarios of killing himself. He does still express significant depression which limits his insight into his hopelessness and outlook/goals for treatment.   Judgment:  fair due to being engaged in groups/treatment here and cooperative. He does express the desire to die but is able to separate his thoughts from reality.   Impulse control: fair due to prior to admission behaviors but has remained safe and cooperative here, able to recognize impulses and has not acted on them during his admission   Attention Span:  adequate for conversation  Concentration:  grossly intact to conversation   Recent and Remote Memory:  not formally assessed but intact to conversation   Fund of Knowledge: average, " estimated  Muscle Strength and Tone: normal  Gait and Station: Normal     Psychiatric Assessment     Zenon Truong is a 54 year old male with previous psychiatric diagnoses of polysubstance use disorder [benzodiazepines, opiates, methamphetamine, THC, nicotine], severe depression with suicidal ideation, borderline personality disorder and PTSD admitted 03/18/2025 due to concern for SI with plan and intent in the context of medication non adherence, substance use and housing/financial insecurity. On admission, he presented with symptoms of SI with intent and plan, hopelessness, poor motivation, low energy, anhedonia, impulsivity and irritability.      Had a recent hospitalization at [facility] 2/23-3/6/2025 for suicidal ideation and benzodiazepine overdose. ECT was considered and planned during that admission, but he decided against pursuing it at that time. Did not continue taking his meds after leaving.     There is genetic predisposition given family hx of substance use disorder. Patient has struggled with homelessness and severe substance use for years. This current admission is likely precipitated by recent psychiatric hospitalization at end of February followed by relapse soon after discharge to a sober house. Perpetuating factors include ongoing substance use, medication non adherence, longstanding functional impairment, personality traits, lack of perceived support, deconditioning and housing and financial insecurity. Patient's support system includes  . Based on patient's history and current symptoms, criteria are met for primary diagnosis of MDD without psychotic features, opioid use disorder, benzodiazepine use disorder, stimulant use disorder and cannabis use disorder. Patient warrants inpatient hospitalization to maintain their safety and would benefit from medication optimization and outpatient referral/resources.      Due to his accounts of taking large amounts of fentanyl prior to  admission,we discussed with addiction medicine team given duration and severity of his withdrawal. There is a high likelihood that the fentanyl he was taking was cut with other substances, including xylazine. Xylazine is an alpha 2 agonist used as a veterinary anesthetic but also commonly found in illicit opioids. There may also be other substances present in the fentanyl and xanax he was using on the streets which are contributing to the complexity of his withdrawal. We discussed increasing his gabapentin to 900mg TID given his ongoing anxiety and physical symptoms. Additionally, the recommendation from addiction medicine was to increase clonidine to 0.2mg BID given ongoing withdrawal (this will be discussed). He has been intermittently bradycardic with HR <60. HR parameter changes to <55 for giving this today. Overall, we are trying to better manage his withdrawal symptoms, given that he has been asking to leave due to the discomfort. At this time, he would be at very high risk of suicide and return to use of substances/overdose if he were to leave. That being said, team (including addiction team) feels the benefits of these medications we are using to manage symptoms outweigh risks.       Today's changes:  - Start 10 mg Flexiril TID PRN Q8H  - Methadone ok to use on days of ECT per nursing staff calling ECT team to verify    - ECT start on Monday   -(hold gabapentin after 6 pm the day before session but is not prohibitory towards starting ECT)          Psychiatric Plan by Diagnosis      #Major depressive disorder, recurrent, severe without psychosis  Patient was continued on Mirtazapine and Desvenlafaxine. Prior to admission while on the hospitalist service, given severity of depression symptoms, ECT consult was obtained. He did express some reservation due to short term memory loss from previous sessions, but remains hopeful it could be helpful. Per chart review, he was previously on 50mg desvenlafaxine  succinate which was increased to 100mg on 11/21/2024. He was discharged from [facility] from his voluntary psychiatric admission from 2/18- 3/6 with 50 mg desvenlafaxine succinate. However, he stopped taking his psychiatric medications after discharge.  On his admission here on 3/14, he was restarted on 25mg desvenlafaxine succinate as he had not been on it since prior discharge. Given this information, increased his pristiq on 3/25 to 50mg.   - Continue Mirtazapine 45 mg po at bedtime  - Continue Desvenlafaxine to 50mg po daily  - Plan to start ECT Monday (hold gabapentin after 6 pm the day before session but is not prohibitory towards starting ECT)      #Opioid use disorder, severe  Phenobarbital taper was discontinued on 3/26 per recommendation from addiction medicine team recommendation that his symptoms were likely secondary to opiate withdrawal vs benzo withdrawal. Methadone was increased to 115 mg daily on 3/26 and to 125mg on 3/27 per addiction medicine recs for ongoing withdrawal symptoms.   He does report that he was on 180 mg methadone for 13 years and relapsed on opioids when this was tapered. He has been on PTA 105mg methadone but given his ongoing symptoms of withdrawal and discomfort, addiction medicine consulted on 3/24 for additional input on methadone dosage and other options.   - 3/26: Increase Methadone to 115 mg daily  - 3/27: increase methadone to 125mg daily   - Addiction team consulted; likely opoid withdrawal  - Thiamine, MVI, folic acid   - Start 10 mg Flexiril TID PRN Q8H for muscle cramps, spasms, or tightness.      #Benzodiazepine use disorder, severe  Phenobarbital taper was discontinued on 3/26 per recommendation from addiction medicine team recommendation that his symptoms were likely secondary to opiate withdrawal vs benzo withdrawal.   - 3/26: Discontinue phenobarb taper per addiction team recs   - Continue clonidine 0.1mg TID for withdrawal symptoms (decreased HR parameters to hold if  <55)   - Rec from addiction med: increase clonidine to 0.2mg BID   - Will hold today given other medication changes   - Check HR prior to administration. HR parameters; hold for less than 55       #Stimulant (methamphetamine) use disorder  #Cannabis use disorder     Additional Plans:  - Patient will be treated in therapeutic milieu with appropriate individual and group therapies as described.     Psychiatric Hospital Course:      Zneon Truong was admitted to [facility] as a voluntary patient. Had a recent hospitalization   Medications and other updates:  3/14: hold bupropion due to risk of lower seizure threshold  3/14: Continue PTA desvenlafaxine 25mg and mirtazapine 30mg for sleep  3/18: start clonidine 0.1mg TID   3/20: Increase mirtazapine to 45 mg daily for continued insomnia   3/21: restart PTA gabapentin 600mg TID for anxiety   3/21: continuing phenobarb taper (today is 32.4 mg BID)  3/23: no scheduled phenobarb received 32.4mg due to ongoing withdrawal symptoms  3/24: give one time phenobarbital dose of 32.4mg due to significant discomfort that seem at least in part due to withdrawal  3/24: consult addiction medicine for assistance with methadone dose and ongoing withdrawal symptoms   3/24: discussed re-initiating wellbutrin. However, patient reporting symptoms that he feels were similar to auras he has had prior to seizures (this has been worse the last couple days), so hesitant to do this at this time.  3/25: one time phenobarbital dose of 32.4mg given significant withdrawal symptoms  3/25: increase desvenlafaxine to 50mg daily (per chart review, was prescribed this dose outpatient)  3/26: increase methadone to 115 mg daily per addiction medicine recs   3/26: discontinue phenobarb taper   3/26: medicine consult for ECT clearance; Defer to psychiatry for decision of proceeding with ECT with recent benzodiazepine withdrawal/use. Otherwise, no absolute medical contraindications to proceeding with ECT at  this time. Treatment plan per psychiatry.   3/27: increase gabapentin to 900 TID per addiction med team recs   3.27: increase methadone to 125mg daily per addiction med recs given ongoing withdrawal symptoms   3/27: pending ECT team recs regarding methadone on ECT days   3/28: Start 10 mg Flexiril for muscle cramps TID PRN  3/28: Methadone ok to use on ECT days per ECT team -- Nursing team called to confirm     The risks, benefits, alternatives, and side effects were discussed and understood by the patient.     Medical Assessment and Plan     #Palpitations  3/21 patient reporting short episodes (few seconds) of palpitations occurring intermittently over the last month. Does have shortness of breath with this, no chest pain or other symptoms. He is not sure if it is associated with anxiety.   - repeat EKG ordered showed sinus bradycardia with no significant change to prior EKG      #Chronic hepatitis C, never treated  #Mild transaminase elevations, hepatic steatosis:  Never treated for hepatitis C.  Chronic, mild transaminase elevations with normal hepatic function.  Most recent abdominal ultrasound 2/15/2024 showed hepatic steatosis without hepatic lesions, normal gallbladder.  Denies current alcohol use.  - monitor outpatient      #Chronic thrombocytopenia  #Normocytic anemia, \now resolved  Chronic thrombocytopenia, moderate and stable.  Denies any history of bleeding including no history of gastric ulcers, hematemesis, melena or hematochezia.  Anemia appears to be more recent since 2/23/2025 when hemoglobin was 11.6, MCV 86.  Hgb 14.1, Plts 97, MCV 86, ferritin, iron, TIBC wnl on 3/15.      #Concern for malnutrition  Seen by dietician team, patient does not meet two of the established criteria necessary for diagnosing malnutrition but is at risk for malnutrition.   - ensure enlive ordered        Medical course: Patient was physically examined by the ED prior to being transferred to the unit and was found to be  medically stable and appropriate for admission.      Consults:  Medicine (for clearance for ECT)  Addiction med (for methadone dosage and withdrawal symptoms)      Checklist     Legal Status: Voluntary   If patient were to ask to leave, would strongly recommend 72 hour hold due to continued SI with plan (has mentioned multiple different plans including overdose with fentanyl) and intermittently expressing intent     Safety Assessment:   Behavioral Orders   Procedures    Code 1 - Restrict to Unit    Routine Programming     As clinically indicated    Seizure precautions    Status 15     Every 15 minutes.    Suicide precautions: Suicide Risk: LOW; Clinical rationale to override score: lack of access to a plan for self-harm     .     Order Specific Question:   Suicide Risk     Answer:   LOW     Order Specific Question:   Clinical rationale to override score:     Answer:   lack of access to a plan for self-harm    Withdrawal precautions       Risk Assessment:  Risk for harm is low   Risk factors: SI, maladaptive coping, substance use, impulsive, and past behaviors  Protective factors: engaged in treatment     SIO: no    Disposition: Pending stabilization, ECT, medication optimization, & development of a safe discharge plan.      Attestations     Parviz Jarvis MS3  [employer]          Physician Attestation   I, Charles Menjivar MD, was present with the medical/[employer]WA[employer] student who participated in the service and in the documentation of the note.  I have verified the history and personally performed the physical exam and medical decision making.  I agree with the assessment and plan of care as documented in the note.        Charles Menjivar MD  Date of Service (when I saw the patient): 03/28/25

## 2025-03-28 NOTE — PLAN OF CARE
BEH IP Unit Acuity Rating Score (UARS)  Patient is given one point for every criteria they meet.    CRITERIA SCORING   On a 72 hour hold, court hold, committed, stay of commitment, or revocation. 0    Patient LOS on BEH unit exceeds 20 days. 0  LOS: 10   Patient under guardianship, 55+, otherwise medically complex, or under age 11. 0   Suicide ideation without relief of precipitating factors. 1   Current plan for suicide. 1   Current plan for homicide. 0   Imminent risk or actual attempt to seriously harm another without relief of factors precipitating the attempt. 0   Severe dysfunction in daily living (ex: complete neglect for self care, extreme disruption in vegetative function, extreme deterioration in social interactions). 1   Recent (last 7 days) or current physical aggression in the ED or on unit. 0   Restraints or seclusion episode in past 72 hours. 0   Recent (last 7 days) or current verbal aggression, agitation, yelling, etc., while in the ED or unit. 0   Active psychosis. 0   Need for constant or near constant redirection (from leaving, from others, etc).  0   Intrusive or disruptive behaviors. 0   Patient requires 3 or more hours of individualized nursing care per 8-hour shift (i.e. for ADLs, meds, therapeutic interventions). 0   TOTAL 3

## 2025-03-28 NOTE — PLAN OF CARE
Problem: Adult Behavioral Health Plan of Care  Goal: Adheres to Safety Considerations for Self and Others  Outcome: Progressing     Problem: Depressive Signs/Symptoms  Goal: Improved Mood Symptoms (Depressive Signs/Symptoms)  Outcome: Progressing    Pt's pulse was 54 when assessed at start of the evening shift. Pt's COWS scored was 6 at 1718. Pt reported that his stomach is feeling better and no loose stool in this shift. Pt stated that he has no hallucination. Pt denied having SI/SIB/HI/AVH. Pt endorsed having high anxiety rated 7/10 and depression 5/10. At 1740, pt received prn Atarax on his request. Pt received prn Nicotine lozenge on request. Pt reported of having lower back pain rated 4/10; requested and received prn Flexeril at 2002. Pt's pulse was 64 before receiving bed time medication. Pt is compliant with meds. Pt's mood was good and no outburst noted in this shift.      Goal Outcome Evaluation:    Plan of Care Reviewed With: patient

## 2025-03-28 NOTE — PROGRESS NOTES
Rehab Group    Start time: 1020  End time: 1210  Patient time total: 60 minutes    attended full group    #4 attended   Group Type: occupational therapy   Group Topic Covered: coping skills     Group Session Detail:  OT clinic     Patient Response/Contribution:  cooperative with task, organized, attentive, and actively engaged     Patient Detail:    Pt actively participated in occupational therapy clinic to facilitate coping skill exploration, creative expression within personally meaningful activities, and clinical observation of social, cognitive, and kinesthetic performance skills. Pt response: Independent to initiate, gather materials, sequence, and adjust to workspace demands as needed. Demonstrated good attention to task and attention to detail for selected detailed, goal-directed task. Able to ask for assistance as needed, and intermittently socialized with peers and staff. He shared that he enjoys utilizing guided meditation videos on YouTube, specifically discussing finding them helpful for managing racing thoughts when he's trying to fall asleep. Calm, pleasant, and polite in interactions.      [id number] OT Group (2 or more in attendance)      Patient Active Problem List   Diagnosis    Opioid use disorder, severe, on maintenance therapy (H)    Suicidal ideation    Severe benzodiazepine use disorder (H)    Substance-induced anxiety disorder (H)    Benzodiazepine withdrawal without complication (H)    Cannabis use disorder, moderate, dependence (H)    Cigarette nicotine dependence with withdrawal    Moderate episode of recurrent major depressive disorder (H)    Homelessness    Opioid withdrawal (H)    MDD (major depressive disorder), recurrent severe, without psychosis (H)    ALISSON (generalized anxiety disorder)

## 2025-03-28 NOTE — PLAN OF CARE
"  Problem: Depressive Signs/Symptoms  Goal: Improved Mood Symptoms (Depressive Signs/Symptoms)  Outcome: Progressing  Intervention: Promote Mood Improvement  Recent Flowsheet Documentation  Taken 3/28/2025 0930 by Kristen Neil RN  Supportive Measures:   self-care encouraged   positive reinforcement provided   active listening utilized  Trust Relationship/Rapport:   care explained   choices provided   emotional support provided   empathic listening provided   questions answered   questions encouraged   reassurance provided   thoughts/feelings acknowledged     Problem: Depressive Signs/Symptoms  Goal: Improved Mood Symptoms (Depressive Signs/Symptoms)  Intervention: Promote Mood Improvement  Recent Flowsheet Documentation  Taken 3/28/2025 0930 by Kristen Neil RN  Supportive Measures:   self-care encouraged   positive reinforcement provided   active listening utilized  Trust Relationship/Rapport:   care explained   choices provided   emotional support provided   empathic listening provided   questions answered   questions encouraged   reassurance provided   thoughts/feelings acknowledged     Problem: Adult Behavioral Health Plan of Care  Goal: Plan of Care Review  Recent Flowsheet Documentation  Taken 3/28/2025 0930 by Kristen Neil RN  Patient Agreement with Plan of Care: agrees   Goal Outcome Evaluation:    Plan of Care Reviewed With: patient      Vital Signs:    /78   Pulse 62   Temp 96.8  F (36  C)   Resp 16   Wt 90.8 kg (200 lb 1.6 oz)   SpO2 97%   BMI 27.92 kg/m         Shift Summary:    Mental Health:    Patient has been visible in the milieu a majority of the shift eating meals, attending groups, and watching television.     Patient ate 100% of breakfast and lunch..  Patient appears well kempt. States mood is frustrated due to experiencing withdrawal symptoms and being \"very uncomfortable.\"  Reports symptoms make me\"cynical, I smart off more, and my negative thinking increases.\"  " "Patient was reminded to use his DBT skills to help with coping and emotional support and empathetic listening provided.  Patient verbalized that attending groups has been a helpful \"distraction.\"  Patient  was given immodium at 08:58 for gi upset which he later reported as helpful.   Speech clear and coherent.  Thought content:  denies SI/SIB.  Patient rates anxiety 7/10 and depression 4/10.  Zenon declined additional intervention for anxiety as he reports scheduled medications have been sufficient and hydroxyzine \"isn't helpful.\"  Anxiety decreased to 2/10 with scheduled medication administration.  Patient denies AH/VH.  Patient denies medication side effects at this time.   Zenon scored 7/10 on COWS and reported GI upset, sweating, and had visible hand tremors, but symptoms appeared to improved throughout the day.    PRN's Given:   Nicotine Lozenges  Flexaril 10 mg at 11:05 am  Tylenol 650 mg at 11:08 am  Immodium 08:58 am    Patient continues on suicide, seizure, and withdrawal precautions.  No associated behaviors noted this shift.  Zenon has been compliant with medications and has been requesting and received the above medications which have all been effective.  Will continue plan of care and assist as needed.                   "

## 2025-03-28 NOTE — PLAN OF CARE
Goal Outcome Evaluation:  Problem: Sleep Disturbance  Goal: Adequate Sleep/Rest  Outcome: Not Progressing     Pt received asleep at the start of the shift, respirations appeared even and non labored. No behavioral outburst noted at this time, received scheduled Methadone at 0550. Slept for  4.25 hours.

## 2025-03-29 PROCEDURE — 124N000002 HC R&B MH UMMC

## 2025-03-29 PROCEDURE — 250N000013 HC RX MED GY IP 250 OP 250 PS 637

## 2025-03-29 RX ORDER — HALOPERIDOL 5 MG/ML
2 INJECTION INTRAMUSCULAR 2 TIMES DAILY PRN
Status: DISCONTINUED | OUTPATIENT
Start: 2025-03-29 | End: 2025-03-29

## 2025-03-29 RX ORDER — HALOPERIDOL 5 MG/ML
2 INJECTION INTRAMUSCULAR 2 TIMES DAILY PRN
Status: DISCONTINUED | OUTPATIENT
Start: 2025-03-29 | End: 2025-04-16 | Stop reason: HOSPADM

## 2025-03-29 RX ORDER — QUETIAPINE FUMARATE 50 MG/1
50 TABLET, FILM COATED ORAL DAILY PRN
Status: DISCONTINUED | OUTPATIENT
Start: 2025-03-29 | End: 2025-03-29

## 2025-03-29 RX ORDER — GABAPENTIN 600 MG/1
300 TABLET ORAL ONCE
Status: COMPLETED | OUTPATIENT
Start: 2025-03-29 | End: 2025-03-29

## 2025-03-29 RX ORDER — QUETIAPINE FUMARATE 100 MG/1
100 TABLET, FILM COATED ORAL DAILY PRN
Status: DISCONTINUED | OUTPATIENT
Start: 2025-03-29 | End: 2025-03-30

## 2025-03-29 RX ORDER — HALOPERIDOL 1 MG/1
2 TABLET ORAL 2 TIMES DAILY PRN
Status: DISCONTINUED | OUTPATIENT
Start: 2025-03-29 | End: 2025-03-29

## 2025-03-29 RX ORDER — CLONIDINE HYDROCHLORIDE 0.1 MG/1
0.1 TABLET ORAL
Status: COMPLETED | OUTPATIENT
Start: 2025-03-29 | End: 2025-03-30

## 2025-03-29 RX ORDER — GABAPENTIN 600 MG/1
1200 TABLET ORAL 3 TIMES DAILY
Status: DISCONTINUED | OUTPATIENT
Start: 2025-03-29 | End: 2025-04-16 | Stop reason: HOSPADM

## 2025-03-29 RX ORDER — HALOPERIDOL 1 MG/1
2 TABLET ORAL 2 TIMES DAILY PRN
Status: DISCONTINUED | OUTPATIENT
Start: 2025-03-29 | End: 2025-04-16 | Stop reason: HOSPADM

## 2025-03-29 RX ADMIN — ACETAMINOPHEN 650 MG: 325 TABLET, FILM COATED ORAL at 07:48

## 2025-03-29 RX ADMIN — NICOTINE POLACRILEX 4 MG: 4 LOZENGE ORAL at 10:08

## 2025-03-29 RX ADMIN — THIAMINE HCL TAB 100 MG 100 MG: 100 TAB at 07:49

## 2025-03-29 RX ADMIN — CLONIDINE HYDROCHLORIDE 0.1 MG: 0.1 TABLET ORAL at 07:47

## 2025-03-29 RX ADMIN — Medication 900 MG: at 07:49

## 2025-03-29 RX ADMIN — CYCLOBENZAPRINE 10 MG: 10 TABLET, FILM COATED ORAL at 20:59

## 2025-03-29 RX ADMIN — NICOTINE POLACRILEX 4 MG: 4 LOZENGE ORAL at 07:49

## 2025-03-29 RX ADMIN — Medication 1 TABLET: at 07:47

## 2025-03-29 RX ADMIN — Medication 300 MG: at 11:36

## 2025-03-29 RX ADMIN — NICOTINE POLACRILEX 4 MG: 4 LOZENGE ORAL at 06:30

## 2025-03-29 RX ADMIN — NICOTINE POLACRILEX 4 MG: 4 LOZENGE ORAL at 18:36

## 2025-03-29 RX ADMIN — GABAPENTIN 1200 MG: 600 TABLET, FILM COATED ORAL at 20:59

## 2025-03-29 RX ADMIN — NICOTINE POLACRILEX 4 MG: 4 LOZENGE ORAL at 20:59

## 2025-03-29 RX ADMIN — METHADONE HYDROCHLORIDE 125 MG: 10 CONCENTRATE ORAL at 05:03

## 2025-03-29 RX ADMIN — NICOTINE POLACRILEX 4 MG: 4 LOZENGE ORAL at 16:27

## 2025-03-29 RX ADMIN — FOLIC ACID 1 MG: 1 TABLET ORAL at 07:48

## 2025-03-29 RX ADMIN — NICOTINE POLACRILEX 4 MG: 4 LOZENGE ORAL at 13:03

## 2025-03-29 RX ADMIN — NICOTINE POLACRILEX 4 MG: 4 LOZENGE ORAL at 09:00

## 2025-03-29 RX ADMIN — CYCLOBENZAPRINE 10 MG: 10 TABLET, FILM COATED ORAL at 05:03

## 2025-03-29 RX ADMIN — NICOTINE POLACRILEX 4 MG: 4 LOZENGE ORAL at 15:12

## 2025-03-29 RX ADMIN — DESVENLAFAXINE 50 MG: 25 TABLET, EXTENDED RELEASE ORAL at 07:48

## 2025-03-29 RX ADMIN — GABAPENTIN 1200 MG: 600 TABLET, FILM COATED ORAL at 13:01

## 2025-03-29 RX ADMIN — NICOTINE POLACRILEX 4 MG: 4 LOZENGE ORAL at 19:35

## 2025-03-29 RX ADMIN — MIRTAZAPINE 45 MG: 45 TABLET, FILM COATED ORAL at 20:59

## 2025-03-29 RX ADMIN — NICOTINE POLACRILEX 4 MG: 4 LOZENGE ORAL at 17:29

## 2025-03-29 RX ADMIN — NICOTINE POLACRILEX 4 MG: 4 LOZENGE ORAL at 12:04

## 2025-03-29 RX ADMIN — CYCLOBENZAPRINE 10 MG: 10 TABLET, FILM COATED ORAL at 14:52

## 2025-03-29 RX ADMIN — HYDROXYZINE HYDROCHLORIDE 50 MG: 50 TABLET, FILM COATED ORAL at 10:13

## 2025-03-29 ASSESSMENT — ACTIVITIES OF DAILY LIVING (ADL)
ADLS_ACUITY_SCORE: 26
LAUNDRY: WITH SUPERVISION
ADLS_ACUITY_SCORE: 26
LAUNDRY: WITH SUPERVISION
ORAL_HYGIENE: INDEPENDENT
ADLS_ACUITY_SCORE: 26
DRESS: INDEPENDENT
ORAL_HYGIENE: INDEPENDENT
ADLS_ACUITY_SCORE: 26
DRESS: INDEPENDENT
ADLS_ACUITY_SCORE: 26
HYGIENE/GROOMING: INDEPENDENT
HYGIENE/GROOMING: INDEPENDENT
ADLS_ACUITY_SCORE: 26

## 2025-03-29 NOTE — PROVIDER NOTIFICATION
"On call resident note    Was notified that Zenon  was feeling more agitated this AM, restless and asking for benzo. Said he felt like there was pressure on his chest and that he was going to \"lose my shit\". Said he feels this way when having a panic attack. Pt noted sweating and restlessness. I discussed plan for ECT Monday. Zenon expressed motivation to get ECT since nothing else has helped his depression and SI. Said he feels that he will \"end up dead if something drastic doesn't happen\" to help treat his mental health, said that is why he is here for ECT. Said SI is constant and severe. However, he said that he is feeling \"too agitated\" and in withdrawal \"to stay here if something more cannot be done\" for his withdrawal. Zenon said Xanax in the only thing that has helped him in the past when feeling this way and that his coping skills are no longer working. Discussed going up on gabapentin and adding prn one time 0.1mg Clonidine. Pt feels these are not helpful and would \"just ride it out\". Discussed that benzos are not ideal for upcoming ECT. Later when I called to update pt on med changes, he was noted to be more angry per staff, isolated in his room yelling, saying he would \"no longer speak to anyone\".      Last pulse was 57 and stable this AM. Denied cardiac palpitations. Denied further vitals. No hx of QTC prolongation per chart while on methadone.     Discussed plan with Attending Dr. Montes De Oca. Likely withdrawal symptoms leading to agitation. Will manage symptomatically.     Plan:  - EKG, pt can refuse   - added 0.1mg Clonidine one time dose to help with withdrawal, ctm low pulse but low risk as patient has denied lightheadedness or dizziness so far.   - increased scheduled Gabapentin to 1200mg TID  - added 100mg daily prn Seroquel for agitation and withdrawal symptoms.   - added haldol 2mg BID prn with IM back up for acute agitation as second line -pt has taken this orally in the past for acute agitation per " chart      Sunni Lorenz MD  Psychiatry Resident Physician  PGY2

## 2025-03-29 NOTE — PLAN OF CARE
Problem: Sleep Disturbance  Goal: Adequate Sleep/Rest  Outcome: Progressing     Received pt sleeping in a bed at start of the night shift. Pt appeared to have slept 4.5 hours based on Q 15 min rounds. Pt received his scheduled Methadone and prn Flexeril for lower back pain at 0503. Pt received prn Nicotine lozenge on request.    Goal Outcome Evaluation:    Plan of Care Reviewed With: patient

## 2025-03-29 NOTE — PLAN OF CARE
"  Problem: Adult Behavioral Health Plan of Care  Goal: Plan of Care Review  Outcome: Progressing  Flowsheets (Taken 3/29/2025 0800)  Patient Agreement with Plan of Care: agrees     Problem: Adult Behavioral Health Plan of Care  Goal: Develops/Participates in Therapeutic Bearden to Support Successful Transition  Outcome: Progressing  Intervention: Foster Therapeutic Bearden  Recent Flowsheet Documentation  Taken 3/29/2025 0800 by Kristen Neil RN  Trust Relationship/Rapport:   care explained   choices provided   emotional support provided   empathic listening provided   questions answered   questions encouraged   reassurance provided   thoughts/feelings acknowledged     Problem: Depressive Signs/Symptoms  Goal: Improved Mood Symptoms (Depressive Signs/Symptoms)  Outcome: Progressing  Intervention: Promote Mood Improvement  Recent Flowsheet Documentation  Taken 3/29/2025 0800 by Kristen Neil RN  Supportive Measures:   active listening utilized   positive reinforcement provided   decision-making supported   verbalization of feelings encouraged   self-responsibility promoted  Trust Relationship/Rapport:   care explained   choices provided   emotional support provided   empathic listening provided   questions answered   questions encouraged   reassurance provided   thoughts/feelings acknowledged   Goal Outcome Evaluation:    Plan of Care Reviewed With: patient        Shift Summary:    Vital Signs:    /72 (BP Location: Right arm, Patient Position: Sitting, Cuff Size: Adult Regular)   Pulse 57   Temp 96.9  F (36.1  C) (Temporal)   Resp 16   Wt 90.8 kg (200 lb 1.6 oz)   SpO2 100%   BMI 27.92 kg/m         Mental Health:    Patient has been spent most of the day in his room but was out for meals and to briefly walks the halls.      Patient eating and drinking well.  Patient appears well-kempt.  Affect appears calm and cooperative.  Mood reported as tired , \"I didn't sleep well last night.\"  Patient " "reports lower back pain 3/10 and requested and received prn tylenol at 08:00, which was effective.  Speech clear and coherent. Thought content:  Patient denies SI/SIB.  Patient rates anxiety 7/10 and depression 4/10.  Patient denies medication side effects.  No reports of GI distress today.      Medical:  At 10:10-Patient approached write stating \"It feels like something in pulling my muscles tight on my chest, he stated like a \"panic feeling.\" Patient denied chest pain or difficulty breathing. COW score was 8 this morning but hands now are more tremulous and he is complaining of increased restlessness. Patient also stated he feels \"agitated\" like he just wants to pounce. Unable to identify where this is coming from but it started \"about an hour ago.\"  Patient has been trying to use relaxation techniques, walking the halls, decilned cold pack and lavender.  Patient then he began to cry and said I poonam to go to my room.  Patient initially declined prn hydroxyzine but later agreed to take 50 mg at 10:13, which was ineffective.  Dr. Sunni Lorenz informed and talked with patient over the phone.  Patient became agitated after conversation with doctor and now is pacing in his room, stating \"they lied to me and said I couldn't have benzos because of ECT, but my roommate is on benzos and he gets ECT. \"  \"All they want to do is talk about ECT.\"  \"I am ready to abort the whole thing.\"  Writer approached patient to provide emotional support and active listening and patient stated \"if I punch a hole in the wall that's on me, not you.  Patient also stated, that \"I wish when Natacha , I should have killed myself too.\"  Writer tried to talk with patient at this time but Zenon responded, \"just leave me alone.\"   Zenon again refused vital signs assessment and ordered EKG.     Writer attempt a second COWs assessment but patient declined.    Patient was given 1 time dose of 300 mg of gabapentin at 11:36 which appeared to be " "somewhat helpful as patient is now rocking  in bed and not anxiously pacing around his room.  Gabapentin now increased to 1200 mg three times daily.  Patient received 1200 mg dose at 13:00 but declined 14:00 clonidine.  He again declined vital sign assessment.   At 13:00 patient reported to writer that he does not want to have any ECT treatments.    14:00-At this time patient is laying awake in bed.       PRN's Given:   Nicotine lozenges  Tylenol 650 mg at 07:48  Hydroxyzine 50 mg at 10:13    Patient remains on a no roommate order and continues on suicide, seizure, and withdrawal precautions.   Zenon has been compliant with medications except declined 14:00 clonidine \"because it doesn't work.\"  Will continue plan of care and assist as needed.             "

## 2025-03-30 PROCEDURE — 250N000013 HC RX MED GY IP 250 OP 250 PS 637

## 2025-03-30 PROCEDURE — 124N000002 HC R&B MH UMMC

## 2025-03-30 RX ORDER — CLONIDINE HYDROCHLORIDE 0.1 MG/1
0.1 TABLET ORAL
Status: DISCONTINUED | OUTPATIENT
Start: 2025-03-30 | End: 2025-04-01

## 2025-03-30 RX ORDER — QUETIAPINE FUMARATE 50 MG/1
50-100 TABLET, FILM COATED ORAL DAILY PRN
Status: DISCONTINUED | OUTPATIENT
Start: 2025-03-30 | End: 2025-04-01

## 2025-03-30 RX ADMIN — CLONIDINE HYDROCHLORIDE 0.1 MG: 0.1 TABLET ORAL at 08:21

## 2025-03-30 RX ADMIN — NICOTINE POLACRILEX 4 MG: 4 LOZENGE ORAL at 16:15

## 2025-03-30 RX ADMIN — DESVENLAFAXINE 50 MG: 25 TABLET, EXTENDED RELEASE ORAL at 08:21

## 2025-03-30 RX ADMIN — QUETIAPINE FUMARATE 50 MG: 50 TABLET ORAL at 11:55

## 2025-03-30 RX ADMIN — NICOTINE POLACRILEX 4 MG: 4 LOZENGE ORAL at 09:28

## 2025-03-30 RX ADMIN — CLONIDINE HYDROCHLORIDE 0.1 MG: 0.1 TABLET ORAL at 10:28

## 2025-03-30 RX ADMIN — NICOTINE POLACRILEX 4 MG: 4 LOZENGE ORAL at 08:21

## 2025-03-30 RX ADMIN — MIRTAZAPINE 45 MG: 45 TABLET, FILM COATED ORAL at 20:17

## 2025-03-30 RX ADMIN — NICOTINE POLACRILEX 4 MG: 4 LOZENGE ORAL at 10:29

## 2025-03-30 RX ADMIN — NICOTINE POLACRILEX 4 MG: 4 LOZENGE ORAL at 14:02

## 2025-03-30 RX ADMIN — NICOTINE POLACRILEX 4 MG: 4 LOZENGE ORAL at 06:34

## 2025-03-30 RX ADMIN — NICOTINE POLACRILEX 4 MG: 4 LOZENGE ORAL at 18:02

## 2025-03-30 RX ADMIN — GABAPENTIN 1200 MG: 600 TABLET, FILM COATED ORAL at 20:17

## 2025-03-30 RX ADMIN — CYCLOBENZAPRINE 10 MG: 10 TABLET, FILM COATED ORAL at 20:17

## 2025-03-30 RX ADMIN — CYCLOBENZAPRINE 10 MG: 10 TABLET, FILM COATED ORAL at 05:36

## 2025-03-30 RX ADMIN — THIAMINE HCL TAB 100 MG 100 MG: 100 TAB at 08:24

## 2025-03-30 RX ADMIN — Medication 1 TABLET: at 08:21

## 2025-03-30 RX ADMIN — NICOTINE POLACRILEX 4 MG: 4 LOZENGE ORAL at 11:27

## 2025-03-30 RX ADMIN — NICOTINE POLACRILEX 4 MG: 4 LOZENGE ORAL at 15:14

## 2025-03-30 RX ADMIN — GABAPENTIN 1200 MG: 600 TABLET, FILM COATED ORAL at 14:02

## 2025-03-30 RX ADMIN — FOLIC ACID 1 MG: 1 TABLET ORAL at 08:22

## 2025-03-30 RX ADMIN — CYCLOBENZAPRINE 10 MG: 10 TABLET, FILM COATED ORAL at 11:42

## 2025-03-30 RX ADMIN — METHADONE HYDROCHLORIDE 125 MG: 10 CONCENTRATE ORAL at 05:33

## 2025-03-30 RX ADMIN — NICOTINE POLACRILEX 4 MG: 4 LOZENGE ORAL at 20:27

## 2025-03-30 RX ADMIN — GABAPENTIN 1200 MG: 600 TABLET, FILM COATED ORAL at 08:19

## 2025-03-30 RX ADMIN — NICOTINE POLACRILEX 4 MG: 4 LOZENGE ORAL at 12:47

## 2025-03-30 RX ADMIN — NICOTINE POLACRILEX 4 MG: 4 LOZENGE ORAL at 19:27

## 2025-03-30 ASSESSMENT — ACTIVITIES OF DAILY LIVING (ADL)
ADLS_ACUITY_SCORE: 26
ORAL_HYGIENE: INDEPENDENT
ADLS_ACUITY_SCORE: 26
DRESS: INDEPENDENT
ADLS_ACUITY_SCORE: 26
ADLS_ACUITY_SCORE: 26
LAUNDRY: WITH SUPERVISION
ADLS_ACUITY_SCORE: 26
HYGIENE/GROOMING: INDEPENDENT
ADLS_ACUITY_SCORE: 26
HYGIENE/GROOMING: INDEPENDENT
ADLS_ACUITY_SCORE: 26
LAUNDRY: WITH SUPERVISION
ADLS_ACUITY_SCORE: 26
DRESS: INDEPENDENT
ADLS_ACUITY_SCORE: 26
ORAL_HYGIENE: INDEPENDENT

## 2025-03-30 NOTE — PLAN OF CARE
Problem: Adult Behavioral Health Plan of Care  Goal: Adheres to Safety Considerations for Self and Others  Outcome: Progressing     Problem: Adult Behavioral Health Plan of Care  Goal: Optimized Coping Skills in Response to Life Stressors  Outcome: Not Progressing    Pt was irritable and agitated from the start of the evening shift. Pt was avoiding social contact and declining all the assessment in this shift. Pt declined to do VS, COWS, and mental health assessment. Pt was very dismissive and did not want talk about his concern. Pt was slamming door most of the time when he goes to his room. Pt declined to take prn medication offered by this writer to help with his agitation. Pt requested and received prn Nicotine lozenge. Pt received prn Flexeril for lower back pain at 2100. Pt declined to take scheduled Clonidine and Hydrocortisone cream. Pt took rest of his scheduled medications. Pt wants to get his morning Methadone at 0500 in a morning tomorrow.      Goal Outcome Evaluation:    Plan of Care Reviewed With: patient

## 2025-03-30 NOTE — PLAN OF CARE
Problem: Adult Behavioral Health Plan of Care  Goal: Plan of Care Review  Outcome: Progressing  Flowsheets (Taken 3/30/2025 0900)  Patient Agreement with Plan of Care: agrees     Problem: Adult Behavioral Health Plan of Care  Goal: Optimized Coping Skills in Response to Life Stressors  Intervention: Promote Effective Coping Strategies  Recent Flowsheet Documentation  Taken 3/30/2025 0900 by Kristen Neil RN  Supportive Measures:   active listening utilized   positive reinforcement provided   decision-making supported   verbalization of feelings encouraged   self-responsibility promoted     Problem: Depressive Signs/Symptoms  Goal: Improved Mood Symptoms (Depressive Signs/Symptoms)  Intervention: Promote Mood Improvement  Recent Flowsheet Documentation  Taken 3/30/2025 0900 by Kristen Neil RN  Supportive Measures:   active listening utilized   positive reinforcement provided   decision-making supported   verbalization of feelings encouraged   self-responsibility promoted  Trust Relationship/Rapport:   care explained   choices provided   emotional support provided   empathic listening provided   questions answered   questions encouraged   reassurance provided   thoughts/feelings acknowledged   Goal Outcome Evaluation:    Plan of Care Reviewed With: patient      Shift Summary:    Vital Signs:    /72 (BP Location: Right arm, Patient Position: Sitting, Cuff Size: Adult Regular)   Pulse 71   Temp 97.7  F (36.5  C) (Temporal)   Resp 20   Wt 92.1 kg (203 lb)   SpO2 98%   BMI 28.33 kg/m         Mental Health:    Patient endorses lower back pain along with joint aches 5/10, prn flexeril given on night shift at 05:36 which patient reported as helpful.  Second dose of flexeril administered at 11:42 which patient reported as effective.    Patient has been intermittently visible in the milieu a majority of the shift eating meals, watching television, talking with staff and peers, and working on a [employer]  "puzzle.  Patient complaining of restless, joint aches, runny nose, sweating, difficulty sleeping, GI upset, and rates anxiety 7/10.  Reported to staff that on day shift yesterday he vomited x 2 but did not let any staff know.  Patient's hands also appear tremulous  Cows score was 10.  Zenon states, \"I don't know if I can take this anymore, I just need to go out and use, I am trying to hold it together but it's hard.\"  Patient became teary when speaking to writer.     Patient offered Seroquel but declined as \"I am very sensitive to seroquel, even 12.5 mgs, it makes me sedated.\"  Patient then offered haldol, but declined that too.  Dr. Sarabia notified and prescribed one time extra dose of clonidine which was administered at 10:28.  Upon reassessment, patient observed to be less irritable and restless.    Patient became frustrated when asked if he wanted seroquel and/or haldol to with his symptoms and reported the \"the only thing that has fucking worked for me in the past is a B52, and even that didn't make me sleepy.\"      \"I don't want to fucking use,  but if I wanted too I could just have a visitor come and bring me something, like I did in the past.\"  \"So if I want a visitor, don't let me have one.\"  Patient observed to be trying to distract self with shower, watching television, coloring, and working on [employer].      Zenon endorses feeling like \"I don't want to be around anymore,\" due to his symptoms, but reports he feels safe in the hospital and contracts for safely while here.     Patient requested and received prn 50 mg of seroquel at 11:55 for anxiety.  Upon reassessment patient said medication \"was not helpful and was not open to further interventionat this time.    Around 14:00- patient asked for his scheduled gabapentin but declined clonidine.  He appeared angry and told writer that \"I am not going through with ECT tomorrow, \"the doctors have been messed up my medications.\" \"Tell staff I don't need to be " put on a 1:1 like my roommate.    Patient ate only sausage and drank ensure for breakfast as he was having GI upset but declined medication or further intervention. At lunch he of 100% of his meal. .  Patient appears anxious, restless, and intermittently irritable.  States mood is anxious and rates anxiety 7/10 but states gabapentin has been helpful at reducing anxiety.     PRN's Given:   Nicotine lozenges  Seroquel  Clonidine    Patient continues on suicide, seizure, and withdrawl precautions.   Zenon has been compliant with medications except with schedule clonidine at 14:00.  He has had multiple somatic complaints and requests this shift.  Will continue plan of care and assist as needed.

## 2025-03-30 NOTE — PLAN OF CARE
Problem: Adult Behavioral Health Plan of Care  Goal: Adheres to Safety Considerations for Self and Others  Outcome: Progressing     Problem: Suicide Risk  Goal: Absence of Self-Harm  Outcome: Progressing     Problem: Alcohol Withdrawal  Goal: Alcohol Withdrawal Symptom Control  Outcome: Not Progressing    Pt's mood was better in this shift and communicated with this writer about his concern. Pt was frustrated and stated that he no longer trusts the doctor because his withdrawal symptoms are still bad. Pt stated that he is not going do an ECT and stated that he will look for an IRTS placement. The on call provider was informed about it and they gave a clearance for pt to have his scheduled HS Gabapentin and not to be on NPO at midnight. Pt's scored 9 on COWS scale. Pt endorsed having high anxiety; declined any interventions or prn medications for it; pt stated that he can cope with walking and watching TV. Pt denied having any SI/SIB/HI/AVH in this shift. Pt denied having loose stool or vomiting in this shift. Around 1830, pt was very frustrated about his current withdrawal symptoms and was seen lightly hitting on a window. Pt was offered a support, and asked if he needs any prn medication or coping strategies to which he declined. Pt received prn Nicotine lozenge on request. Pt declined to take scheduled Clonidine. Pt requested and received PRN Flexeril for back pain rated 6/10 at 2019. Pt wanted to take his morning Methadone around 0500 when he is awake.     Vital signs:  Temp: 96.8  F (36  C) Temp src: Temporal BP: (!) 137/97 Pulse: 73   Resp: 18 SpO2: 100 % O2 Device: None (Room air)         Goal Outcome Evaluation:    Plan of Care Reviewed With: patient

## 2025-03-30 NOTE — PLAN OF CARE
Goal Outcome Evaluation:  Problem: Sleep Disturbance  Goal: Adequate Sleep/Rest  Outcome: Progressing     Pt asleep at the start of the shift, respirations appeared even and non labored in all safety checks. Pt requested received prn Flexeril 10 Mg at 0536. Pt slept for 5.25 hours.

## 2025-03-30 NOTE — PROVIDER NOTIFICATION
"Subjective: on call resident was notified that pt's Opiate withdrawal scale is scoring 10, symptoms gi upset, irritability and anxiety, restlessness, joint aches, sweating, runny nose, tremors. He also told me today that during day shift he vomited x 2. Yesterday we increased his gabapentin from 900 to 1200 which he states has helped anxiety somewhat but he is having a hard time with the symptoms. He has taken his methadone this am and flexeril but appears to be very uncomfortable.       Objective:  Vital signs:  Temp: 97.7  F (36.5  C) Temp src: Temporal   Pulse: 71   Resp: 20 SpO2: 98 % O2 Device: None (Room air)     Weight: 92.1 kg (203 lb)  Estimated body mass index is 28.33 kg/m  as calculated from the following:    Height as of 3/14/25: 1.803 m (5' 10.98\").    Weight as of this encounter: 92.1 kg (203 lb).      Plan: after discussing with attending on call and reviewing addiction medicine notes, patient's symptoms are Liley due to craving rather that actual withdrawal from opioids given the timeline since admission.    Please consider the following for managing anxiety, agitation related to cravings until addiction medicine re-evaluates on Monday:    -first line PRN for agitation/cravings Seroquel (50- 100 mg)  - second line is haldol 2 mg tab pr injection.  - if none worked we can add additional dose of clonidine 0.1 mg, hold if HR is less than 55 B/min  - Also modifying the dose of clonidine to 0.2 mg BID can be considered per addiction medicine recs.     Today patient declined to take Seroquel, haldol and he was agreeable to one time dose of clonidine 0.1 mg.       Demetri Sarabia MD, PGY2  "

## 2025-03-31 PROCEDURE — 250N000013 HC RX MED GY IP 250 OP 250 PS 637

## 2025-03-31 PROCEDURE — 124N000002 HC R&B MH UMMC

## 2025-03-31 PROCEDURE — 99233 SBSQ HOSP IP/OBS HIGH 50: CPT | Mod: GC | Performed by: PSYCHIATRY & NEUROLOGY

## 2025-03-31 PROCEDURE — 99233 SBSQ HOSP IP/OBS HIGH 50: CPT | Performed by: STUDENT IN AN ORGANIZED HEALTH CARE EDUCATION/TRAINING PROGRAM

## 2025-03-31 RX ORDER — METHADONE HYDROCHLORIDE 10 MG/ML
135 CONCENTRATE ORAL DAILY
Status: DISCONTINUED | OUTPATIENT
Start: 2025-04-01 | End: 2025-04-16 | Stop reason: HOSPADM

## 2025-03-31 RX ADMIN — NICOTINE POLACRILEX 4 MG: 4 LOZENGE ORAL at 10:06

## 2025-03-31 RX ADMIN — DESVENLAFAXINE 50 MG: 25 TABLET, EXTENDED RELEASE ORAL at 07:36

## 2025-03-31 RX ADMIN — CYCLOBENZAPRINE 10 MG: 10 TABLET, FILM COATED ORAL at 18:17

## 2025-03-31 RX ADMIN — NICOTINE POLACRILEX 4 MG: 4 LOZENGE ORAL at 06:32

## 2025-03-31 RX ADMIN — HYDROCORTISONE: 1 CREAM TOPICAL at 20:03

## 2025-03-31 RX ADMIN — CYCLOBENZAPRINE 10 MG: 10 TABLET, FILM COATED ORAL at 11:13

## 2025-03-31 RX ADMIN — GABAPENTIN 1200 MG: 600 TABLET, FILM COATED ORAL at 07:36

## 2025-03-31 RX ADMIN — GABAPENTIN 1200 MG: 600 TABLET, FILM COATED ORAL at 20:00

## 2025-03-31 RX ADMIN — METHADONE HYDROCHLORIDE 125 MG: 10 CONCENTRATE ORAL at 05:00

## 2025-03-31 RX ADMIN — NICOTINE POLACRILEX 4 MG: 4 LOZENGE ORAL at 12:14

## 2025-03-31 RX ADMIN — FOLIC ACID 1 MG: 1 TABLET ORAL at 07:36

## 2025-03-31 RX ADMIN — NICOTINE POLACRILEX 4 MG: 4 LOZENGE ORAL at 15:00

## 2025-03-31 RX ADMIN — QUETIAPINE FUMARATE 50 MG: 50 TABLET ORAL at 18:17

## 2025-03-31 RX ADMIN — NICOTINE POLACRILEX 4 MG: 4 LOZENGE ORAL at 20:01

## 2025-03-31 RX ADMIN — GABAPENTIN 1200 MG: 600 TABLET, FILM COATED ORAL at 13:44

## 2025-03-31 RX ADMIN — NICOTINE POLACRILEX 4 MG: 4 LOZENGE ORAL at 07:37

## 2025-03-31 RX ADMIN — NICOTINE POLACRILEX 4 MG: 4 LOZENGE ORAL at 08:57

## 2025-03-31 RX ADMIN — NICOTINE POLACRILEX 4 MG: 4 LOZENGE ORAL at 16:06

## 2025-03-31 RX ADMIN — Medication 1 TABLET: at 07:36

## 2025-03-31 RX ADMIN — NICOTINE POLACRILEX 4 MG: 4 LOZENGE ORAL at 17:14

## 2025-03-31 RX ADMIN — NICOTINE POLACRILEX 4 MG: 4 LOZENGE ORAL at 11:07

## 2025-03-31 RX ADMIN — THIAMINE HCL TAB 100 MG 100 MG: 100 TAB at 07:36

## 2025-03-31 RX ADMIN — CYCLOBENZAPRINE 10 MG: 10 TABLET, FILM COATED ORAL at 04:59

## 2025-03-31 RX ADMIN — NICOTINE POLACRILEX 4 MG: 4 LOZENGE ORAL at 13:44

## 2025-03-31 ASSESSMENT — ACTIVITIES OF DAILY LIVING (ADL)
ADLS_ACUITY_SCORE: 26
ADLS_ACUITY_SCORE: 26
DRESS: INDEPENDENT
ORAL_HYGIENE: INDEPENDENT
ADLS_ACUITY_SCORE: 26
LAUNDRY: WITH SUPERVISION
HYGIENE/GROOMING: INDEPENDENT
ADLS_ACUITY_SCORE: 26
HYGIENE/GROOMING: INDEPENDENT
LAUNDRY: WITH SUPERVISION
ADLS_ACUITY_SCORE: 26
DRESS: INDEPENDENT
ADLS_ACUITY_SCORE: 26
ORAL_HYGIENE: INDEPENDENT
ADLS_ACUITY_SCORE: 26

## 2025-03-31 NOTE — PLAN OF CARE
[facility] IP Unit Acuity Rating Score (UARS)  Patient is given one point for every criteria they meet.    CRITERIA SCORING   On a 72 hour hold, court hold, committed, stay of commitment, or revocation. 0    Patient LOS on [facility] unit exceeds 20 days. 0  LOS: 13   Patient under guardianship, 55+, otherwise medically complex, or under age 11. 0   Suicide ideation without relief of precipitating factors. 1   Current plan for suicide. 1   Current plan for homicide. 0   Imminent risk or actual attempt to seriously harm another without relief of factors precipitating the attempt. 0   Severe dysfunction in daily living (ex: complete neglect for self care, extreme disruption in vegetative function, extreme deterioration in social interactions). 1   Recent (last 7 days) or current physical aggression in the ED or on unit. 0   Restraints or seclusion episode in past 72 hours. 0   Recent (last 7 days) or current verbal aggression, agitation, yelling, etc., while in the ED or unit. 0   Active psychosis. 0   Need for constant or near constant redirection (from leaving, from others, etc).  0   Intrusive or disruptive behaviors. 0   Patient requires 3 or more hours of individualized nursing care per 8-hour shift (i.e. for ADLs, meds, therapeutic interventions). 0   TOTAL 3

## 2025-03-31 NOTE — PROGRESS NOTES
"  ----------------------------------------------------------------------------------------------------------  [facility]  Psychiatry Progress Note  Hospital Day #13     Interim History:     The patient's care was discussed with the treatment team and chart notes were reviewed.    Vitals: VSS.   Sleep: 4 hours (03/31/25 0607)  Scheduled medications: Did not take 2 doses of clonidine. Took other scheduled meds.   Psychiatric PRN medications: quetiapine 50mg, cyclobenzaprine 10mg X3      Staff Report:   No acute events or safety concerns overnight.   - Had a difficult weekend  - Intermittently upset/agitated, did punch a wall this morning  - Was scheduled for ECT today, he declined.   - [facility] this morning     Please see staff notes for details.      Subjective:     Patient Interview:  Weekend  was what it was . ECT is off the table, doesn't feel he can trust this. Reports he is having the same symptoms, nausea, chills, shakes. Denies visual auras. No questions for us. Reports medication changes were not effective, doesn't like Seroquel because its too sedating and makes him feel drunk. Reports if he wanted fentanyl he could get some, doesn't want to do this because he wants sobriety. Upset he has a benzodiazepine misuse label. Reports he's really restless, anxiety is \"through the roof\". Anxiety piece is the worst, reports this is his baseline and its difficult to live with. Can't focus on depression because of how anxious he is.   Reports nothing changed over the weekend, just more of the same suffering. Doesn't feel ECT is the appropriate treatment for him, can't get onboard because it  feels so wrong.  Feels like its too big of a treatment, too much for what he's experiencing. Endorses being in a constant panic. Feels like he's spinning in circles basically. Can't think or concentrate. Reports nothing can distract him from this. Unsure of what would be a good treatment " moving forward, would like to continue managing with medication and interested in therapy. Increase of methadone was helpful, but only lasts until ~noon. He endorses wishing he could go to sleep and never wake up. Denies improvement in SI. Reports quality life is exactly zero. Reporting indifference to prospect of methadone increases. Wishes he hadn't told us about getting fentanyl in the hospital, because  Now I can't get away with it.  Reports everything he's been saying is a default response or a coping mechanism.   Reports he doesn't want to use, and this is the reason he's still here maintaining sobriety. Reports sober house was a paradise for someone that wants to get high. Endorses he might have a reason to continue engaging with treatment. Now expressing regret about coming to the hospital. Endorses working hard to get here. Discussed how he had very easy access to fentanyl on the outside. Can't figure out what or where this anxiety is coming from. Endorses withdrawals are probably a large part of this. Discussed that this is a difficult process. Doesn't wish to talk any further, walked out of interview.    ROS:  Patient has nausea, chills, tremors, back pain, and insomnia   Patient denies  other concerns     Objective:     Vitals:  BP (!) 137/97 (BP Location: Right arm, Patient Position: Sitting, Cuff Size: Adult Regular)   Pulse 73   Temp 96.8  F (36  C) (Temporal)   Resp 18   Wt 92.1 kg (203 lb)   SpO2 100%   BMI 28.33 kg/m      Allergies:  Allergies   Allergen Reactions    Iodinated Contrast Media Hives and Rash     Reports he is allergic to Iodine contrast     Reports he is allergic to Iodine contrast       Reports he is allergic to Iodine contrast    Reports he is allergic to Iodine contrast      Reports he is allergic to Iodine contrast   Reports he is allergic to Iodine contrast    Iodine Hives    Lisinopril Hives and Rash    Lithium Hives    Topiramate Hives    Escitalopram Rash     Rash  around mouth    Iodine-131 Hives    Trazodone Anxiety     Trazodone causes RLS for pt    Olanzapine Rash     Pt reported taking approximately one year ago. Developed rash around mouth.       Current Medications:  Scheduled:  Current Facility-Administered Medications   Medication Dose Route Frequency Provider Last Rate Last Admin    acetaminophen (TYLENOL) tablet 650 mg  650 mg Oral Q4H PRN Patti Bernard MD   650 mg at 03/29/25 0748    alum & mag hydroxide-simethicone (MAALOX) suspension 30 mL  30 mL Oral Q4H PRN Patti Bernard MD        cloNIDine (CATAPRES) tablet 0.1 mg  0.1 mg Oral Once PRN Demetri Sarabia MD        cloNIDine (CATAPRES) tablet 0.1 mg  0.1 mg Oral TID Tracy Castillo MD   0.1 mg at 03/30/25 0821    cyclobenzaprine (FLEXERIL) tablet 10 mg  10 mg Oral TID PRN Patti Bernard MD   10 mg at 03/31/25 0459    desvenlafaxine succinate (PRISTIQ) 24 hr tablet 50 mg  50 mg Oral Daily Tracy Castillo MD   50 mg at 03/31/25 0736    dicyclomine (BENTYL) capsule 10 mg  10 mg Oral 4x Daily PRN Suad Coyle MD        folic acid (FOLVITE) tablet 1 mg  1 mg Oral Daily Patti Bernard MD   1 mg at 03/31/25 0736    gabapentin (NEURONTIN) tablet 1,200 mg  1,200 mg Oral TID Sunni Lorenz MD   1,200 mg at 03/31/25 0736    haloperidol (HALDOL) tablet 2 mg  2 mg Oral BID PRN Sunni Lorenz MD        Or    haloperidol lactate (HALDOL) injection 2 mg  2 mg Intramuscular BID PRN Sunni Lorenz MD        Hold Medications for ECT (select and list medications to be held)   Does not apply HOLD Karen Ribera MD        hydrocortisone (CORTAID) 1 % cream   Topical BID Patti Bernard MD   Given at 03/25/25 1917    hydrOXYzine HCl (ATARAX) tablet 50 mg  50 mg Oral 4x Daily PRN Evelio Chavarria DO   50 mg at 03/29/25 1013    loperamide (IMODIUM) capsule 2 mg  2 mg Oral 4x Daily PRN Suad Coyle MD   2 mg at 03/28/25 0858    melatonin tablet  3 mg  3 mg Oral At Bedtime PRN Patti Bernard MD        methadone (DOLOPHINE-INTENSOL) 10 MG/ML (HIGH CONC) solution 125 mg  125 mg Oral Daily Tracy Castillo MD   125 mg at 03/31/25 0500    midazolam 5 mg/mL (VERSED) intranasal solution 10 mg  10 mg Intranasal Once PRN Tracy Castillo MD        And    mucosal atomization device #  device 1 Device  1 Device Inhalation DOES NOT GO TO MAR Tracy Castillo MD        mirtazapine (REMERON) tablet 45 mg  45 mg Oral At Bedtime Demetri Sarabia MD   45 mg at 03/30/25 2017    multivitamin w/minerals (THERA-VIT-M) tablet 1 tablet  1 tablet Oral Daily Patti Bernard MD   1 tablet at 03/31/25 0736    naloxone (NARCAN) injection 0.2 mg  0.2 mg Intravenous Q2 Min PRN Charles Menjivar MD        Or    naloxone (NARCAN) injection 0.4 mg  0.4 mg Intravenous Q2 Min PRN Charles Menjivar MD        Or    naloxone (NARCAN) injection 0.2 mg  0.2 mg Intramuscular Q2 Min PRN Charles Menjivar MD        Or    naloxone (NARCAN) injection 0.4 mg  0.4 mg Intramuscular Q2 Min PRN Charles Menjivar MD        nicotine (NICORETTE) lozenge 4 mg  4 mg Buccal Q1H PRN Patti Bernard MD   4 mg at 03/31/25 0737    polyethylene glycol (MIRALAX) Packet 17 g  17 g Oral Daily PRN Patti Bernard MD        QUEtiapine (SEROquel) tablet  mg   mg Oral Daily PRN Demetri Sarabia MD   50 mg at 03/30/25 1155    thiamine (B-1) tablet 100 mg  100 mg Oral Daily Patti Bernard MD   100 mg at 03/31/25 0736       PRN:  Current Facility-Administered Medications   Medication Dose Route Frequency Provider Last Rate Last Admin    acetaminophen (TYLENOL) tablet 650 mg  650 mg Oral Q4H PRN Patti Bernard MD   650 mg at 03/29/25 0748    alum & mag hydroxide-simethicone (MAALOX) suspension 30 mL  30 mL Oral Q4H PRN Patti Bernard MD        cloNIDine (CATAPRES) tablet 0.1 mg  0.1 mg Oral Once PRN  Demetri Sarabia MD        cloNIDine (CATAPRES) tablet 0.1 mg  0.1 mg Oral TID Tracy Castillo MD   0.1 mg at 03/30/25 0821    cyclobenzaprine (FLEXERIL) tablet 10 mg  10 mg Oral TID PRN Patti Bernard MD   10 mg at 03/31/25 0459    desvenlafaxine succinate (PRISTIQ) 24 hr tablet 50 mg  50 mg Oral Daily Tracy Castillo MD   50 mg at 03/31/25 0736    dicyclomine (BENTYL) capsule 10 mg  10 mg Oral 4x Daily PRN Suad Coyle MD        folic acid (FOLVITE) tablet 1 mg  1 mg Oral Daily Patti Bernard MD   1 mg at 03/31/25 0736    gabapentin (NEURONTIN) tablet 1,200 mg  1,200 mg Oral TID Sunni Lorenz MD   1,200 mg at 03/31/25 0736    haloperidol (HALDOL) tablet 2 mg  2 mg Oral BID PRN Sunni Lorenz MD        Or    haloperidol lactate (HALDOL) injection 2 mg  2 mg Intramuscular BID PRN Sunni Lorenz MD        Hold Medications for ECT (select and list medications to be held)   Does not apply HOLD Karne Ribera MD        hydrocortisone (CORTAID) 1 % cream   Topical BID Patti Bernard MD   Given at 03/25/25 1917    hydrOXYzine HCl (ATARAX) tablet 50 mg  50 mg Oral 4x Daily PRN Evelio Chavarria DO   50 mg at 03/29/25 1013    loperamide (IMODIUM) capsule 2 mg  2 mg Oral 4x Daily PRN Suad Coyle MD   2 mg at 03/28/25 0858    melatonin tablet 3 mg  3 mg Oral At Bedtime PRN Patti Bernard MD        methadone (DOLOPHINE-INTENSOL) 10 MG/ML (HIGH CONC) solution 125 mg  125 mg Oral Daily Tracy Castillo MD   125 mg at 03/31/25 0500    midazolam 5 mg/mL (VERSED) intranasal solution 10 mg  10 mg Intranasal Once PRN Tracy Castillo MD        And    mucosal atomization device #  device 1 Device  1 Device Inhalation DOES NOT GO TO Tracy Thomson MD        mirtazapine (REMERON) tablet 45 mg  45 mg Oral At Bedtime Demetri Sarabia MD   45 mg at 03/30/25 2017    multivitamin w/minerals (THERA-VIT-M) tablet 1  tablet  1 tablet Oral Daily Patti Bernard MD   1 tablet at 03/31/25 0736    naloxone (NARCAN) injection 0.2 mg  0.2 mg Intravenous Q2 Min PRN Charles Menjivar MD        Or    naloxone (NARCAN) injection 0.4 mg  0.4 mg Intravenous Q2 Min PRN Charles Menjivar MD        Or    naloxone (NARCAN) injection 0.2 mg  0.2 mg Intramuscular Q2 Min PRN Charles Menjivar MD        Or    naloxone (NARCAN) injection 0.4 mg  0.4 mg Intramuscular Q2 Min PRN Charles Menjivar MD        nicotine (NICORETTE) lozenge 4 mg  4 mg Buccal Q1H PRN Patti Bernard MD   4 mg at 03/31/25 0737    polyethylene glycol (MIRALAX) Packet 17 g  17 g Oral Daily PRN Patti Bernard MD        QUEtiapine (SEROquel) tablet  mg   mg Oral Daily PRN Demetri Sarabia MD   50 mg at 03/30/25 1155    thiamine (B-1) tablet 100 mg  100 mg Oral Daily Patti Bernard MD   100 mg at 03/31/25 0736       Labs and Imaging:  New results:   No results found for this or any previous visit (from the past 24 hours).    Data this admission:  - CBC with chronic thrombocytopenia (platelet count 78)   - CMP with mildly elevated AST (47) and ALT (84)   - TSH normal  - UDS positive for amphetamines, cannabinoids, fentanyl  - Vit D in process  - Lipids not ordered this admission (but within normal limits on 2/26/2025)  - Vit B12 normal  - Folate normal  - EKG sinus bradycardia, QTc 396     Mental Status Exam:     Oriented to:  Grossly Oriented, Person/Self, and Situation  General:  Awake and Alert  Appearance:  Appears older than stated age Edentulous. Slightly disheveled, dressed in personal clothing.   Behavior/Attitude:  Irritable, at first says he will not talk with team but does end up coming to talk. However, after talking for 10 minutes or so, asks to end the interview and he gets up and leaves.  Eye Contact: Appropriate  Psychomotor: playing with his hands, jittery and tremulous.  no catatonia  "present  Speech:  appropriate volume/tone, spontaneous, and with good articulation  Language: Fluent in English with appropriate syntax and vocabulary.  Mood:  \"Terrible\"  Affect:   restricted affect, intermittent tearfulness, irritable at times  Thought Process:   linear, coherent.   Thought Content:   suicidal ideation with plan (without intent, has mentioned different methods at different times); No apparent delusions  Associations:  intact  Insight:  fair due to his ability to recognize rational thoughts from irrational thought, like the scenarios of killing himself. He does still express significant depression which limits his insight into his hopelessness and outlook/goals for treatment. Feels like his anxiety is worse than his depression.  Judgment:  fair due to being engaged in groups/treatment here and cooperative. He does express the desire to die but is able to separate his thoughts from reality.   Impulse control: fair due to prior to admission behaviors but has remained safe and cooperative here, able to recognize impulses and has not acted on them during his admission   Attention Span:  adequate for conversation  Concentration:  grossly intact to conversation   Recent and Remote Memory:  not formally assessed but intact to conversation   Fund of Knowledge: average, estimated  Muscle Strength and Tone: normal  Gait and Station: Normal     Psychiatric Assessment     Zenon Truong is a 54 year old male with previous psychiatric diagnoses of polysubstance use disorder [benzodiazepines, opiates, methamphetamine, THC, nicotine], severe depression with suicidal ideation, borderline personality disorder and PTSD admitted 03/18/2025 due to concern for SI with plan and intent in the context of medication non adherence, substance use and housing/financial insecurity. On admission, he presented with symptoms of SI with intent and plan, hopelessness, poor motivation, low energy, anhedonia, impulsivity and " irritability.      Had a recent hospitalization at [facility] 2/23-3/6/2025 for suicidal ideation and benzodiazepine overdose. ECT was considered and planned during that admission, but he decided against pursuing it at that time. Did not continue taking his meds after leaving.     There is genetic predisposition given family hx of substance use disorder. Patient has struggled with homelessness and severe substance use for years. This current admission is likely precipitated by recent psychiatric hospitalization at end of February followed by relapse soon after discharge to a sober house. Perpetuating factors include ongoing substance use, medication non adherence, longstanding functional impairment, personality traits, lack of perceived support, deconditioning and housing and financial insecurity. Patient's support system includes  . Based on patient's history and current symptoms, criteria are met for primary diagnosis of MDD without psychotic features, opioid use disorder, benzodiazepine use disorder, stimulant use disorder and cannabis use disorder. Patient warrants inpatient hospitalization to maintain their safety and would benefit from medication optimization and outpatient referral/resources.       Due to his accounts of taking large amounts of fentanyl prior to admission,we discussed with addiction medicine team given duration and severity of his withdrawal. There is a high likelihood that the fentanyl he was taking was cut with other substances, including xylazine. Xylazine is an alpha 2 agonist used as a veterinary anesthetic but also commonly found in illicit opioids. There may also be other substances present in the fentanyl and xanax he was using on the streets which are contributing to the complexity of his withdrawal. We discussed increasing his gabapentin to 900mg TID given his ongoing anxiety and physical symptoms. Additionally, the recommendation from addiction medicine was to increase  clonidine to 0.2mg BID given ongoing withdrawal (this will be discussed). He has been intermittently bradycardic with HR <60. HR parameter changes to <55 for giving this today. Overall, we are trying to better manage his withdrawal symptoms, given that he has been asking to leave due to the discomfort. At this time, he would be at very high risk of suicide and return to use of substances/overdose if he were to leave. That being said, team (including addiction team) feels the benefits of these medications we are using to manage symptoms outweigh risks.    ECT had been discussed as one of Zenon's goals for this admission with the plan to start on 3/31. He declined on the intended start date as he did not think this was the right treatment for him. From chart review, it appears like this has occurred on previous admissions, with the plan to initiate ECT and Zenon declining prior to starting. His physical symptoms of withdrawal and cravings are his biggest priority and he feels as though his anxiety is worse than his depression. Given that his physical symptoms are causing significant distress and he does continue to express a desire to get better and not relapse, we will continue to work with the addiction medicine team to optimize his medication regimen.     Today's changes:  - Declined ECT today   - Pending addiction med recs     Psychiatric Plan by Diagnosis      #Major depressive disorder, recurrent, severe without psychosis  #Borderline personality disorder.  Patient was continued on Mirtazapine and Desvenlafaxine. Prior to admission while on the hospitalist service, given severity of depression symptoms, ECT consult was obtained. He did express some reservation due to short term memory loss from previous sessions, but remains hopeful it could be helpful. Per chart review, he was previously on 50mg desvenlafaxine succinate which was increased to 100mg on 11/21/2024. He was discharged from [facility] from his voluntary  psychiatric admission from 2/18- 3/6 with 50 mg desvenlafaxine succinate. However, he stopped taking his psychiatric medications after discharge.  On his admission here on 3/14, he was restarted on 25mg desvenlafaxine succinate as he had not been on it since prior discharge. Given this information, increased his pristiq on 3/25 to 50mg. As of 3/31, strong suspicion of BPD being part of his current situation, as he is demonstrating more behaviors such as pounding on his wall, slamming doors, he has been more irritable with team, has been blaming the team more, has more superficial mood swings and overall difficulty with interpersonal relationships with staff on the unit.  - Continue Mirtazapine 45 mg po at bedtime  - Continue Desvenlafaxine to 50mg po daily  - Declined ECT today      #Opioid use disorder, severe  Phenobarbital taper was discontinued on 3/26 per recommendation from addiction medicine team recommendation that his symptoms were likely secondary to opiate withdrawal vs benzo withdrawal. Methadone was increased to 115 mg daily on 3/26 and to 125mg on 3/27 per addiction medicine recs for ongoing withdrawal symptoms.   He does report that he was on 180 mg methadone for 13 years and relapsed on opioids when this was tapered. He has been on PTA 105mg methadone but given his ongoing symptoms of withdrawal and discomfort, addiction medicine consulted on 3/24 for additional input on methadone dosage and other options.   - methadone 125mg daily   - Addiction team following  - Thiamine, MVI, folic acid   For symptomatic management:  - 10 mg Flexiril TID PRN Q8H for muscle cramps, spasms, or tightness  - loperamide, bentyl     #Benzodiazepine use disorder, severe  Phenobarbital taper was discontinued on 3/26 per recommendation from addiction medicine team recommendation that his symptoms were likely secondary to opiate withdrawal vs benzo withdrawal.   - Addiction team following  - Continue clonidine 0.1mg TID for  withdrawal symptoms (HR parameters to hold if <55)              - Rec from addiction med: consider increasing clonidine to 0.2mg BID     #Stimulant (methamphetamine) use disorder  #Cannabis use disorder     Additional Plans:  - Patient will be treated in therapeutic milieu with appropriate individual and group therapies as described.     Psychiatric Hospital Course:      Zenon Truong was admitted to [facility] as a voluntary patient. Had a recent hospitalization   Medications and other updates:  3/14: hold bupropion due to risk of lower seizure threshold  3/14: Continue PTA desvenlafaxine 25mg and mirtazapine 30mg for sleep  3/18: start clonidine 0.1mg TID   3/20: Increase mirtazapine to 45 mg daily for continued insomnia   3/21: restart PTA gabapentin 600mg TID for anxiety   3/21: continuing phenobarb taper (today is 32.4 mg BID)  3/23: no scheduled phenobarb received 32.4mg due to ongoing withdrawal symptoms  3/24: give one time phenobarbital dose of 32.4mg due to significant discomfort that seem at least in part due to withdrawal  3/24: consult addiction medicine for assistance with methadone dose and ongoing withdrawal symptoms   3/24: discussed re-initiating wellbutrin. However, patient reporting symptoms that he feels were similar to auras he has had prior to seizures (this has been worse the last couple days), so hesitant to do this at this time.  3/25: one time phenobarbital dose of 32.4mg given significant withdrawal symptoms  3/25: increase desvenlafaxine to 50mg daily (per chart review, was prescribed this dose outpatient)  3/26: increase methadone to 115 mg daily per addiction medicine recs   3/26: discontinue phenobarb taper   3/26: medicine consult for ECT clearance; Defer to psychiatry for decision of proceeding with ECT with recent benzodiazepine withdrawal/use. Otherwise, no absolute medical contraindications to proceeding with ECT at this time. Treatment plan per psychiatry.   3/27: increase  gabapentin to 900 TID per addiction med team recs   3.27: increase methadone to 125mg daily per addiction med recs given ongoing withdrawal symptoms   3/27: pending ECT team recs regarding methadone on ECT days  3/31: declined ECT on intended start day     The risks, benefits, alternatives, and side effects were discussed and understood by the patient.     Medical Assessment and Plan     #Palpitations  3/21 patient reporting short episodes (few seconds) of palpitations occurring intermittently over the last month. Does have shortness of breath with this, no chest pain or other symptoms. He is not sure if it is associated with anxiety.   - repeat EKG ordered showed sinus bradycardia with no significant change to prior EKG      #Chronic hepatitis C, never treated  #Mild transaminase elevations, hepatic steatosis:  Never treated for hepatitis C.  Chronic, mild transaminase elevations with normal hepatic function.  Most recent abdominal ultrasound 2/15/2024 showed hepatic steatosis without hepatic lesions, normal gallbladder.  Denies current alcohol use.  - monitor outpatient      #Chronic thrombocytopenia  #Normocytic anemia, now resolved  Chronic thrombocytopenia, moderate and stable.  Denies any history of bleeding including no history of gastric ulcers, hematemesis, melena or hematochezia.  Anemia appears to be more recent since 2/23/2025 when hemoglobin was 11.6, MCV 86.  Hgb 14.1, Plts 97, MCV 86, ferritin, iron, TIBC wnl on 3/15.      #Concern for malnutrition  Seen by dietician team, patient does not meet two of the established criteria necessary for diagnosing malnutrition but is at risk for malnutrition.   - ensure enlive ordered        Medical course: Patient was physically examined by the ED prior to being transferred to the unit and was found to be medically stable and appropriate for admission.      Consults:  Medicine (for clearance for ECT)  Addiction med (for methadone dosage and withdrawal symptoms)       Checklist     Legal Status: Voluntary     Safety Assessment:   Behavioral Orders   Procedures    Code 1 - Restrict to Unit    Code 2 - 1:1 Staff Supervision     For ECT only    Electroconvulsive therapy     Series of up to 12 treatments. Begin Date: 3/31/25     Treating Psychiatrist providing ECT:  Nico     Notified on:  3/28    Fall precautions    Routine Programming     As clinically indicated    Seizure precautions    Status 15     Every 15 minutes.    Suicide precautions: Suicide Risk: LOW; Clinical rationale to override score: lack of access to a plan for self-harm     .     Order Specific Question:   Suicide Risk     Answer:   LOW     Order Specific Question:   Clinical rationale to override score:     Answer:   lack of access to a plan for self-harm    Withdrawal precautions       Risk Assessment:  Risk for harm is low.  Risk factors: SI, maladaptive coping, substance use, impulsive, and past behaviors  Protective factors: engaged in treatment     SIO: no    Disposition: Pending stabilization, possible ECT, medication optimization, & development of a safe discharge plan.      Attestations     Mikayla Latham, MS3  [facility] Medical Student     I was present with the medical student who participated in the service and in the documentation of the note.  I have verified the history and personally performed the physical exam and medical decision making. I agree with the assessment and plan of care as documented in the note.    This patient was seen and discussed with my attending physician.  Tracy Castillo MD   Psychiatry Resident Physician      This patient has been seen and evaluated by me, Charles Fink.  I have discussed this patient with the psychiatry resident and I agree with the findings and plan in this note.    I have reviewed today's vital signs, medications, labs and imaging.     Charles Menjivar MD

## 2025-03-31 NOTE — CONSULTS
"      Psychiatry Consultation; Follow up              Reason for Consult, requesting source:    Follow up addiction  Requesting source: Charles Menjivar    Labs and imaging reviewed.               Interim history:    - Per weekend notes he was reporting more symptoms of anxiety, craving, restlessness, etc yesterday. Given doses of clonidine. Gabapentin dose has been increased to 1200mg TID    - On my interview, Zenon continues to endorse above symptoms. Says he is alright increasing methadone dose  - When asked how his mood is says. \"Pissed off, I guess they're committing me\"  - Did not explicitly endorse SI when asked  - Said he would not take any other meds besides methadone, \"fuck those guys\"  - Says he now does not want to pursue ECT        Current Medications:     Current Facility-Administered Medications   Medication Dose Route Frequency Provider Last Rate Last Admin    cloNIDine (CATAPRES) tablet 0.1 mg  0.1 mg Oral TID Tracy Castillo MD   0.1 mg at 03/30/25 0821    desvenlafaxine succinate (PRISTIQ) 24 hr tablet 50 mg  50 mg Oral Daily Tracy Castillo MD   50 mg at 03/31/25 0736    folic acid (FOLVITE) tablet 1 mg  1 mg Oral Daily Patti Bernard MD   1 mg at 03/31/25 0736    gabapentin (NEURONTIN) tablet 1,200 mg  1,200 mg Oral TID Sunni Lorenz MD   1,200 mg at 03/31/25 1344    hydrocortisone (CORTAID) 1 % cream   Topical BID Patti Bernard MD   Given at 03/25/25 1917    [START ON 4/1/2025] methadone (DOLOPHINE-INTENSOL) 10 MG/ML (HIGH CONC) solution 135 mg  135 mg Oral Daily Dilshad Bailey MD        mirtazapine (REMERON) tablet 45 mg  45 mg Oral At Bedtime Demetri Sarabia MD   45 mg at 03/30/25 2017    multivitamin w/minerals (THERA-VIT-M) tablet 1 tablet  1 tablet Oral Daily Patti Bernard MD   1 tablet at 03/31/25 0736    thiamine (B-1) tablet 100 mg  100 mg Oral Daily Patti Bernard MD   100 mg at 03/31/25 0736              MSE: " "  Appearance: awake, alert, adequately groomed, and casually dressed  Attitude:  cooperative  Eye Contact:  good  Mood:   \"pissed off\"  Affect:  mood congruent and intensity is heightened  Speech:  clear, coherent  Psychomotor Behavior:  no evidence of tardive dyskinesia, dystonia, or tics  Muscle strength and tone: Normal   Thought Process:  logical, linear, and goal oriented  Associations:  no loose associations  Thought Content:  no evidence of suicidal ideation or homicidal ideation and no evidence of psychotic thought  Insight:  fair  Judgement:  limited  Oriented to:  time, person, and place  Attention Span and Concentration:  fair  Recent and Remote Memory:  fair    Vital signs:  Temp: 97  F (36.1  C) Temp src: Temporal BP: (!) 155/111 (checked twice, 160/122) Pulse: 64     SpO2: 97 % O2 Device: None (Room air)     Weight: 92.1 kg (203 lb)  Estimated body mass index is 28.33 kg/m  as calculated from the following:    Height as of 3/14/25: 1.803 m (5' 10.98\").    Weight as of this encounter: 92.1 kg (203 lb).    EKG: QTc 397ms 3/21         DSM-5 Diagnosis:   Major depressive disorder, recurrent, severe without psychosis  Opioid use disorder, severe  Benzodiazepine use disorder, severe  Stimulant (methamphetamine) use disorder  Cannabis use disorder          Assessment:     For today will increase methadone dose. He was reporting ongoing symptoms of anxiety, restlessness, etc. These are likely related to relatively under-dosed methadone, compared to his use PTA. Says he will not take clonidine, we can change this to PRN.           Summary of Recommendations:   - Increased methadone to 135mg daily (can change morning admin time to patient's preference)    - Would not recommend additional phenobarbital at this time  - Will continue to follow      Dilshad Bailey MD    Department of Psychiatry  Please Vocera if questions    Total time spent in chart review, patient interview and coordination of " care; 50 minutes - all time was spent on the date of the encounter that I saw patient

## 2025-03-31 NOTE — PLAN OF CARE
"Goal Outcome Evaluation:    Plan of Care Reviewed With: patient      Problem: Substance Withdrawal  Goal: Substance Withdrawal  Description: Signs and symptoms of listed problems will be absent or manageable.  Outcome: Not Progressing  Note: Zenon presented with a tense, irritable affect. He endorsed high anxiety and withdrawal symptoms of tremors and restlessness. He declined to take clonidine - reported it hasn't helped his withdrawal symptoms, lowers his pulse and makes him feel dizzy when he stands up. COWS was a 8 at 0845. Pt went to his room and hit his fist into the wall at 0900. Writer offered seroquel and pt declined, stating, \"no I hate that shit!\" He requested hydroxyzine be removed from his med list due to it having an excitatory effect on him. He denied SI. Reported his focus is on his physical symptoms not depression, \"fentanyl withdrawal is the worst! I wouldn't wish it on my worse enemy\". Pt declined scheduled ECT. Requested and received flexeril at 1115 with reported. He slammed his door after lunch and at 1430.    Temp: 97  F (36.1  C) Temp src: Temporal BP: (!) 155/111 (checked twice, 160/122) Pulse: 64   Resp: 18 SpO2: 97 % O2 Device: None (Room air)         "

## 2025-03-31 NOTE — PLAN OF CARE
Team Note Due:  Wednesday    Assessment/Intervention/Current Symtoms and Care Coordination:  Chart review and met with team, discussed pt progress, symptomology, and response to treatment.  Discussed the discharge plan and any potential impediments to discharge.    -Received a voicemail from Zenon's , Stephanie, for an update. Called back and let Stephanie know that at this time, Zenon has declined ECT. Stephanie is concerned about Zenon's cycle of behavior and would like to pursue commitment. She plans to visit with Zenon on Thursday and hopes to talk to him about ECT and treatment.   -Received an email from Stephanie after she spoke with Zenon stating Zenon had made concerning comments about suicidal ideation, wanting to leave, and having people deliver drugs to hospital. Let the team know of comments.        Discharge Plan or Goal:  TBD     Barriers to Discharge:  Medication Management  ECT     Referral Status:  None currently      Legal Status:  Voluntary       Contacts (include TIERNEY status):  Methadone Management: [facility] [place] [facility]     /ACT Team: TIERNEY signed  Name/Clinic: [facility]    Number: [phone]      Upcoming Meetings and Dates/Important Information and next steps:  Visit with  4/3 9am

## 2025-03-31 NOTE — PLAN OF CARE
Brief Psychotherapy Note    Therapist checked in with Pt to remind Pt about psychotherapy service available.    Pt response: Pt not interested currently in meeting 1:1, appeared agitated in his response.    Plan: Writer will remain available to Pt and continue to check in for 1:1 sessions.

## 2025-04-01 PROCEDURE — 124N000002 HC R&B MH UMMC

## 2025-04-01 PROCEDURE — 250N000013 HC RX MED GY IP 250 OP 250 PS 637

## 2025-04-01 PROCEDURE — 250N000013 HC RX MED GY IP 250 OP 250 PS 637: Performed by: STUDENT IN AN ORGANIZED HEALTH CARE EDUCATION/TRAINING PROGRAM

## 2025-04-01 PROCEDURE — 97150 GROUP THERAPEUTIC PROCEDURES: CPT | Mod: GO

## 2025-04-01 PROCEDURE — 99232 SBSQ HOSP IP/OBS MODERATE 35: CPT | Mod: GC | Performed by: PSYCHIATRY & NEUROLOGY

## 2025-04-01 RX ORDER — BUPROPION HYDROCHLORIDE 150 MG/1
150 TABLET ORAL DAILY
Status: DISCONTINUED | OUTPATIENT
Start: 2025-04-01 | End: 2025-04-07

## 2025-04-01 RX ORDER — CLONIDINE HYDROCHLORIDE 0.1 MG/1
0.1 TABLET ORAL 3 TIMES DAILY PRN
Status: DISCONTINUED | OUTPATIENT
Start: 2025-04-01 | End: 2025-04-16 | Stop reason: HOSPADM

## 2025-04-01 RX ADMIN — Medication 1 TABLET: at 07:50

## 2025-04-01 RX ADMIN — CYCLOBENZAPRINE 10 MG: 10 TABLET, FILM COATED ORAL at 03:33

## 2025-04-01 RX ADMIN — FOLIC ACID 1 MG: 1 TABLET ORAL at 07:50

## 2025-04-01 RX ADMIN — NICOTINE POLACRILEX 4 MG: 4 LOZENGE ORAL at 18:29

## 2025-04-01 RX ADMIN — CYCLOBENZAPRINE 10 MG: 10 TABLET, FILM COATED ORAL at 13:52

## 2025-04-01 RX ADMIN — CYCLOBENZAPRINE 10 MG: 10 TABLET, FILM COATED ORAL at 20:53

## 2025-04-01 RX ADMIN — NICOTINE POLACRILEX 4 MG: 4 LOZENGE ORAL at 10:06

## 2025-04-01 RX ADMIN — BUPROPION HYDROCHLORIDE 150 MG: 150 TABLET, EXTENDED RELEASE ORAL at 10:06

## 2025-04-01 RX ADMIN — NICOTINE POLACRILEX 4 MG: 4 LOZENGE ORAL at 13:06

## 2025-04-01 RX ADMIN — NICOTINE POLACRILEX 4 MG: 4 LOZENGE ORAL at 06:40

## 2025-04-01 RX ADMIN — GABAPENTIN 1200 MG: 600 TABLET, FILM COATED ORAL at 13:06

## 2025-04-01 RX ADMIN — NICOTINE POLACRILEX 4 MG: 4 LOZENGE ORAL at 16:33

## 2025-04-01 RX ADMIN — DESVENLAFAXINE 50 MG: 25 TABLET, EXTENDED RELEASE ORAL at 07:50

## 2025-04-01 RX ADMIN — NICOTINE POLACRILEX 4 MG: 4 LOZENGE ORAL at 13:59

## 2025-04-01 RX ADMIN — QUETIAPINE FUMARATE 50 MG: 50 TABLET ORAL at 03:33

## 2025-04-01 RX ADMIN — MIRTAZAPINE 45 MG: 45 TABLET, FILM COATED ORAL at 20:53

## 2025-04-01 RX ADMIN — NICOTINE POLACRILEX 4 MG: 4 LOZENGE ORAL at 15:04

## 2025-04-01 RX ADMIN — NICOTINE POLACRILEX 4 MG: 4 LOZENGE ORAL at 20:03

## 2025-04-01 RX ADMIN — NICOTINE POLACRILEX 4 MG: 4 LOZENGE ORAL at 11:05

## 2025-04-01 RX ADMIN — NICOTINE POLACRILEX 4 MG: 4 LOZENGE ORAL at 12:05

## 2025-04-01 RX ADMIN — GABAPENTIN 1200 MG: 600 TABLET, FILM COATED ORAL at 20:53

## 2025-04-01 RX ADMIN — THIAMINE HCL TAB 100 MG 100 MG: 100 TAB at 07:50

## 2025-04-01 RX ADMIN — NICOTINE POLACRILEX 4 MG: 4 LOZENGE ORAL at 07:49

## 2025-04-01 RX ADMIN — GABAPENTIN 1200 MG: 600 TABLET, FILM COATED ORAL at 07:50

## 2025-04-01 RX ADMIN — NICOTINE POLACRILEX 4 MG: 4 LOZENGE ORAL at 09:04

## 2025-04-01 RX ADMIN — METHADONE HYDROCHLORIDE 135 MG: 10 CONCENTRATE ORAL at 05:14

## 2025-04-01 ASSESSMENT — ACTIVITIES OF DAILY LIVING (ADL)
DRESS: INDEPENDENT
DRESS: INDEPENDENT
ADLS_ACUITY_SCORE: 26
ORAL_HYGIENE: INDEPENDENT
ADLS_ACUITY_SCORE: 26
HYGIENE/GROOMING: INDEPENDENT
ADLS_ACUITY_SCORE: 26
ADLS_ACUITY_SCORE: 26
ORAL_HYGIENE: INDEPENDENT
ADLS_ACUITY_SCORE: 26
LAUNDRY: WITH SUPERVISION
ADLS_ACUITY_SCORE: 26
HYGIENE/GROOMING: INDEPENDENT
ADLS_ACUITY_SCORE: 26
ADLS_ACUITY_SCORE: 26
LAUNDRY: WITH SUPERVISION
ADLS_ACUITY_SCORE: 26

## 2025-04-01 NOTE — PROGRESS NOTES
"  ----------------------------------------------------------------------------------------------------------  [facility]  Psychiatry Progress Note  Hospital Day #14     Interim History:     The patient's care was discussed with the treatment team and chart notes were reviewed.    Vitals: VSS  Sleep: 3 hours (04/01/25 0630)  Scheduled medications: Did not take mirtazapine. Took other scheduled meds.   Psychiatric PRN medications: quetiapine 50mg, flexeril x3    Staff Report:   No acute events or safety concerns overnight.   - Irritable overnight, woke up at 0300 and slammed door  - Was more pleasant in the morning and chatting with other patients in the milieu   - Refusing vitals   Please see staff notes for details.      Subjective:     Patient Interview:  Didn't sleep well last night. Wants hydroxyzine, and Seroquel off his list, wants to discontinue Pristiq and restart Wellbutrin  Team let him know that the clonidine has been made PRN vs scheduled.    Endorses feeling poorly. Doesn't want to be continually asked about PRN. Wants us to \"stop saying we're sorry and treating him like he's [age] years old and developmentally delayed\". Doesn't want Haldol, or any antipsychotics. Going up on the methadone seems helpful. Upset about his  mentioning commitment. Says he feels \"worthless, useless, and waste on society\".  Reports he doesn't want CD treatment because he'll \"become even less engaged in treatment\". Reports he thinks his life will be over soon, and states he would prefer this so he could be less aware of what's going on right now.    ROS:  Patient has chills, muscle stiffness, nausea, tremors and insomnia   Patient denies  other symptoms     Objective:     Vitals:  BP (!) 155/111 (Patient Position: Sitting, Cuff Size: Adult Regular)   Pulse 64   Temp 97  F (36.1  C) (Temporal)   Resp 18   Wt 92.1 kg (203 lb)   SpO2 97%   BMI 28.33 kg/m  "     Allergies:  Allergies   Allergen Reactions    Iodinated Contrast Media Hives and Rash     Reports he is allergic to Iodine contrast     Reports he is allergic to Iodine contrast       Reports he is allergic to Iodine contrast    Reports he is allergic to Iodine contrast      Reports he is allergic to Iodine contrast   Reports he is allergic to Iodine contrast    Iodine Hives    Lisinopril Hives and Rash    Lithium Hives    Topiramate Hives    Escitalopram Rash     Rash around mouth    Iodine-131 Hives    Trazodone Anxiety     Trazodone causes RLS for pt    Olanzapine Rash     Pt reported taking approximately one year ago. Developed rash around mouth.       Current Medications:  Scheduled:  Current Facility-Administered Medications   Medication Dose Route Frequency Provider Last Rate Last Admin    acetaminophen (TYLENOL) tablet 650 mg  650 mg Oral Q4H PRN Patti Bernard MD   650 mg at 03/29/25 0748    alum & mag hydroxide-simethicone (MAALOX) suspension 30 mL  30 mL Oral Q4H PRN Patti Bernard MD        cloNIDine (CATAPRES) tablet 0.1 mg  0.1 mg Oral Once PRN Demetri Sarabia MD        cloNIDine (CATAPRES) tablet 0.1 mg  0.1 mg Oral TID Tracy Castillo MD   0.1 mg at 03/30/25 0821    cyclobenzaprine (FLEXERIL) tablet 10 mg  10 mg Oral TID PRN Patti Bernard MD   10 mg at 04/01/25 0333    desvenlafaxine succinate (PRISTIQ) 24 hr tablet 50 mg  50 mg Oral Daily Tracy Castillo MD   50 mg at 04/01/25 0750    dicyclomine (BENTYL) capsule 10 mg  10 mg Oral 4x Daily PRN Suad Coyle MD        folic acid (FOLVITE) tablet 1 mg  1 mg Oral Daily Patti Bernard MD   1 mg at 04/01/25 0750    gabapentin (NEURONTIN) tablet 1,200 mg  1,200 mg Oral TID Sunni Lorenz MD   1,200 mg at 04/01/25 0750    haloperidol (HALDOL) tablet 2 mg  2 mg Oral BID PRN Sunni Lorenz MD        Or    haloperidol lactate (HALDOL) injection 2 mg  2 mg Intramuscular BID PRN Gerda  MD Sunni        Hold Medications for ECT (select and list medications to be held)   Does not apply HOLD Karen Ribera MD        hydrocortisone (CORTAID) 1 % cream   Topical BID Patti Bernard MD   Given at 03/31/25 2003    hydrOXYzine HCl (ATARAX) tablet 50 mg  50 mg Oral 4x Daily PRN Evelio Chavarria DO   50 mg at 03/29/25 1013    loperamide (IMODIUM) capsule 2 mg  2 mg Oral 4x Daily PRN Suad Coyle MD   2 mg at 03/28/25 0858    melatonin tablet 3 mg  3 mg Oral At Bedtime PRN Patti Bernard MD        methadone (DOLOPHINE-INTENSOL) 10 MG/ML (HIGH CONC) solution 135 mg  135 mg Oral Daily Dilshad Bailey MD   135 mg at 04/01/25 0514    midazolam 5 mg/mL (VERSED) intranasal solution 10 mg  10 mg Intranasal Once PRN Tracy Castillo MD        And    mucosal atomization device #  device 1 Device  1 Device Inhalation DOES NOT GO TO Tracy Thomson MD        mirtazapine (REMERON) tablet 45 mg  45 mg Oral At Bedtime Demetri Sarabia MD   45 mg at 03/30/25 2017    multivitamin w/minerals (THERA-VIT-M) tablet 1 tablet  1 tablet Oral Daily Patti Bernard MD   1 tablet at 04/01/25 0750    naloxone (NARCAN) injection 0.2 mg  0.2 mg Intravenous Q2 Min PRN Charles Menjivar MD        Or    naloxone (NARCAN) injection 0.4 mg  0.4 mg Intravenous Q2 Min PRN Charles Menjivar MD        Or    naloxone (NARCAN) injection 0.2 mg  0.2 mg Intramuscular Q2 Min PRN Charles Menjivar MD        Or    naloxone (NARCAN) injection 0.4 mg  0.4 mg Intramuscular Q2 Min PRN Charles Menjivar MD        nicotine (NICORETTE) lozenge 4 mg  4 mg Buccal Q1H PRN Patti Bernard MD   4 mg at 04/01/25 0749    polyethylene glycol (MIRALAX) Packet 17 g  17 g Oral Daily PRN Patti Bernard MD        QUEtiapine (SEROquel) tablet  mg   mg Oral Daily PRN Demetri Sarabia MD   50 mg at 04/01/25 0333    thiamine (B-1) tablet  100 mg  100 mg Oral Daily Patti Bernard MD   100 mg at 04/01/25 0750       PRN:  Current Facility-Administered Medications   Medication Dose Route Frequency Provider Last Rate Last Admin    acetaminophen (TYLENOL) tablet 650 mg  650 mg Oral Q4H PRN Patti Bernard MD   650 mg at 03/29/25 0748    alum & mag hydroxide-simethicone (MAALOX) suspension 30 mL  30 mL Oral Q4H PRN Patti Bernard MD        cloNIDine (CATAPRES) tablet 0.1 mg  0.1 mg Oral Once PRN Demetri Sarabia MD        cloNIDine (CATAPRES) tablet 0.1 mg  0.1 mg Oral TID Tracy Castillo MD   0.1 mg at 03/30/25 0821    cyclobenzaprine (FLEXERIL) tablet 10 mg  10 mg Oral TID PRN Patti Bernard MD   10 mg at 04/01/25 0333    desvenlafaxine succinate (PRISTIQ) 24 hr tablet 50 mg  50 mg Oral Daily Tracy Castillo MD   50 mg at 04/01/25 0750    dicyclomine (BENTYL) capsule 10 mg  10 mg Oral 4x Daily PRN Suad Coyle MD        folic acid (FOLVITE) tablet 1 mg  1 mg Oral Daily Patti Bernard MD   1 mg at 04/01/25 0750    gabapentin (NEURONTIN) tablet 1,200 mg  1,200 mg Oral TID Sunni Lorenz MD   1,200 mg at 04/01/25 0750    haloperidol (HALDOL) tablet 2 mg  2 mg Oral BID PRN Sunni Lorenz MD        Or    haloperidol lactate (HALDOL) injection 2 mg  2 mg Intramuscular BID PRN Sunni Lorenz MD        Hold Medications for ECT (select and list medications to be held)   Does not apply HOLD Karen Ribera MD        hydrocortisone (CORTAID) 1 % cream   Topical BID Patti Bernard MD   Given at 03/31/25 2003    hydrOXYzine HCl (ATARAX) tablet 50 mg  50 mg Oral 4x Daily PRN Evelio Chavarria DO   50 mg at 03/29/25 1013    loperamide (IMODIUM) capsule 2 mg  2 mg Oral 4x Daily PRN Suad Coyle MD   2 mg at 03/28/25 0858    melatonin tablet 3 mg  3 mg Oral At Bedtime PRN Patti Bernard MD        methadone (DOLOPHINE-INTENSOL) 10 MG/ML (HIGH CONC) solution 135 mg  135  mg Oral Daily Dilshad Bailey MD   135 mg at 04/01/25 0514    midazolam 5 mg/mL (VERSED) intranasal solution 10 mg  10 mg Intranasal Once PRN Tracy Castillo MD        And    mucosal atomization device #  device 1 Device  1 Device Inhalation DOES NOT GO TO Tracy Thomson MD        mirtazapine (REMERON) tablet 45 mg  45 mg Oral At Bedtime Demetri Sarabia MD   45 mg at 03/30/25 2017    multivitamin w/minerals (THERA-VIT-M) tablet 1 tablet  1 tablet Oral Daily Patti Bernard MD   1 tablet at 04/01/25 0750    naloxone (NARCAN) injection 0.2 mg  0.2 mg Intravenous Q2 Min PRN Charles Menjivar MD        Or    naloxone (NARCAN) injection 0.4 mg  0.4 mg Intravenous Q2 Min PRN Charles Menjivar MD        Or    naloxone (NARCAN) injection 0.2 mg  0.2 mg Intramuscular Q2 Min PRN Charles Menjivar MD        Or    naloxone (NARCAN) injection 0.4 mg  0.4 mg Intramuscular Q2 Min PRN Charles Menjivar MD        nicotine (NICORETTE) lozenge 4 mg  4 mg Buccal Q1H PRN Patti Bernard MD   4 mg at 04/01/25 0749    polyethylene glycol (MIRALAX) Packet 17 g  17 g Oral Daily PRN Patti Bernard MD        QUEtiapine (SEROquel) tablet  mg   mg Oral Daily PRN Demetri Sarabia MD   50 mg at 04/01/25 0333    thiamine (B-1) tablet 100 mg  100 mg Oral Daily Patti Bernard MD   100 mg at 04/01/25 0750       Labs and Imaging:  New results:   No results found for this or any previous visit (from the past 24 hours).    Data this admission:  - CBC with chronic thrombocytopenia (platelet count 78)   - CMP with mildly elevated AST (47) and ALT (84)   - TSH normal  - UDS positive for amphetamines, cannabinoids, fentanyl  - Vit D in process  - Lipids not ordered this admission (but within normal limits on 2/26/2025)  - Vit B12 normal  - Folate normal  - EKG sinus bradycardia, QTc 396     Mental Status Exam:     Oriented to:  Grossly Oriented,  "Person/Self, and Situation  General:  Awake and Alert  Appearance:  Appears older than stated age Edentulous. Slightly disheveled, dressed in personal clothing.   Behavior/Attitude:  Irritable, sitting on bed facing the wall. Short with answers and verbally defensive.   Eye Contact:  Limited eye contact, facing the wall.   Psychomotor: Less jittery than yesterday, shuffling his feet.  no catatonia present  Speech:  appropriate volume/tone, spontaneous, and with good articulation  Language: Fluent in English with appropriate syntax and vocabulary.  Mood:  \"doing poorly\"  Affect:   restricted affect, irritable  Thought Process:   linear, coherent.   Thought Content:   suicidal ideation with plan (without intent, has mentioned different methods at different times); No apparent delusions  Associations:  intact  Insight:  poor due to his inability to recognize rational thoughts from irrational thought, like the scenarios of killing himself, hopelessness for his life, and staff/team are not trying to help him.  Judgment:  poor today but was previously engaged in groups/treatment here. He does express the desire to die and \"not wake up\".    Impulse control: poor due to irritability and acting out on frustration (punching the wall, verbally abusive to staff), able to recognize impulses and was previously not acting on them but has been acting out more recently.   Attention Span:  adequate for conversation  Concentration:  grossly intact to conversation   Recent and Remote Memory:  not formally assessed but intact to conversation   Fund of Knowledge: average, estimated  Muscle Strength and Tone: normal  Gait and Station: Normal     Psychiatric Assessment     Zenon Truong is a 54 year old male with previous psychiatric diagnoses of polysubstance use disorder [benzodiazepines, opiates, methamphetamine, THC, nicotine], severe depression with suicidal ideation, borderline personality disorder and PTSD admitted 03/18/2025 " due to concern for SI with plan and intent in the context of medication non adherence, substance use and housing/financial insecurity. On admission, he presented with symptoms of SI with intent and plan, hopelessness, poor motivation, low energy, anhedonia, impulsivity and irritability.      Had a recent hospitalization at [facility] 2/23-3/6/2025 for suicidal ideation and benzodiazepine overdose. ECT was considered and planned during that admission, but he decided against pursuing it at that time. Did not continue taking his meds after leaving.     There is genetic predisposition given family hx of substance use disorder. Patient has struggled with homelessness and severe substance use for years. This current admission is likely precipitated by recent psychiatric hospitalization at end of February followed by relapse soon after discharge to a sober house. Perpetuating factors include ongoing substance use, medication non adherence, longstanding functional impairment, personality traits, lack of perceived support, deconditioning and housing and financial insecurity. Patient's support system includes  . Based on patient's history and current symptoms, criteria are met for primary diagnosis of MDD without psychotic features, opioid use disorder, benzodiazepine use disorder, stimulant use disorder and cannabis use disorder. Patient warrants inpatient hospitalization to maintain their safety and would benefit from medication optimization and outpatient referral/resources.       ECT had been discussed as one of Zenon's goals for this admission with the plan to start on 3/31. He declined on the intended start date as he did not think this was the right treatment for him. From chart review, it appears like this has occurred on previous admissions, with the plan to initiate ECT and Zenon declining prior to starting. He has been increasingly frustrated and irritable throughout his admission. It appears as though  his depression has improved over the admission but he is still reporting significant anxiety and physical symptoms. He does have baseline chronic SI and still continues to express a desire to not wake up. It appears like his current presentation is more likely borderline personality with mood swings, irritability, impulsive behaviors such as slamming doors and difficulty with interpersonal relationships with staff . There is likely a component of withdrawal/cravings at play which may be contributing to his mood and irritability but borderline personality traits are more apparent now that he is physically stabilizing. Given that his physical symptoms are still causing him significant distress and he does continue to express a desire to get better and not relapse, we will continue to work with the addiction medicine team to optimize his medication regimen. We will also discuss outpatient DBT as an option for his BPD.     He does not feel like the pristiq is helping his depression or anxiety and would like to discontinue this and restart his Wellbutrin. Given that his visual disturbances (which were previously a prodrome for withdrawal seizure) have resolved and he has not displayed signs of seizures on this admission, concern for seizure is lower at this point. Although it may increase his anxiety, he remains depressed with SI and has been upset about his medications/has intermittently threatened to leave. Therefore, we discussed that the benefits of starting wellbutrin would outweigh the risks at this point. He was on 300mg ER PTA; will restart at 150mg XL.      Today's changes:  - Discontinue seroquel and hydroxyzine prn  - Discontinue pristiq 50mg daily  - Start wellbutrin XL 150mg (PTA 300mg ER) - because this is a CYP 2D6 inhibitor, discussed with addition team since methadone levels could be increased- recommend holding methadone at the 135 mg dose for about one week and monitor for sedation/respiratory  depression  - Increased methadone to 135mg daily per addiction medication recs  - made clonidine PRN as patient not wanting to take it and becoming irritable when asked (was scheduled)     Psychiatric Plan by Diagnosis      #Major depressive disorder, recurrent, severe without psychosis  #Borderline personality disorder.  Patient was continued on Mirtazapine and Desvenlafaxine. Prior to admission while on the hospitalist service, given severity of depression symptoms, ECT consult was obtained. He did express some reservation due to short term memory loss from previous sessions, but remains hopeful it could be helpful. Per chart review, he was previously on 50mg desvenlafaxine succinate which was increased to 100mg on 11/21/2024. He was discharged from [facility] from his voluntary psychiatric admission from 2/18- 3/6 with 50 mg desvenlafaxine succinate. However, he stopped taking his psychiatric medications after discharge.  On his admission here on 3/14, he was restarted on 25mg desvenlafaxine succinate as he had not been on it since prior discharge. Given this information, increased his pristiq on 3/25 to 50mg. As of 3/31, strong suspicion of BPD being part of his current situation, as he is demonstrating more behaviors such as pounding on his wall, slamming doors, he has been more irritable with team, has been blaming the team more, has more superficial mood swings and overall difficulty with interpersonal relationships with staff on the unit.  - Declined ECT on 3/31  - Continue Mirtazapine 45 mg po at bedtime  - Discontinue Desvenlafaxine to 50mg po daily  - Restart wellbutrin 150mg XL (PTA was taking 300 mg)     #Opioid use disorder, severe  Due to his accounts of taking large amounts of fentanyl prior to admission,we discussed with addiction medicine team given duration and severity of his withdrawal. There is a high likelihood that the fentanyl he was taking was cut with other substances, including xylazine.  Xylazine is an alpha 2 agonist used as a veterinary anesthetic but also commonly found in illicit opioids. We are working with addiction team to manage this.  - methadone 135mg daily (of note, 4/1 wellbutrin started, this can increase serum levels of the methadone)  - Addiction team following  - Thiamine, MVI, folic acid   For symptomatic management:  - 10 mg Flexeril TID PRN Q8H for muscle cramps, spasms, or tightness  - loperamide, bentyl     #Benzodiazepine use disorder, severe  Phenobarbital taper was discontinued on 3/26 per recommendation from addiction medicine team recommendation that his symptoms were likely secondary to opiate withdrawal vs benzo withdrawal.   - Addiction team following  - scheduled clonidine 0.1mg TID changed to PRN (HR parameters to hold if <55) per patient request       #Stimulant (methamphetamine) use disorder  #Cannabis use disorder     Additional Plans:  - Patient will be treated in therapeutic milieu with appropriate individual and group therapies as described.     Psychiatric Hospital Course:      Zenon Truong was admitted to [facility] as a voluntary patient. Had a recent hospitalization   Medications and other updates:  3/14: hold bupropion due to risk of lower seizure threshold  3/14: Continue PTA desvenlafaxine 25mg and mirtazapine 30mg for sleep  3/18: start clonidine 0.1mg TID   3/20: Increase mirtazapine to 45 mg daily for continued insomnia   3/21: restart PTA gabapentin 600mg TID for anxiety   3/21: continuing phenobarb taper (today is 32.4 mg BID)  3/23: no scheduled phenobarb received 32.4mg due to ongoing withdrawal symptoms  3/24: give one time phenobarbital dose of 32.4mg due to significant discomfort that seem at least in part due to withdrawal  3/24: consult addiction medicine for assistance with methadone dose and ongoing withdrawal symptoms   3/24: discussed re-initiating wellbutrin. However, patient reporting symptoms that he feels were similar to auras he  has had prior to seizures (this has been worse the last couple days), so hesitant to do this at this time.  3/25: one time phenobarbital dose of 32.4mg given significant withdrawal symptoms  3/25: increase desvenlafaxine to 50mg daily (per chart review, was prescribed this dose outpatient)  3/26: increase methadone to 115 mg daily per addiction medicine recs   3/26: discontinue phenobarb taper   3/26: medicine consult for ECT clearance; Defer to psychiatry for decision of proceeding with ECT with recent benzodiazepine withdrawal/use. Otherwise, no absolute medical contraindications to proceeding with ECT at this time. Treatment plan per psychiatry.   3/27: increase gabapentin to 900 TID per addiction med team recs   3.27: increase methadone to 125mg daily per addiction med recs given ongoing withdrawal symptoms   3/27: pending ECT team recs regarding methadone on ECT days  3/31: declined ECT on intended start day  4/1: increase methadone to 135mg daily per addiction med recs  4/1: changed scheduled clonidine 0.1mg TID to PRN per patient request  4/1: discontinue prn quetiapine and hydroxyzine per patient request  4/1: discontinue desvenlafaxine 50mg per patient request  4/1: restart PTA wellbutrin, was previously on 300mg, restart at 150mg XL     The risks, benefits, alternatives, and side effects were discussed and understood by the patient.     Medical Assessment and Plan     #Palpitations  3/21 patient reporting short episodes (few seconds) of palpitations occurring intermittently over the last month. Does have shortness of breath with this, no chest pain or other symptoms. He is not sure if it is associated with anxiety.   - repeat EKG ordered showed sinus bradycardia with no significant change to prior EKG      #Chronic hepatitis C, never treated  #Mild transaminase elevations, hepatic steatosis:  Never treated for hepatitis C.  Chronic, mild transaminase elevations with normal hepatic function.  Most recent  abdominal ultrasound 2/15/2024 showed hepatic steatosis without hepatic lesions, normal gallbladder.  Denies current alcohol use.  - monitor outpatient      #Chronic thrombocytopenia  #Normocytic anemia, now resolved  Chronic thrombocytopenia, moderate and stable.  Denies any history of bleeding including no history of gastric ulcers, hematemesis, melena or hematochezia.  Anemia appears to be more recent since 2/23/2025 when hemoglobin was 11.6, MCV 86.  Hgb 14.1, Plts 97, MCV 86, ferritin, iron, TIBC wnl on 3/15.      #Concern for malnutrition  Seen by dietician team, patient does not meet two of the established criteria necessary for diagnosing malnutrition but is at risk for malnutrition.   - ensure enlive ordered        Medical course: Patient was physically examined by the ED prior to being transferred to the unit and was found to be medically stable and appropriate for admission.      Consults:  Medicine (for clearance for ECT)  Addiction med (for methadone dosage and withdrawal symptoms)      Checklist     Legal Status: Voluntary     Safety Assessment:   Behavioral Orders   Procedures    Code 1 - Restrict to Unit    Code 2 - 1:1 Staff Supervision     For ECT only    Electroconvulsive therapy     Series of up to 12 treatments. Begin Date: 3/31/25     Treating Psychiatrist providing ECT:  Nico     Notified on:  3/28    Fall precautions    Routine Programming     As clinically indicated    Seizure precautions    Status 15     Every 15 minutes.    Suicide precautions: Suicide Risk: LOW; Clinical rationale to override score: lack of access to a plan for self-harm     .     Order Specific Question:   Suicide Risk     Answer:   LOW     Order Specific Question:   Clinical rationale to override score:     Answer:   lack of access to a plan for self-harm    Withdrawal precautions       Risk Assessment:  Risk for harm is low.  Risk factors: SI, maladaptive coping, substance use, impulsive, and past  behaviors  Protective factors: engaged in treatment     SIO: no    Disposition: Pending stabilization, possible ECT, medication optimization, & development of a safe discharge plan. Will offer DBT as option.        Attestations     Mikayla Latham, MS3  [facility] Medical Student     I was present with the medical student who participated in the service and in the documentation of the note.  I have verified the history and personally performed the physical exam and medical decision making. I agree with the assessment and plan of care as documented in the note.    This patient was seen and discussed with my attending physician.  Tracy Castillo MD   Psychiatry Resident Physician      This patient has been seen and evaluated by me, Charles Fink.  I have discussed this patient with the psychiatry resident and I agree with the findings and plan in this note.    I have reviewed today's vital signs, medications, labs and imaging.     Charles Menjivar MD

## 2025-04-01 NOTE — PLAN OF CARE
BEH IP Unit Acuity Rating Score (UARS)  Patient is given one point for every criteria they meet.    CRITERIA SCORING   On a 72 hour hold, court hold, committed, stay of commitment, or revocation. 0    Patient LOS on BEH unit exceeds 20 days. 0  LOS: 14   Patient under guardianship, 55+, otherwise medically complex, or under age 11. 0   Suicide ideation without relief of precipitating factors. 1   Current plan for suicide. 1   Current plan for homicide. 0   Imminent risk or actual attempt to seriously harm another without relief of factors precipitating the attempt. 0   Severe dysfunction in daily living (ex: complete neglect for self care, extreme disruption in vegetative function, extreme deterioration in social interactions). 1   Recent (last 7 days) or current physical aggression in the ED or on unit. 0   Restraints or seclusion episode in past 72 hours. 0   Recent (last 7 days) or current verbal aggression, agitation, yelling, etc., while in the ED or unit. 0   Active psychosis. 0   Need for constant or near constant redirection (from leaving, from others, etc).  0   Intrusive or disruptive behaviors. 0   Patient requires 3 or more hours of individualized nursing care per 8-hour shift (i.e. for ADLs, meds, therapeutic interventions). 0   TOTAL 3

## 2025-04-01 NOTE — PLAN OF CARE
"Brief Psychotherapy Note    Therapist checked in with Pt to offer to only complete safety plan, as Pt had declined meetings in the past.    Pt response: Pt not interested currently in meeting 1:1, said \"another time\" and kept walking quickly. A few minutes later he apologized stated he is really anxious and has to keep pacing.    Plan: Writer will remain available to Pt and continue to check in for 1:1 sessions.        Later Pt saw writer in the [place] and began to discuss why he is upset. Writer walked with Pt for a few minutes for verbal processing. Feels his  was not honest with him, reports this makes him so angry that he wants to punch a wall so hard that he will have to go to the ED. Pt also expressed frustration with doctors for not giving him medication. Writer attempted to validated, offer words of encouragement, talk about coping. Pt was fairly resistant/opposed to everything writer said. Pt did say he has an ice pack in his room that would help if his anxiety gets even worse.        "

## 2025-04-01 NOTE — PLAN OF CARE
"Problem: Adult Behavioral Health Plan of Care  Goal: Plan of Care Review  Outcome: Progressing  Flowsheets (Taken 4/1/2025 1102)  Plan of Care Reviewed With: patient  Overall Patient Progress: improving  Patient Agreement with Plan of Care: (partial acceptance of tx plan) agrees with comment (describe)  Goal: Optimized Coping Skills in Response to Life Stressors  Outcome: Progressing  Flowsheets (Taken 4/1/2025 1102)  Optimized Coping Skills in Response to Life Stressors: making progress toward outcome     Problem: Depressive Signs/Symptoms  Goal: Increased Participation and Engagement (Depressive Signs/Symptoms)  Outcome: Progressing  Flowsheets (Taken 4/1/2025 1102)  Mutually Determined Action Steps (Increased Participation and Engagement):   participates in one or more activity   voluntarily attends group therapy   initiates interaction with others   Goal Outcome Evaluation:      Plan of Care Reviewed With: patient    Overall Patient Progress: improvingOverall Patient Progress: improving     Zenon irritable at beginning of shift-angrily blurted out, \"My life is a shit hole! I lost everything I had! I don't have anything left! I don't have any family! I don't have any friends! I don't have a job! I don't have a home! I don't care anymore it they commit me! Why should I care I don't have anything anyway!\"-declined VS stating he's not going to allow VS because he's not going to take clonidine-late AM again declined VS- intense pacing while MDs rounding on unit- did regain calm- mood improved as morning progressed, in fact, appeared to be enjoying casual conversation while watching television with peer-Zenon attended morning OT grps, although left at times for quick pace in the [place]-1102 COWS score=4 without pulse as Zenon will not allow VS-Zenon requested Flexeril 10 mg PO at 1352 for c/o tight lower back muscles-at 1455 sleeping with no further complaint       "

## 2025-04-01 NOTE — PLAN OF CARE
Team Note Due:  Wednesday    Assessment/Intervention/Current Symtoms and Care Coordination:  Chart review and met with team, discussed pt progress, symptomology, and response to treatment.  Discussed the discharge plan and any potential impediments to discharge.    -Received an email from Zenon's  stating he told her not to visit on Thursday because he is upset that she is recommending for him to be committed. Emailed her back to report how Zenon is doing on the unit and discharge plans. Also told her that as of right now, Zenon is voluntary and the team is not planning to pursue commitment.        Discharge Plan or Goal:  TBD     Barriers to Discharge:  Medication Management  ECT     Referral Status:  None currently      Legal Status:  Voluntary       Contacts (include TIERNEY status):  Methadone Management: [facility] daily     /ACT Team: TIERNEY signed  Name/Clinic: [facility]    Number: [phone]      Upcoming Meetings and Dates/Important Information and next steps:  Visit with  4/3 9am

## 2025-04-01 NOTE — PLAN OF CARE
"  Problem: Adult Behavioral Health Plan of Care  Goal: Adheres to Safety Considerations for Self and Others  Outcome: Not Progressing  Flowsheets (Taken 3/31/2025 2043)  Adheres to Safety Considerations for Self and Others: unable to achieve outcome  Intervention: Develop and Maintain Individualized Safety Plan  Recent Flowsheet Documentation  Taken 3/31/2025 1843 by Hannah Ballesteros RN  Safety Measures:   environmental rounds completed   self-directed behavior promoted   Goal Outcome Evaluation:    Plan of Care Reviewed With: patient          Pt presented with significant agitation, tension, restlessness, and labile. His affect was also labile. Pt was frequently in and out his room, slamming his door loudly on multiple occasions. He exhibited irritability and frustration when his requests were not immediately addressed. For instance, when he asked for nicotine gum and was  instructed to wait while nurse attended to another pt, he became agitated and slammed his door shut. Despite attempts by staff to redirect him regarding unit policies, he responded with profanity, telling staff to \"fuck off.\" Safety checks were conducted discreetly through the blinds, as it was observed that pt becomes increasingly agitated when the door or blinds are open. He requested PRN hydroxyzine and flexeril for anxiety, which he rated 8/10, and for muscle spasms. However, his behavior remained unchanged. At , he declined his prescribed Remeron and clonidine medications, declining to provide a reason for doing so. He did, however, take his gabapentin as prescribed. Pt ate and drank well. Pt refused to participate in a mental health assessment. COWS score was 7.            "

## 2025-04-01 NOTE — PLAN OF CARE
"  Problem: Sleep Disturbance  Goal: Adequate Sleep/Rest  Outcome: Progressing  Intervention: Promote Sleep/Rest  Recent Flowsheet Documentation  Taken 4/1/2025 0006 by Hannah Ballesteros RN  Sleep/Rest Enhancement: noise level reduced   Goal Outcome Evaluation:    Plan of Care Reviewed With: patient          At 0245, pt was awake and slammed his door loudly. When approached to see if he needed assistance, he declined to respond, and did not make any requests. Staff redirected him, explaining that such disruptive behavior is unacceptable as it disturbs the sleep of other pts. Pt retired to bed but soon became wakeful at 0300, and emerged to sit in the lounge. He stated that he was waiting for his 0500 medication. While another nurse prepared and clarified his medication dose, he requested for the empty syringe after administration. He then returned the syringe, and expressed concerns about an incorrect dose and made a derogatory comment to the nurse, stating, \"I don't trust you as far as I can throw you.\" Nurse explained that it was a standard protocol and necessary for the nurse to verify the medication dose before administration to ensure pt safety. He subsequently walked back to his room and went to bed. He declined COW's assessment. Pt slept approx. 3 hours.        "

## 2025-04-01 NOTE — PROGRESS NOTES
Rehab Group    Start time: 1015  End time: 1130  Patient time total: 75 minutes    attended partial group    #6 attended   Group Type: occupational therapy and OT Clinic   Group Topic Covered: activity therapy, coping skills, and problem solving       Group Session Detail:  OT Clinic     Patient Response/Contribution:  Cooperative with task, organized,  supportive of peers, socially appropriate, safe use of materials/supplies and actively engaged.   Patient Detail:    Pt actively participated in occupational therapy clinic to facilitate coping skill exploration, creative expression within personally meaningful activities, and clinical observation of social, cognitive, and kinesthetic performance skills. Pt response: Independent to initiate, gather materials, sequence, and adjust to workspace demands as needed. Demonstrated good focus, planning, and problem solving for selected scratch art task. Able to ask for assistance as needed, and appropriately social with peers and staff.  Pt will continue to be encouraged to attend groups for further asssesssment and to address goals identified on plan of care.       [id number] OT Group (2 or more in attendance)  Patient Active Problem List   Diagnosis    Opioid use disorder, severe, on maintenance therapy (H)    Suicidal ideation    Severe benzodiazepine use disorder (H)    Substance-induced anxiety disorder (H)    Benzodiazepine withdrawal without complication (H)    Cannabis use disorder, moderate, dependence (H)    Cigarette nicotine dependence with withdrawal    Moderate episode of recurrent major depressive disorder (H)    Homelessness    Opioid withdrawal (H)    MDD (major depressive disorder), recurrent severe, without psychosis (H)    ALISSON (generalized anxiety disorder)

## 2025-04-02 PROCEDURE — 250N000013 HC RX MED GY IP 250 OP 250 PS 637

## 2025-04-02 PROCEDURE — 97150 GROUP THERAPEUTIC PROCEDURES: CPT | Mod: GO

## 2025-04-02 PROCEDURE — 124N000002 HC R&B MH UMMC

## 2025-04-02 PROCEDURE — 250N000013 HC RX MED GY IP 250 OP 250 PS 637: Performed by: STUDENT IN AN ORGANIZED HEALTH CARE EDUCATION/TRAINING PROGRAM

## 2025-04-02 RX ORDER — CLONIDINE HYDROCHLORIDE 0.1 MG/1
0.1 TABLET ORAL ONCE
Status: COMPLETED | OUTPATIENT
Start: 2025-04-02 | End: 2025-04-02

## 2025-04-02 RX ADMIN — NICOTINE POLACRILEX 4 MG: 4 LOZENGE ORAL at 19:02

## 2025-04-02 RX ADMIN — Medication 1 TABLET: at 07:34

## 2025-04-02 RX ADMIN — GABAPENTIN 1200 MG: 600 TABLET, FILM COATED ORAL at 07:34

## 2025-04-02 RX ADMIN — GABAPENTIN 1200 MG: 600 TABLET, FILM COATED ORAL at 19:02

## 2025-04-02 RX ADMIN — NICOTINE POLACRILEX 4 MG: 4 LOZENGE ORAL at 11:25

## 2025-04-02 RX ADMIN — BUPROPION HYDROCHLORIDE 150 MG: 150 TABLET, EXTENDED RELEASE ORAL at 07:34

## 2025-04-02 RX ADMIN — CYCLOBENZAPRINE 10 MG: 10 TABLET, FILM COATED ORAL at 05:03

## 2025-04-02 RX ADMIN — NICOTINE POLACRILEX 4 MG: 4 LOZENGE ORAL at 14:13

## 2025-04-02 RX ADMIN — CYCLOBENZAPRINE 10 MG: 10 TABLET, FILM COATED ORAL at 18:08

## 2025-04-02 RX ADMIN — NICOTINE POLACRILEX 4 MG: 4 LOZENGE ORAL at 09:13

## 2025-04-02 RX ADMIN — NICOTINE POLACRILEX 4 MG: 4 LOZENGE ORAL at 06:30

## 2025-04-02 RX ADMIN — NICOTINE POLACRILEX 4 MG: 4 LOZENGE ORAL at 21:24

## 2025-04-02 RX ADMIN — ACETAMINOPHEN 650 MG: 325 TABLET, FILM COATED ORAL at 17:19

## 2025-04-02 RX ADMIN — CYCLOBENZAPRINE 10 MG: 10 TABLET, FILM COATED ORAL at 10:13

## 2025-04-02 RX ADMIN — METHADONE HYDROCHLORIDE 135 MG: 10 CONCENTRATE ORAL at 05:03

## 2025-04-02 RX ADMIN — FOLIC ACID 1 MG: 1 TABLET ORAL at 07:34

## 2025-04-02 RX ADMIN — NICOTINE POLACRILEX 4 MG: 4 LOZENGE ORAL at 15:42

## 2025-04-02 RX ADMIN — THIAMINE HCL TAB 100 MG 100 MG: 100 TAB at 07:34

## 2025-04-02 RX ADMIN — NICOTINE POLACRILEX 4 MG: 4 LOZENGE ORAL at 12:50

## 2025-04-02 RX ADMIN — GABAPENTIN 1200 MG: 600 TABLET, FILM COATED ORAL at 13:22

## 2025-04-02 RX ADMIN — NICOTINE POLACRILEX 4 MG: 4 LOZENGE ORAL at 16:52

## 2025-04-02 RX ADMIN — CLONIDINE HYDROCHLORIDE 0.1 MG: 0.1 TABLET ORAL at 16:42

## 2025-04-02 RX ADMIN — NICOTINE POLACRILEX 4 MG: 4 LOZENGE ORAL at 10:13

## 2025-04-02 RX ADMIN — NICOTINE POLACRILEX 4 MG: 4 LOZENGE ORAL at 17:47

## 2025-04-02 RX ADMIN — NICOTINE POLACRILEX 4 MG: 4 LOZENGE ORAL at 07:32

## 2025-04-02 RX ADMIN — CLONIDINE HYDROCHLORIDE 0.1 MG: 0.1 TABLET ORAL at 08:41

## 2025-04-02 ASSESSMENT — ACTIVITIES OF DAILY LIVING (ADL)
ADLS_ACUITY_SCORE: 26
LAUNDRY: WITH SUPERVISION
ADLS_ACUITY_SCORE: 26
DRESS: INDEPENDENT
ADLS_ACUITY_SCORE: 26
HYGIENE/GROOMING: INDEPENDENT
ADLS_ACUITY_SCORE: 26
LAUNDRY: WITH SUPERVISION
ORAL_HYGIENE: INDEPENDENT
ADLS_ACUITY_SCORE: 26
ORAL_HYGIENE: INDEPENDENT
DRESS: INDEPENDENT
HYGIENE/GROOMING: INDEPENDENT

## 2025-04-02 NOTE — PLAN OF CARE
BEH IP Unit Acuity Rating Score (UARS)  Patient is given one point for every criteria they meet.    CRITERIA SCORING   On a 72 hour hold, court hold, committed, stay of commitment, or revocation. 0    Patient LOS on BEH unit exceeds 20 days. 0  LOS: 15   Patient under guardianship, 55+, otherwise medically complex, or under age 11. 0   Suicide ideation without relief of precipitating factors. 1   Current plan for suicide. 1   Current plan for homicide. 0   Imminent risk or actual attempt to seriously harm another without relief of factors precipitating the attempt. 0   Severe dysfunction in daily living (ex: complete neglect for self care, extreme disruption in vegetative function, extreme deterioration in social interactions). 1   Recent (last 7 days) or current physical aggression in the ED or on unit. 0   Restraints or seclusion episode in past 72 hours. 0   Recent (last 7 days) or current verbal aggression, agitation, yelling, etc., while in the ED or unit. 0   Active psychosis. 0   Need for constant or near constant redirection (from leaving, from others, etc).  0   Intrusive or disruptive behaviors. 0   Patient requires 3 or more hours of individualized nursing care per 8-hour shift (i.e. for ADLs, meds, therapeutic interventions). 0   TOTAL 3

## 2025-04-02 NOTE — PROGRESS NOTES
"  ----------------------------------------------------------------------------------------------------------  [facility]  Psychiatry Progress Note  Hospital Day #15     Interim History:     The patient's care was discussed with the treatment team and chart notes were reviewed.    Vitals: VSS. /107, 157/116  Sleep: 4.75 hours (04/02/25 0630)  Scheduled medications: Took all scheduled medications as prescribed  Psychiatric PRN medications: Flexeril 10mg x3, clonidine 0.1 mg     Staff Report:   No acute events or safety concerns overnight.  - BP was 170/140 last night but refused vital signs  - this morning, was 150/110's, he initially declined clonidine but took it this morning   - repeat BP after clonidine was 157/116; offered additional clonidine   Please see staff notes for details.      Subjective:     Patient Interview:  Zenon RODRIGUEZ Dionne today decline interview. Team approach him twice, first in the hallway and then at his room, he became irritable and raised his voice to the team saying \"get out, I dont want to talk to you\". Previously nurse staff mentioned that he slammed the door before the psych team was able to interview him. Later in the day, short interview with Attending psychiatrist present. Patient denies SI, his mood is ok. Denies physical symptoms.    ROS:  Patient has  multiple high BP measures  Patient denies acute concerns     Objective:     Vitals:  BP (!) 157/116 (BP Location: Right arm, Patient Position: Sitting, Cuff Size: Adult Regular)   Pulse 74   Temp 98.2  F (36.8  C) (Oral)   Resp 16   Wt 92.1 kg (203 lb)   SpO2 98%   BMI 28.33 kg/m      Allergies:  Allergies   Allergen Reactions    Iodinated Contrast Media Hives and Rash     Reports he is allergic to Iodine contrast     Reports he is allergic to Iodine contrast       Reports he is allergic to Iodine contrast    Reports he is allergic to Iodine contrast      Reports he is allergic " to Iodine contrast   Reports he is allergic to Iodine contrast    Iodine Hives    Lisinopril Hives and Rash    Lithium Hives    Topiramate Hives    Escitalopram Rash     Rash around mouth    Iodine-131 Hives    Trazodone Anxiety     Trazodone causes RLS for pt    Olanzapine Rash     Pt reported taking approximately one year ago. Developed rash around mouth.       Current Medications:  Scheduled:  Current Facility-Administered Medications   Medication Dose Route Frequency Provider Last Rate Last Admin    acetaminophen (TYLENOL) tablet 650 mg  650 mg Oral Q4H PRN Patti Bernard MD   650 mg at 03/29/25 0748    alum & mag hydroxide-simethicone (MAALOX) suspension 30 mL  30 mL Oral Q4H PRN Patti Bernard MD        buPROPion (WELLBUTRIN XL) 24 hr tablet 150 mg  150 mg Oral Daily Tracy Castillo MD   150 mg at 04/02/25 0734    cloNIDine (CATAPRES) tablet 0.1 mg  0.1 mg Oral TID PRN Tracy Castillo MD   0.1 mg at 04/02/25 0841    cyclobenzaprine (FLEXERIL) tablet 10 mg  10 mg Oral TID PRN Patti Bernard MD   10 mg at 04/02/25 1013    dicyclomine (BENTYL) capsule 10 mg  10 mg Oral 4x Daily PRN Suad Coyle MD        folic acid (FOLVITE) tablet 1 mg  1 mg Oral Daily Patti Bernard MD   1 mg at 04/02/25 0734    gabapentin (NEURONTIN) tablet 1,200 mg  1,200 mg Oral TID Sunni Lorenz MD   1,200 mg at 04/02/25 1322    haloperidol (HALDOL) tablet 2 mg  2 mg Oral BID PRN Sunni Lorenz MD        Or    haloperidol lactate (HALDOL) injection 2 mg  2 mg Intramuscular BID PRN Sunni Lorenz MD        Hold Medications for ECT (select and list medications to be held)   Does not apply HOLD Karen Ribera MD        loperamide (IMODIUM) capsule 2 mg  2 mg Oral 4x Daily PRN Suad Coyle MD   2 mg at 03/28/25 0858    melatonin tablet 3 mg  3 mg Oral At Bedtime PRN Patti Bernard MD        methadone (DOLOPHINE-INTENSOL) 10 MG/ML (HIGH CONC) solution  135 mg  135 mg Oral Daily Dilshad Bailey MD   135 mg at 04/02/25 0503    midazolam 5 mg/mL (VERSED) intranasal solution 10 mg  10 mg Intranasal Once PRN Tracy Castillo MD        And    mucosal atomization device #  device 1 Device  1 Device Inhalation DOES NOT GO TO MAR Tracy Castillo MD        mirtazapine (REMERON) tablet 45 mg  45 mg Oral At Bedtime Demetri Sarabia MD   45 mg at 04/01/25 2053    multivitamin w/minerals (THERA-VIT-M) tablet 1 tablet  1 tablet Oral Daily Patti Bernard MD   1 tablet at 04/02/25 0734    naloxone (NARCAN) injection 0.2 mg  0.2 mg Intravenous Q2 Min PRN Charles Menjivar MD        Or    naloxone (NARCAN) injection 0.4 mg  0.4 mg Intravenous Q2 Min PRN Charles Menjivar MD        Or    naloxone (NARCAN) injection 0.2 mg  0.2 mg Intramuscular Q2 Min PRN Charles Menjivar MD        Or    naloxone (NARCAN) injection 0.4 mg  0.4 mg Intramuscular Q2 Min PRN Charles Menjivar MD        nicotine (NICORETTE) lozenge 4 mg  4 mg Buccal Q1H PRN Patti Bernard MD   4 mg at 04/02/25 1250    polyethylene glycol (MIRALAX) Packet 17 g  17 g Oral Daily PRN Patti Bernard MD        thiamine (B-1) tablet 100 mg  100 mg Oral Daily Patti Bernard MD   100 mg at 04/02/25 0734       PRN:  Current Facility-Administered Medications   Medication Dose Route Frequency Provider Last Rate Last Admin    acetaminophen (TYLENOL) tablet 650 mg  650 mg Oral Q4H PRN Patti Bernard MD   650 mg at 03/29/25 0748    alum & mag hydroxide-simethicone (MAALOX) suspension 30 mL  30 mL Oral Q4H PRN Patti Bernard MD        buPROPion (WELLBUTRIN XL) 24 hr tablet 150 mg  150 mg Oral Daily Tracy Castillo MD   150 mg at 04/02/25 0734    cloNIDine (CATAPRES) tablet 0.1 mg  0.1 mg Oral TID PRN Tracy Castillo MD   0.1 mg at 04/02/25 0841    cyclobenzaprine (FLEXERIL) tablet 10 mg  10 mg Oral TID PRN  Patti Bernard MD   10 mg at 04/02/25 1013    dicyclomine (BENTYL) capsule 10 mg  10 mg Oral 4x Daily PRN Suad Coyle MD        folic acid (FOLVITE) tablet 1 mg  1 mg Oral Daily Patti Bernard MD   1 mg at 04/02/25 0734    gabapentin (NEURONTIN) tablet 1,200 mg  1,200 mg Oral TID Sunni Lorenz MD   1,200 mg at 04/02/25 1322    haloperidol (HALDOL) tablet 2 mg  2 mg Oral BID PRN Sunni Lorenz MD        Or    haloperidol lactate (HALDOL) injection 2 mg  2 mg Intramuscular BID PRN Sunni Lorenz MD        Hold Medications for ECT (select and list medications to be held)   Does not apply HOLD Karen Ribera MD        loperamide (IMODIUM) capsule 2 mg  2 mg Oral 4x Daily PRN Suad Coyle MD   2 mg at 03/28/25 0858    melatonin tablet 3 mg  3 mg Oral At Bedtime PRN Patti Bernard MD        methadone (DOLOPHINE-INTENSOL) 10 MG/ML (HIGH CONC) solution 135 mg  135 mg Oral Daily Dilshad Bailey MD   135 mg at 04/02/25 0503    midazolam 5 mg/mL (VERSED) intranasal solution 10 mg  10 mg Intranasal Once PRN Tracy Castillo MD        And    mucosal atomization device #  device 1 Device  1 Device Inhalation DOES NOT GO TO Tracy Thomson MD        mirtazapine (REMERON) tablet 45 mg  45 mg Oral At Bedtime Demetri Sarabia MD   45 mg at 04/01/25 2053    multivitamin w/minerals (THERA-VIT-M) tablet 1 tablet  1 tablet Oral Daily Patti Bernard MD   1 tablet at 04/02/25 0734    naloxone (NARCAN) injection 0.2 mg  0.2 mg Intravenous Q2 Min PRN Charles Menjivar MD        Or    naloxone (NARCAN) injection 0.4 mg  0.4 mg Intravenous Q2 Min PRN Charles Menjivar MD        Or    naloxone (NARCAN) injection 0.2 mg  0.2 mg Intramuscular Q2 Min PRN Charles Menjivar MD        Or    naloxone (NARCAN) injection 0.4 mg  0.4 mg Intramuscular Q2 Min PRN Charles Menjivar MD        nicotine (NICORETTE)  "lozenge 4 mg  4 mg Buccal Q1H PRN Patti Bernard MD   4 mg at 04/02/25 1250    polyethylene glycol (MIRALAX) Packet 17 g  17 g Oral Daily PRN Patti Bernard MD        thiamine (B-1) tablet 100 mg  100 mg Oral Daily Patti Bernard MD   100 mg at 04/02/25 0734       Labs and Imaging:  New results:   No results found for this or any previous visit (from the past 24 hours).    Data this admission:  - CBC with chronic thrombocytopenia (platelet count 78)   - CMP with mildly elevated AST (47) and ALT (84)   - TSH normal  - UDS positive for amphetamines, cannabinoids, fentanyl  - Vit D in process  - Lipids not ordered this admission (but within normal limits on 2/26/2025)  - Vit B12 normal  - Folate normal  - EKG sinus bradycardia, QTc 396     Mental Status Exam:     Oriented to:  Grossly Oriented, Person/Self, and Situation  General:  Awake and Alert  Appearance:  Appears older than stated age Edentulous. Slightly disheveled, dressed in personal clothing.   Behavior/Attitude:  Irritable, pacing in in his room facing the wall. Yelling \"get out of my room before I turn the computer into [employer]\"   Eye Contact: Appropriate  Psychomotor: Normal no catatonia present  Speech:  loud volume/tone, yelling  Language: Fluent in English with appropriate syntax and vocabulary.  Mood:  \"I don't want to talk\"  Affect:  congruent with mood, hostile, irritable, dysphoric , and anxious  Thought Process:  linear, perseverative  Thought Content:  ruminations about current admission and not getting bdz. and No apparent delusions.   Associations:  questionable  Insight: poor due to his inability to recognize rational thoughts from irrational thought, like the his mood swings and that staff/team are not trying to help him.   Judgment:   poor today due to anger and outbursts with yelling. He was previously engaged in groups/treatment here.   Impulse control:  poor due to irritability and acting out on frustration (verbally " abusive to staff)  Attention Span:   not assessed today given limited interview  Concentration:  not assessed today given limited interview  Recent and Remote Memory:  not formally assessed  Fund of Knowledge: average estimated  Muscle Strength and Tone: normal  Gait and Station: Normal     Psychiatric Assessment     Zenon Truong is a 54 year old male with previous psychiatric diagnoses of polysubstance use disorder [benzodiazepines, opiates, methamphetamine, THC, nicotine], severe depression with suicidal ideation, borderline personality disorder and PTSD admitted 03/18/2025 due to concern for SI with plan and intent in the context of medication non adherence, substance use and housing/financial insecurity. On admission, he presented with symptoms of SI with intent and plan, hopelessness, poor motivation, low energy, anhedonia, impulsivity and irritability.      Had a recent hospitalization at [facility] 2/23-3/6/2025 for suicidal ideation and benzodiazepine overdose. ECT was considered and planned during that admission, but he decided against pursuing it at that time. Did not continue taking his meds after leaving.     There is genetic predisposition given family hx of substance use disorder. Patient has struggled with homelessness and severe substance use for years. This current admission is likely precipitated by recent psychiatric hospitalization at end of February followed by relapse soon after discharge to a sober house. Perpetuating factors include ongoing substance use, medication non adherence, longstanding functional impairment, personality traits, lack of perceived support, deconditioning and housing and financial insecurity. Patient's support system includes  . Based on patient's history and current symptoms, criteria are met for primary diagnosis of MDD without psychotic features, opioid use disorder, benzodiazepine use disorder, stimulant use disorder and cannabis use disorder. Patient  warrants inpatient hospitalization to maintain their safety and would benefit from medication optimization and outpatient referral/resources.       ECT had been discussed as one of Zenon's goals for this admission with the plan to start on 3/31. He declined on the intended start date as he did not think this was the right treatment for him. From chart review, it appears like this has occurred on previous admissions, with the plan to initiate ECT and Zenon declining prior to starting. He has been increasingly frustrated and irritable throughout his admission. It appears as though his depression has improved over the admission but he is still reporting significant anxiety and physical symptoms. He does have baseline chronic SI and still continues to express a desire to not wake up. It appears like his current presentation is more likely borderline personality with mood swings, irritability, impulsive behaviors such as slamming doors and difficulty with interpersonal relationships with staff . There is likely a component of withdrawal/cravings at play which may be contributing to his mood and irritability but borderline personality traits are more apparent now that he is physically stabilizing.      As of 4/1, it appears as though his borderline personality traits are contributing most to his current presentation. There is certainly a component of chronic SI that does pose significant risks to his discharge. However, he has not expressed attachment to a specific plan or intent. Given that he does not wish to initiate ECT, prolonged admission may not offer the most benefit at this point. His physical symptoms are still causing him distress and he does continue to express a desire to get better and not relapse, so we will continue to work with the addiction medicine team to optimize his medication regimen. Currently presenting with rebound hypertension due to change from scheduled to PRN Clonidine. Will keep monitoring  for hemodynamic changes.     Today's changes:  - clonidine x2 given elevated BP (150s/110s). He has been refusing vital signs.   - Discuss DBT as option for outpatient      Psychiatric Plan by Diagnosis      #Major depressive disorder, recurrent, severe without psychosis  #Borderline personality disorder.  - Declined ECT on 3/31  - Continue Mirtazapine 45 mg po at bedtime  - Continue wellbutrin 150mg XL (PTA was taking 300 mg) to help with stimulant use disorder symptoms.     #Opioid use disorder, severe  Due to his accounts of taking large amounts of fentanyl prior to admission,we discussed with addiction medicine team given duration and severity of his withdrawal. There is a high likelihood that the fentanyl he was taking was cut with other substances, including xylazine. Xylazine is an alpha 2 agonist used as a veterinary anesthetic but also commonly found in illicit opioids. We are working with addiction team to manage this.  - methadone 135mg daily (of note, 4/1 wellbutrin started, this can increase serum levels of the methadone)  - Addiction team following  - Thiamine, MVI, folic acid   For symptomatic management:  - 10 mg Flexeril TID PRN Q8H for muscle cramps, spasms, or tightness  - loperamide, bentyl     #Benzodiazepine use disorder, severe  Phenobarbital taper was discontinued on 3/26 per recommendation from addiction medicine team recommendation that his symptoms were likely secondary to opiate withdrawal vs benzo withdrawal.   - Addiction team following  - scheduled clonidine 0.1mg TID changed to PRN (HR parameters to hold if <55) per patient request        #Stimulant (methamphetamine) use disorder  #Cannabis use disorder     Additional Plans:  - Patient will be treated in therapeutic milieu with appropriate individual and group therapies as described.     Psychiatric Hospital Course:      Zenon Truong was admitted to [facility] as a voluntary patient. Had a recent hospitalization   Medications and  other updates:  3/14: hold bupropion due to risk of lower seizure threshold  3/14: Continue PTA desvenlafaxine 25mg and mirtazapine 30mg for sleep  3/18: start clonidine 0.1mg TID   3/20: Increase mirtazapine to 45 mg daily for continued insomnia   3/21: restart PTA gabapentin 600mg TID for anxiety   3/21: continuing phenobarb taper (today is 32.4 mg BID)  3/23: no scheduled phenobarb received 32.4mg due to ongoing withdrawal symptoms  3/24: give one time phenobarbital dose of 32.4mg due to significant discomfort that seem at least in part due to withdrawal  3/24: consult addiction medicine for assistance with methadone dose and ongoing withdrawal symptoms   3/24: discussed re-initiating wellbutrin. However, patient reporting symptoms that he feels were similar to auras he has had prior to seizures (this has been worse the last couple days), so hesitant to do this at this time.  3/25: one time phenobarbital dose of 32.4mg given significant withdrawal symptoms  3/25: increase desvenlafaxine to 50mg daily (per chart review, was prescribed this dose outpatient)  3/26: increase methadone to 115 mg daily per addiction medicine recs   3/26: discontinue phenobarb taper   3/26: medicine consult for ECT clearance; Defer to psychiatry for decision of proceeding with ECT with recent benzodiazepine withdrawal/use. Otherwise, no absolute medical contraindications to proceeding with ECT at this time. Treatment plan per psychiatry.   3/27: increase gabapentin to 900 TID per addiction med team recs   3.27: increase methadone to 125mg daily per addiction med recs given ongoing withdrawal symptoms   3/27: pending ECT team recs regarding methadone on ECT days  3/31: declined ECT on intended start day  4/1: increase methadone to 135mg daily per addiction med recs  4/1: changed scheduled clonidine 0.1mg TID to PRN per patient request  4/1: discontinue prn quetiapine and hydroxyzine per patient request  4/1: discontinue desvenlafaxine  50mg per patient request  4/1: restart PTA wellbutrin, was previously on 300mg, restart at 150mg XL  4/1: hypertensive with /140s but refused repeat vital signs  4/2: clonidine 0.1 prn given; BP 150s/110s on recheck after medication. Will offer another prn clonidine (he has been refusing clonidine)       The risks, benefits, alternatives, and side effects were discussed and understood by the patient.     Medical Assessment and Plan     #Palpitations  3/21 patient reporting short episodes (few seconds) of palpitations occurring intermittently over the last month. Does have shortness of breath with this, no chest pain or other symptoms. He is not sure if it is associated with anxiety.   - repeat EKG ordered showed sinus bradycardia with no significant change to prior EKG      #Chronic hepatitis C, never treated  #Mild transaminase elevations, hepatic steatosis:  Never treated for hepatitis C.  Chronic, mild transaminase elevations with normal hepatic function.  Most recent abdominal ultrasound 2/15/2024 showed hepatic steatosis without hepatic lesions, normal gallbladder.  Denies current alcohol use.  - monitor outpatient      #Chronic thrombocytopenia  #Normocytic anemia, now resolved  Chronic thrombocytopenia, moderate and stable.  Denies any history of bleeding including no history of gastric ulcers, hematemesis, melena or hematochezia.  Anemia appears to be more recent since 2/23/2025 when hemoglobin was 11.6, MCV 86.  Hgb 14.1, Plts 97, MCV 86, ferritin, iron, TIBC wnl on 3/15.      #Concern for malnutrition  Seen by dietician team, patient does not meet two of the established criteria necessary for diagnosing malnutrition but is at risk for malnutrition.   - ensure enlive ordered        Medical course: Patient was physically examined by the ED prior to being transferred to the unit and was found to be medically stable and appropriate for admission.      Consults:  Medicine (for clearance for  ECT)  Addiction med (for methadone dosage and withdrawal symptoms)      Checklist     Legal Status: Voluntary   /ACT Team: TIERNEY signed  Name/Clinic: [facility]    Number: [phone]     Safety Assessment:   Behavioral Orders   Procedures    Code 1 - Restrict to Unit    Fall precautions    Routine Programming     As clinically indicated    Seizure precautions    Status 15     Every 15 minutes.    Suicide precautions: Suicide Risk: LOW; Clinical rationale to override score: lack of access to a plan for self-harm     .     Order Specific Question:   Suicide Risk     Answer:   LOW     Order Specific Question:   Clinical rationale to override score:     Answer:   lack of access to a plan for self-harm    Withdrawal precautions       Risk Assessment:  Risk for harm is low to moderte.  Risk factors: SI, maladaptive coping, substance use, impulsive, and past behaviors  Protective factors: engaged in treatment      SIO: no     Disposition: Pending stabilization, possible ECT, medication optimization, & development of a safe discharge plan. Will offer DBT as option     Attestations     Mikayla Latham, MS3  [facility] Medical Student     I was present with the medical student who participated in the service and in the documentation of the note.  I have verified the history and personally performed the physical exam and medical decision making. I agree with the assessment and plan of care as documented in the note.    This patient was seen and discussed with my attending physician.  Rupinder Maurice MD  [facility] Psychiatry Resident  04/02/2025    This patient has been seen and evaluated by me, Charles Fink.  I have discussed this patient with the psychiatry resident and I agree with the findings and plan in this note.    I have reviewed today's vital signs, medications, labs and imaging.     Charles Menjivar MD

## 2025-04-02 NOTE — PLAN OF CARE
"SHIFT NURSING PLAN OF CARE   Problem: Suicide Risk  Goal: Absence of Self-Harm  Outcome: Progressing     Problem: Psychotic Signs/Symptoms  Goal: Improved Behavioral Control (Psychotic Signs/Symptoms)  Outcome: Progressing     Problem: Anxiety Signs/Symptoms  Goal: Improved Mood Symptoms (Anxiety Signs/Symptoms)  Outcome: Progressing   Goal Outcome Evaluation:    Pt started the shift with tense and irritable affect; initially declined VS assessment and interacting with unit staff; after acknowledging his frustration and offering help, pt agreed to VS assessment and BP and pulse were significantly elevated see(flowsheet); offered and administered 0.1 mg clonidine and noted improvement with BP and pulse; pt said, \" I don't want to kill or hurt myself, but I wish I am not going through this\"; rated anxiety 10/10 and appeared anxious; scored 10 on COWS; C/O headache rated 7/10 and administered 650 mg Tylenol and reported effective; attending resident notified and discuss pt's VS and behavioral presentation. Pt requested and received HS scheduled Gabapentin at 1903 and slammed his door walking in to his room. Pt also mentioned his plan not to take his HS scheduled Remeron. Ate dinner and snack; appetite and hygiene are good; briefly paced in the hallway; was mostly isolative to his room.     Attempted to recheck his BP per resident request but pt declined. Attending resident notified.        "

## 2025-04-02 NOTE — PLAN OF CARE
Problem: Sleep Disturbance  Goal: Adequate Sleep/Rest  Outcome: Progressing   Goal Outcome Evaluation:    The Team conducted safety checks every 15 minutes without any issues. The Patient slept 4.75 hours and experienced no respiratory problems. He had an uneventful night and had no concerns regarding his mental health. His MSSA and COW scores were 4 and 2 at 0432. He took his Methadone at 0500.

## 2025-04-02 NOTE — PLAN OF CARE
"Individual Therapy Note    Checked in with Pt in the lounge, Pt appeared a little less anxious, not pacing and looking out the window, but reported his anxiety is as high as it was yesterday. Pt reports he feels tension and tight all over, writer suggested deep breaths or stretching, Pt immediately dismissed ideas reporting his anxiety \"wont let\" him do those things. Writer offered validation and ended session as Pt appeared to become more agitated.        "

## 2025-04-02 NOTE — PLAN OF CARE
Problem: Adult Behavioral Health Plan of Care  Goal: Plan of Care Review  Outcome: Progressing  Flowsheets (Taken 4/2/2025 0921)  Plan of Care Reviewed With: patient  Overall Patient Progress: improving  Patient Agreement with Plan of Care: (accepting prescribed meds-vol remains in hospital-believes he should have increased methadone) agrees with comment (describe)  Goal: Optimized Coping Skills in Response to Life Stressors  Outcome: Progressing  Flowsheets (Taken 4/2/2025 0921)  Optimized Coping Skills in Response to Life Stressors: making progress toward outcome     Problem: Depressive Signs/Symptoms  Goal: Increased Participation and Engagement (Depressive Signs/Symptoms)  Outcome: Progressing  Flowsheets (Taken 4/2/2025 0921)  Mutually Determined Action Steps (Increased Participation and Engagement):   participates in one or more activity   voluntarily attends group therapy     Problem: Anxiety Signs/Symptoms  Goal: Improved Mood Symptoms (Anxiety Signs/Symptoms)  Outcome: Progressing  Flowsheets (Taken 4/2/2025 0921)  Mutually Determined Action Steps (Improved Mood Symptoms): adheres to medication regimen   Goal Outcome Evaluation:    Plan of Care Reviewed With: patient Plan of Care Reviewed With: patient    Overall Patient Progress: improvingOverall Patient Progress: improving     Zenon appears anxious and tense this AM-allowed VS this AM which showed elevated /138 P 114 sitting- standing /138 P 108- T 0815 COWS= 8 -initially declined available clonidine stating he wanted to take later- Accepted clonidine 0.1 at 0841- At 0953 /116 Requested Flexeril 10 mg PO at 1013 for c/o lower back spasms- Zenon c/o extreme anxiety with intense feeling of panic which he states is extremely difficult to manage and may last from minutes to hours-states he has tried ice, self reassurance and paced breathing, but none are effective-extremely frustrated MD will not order benzodiazepines which he states are  only effective remedy-angrily dismissed tx team when they attempted to meet with him-post above explained to RN that he knows what he needs to be well and if MD is not willing to help him it only makes him more angry to meet with them-refused additional dose of clonidine to manage BP stating he is too angry regarding plan to manage anxiety/withdrawal sxs with clonidine to accept for any reason as he feels it is ineffective-fifteen minute checks being done through pt blinds due to mood lability and threats to punch walls due to inner sense of anxiety and agitation-Zenon irritated with disruption of door repeatedly being opened-

## 2025-04-02 NOTE — PROGRESS NOTES
"  Rehab Group    Start time: 1020  End time: 1205  Patient time total: 10 minutes    attended partial group    #8 attended   Group Type: occupational therapy   Group Topic Covered: coping skills     Group Session Detail:  OT clinic     Patient Response/Contribution:  cooperative with task, attentive, and actively engaged     Patient Detail:    Pt actively participated in occupational therapy clinic to facilitate coping skill exploration, creative expression within personally meaningful activities, and clinical observation of social, cognitive, and kinesthetic performance skills. Pt response: Upon initial arrival to group, pt appeared calm and eager to attend group. Independent to initiate, gather materials, sequence, and adjust to workspace demands as needed. Attentive to detailed, goal-directed task for approximately x10 minutes, then politely excused himself from group early due to feeling \"anxious.\"       [id number] OT Group (2 or more in attendance)      Patient Active Problem List   Diagnosis    Opioid use disorder, severe, on maintenance therapy (H)    Suicidal ideation    Severe benzodiazepine use disorder (H)    Substance-induced anxiety disorder (H)    Benzodiazepine withdrawal without complication (H)    Cannabis use disorder, moderate, dependence (H)    Cigarette nicotine dependence with withdrawal    Moderate episode of recurrent major depressive disorder (H)    Homelessness    Opioid withdrawal (H)    MDD (major depressive disorder), recurrent severe, without psychosis (H)    ALISSON (generalized anxiety disorder)       "

## 2025-04-02 NOTE — PLAN OF CARE
04/02/25 1029   Individualization/Patient Specific Goals   Patient Personal Strengths insight into illness/situation;motivated for recovery   Patient Vulnerabilities substance abuse/addiction;housing insecurity   Interprofessional Rounds   Participants CTC;nursing;psychiatrist   Behavioral Team Discussion   Progress Ongoing symptoms   Anticipated length of stay 5-7 days   Continued Stay Criteria/Rationale Medication management, possible ECT, safe disposition   Medical/Physical See H&P   Precautions See below   Plan Psychiatric assessment/Medication management. Therapeutic Milieu. Individual care planning and after care planning. Patient to participate in unit groups and activities. Individual and group support on unit.   Rationale for change in precautions or plan N/A   Safety Plan Per unit protocol         PRECAUTIONS AND SAFETY    Behavioral Orders   Procedures    Code 1 - Restrict to Unit    Code 2 - 1:1 Staff Supervision     For ECT only    Electroconvulsive therapy     Series of up to 12 treatments. Begin Date: 3/31/25     Treating Psychiatrist providing ECT:  Nico     Notified on:  3/28    Fall precautions    Routine Programming     As clinically indicated    Seizure precautions    Status 15     Every 15 minutes.    Suicide precautions: Suicide Risk: LOW; Clinical rationale to override score: lack of access to a plan for self-harm     .     Order Specific Question:   Suicide Risk     Answer:   LOW     Order Specific Question:   Clinical rationale to override score:     Answer:   lack of access to a plan for self-harm    Withdrawal precautions       Safety  Safety WDL: WDL  Patient Location: hallway  Observed Behavior: walking  Observed Behavior (Comment): sleeping  Safety Measures: environmental rounds completed, clinical history reviewed  Diversional Activity: music, television, other (see comments) (walking)  Suicidality: Status 15  Withdrawal: monitor withdrawal process, seizure precautions (past  history of withdrawal seizure)  Seizure precautions: clutter free environment, room close to team care station (desk), mattress on floor, calm, consistent lighting  Additional Documentation:  (fall precaution inplace)

## 2025-04-02 NOTE — PLAN OF CARE
Problem: Adult Behavioral Health Plan of Care  Goal: Plan of Care Review  Outcome: Not Progressing  Flowsheets  Taken 4/1/2025 1913  Plan of Care Reviewed With: patient  Patient Agreement with Plan of Care: disagrees (describe)  Consent Given to Review Plan with: with tx plans.  Taken 4/1/2025 1636  Patient Agreement with Plan of Care: (disagree with tx plan.) agrees with comment (describe)   Goal Outcome Evaluation:    Plan of Care Reviewed With: patient Plan of Care Reviewed With: patient        Pt presented as restless, tense, agitated and labile. Affect appears labile. He declined to participate in mental health assessment. Pt's blood pressure reading was significantly elevated at 1600 170/141, yet he exhibited no observable or reported symptoms. When approached by nurse to recheck his BP, pt declined, stating that his BP and pulse were normal. As nurse attempted to educate him on the importance of compliance, he became increasingly irritable. Another nurse also attempted to check his BP, but was similarly declined, slamming his door loudly. Resident on call was updated on the situation and advised that, in the absence of symptoms, no immediate action was required. No new orders were received. Pt took his medication and received prn flexeril 10 mg at 2100 as requested for muscle pain.

## 2025-04-03 PROCEDURE — 99233 SBSQ HOSP IP/OBS HIGH 50: CPT | Mod: GC | Performed by: PSYCHIATRY & NEUROLOGY

## 2025-04-03 PROCEDURE — 250N000013 HC RX MED GY IP 250 OP 250 PS 637

## 2025-04-03 PROCEDURE — 97150 GROUP THERAPEUTIC PROCEDURES: CPT | Mod: GO

## 2025-04-03 PROCEDURE — 124N000002 HC R&B MH UMMC

## 2025-04-03 PROCEDURE — 250N000013 HC RX MED GY IP 250 OP 250 PS 637: Performed by: STUDENT IN AN ORGANIZED HEALTH CARE EDUCATION/TRAINING PROGRAM

## 2025-04-03 RX ADMIN — HALOPERIDOL 2 MG: 1 TABLET ORAL at 20:34

## 2025-04-03 RX ADMIN — NICOTINE POLACRILEX 4 MG: 4 LOZENGE ORAL at 06:30

## 2025-04-03 RX ADMIN — Medication 3 MG: at 20:04

## 2025-04-03 RX ADMIN — METHADONE HYDROCHLORIDE 135 MG: 10 CONCENTRATE ORAL at 05:01

## 2025-04-03 RX ADMIN — NICOTINE POLACRILEX 4 MG: 4 LOZENGE ORAL at 16:29

## 2025-04-03 RX ADMIN — GABAPENTIN 1200 MG: 600 TABLET, FILM COATED ORAL at 20:04

## 2025-04-03 RX ADMIN — CLONIDINE HYDROCHLORIDE 0.1 MG: 0.1 TABLET ORAL at 20:34

## 2025-04-03 RX ADMIN — NICOTINE POLACRILEX 4 MG: 4 LOZENGE ORAL at 18:30

## 2025-04-03 RX ADMIN — NICOTINE POLACRILEX 4 MG: 4 LOZENGE ORAL at 20:34

## 2025-04-03 RX ADMIN — NICOTINE POLACRILEX 4 MG: 4 LOZENGE ORAL at 07:37

## 2025-04-03 RX ADMIN — NICOTINE POLACRILEX 4 MG: 4 LOZENGE ORAL at 12:28

## 2025-04-03 RX ADMIN — CYCLOBENZAPRINE 10 MG: 10 TABLET, FILM COATED ORAL at 14:54

## 2025-04-03 RX ADMIN — NICOTINE POLACRILEX 4 MG: 4 LOZENGE ORAL at 11:03

## 2025-04-03 RX ADMIN — Medication 1 TABLET: at 07:38

## 2025-04-03 RX ADMIN — NICOTINE POLACRILEX 4 MG: 4 LOZENGE ORAL at 10:02

## 2025-04-03 RX ADMIN — MIRTAZAPINE 45 MG: 45 TABLET, FILM COATED ORAL at 20:04

## 2025-04-03 RX ADMIN — THIAMINE HCL TAB 100 MG 100 MG: 100 TAB at 07:38

## 2025-04-03 RX ADMIN — FOLIC ACID 1 MG: 1 TABLET ORAL at 07:38

## 2025-04-03 RX ADMIN — CYCLOBENZAPRINE 10 MG: 10 TABLET, FILM COATED ORAL at 20:04

## 2025-04-03 RX ADMIN — HALOPERIDOL 2 MG: 1 TABLET ORAL at 14:54

## 2025-04-03 RX ADMIN — NICOTINE POLACRILEX 4 MG: 4 LOZENGE ORAL at 13:40

## 2025-04-03 RX ADMIN — CYCLOBENZAPRINE 10 MG: 10 TABLET, FILM COATED ORAL at 05:02

## 2025-04-03 RX ADMIN — GABAPENTIN 1200 MG: 600 TABLET, FILM COATED ORAL at 07:37

## 2025-04-03 RX ADMIN — NICOTINE POLACRILEX 4 MG: 4 LOZENGE ORAL at 14:42

## 2025-04-03 RX ADMIN — GABAPENTIN 1200 MG: 600 TABLET, FILM COATED ORAL at 14:02

## 2025-04-03 RX ADMIN — NICOTINE POLACRILEX 4 MG: 4 LOZENGE ORAL at 09:02

## 2025-04-03 RX ADMIN — NICOTINE POLACRILEX 4 MG: 4 LOZENGE ORAL at 19:28

## 2025-04-03 RX ADMIN — BUPROPION HYDROCHLORIDE 150 MG: 150 TABLET, EXTENDED RELEASE ORAL at 07:38

## 2025-04-03 ASSESSMENT — ACTIVITIES OF DAILY LIVING (ADL)
DRESS: INDEPENDENT
ADLS_ACUITY_SCORE: 26
DRESS: INDEPENDENT
ADLS_ACUITY_SCORE: 26
ORAL_HYGIENE: INDEPENDENT
ADLS_ACUITY_SCORE: 26
LAUNDRY: WITH SUPERVISION
LAUNDRY: WITH SUPERVISION
ADLS_ACUITY_SCORE: 26
ADLS_ACUITY_SCORE: 26
HYGIENE/GROOMING: INDEPENDENT
HYGIENE/GROOMING: INDEPENDENT
ADLS_ACUITY_SCORE: 26
ORAL_HYGIENE: INDEPENDENT

## 2025-04-03 NOTE — PLAN OF CARE
Team Note Due:  Wednesday    Assessment/Intervention/Current Symtoms and Care Coordination:  Chart review and met with team, discussed pt progress, symptomology, and response to treatment.  Discussed the discharge plan and any potential impediments to discharge.     Discharge Plan or Goal:  TBD     Barriers to Discharge:  Medication Management  ECT     Referral Status:  None currently      Legal Status:  Voluntary       Contacts (include TIERNEY status):  Methadone Management: [facility] [place] daily     /ACT Team: TIERNEY signed  Name/Clinic: [facility]    Number: [phone]      Upcoming Meetings and Dates/Important Information and next steps:  None currently

## 2025-04-03 NOTE — PLAN OF CARE
"Goal Outcome Evaluation:    Plan of Care Reviewed With: patient      Problem: Anxiety Signs/Symptoms  Goal: Improved Mood Symptoms (Anxiety Signs/Symptoms)  Note: Zenon was pleasant and calm on approach. He reported he is feeling better than yesterday - the panic he was feeling lasted for hours, but finally went away. Reported anxiety as high, 7/10 - a 4 at baseline. Pt stated this is the longest he has been off fentanyl in a year and a half and he has no desire to use. He said depression is still there - focus remains on physical symptoms. Reported he thinks withdrawal is finally subsiding. Denied SI. Stated he wished he was dead yesterday - \"I have no place to stay, no friends or family, everything I own is in that room right there\" (gestured to exam room). He spent the shift in the lounge. Attended groups. No irritability, outbursts or slamming of doors.    Addendum: At 1450, pt was crying. He stated, he has been forcing himself to stay awake because he keeps having the same dream about his ex-fiance committing suicide six years ago. He requested and received flexeril and haldol which were given at 1454. He asked if there was a medication for nightmares and writer encouraged him to discuss this with his provider tomorrow.     Outcome: Progressing    Temp: 97  F (36.1  C) Temp src: Temporal BP: (!) 139/99 Pulse: 87   Resp: 16 SpO2: 98 % O2 Device: None (Room air)      Intervention: Optimize Emotion and Mood  Recent Flowsheet Documentation  Taken 4/3/2025 0800 by Aida Armando RN  Supportive Measures:   active listening utilized   decision-making supported   positive reinforcement provided   self-responsibility promoted   verbalization of feelings encouraged         "

## 2025-04-03 NOTE — PLAN OF CARE
Goal Outcome Evaluation:  Problem: Sleep Disturbance  Goal: Adequate Sleep/Rest  Outcome: Progressing     Pt appeared asleep at the start of the shift, even respirations with no distress noted in all safety rounds. Pt woke-up at 0045 went back to bed at 0315 for few minutes. Received scheduled Methadone with prn Flexeril 10 Mg at 0502, Pt only slept for 2.25 hours.

## 2025-04-03 NOTE — PLAN OF CARE
Problem: Adult Behavioral Health Plan of Care  Goal: Adheres to Safety Considerations for Self and Others  Outcome: Progressing     Problem: Suicide Risk  Goal: Absence of Self-Harm  Outcome: Progressing    Pt was sleeping at start of the shift. Pt took a vital signs. Pt endorsed anxiety and depression in this shift. Pt's COWS score was 4 when assessed at 1800. Pt stated that he feels hopeless about his situation and lately having hard time with a nightmares. Pt stated that he will talk with a doctor tomorrow to get a medication prescribe for a nightmare. Pt endorsed feeling suicidal and contracted for safety. Pt did not want talk more about his suicidal thoughts. Pt received prn Flexeril for his back pain rated 5/10 at 2004 with his HS medication. At 2034, pt requested and received prn Clonidine and prn Haldol for agitation, hot flash, and restless. Pt's VSS except BP was 162/111. Pt received prn Nicotine lozenge on his request.    Temp: 98  F (36.7  C) Temp src: Oral BP: (!) 162/111 Pulse: 85     SpO2: 98 % O2 Device: None (Room air)       Goal Outcome Evaluation:    Plan of Care Reviewed With: patient

## 2025-04-03 NOTE — PROGRESS NOTES
Rehab Group    Start time: 1015  End time: 1145  Patient time total: 25 minutes    attended partial group     #3 attended   Group Type: OT Clinic   Group Topic Covered: activity therapy and coping skills     Group Session Detail:  Coping skill exploration, creative expression within personally meaningful activities, and observation of social, cognitive, and kinesthetic performance skills     Patient Response/Contribution:  positive affect, cooperative with task, and organized     Patient Detail:  IND to gather materials, organize work space, and sequence a familiar scratch art project. Demonstrated fair organization and remained focused throughout his attendance.         [id number] OT Group (2 or more in attendance)      Patient Active Problem List   Diagnosis    Opioid use disorder, severe, on maintenance therapy (H)    Suicidal ideation    Severe benzodiazepine use disorder (H)    Substance-induced anxiety disorder (H)    Benzodiazepine withdrawal without complication (H)    Cannabis use disorder, moderate, dependence (H)    Cigarette nicotine dependence with withdrawal    Moderate episode of recurrent major depressive disorder (H)    Homelessness    Opioid withdrawal (H)    MDD (major depressive disorder), recurrent severe, without psychosis (H)    ALISSON (generalized anxiety disorder)

## 2025-04-03 NOTE — PROGRESS NOTES
"  ----------------------------------------------------------------------------------------------------------  [facility]  Psychiatry Progress Note  Hospital Day #16     Interim History:     The patient's care was discussed with the treatment team and chart notes were reviewed.    Vitals: VSS. /99  Sleep: 2.25 hours (04/03/25 0600)  Scheduled medications: Took all scheduled medications as prescribed  Psychiatric PRN medications: clonidine 0.1mg x2, flexiril 10mg x3     Staff Report:   No acute events or safety concerns overnight.   - Anxiety is better today. Says he was in a panic attack all day yesterday which ended at 4am this morning.   Please see staff notes for details.      Subjective:     Patient Interview:  Zenon apologized about his behavior yesterday. He mentions having a hard time with panic attacks so he knows his behavior yesterday was not the best. He is also struggling about how he never \"is gonna be functional again\". He has been writing down his thoughts. He mentions that he \"says he means what he means and he means what he says\". \"He feels stuck here\".He mentions that the hardest pill to swallow is \"that he did this to himself\" and he is using \"radical acceptance\" to accept his current situation. . So it is really difficult to deal with withdrawal, this is the longest he has been like that (2 weeks). He mentions that he tried a lot of medications and that is why he gets frustrated when we offer medications. He mentions that he was going to a methadone clinic in [place] where he got peak and trough testing where they said he was a \"fast metabolizer\" and they were considering a split dose. Then, at [facility] they tapered him off of it really fast. He felt the side effects, went back to using, and he then found himself on a bridge about to jump. He says he lost a lot of trust in the medical system and doctors after this incident. He had " "seizures in the past because of quick taper of methadone. He feels exhausted today and his anxiety is still high. He feels he is in not control and his anxiety is in the \"drivers seat\". He mentions that tunnel vision and dizziness he experimented yesterday and that high  BP was mostly influenced by the panic attack, and the clonidine did nothing which made him more anxious. He was worried about a stroke, heart attack given his high blood pressure. Team reassures and validate his current feelings. We asked about how he feel with current dose of bupropion, he mentions wanting to go back to 300 mg .  He also wants to talk with addiction medicine regarding his titration with methadone. Says the max he wants to go to is 145mg and if that does not help, he wants to titrate down. No other symptoms reported- he denies headache or visual disturbances     ROS:  Patient has sweats, chills, and insomnia  Patient denies headaches, visual disturbances      Objective:     Vitals:  BP (!) 173/109 (BP Location: Right arm, Patient Position: Sitting, Cuff Size: Adult Regular)   Pulse 80   Temp 97  F (36.1  C) (Temporal)   Resp 16   Wt 92.1 kg (203 lb)   SpO2 100%   BMI 28.33 kg/m      Allergies:  Allergies   Allergen Reactions    Iodinated Contrast Media Hives and Rash     Reports he is allergic to Iodine contrast     Reports he is allergic to Iodine contrast       Reports he is allergic to Iodine contrast    Reports he is allergic to Iodine contrast      Reports he is allergic to Iodine contrast   Reports he is allergic to Iodine contrast    Iodine Hives    Lisinopril Hives and Rash    Lithium Hives    Topiramate Hives    Escitalopram Rash     Rash around mouth    Iodine-131 Hives    Trazodone Anxiety     Trazodone causes RLS for pt    Olanzapine Rash     Pt reported taking approximately one year ago. Developed rash around mouth.       Current Medications:  Scheduled:  Current Facility-Administered Medications   Medication Dose " Route Frequency Provider Last Rate Last Admin    acetaminophen (TYLENOL) tablet 650 mg  650 mg Oral Q4H PRN Patti Bernard MD   650 mg at 04/02/25 1719    alum & mag hydroxide-simethicone (MAALOX) suspension 30 mL  30 mL Oral Q4H PRN Patti Bernard MD        buPROPion (WELLBUTRIN XL) 24 hr tablet 150 mg  150 mg Oral Daily Tracy Castillo MD   150 mg at 04/03/25 0738    cloNIDine (CATAPRES) tablet 0.1 mg  0.1 mg Oral TID PRN Tracy Castillo MD   0.1 mg at 04/02/25 1642    cyclobenzaprine (FLEXERIL) tablet 10 mg  10 mg Oral TID PRN Patti Bernard MD   10 mg at 04/03/25 0502    dicyclomine (BENTYL) capsule 10 mg  10 mg Oral 4x Daily PRN Suad Coyle MD        folic acid (FOLVITE) tablet 1 mg  1 mg Oral Daily Patti Bernard MD   1 mg at 04/03/25 0738    gabapentin (NEURONTIN) tablet 1,200 mg  1,200 mg Oral TID Sunni Lorenz MD   1,200 mg at 04/03/25 0737    haloperidol (HALDOL) tablet 2 mg  2 mg Oral BID PRN Sunni Lorenz MD        Or    haloperidol lactate (HALDOL) injection 2 mg  2 mg Intramuscular BID PRN Sunni Lorenz MD        Hold Medications for ECT (select and list medications to be held)   Does not apply HOLD Kraen Ribera MD        loperamide (IMODIUM) capsule 2 mg  2 mg Oral 4x Daily PRN Suad Coyle MD   2 mg at 03/28/25 0858    melatonin tablet 3 mg  3 mg Oral At Bedtime PRN Patti Bernard MD        methadone (DOLOPHINE-INTENSOL) 10 MG/ML (HIGH CONC) solution 135 mg  135 mg Oral Daily Dilshad Bailey MD   135 mg at 04/03/25 0501    midazolam 5 mg/mL (VERSED) intranasal solution 10 mg  10 mg Intranasal Once PRN Tracy Castillo MD        And    mucosal atomization device #  device 1 Device  1 Device Inhalation DOES NOT GO TO Tracy Thomson MD        mirtazapine (REMERON) tablet 45 mg  45 mg Oral At Bedtime Demetri Sarabia MD   45 mg at 04/01/25 2053    multivitamin w/minerals  (THERA-VIT-M) tablet 1 tablet  1 tablet Oral Daily Patti Bernard MD   1 tablet at 04/03/25 0738    naloxone (NARCAN) injection 0.2 mg  0.2 mg Intravenous Q2 Min PRN Charles Menjivar MD        Or    naloxone (NARCAN) injection 0.4 mg  0.4 mg Intravenous Q2 Min PRN Charles Menjivar MD        Or    naloxone (NARCAN) injection 0.2 mg  0.2 mg Intramuscular Q2 Min PRN Charles Mejnivar MD        Or    naloxone (NARCAN) injection 0.4 mg  0.4 mg Intramuscular Q2 Min PRN Charles Menjivar MD        nicotine (NICORETTE) lozenge 4 mg  4 mg Buccal Q1H PRN Patti Bernard MD   4 mg at 04/03/25 0737    polyethylene glycol (MIRALAX) Packet 17 g  17 g Oral Daily PRN Patti Bernard MD        thiamine (B-1) tablet 100 mg  100 mg Oral Daily Patti Bernard MD   100 mg at 04/03/25 0738       PRN:  Current Facility-Administered Medications   Medication Dose Route Frequency Provider Last Rate Last Admin    acetaminophen (TYLENOL) tablet 650 mg  650 mg Oral Q4H PRN Patti Bernard MD   650 mg at 04/02/25 1719    alum & mag hydroxide-simethicone (MAALOX) suspension 30 mL  30 mL Oral Q4H PRN Patti Bernard MD        buPROPion (WELLBUTRIN XL) 24 hr tablet 150 mg  150 mg Oral Daily Tracy Castillo MD   150 mg at 04/03/25 0738    cloNIDine (CATAPRES) tablet 0.1 mg  0.1 mg Oral TID PRN Tracy Castillo MD   0.1 mg at 04/02/25 1642    cyclobenzaprine (FLEXERIL) tablet 10 mg  10 mg Oral TID PRN Patti Bernard MD   10 mg at 04/03/25 0502    dicyclomine (BENTYL) capsule 10 mg  10 mg Oral 4x Daily PRN Suad Coyle MD        folic acid (FOLVITE) tablet 1 mg  1 mg Oral Daily Patti Bernard MD   1 mg at 04/03/25 0738    gabapentin (NEURONTIN) tablet 1,200 mg  1,200 mg Oral TID Sunni Lorenz MD   1,200 mg at 04/03/25 0737    haloperidol (HALDOL) tablet 2 mg  2 mg Oral BID PRN Sunni Lorenz MD        Or    haloperidol lactate  (HALDOL) injection 2 mg  2 mg Intramuscular BID PRN Sunni Lorenz MD        Hold Medications for ECT (select and list medications to be held)   Does not apply HOLD Karen Ribera MD        loperamide (IMODIUM) capsule 2 mg  2 mg Oral 4x Daily PRN Suad Coyle MD   2 mg at 03/28/25 0858    melatonin tablet 3 mg  3 mg Oral At Bedtime PRN Patti Bernard MD        methadone (DOLOPHINE-INTENSOL) 10 MG/ML (HIGH CONC) solution 135 mg  135 mg Oral Daily Dilshad Bailey MD   135 mg at 04/03/25 0501    midazolam 5 mg/mL (VERSED) intranasal solution 10 mg  10 mg Intranasal Once PRN Tracy Castillo MD        And    mucosal atomization device #  device 1 Device  1 Device Inhalation DOES NOT GO TO MAR Tracy Castillo MD        mirtazapine (REMERON) tablet 45 mg  45 mg Oral At Bedtime Demetri Sarabia MD   45 mg at 04/01/25 2053    multivitamin w/minerals (THERA-VIT-M) tablet 1 tablet  1 tablet Oral Daily Patti Bernard MD   1 tablet at 04/03/25 0738    naloxone (NARCAN) injection 0.2 mg  0.2 mg Intravenous Q2 Min PRN Charles Menjivar MD        Or    naloxone (NARCAN) injection 0.4 mg  0.4 mg Intravenous Q2 Min PRN Charles Menjivar MD        Or    naloxone (NARCAN) injection 0.2 mg  0.2 mg Intramuscular Q2 Min PRN Charles Menjivar MD        Or    naloxone (NARCAN) injection 0.4 mg  0.4 mg Intramuscular Q2 Min PRN Charles Menjivar MD        nicotine (NICORETTE) lozenge 4 mg  4 mg Buccal Q1H PRN Patti Bernard MD   4 mg at 04/03/25 0737    polyethylene glycol (MIRALAX) Packet 17 g  17 g Oral Daily PRN Patti Bernard MD        thiamine (B-1) tablet 100 mg  100 mg Oral Daily Patti Bernard MD   100 mg at 04/03/25 0738       Labs and Imaging:  New results:   No results found for this or any previous visit (from the past 24 hours).    Data this admission:  - CBC with chronic thrombocytopenia (platelet count  "78)   - CMP with mildly elevated AST (47) and ALT (84)   - TSH normal  - UDS positive for amphetamines, cannabinoids, fentanyl  - Vit D in process  - Lipids not ordered this admission (but within normal limits on 2/26/2025)  - Vit B12 normal  - Folate normal  - EKG sinus bradycardia, QTc 396     Mental Status Exam:     Oriented to:  Grossly Oriented, Person/Self, and Situation  General:  Awake and Alert  Appearance:  Appears older than stated age Edentulous.    Behavior/Attitude:  Sitting on bed, calm, open with his answers. Apologetic for his anger yesterday   Eye Contact:  Limited eye contact, facing the wall.   Psychomotor: Less tremulous then before.  no catatonia present  Speech:  appropriate volume/tone, spontaneous, and with good articulation  Language: Fluent in English with appropriate syntax and vocabulary.  Mood:  \"Better today\"  Affect:   mood congruent   Thought Process:   linear, coherent.   Thought Content:   suicidal ideation with plan (without intent, has mentioned different methods at different times); No apparent delusions  Associations:  intact  Insight:  limited but improved from yesterday as he is trying to engage with radical acceptance for his past behaviors and make changes toward staying clean.   Judgment:  limited but improved from yesterday as he was engaged with conversation with the team, expresses regret for his behaviors yesterday and able to separate his anxious thoughts from his rational thoughts.   Impulse control: poor due to prior irritability and acting out on frustration (punching the wall, verbally abusive to staff) but today, is able to recognize his impulse and act in accordance to his goals/desires  Attention Span:  adequate for conversation  Concentration:  grossly intact to conversation   Recent and Remote Memory:  not formally assessed but intact to conversation   Fund of Knowledge: average, estimated  Muscle Strength and Tone: normal  Gait and Station: Normal     " Psychiatric Assessment     Zenon Truong is a 54 year old male with previous psychiatric diagnoses of polysubstance use disorder [benzodiazepines, opiates, methamphetamine, THC, nicotine], severe depression with suicidal ideation, borderline personality disorder and PTSD admitted 03/18/2025 due to concern for SI with plan and intent in the context of medication non adherence, substance use and housing/financial insecurity. On admission, he presented with symptoms of SI with intent and plan, hopelessness, poor motivation, low energy, anhedonia, impulsivity and irritability.      Had a recent hospitalization at [facility] 2/23-3/6/2025 for suicidal ideation and benzodiazepine overdose. ECT was considered and planned during that admission, but he decided against pursuing it at that time. Did not continue taking his meds after leaving.     There is genetic predisposition given family hx of substance use disorder. Patient has struggled with homelessness and severe substance use for years. This current admission is likely precipitated by recent psychiatric hospitalization at end of February followed by relapse soon after discharge to a sober house. Perpetuating factors include ongoing substance use, medication non adherence, longstanding functional impairment, personality traits, lack of perceived support, deconditioning and housing and financial insecurity. Patient's support system includes  . Based on patient's history and current symptoms, criteria are met for primary diagnosis of MDD without psychotic features, opioid use disorder, benzodiazepine use disorder, stimulant use disorder and cannabis use disorder. Patient warrants inpatient hospitalization to maintain their safety and would benefit from medication optimization and outpatient referral/resources.       ECT had been discussed as one of Zenon's goals for this admission with the plan to start on 3/31. He declined on the intended start date as he  did not think this was the right treatment for him. From chart review, it appears like this has occurred on previous admissions, with the plan to initiate ECT and Zenon declining prior to starting. He has been increasingly frustrated and irritable throughout his admission. It appears as though his depression has improved over the admission but he is still reporting significant anxiety and physical symptoms. He does have baseline chronic SI and still continues to express a desire to not wake up. It appears like his current presentation is more likely borderline personality with mood swings, irritability, impulsive behaviors such as slamming doors and difficulty with interpersonal relationships with staff . There is likely a component of withdrawal/cravings at play which may be contributing to his mood and irritability but borderline personality traits are more apparent now that he is physically stabilizing.      As of 4/1, it appears as though his borderline personality traits are contributing most to his current presentation. There is certainly a component of chronic SI that does pose significant risks to his discharge. However, he has not expressed attachment to a specific plan or intent. Given that he does not wish to initiate ECT, prolonged admission may not offer the most benefit at this point.     There is a significant impact of prior mistrust in the medical system. He does demonstrate a desire to commit to treatment and is apologetic for his outbursts in hindsight. His goal is to uptitrate his wellbutrin to his PTA dose of 300mg and increase his methadone to 145mg daily. We will make the according adjustments in the following days. He expresses set goals regarding up titration of methadone  and would like to taper down if it does not improve his symptoms at 145 mg. His physical symptoms are still causing him distress, so we will continue to work with the addiction medicine team to optimize his medication  regimen. We will defer CD treatment conversation, until we evidence a persistent pattern regarding his mood and thought process.     Today's changes:  - check in with addiction med about wellbutrin/methadone doses   - /99 this morning; continue to monitor and use prn clonidine if hypertensive      Psychiatric Plan by Diagnosis      #Major depressive disorder, recurrent, severe without psychosis  #Borderline personality disorder.  - Declined ECT on 3/31  - Continue Mirtazapine 45 mg po at bedtime  - Continue wellbutrin 150mg XL (PTA was taking 300 mg) to help with stimulant use disorder symptoms.     #Opioid use disorder, severe  Due to his accounts of taking large amounts of fentanyl prior to admission,we discussed with addiction medicine team given duration and severity of his withdrawal. There is a high likelihood that the fentanyl he was taking was cut with other substances, including xylazine. Xylazine is an alpha 2 agonist used as a veterinary anesthetic but also commonly found in illicit opioids. We are working with addiction team to manage this.  - methadone 135mg daily (of note, 4/1 wellbutrin started, this can increase serum levels of the methadone)  - Addiction team following  - Thiamine, MVI, folic acid   For symptomatic management:  - 10 mg Flexeril TID PRN Q8H for muscle cramps, spasms, or tightness  - loperamide, bentyl     #Benzodiazepine use disorder, severe  Phenobarbital taper was discontinued on 3/26 per recommendation from addiction medicine team recommendation that his symptoms were likely secondary to opiate withdrawal vs benzo withdrawal.   - Addiction team following  - scheduled clonidine 0.1mg TID changed to PRN (HR parameters to hold if <55) per patient request        #Stimulant (methamphetamine) use disorder  #Cannabis use disorder     Additional Plans:  - Patient will be treated in therapeutic milieu with appropriate individual and group therapies as described  - CDT conversation  defer for now  - Continue coordination with Addiction medicine regarding Methadone     Psychiatric Hospital Course:      Zenon Truong was admitted to [facility] as a voluntary patient. Had a recent hospitalization   Medications and other updates:  3/14: hold bupropion due to risk of lower seizure threshold  3/14: Continue PTA desvenlafaxine 25mg and mirtazapine 30mg for sleep  3/18: start clonidine 0.1mg TID   3/20: Increase mirtazapine to 45 mg daily for continued insomnia   3/21: restart PTA gabapentin 600mg TID for anxiety   3/21: continuing phenobarb taper (today is 32.4 mg BID)  3/23: no scheduled phenobarb received 32.4mg due to ongoing withdrawal symptoms  3/24: give one time phenobarbital dose of 32.4mg due to significant discomfort that seem at least in part due to withdrawal  3/24: consult addiction medicine for assistance with methadone dose and ongoing withdrawal symptoms   3/24: discussed re-initiating wellbutrin. However, patient reporting symptoms that he feels were similar to auras he has had prior to seizures (this has been worse the last couple days), so hesitant to do this at this time.  3/25: one time phenobarbital dose of 32.4mg given significant withdrawal symptoms  3/25: increase desvenlafaxine to 50mg daily (per chart review, was prescribed this dose outpatient)  3/26: increase methadone to 115 mg daily per addiction medicine recs   3/26: discontinue phenobarb taper   3/26: medicine consult for ECT clearance; Defer to psychiatry for decision of proceeding with ECT with recent benzodiazepine withdrawal/use. Otherwise, no absolute medical contraindications to proceeding with ECT at this time. Treatment plan per psychiatry.   3/27: increase gabapentin to 900 TID per addiction med team recs   3.27: increase methadone to 125mg daily per addiction med recs given ongoing withdrawal symptoms   3/27: pending ECT team recs regarding methadone on ECT days  3/31: declined ECT on intended start  day  4/1: increase methadone to 135mg daily per addiction med recs  4/1: changed scheduled clonidine 0.1mg TID to PRN per patient request  4/1: discontinue prn quetiapine and hydroxyzine per patient request  4/1: discontinue desvenlafaxine 50mg per patient request  4/1: restart PTA wellbutrin, was previously on 300mg, restart at 150mg XL  4/1: hypertensive with /140s but refused repeat vital signs  4/2: clonidine 0.1 prn given; BP 150s/110s on recheck after medication. Will offer another prn clonidine (he has been refusing clonidine)    4/3: /99 this morning     The risks, benefits, alternatives, and side effects were discussed and understood by the patient.     Medical Assessment and Plan     #Palpitations  3/21 patient reporting short episodes (few seconds) of palpitations occurring intermittently over the last month. Does have shortness of breath with this, no chest pain or other symptoms. He is not sure if it is associated with anxiety.   - repeat EKG ordered showed sinus bradycardia with no significant change to prior EKG      #Chronic hepatitis C, never treated  #Mild transaminase elevations, hepatic steatosis:  Never treated for hepatitis C.  Chronic, mild transaminase elevations with normal hepatic function.  Most recent abdominal ultrasound 2/15/2024 showed hepatic steatosis without hepatic lesions, normal gallbladder.  Denies current alcohol use.  - monitor outpatient      #Chronic thrombocytopenia  #Normocytic anemia, now resolved  Chronic thrombocytopenia, moderate and stable.  Denies any history of bleeding including no history of gastric ulcers, hematemesis, melena or hematochezia.  Anemia appears to be more recent since 2/23/2025 when hemoglobin was 11.6, MCV 86.  Hgb 14.1, Plts 97, MCV 86, ferritin, iron, TIBC wnl on 3/15.      #Concern for malnutrition  Seen by dietician team, patient does not meet two of the established criteria necessary for diagnosing malnutrition but is at risk for  malnutrition.   - ensure enlive ordered        Medical course: Patient was physically examined by the ED prior to being transferred to the unit and was found to be medically stable and appropriate for admission.      Consults:  Medicine (for clearance for ECT)  Addiction med (for methadone dosage and withdrawal symptoms)      Checklist     Legal Status: Voluntary   /ACT Team: TIERNEY signed  Name/Clinic: [facility]    Number: [phone]     Safety Assessment:   Behavioral Orders   Procedures    Code 1 - Restrict to Unit    Fall precautions    Routine Programming     As clinically indicated    Seizure precautions    Status 15     Every 15 minutes.    Suicide precautions: Suicide Risk: LOW; Clinical rationale to override score: lack of access to a plan for self-harm     .     Order Specific Question:   Suicide Risk     Answer:   LOW     Order Specific Question:   Clinical rationale to override score:     Answer:   lack of access to a plan for self-harm    Withdrawal precautions       Risk Assessment:  Risk for harm is low  Risk factors: SI, maladaptive coping, substance use, impulsive, and past behaviors  Protective factors: engaged in treatment      SIO: no     Disposition: Pending stabilization, possible ECT, medication optimization, & development of a safe discharge plan. Will offer DBT as option.     Attestations     Mikayla Latham, MS3  [facility] Medical Student     I was present with the medical student who participated in the service and in the documentation of the note.  I have verified the history and personally performed the physical exam and medical decision making. I agree with the assessment and plan of care as documented in the note.    This patient was seen and discussed with my attending physician.  Rupinder Maurice MD  [facility] Psychiatry Resident  04/03/2025    This patient has been seen and evaluated by me, Charles Fink.  I have discussed this patient with  the psychiatry resident and I agree with the findings and plan in this note.    I have reviewed today's vital signs, medications, labs and imaging.     Charles Menjivar MD

## 2025-04-03 NOTE — PLAN OF CARE
BEH IP Unit Acuity Rating Score (UARS)  Patient is given one point for every criteria they meet.    CRITERIA SCORING   On a 72 hour hold, court hold, committed, stay of commitment, or revocation. 0    Patient LOS on BEH unit exceeds 20 days. 0  LOS: 16   Patient under guardianship, 55+, otherwise medically complex, or under age 11. 0   Suicide ideation without relief of precipitating factors. 1   Current plan for suicide. 1   Current plan for homicide. 0   Imminent risk or actual attempt to seriously harm another without relief of factors precipitating the attempt. 0   Severe dysfunction in daily living (ex: complete neglect for self care, extreme disruption in vegetative function, extreme deterioration in social interactions). 1   Recent (last 7 days) or current physical aggression in the ED or on unit. 0   Restraints or seclusion episode in past 72 hours. 0   Recent (last 7 days) or current verbal aggression, agitation, yelling, etc., while in the ED or unit. 0   Active psychosis. 0   Need for constant or near constant redirection (from leaving, from others, etc).  0   Intrusive or disruptive behaviors. 0   Patient requires 3 or more hours of individualized nursing care per 8-hour shift (i.e. for ADLs, meds, therapeutic interventions). 0   TOTAL 3

## 2025-04-04 PROCEDURE — 97150 GROUP THERAPEUTIC PROCEDURES: CPT | Mod: GO

## 2025-04-04 PROCEDURE — 250N000013 HC RX MED GY IP 250 OP 250 PS 637: Performed by: STUDENT IN AN ORGANIZED HEALTH CARE EDUCATION/TRAINING PROGRAM

## 2025-04-04 PROCEDURE — 250N000013 HC RX MED GY IP 250 OP 250 PS 637

## 2025-04-04 PROCEDURE — 99232 SBSQ HOSP IP/OBS MODERATE 35: CPT | Mod: GC | Performed by: PSYCHIATRY & NEUROLOGY

## 2025-04-04 PROCEDURE — 124N000002 HC R&B MH UMMC

## 2025-04-04 RX ORDER — PRAZOSIN HYDROCHLORIDE 1 MG/1
1 CAPSULE ORAL
Status: CANCELLED | OUTPATIENT
Start: 2025-04-04

## 2025-04-04 RX ADMIN — GABAPENTIN 1200 MG: 600 TABLET, FILM COATED ORAL at 13:48

## 2025-04-04 RX ADMIN — ACETAMINOPHEN 650 MG: 325 TABLET, FILM COATED ORAL at 17:22

## 2025-04-04 RX ADMIN — NICOTINE POLACRILEX 4 MG: 4 LOZENGE ORAL at 13:00

## 2025-04-04 RX ADMIN — NICOTINE POLACRILEX 4 MG: 4 LOZENGE ORAL at 14:00

## 2025-04-04 RX ADMIN — NICOTINE POLACRILEX 4 MG: 4 LOZENGE ORAL at 09:53

## 2025-04-04 RX ADMIN — NICOTINE POLACRILEX 4 MG: 4 LOZENGE ORAL at 06:31

## 2025-04-04 RX ADMIN — GABAPENTIN 1200 MG: 600 TABLET, FILM COATED ORAL at 07:30

## 2025-04-04 RX ADMIN — GABAPENTIN 1200 MG: 600 TABLET, FILM COATED ORAL at 20:05

## 2025-04-04 RX ADMIN — METHADONE HYDROCHLORIDE 135 MG: 10 CONCENTRATE ORAL at 05:07

## 2025-04-04 RX ADMIN — NICOTINE POLACRILEX 4 MG: 4 LOZENGE ORAL at 15:00

## 2025-04-04 RX ADMIN — MIRTAZAPINE 45 MG: 45 TABLET, FILM COATED ORAL at 20:05

## 2025-04-04 RX ADMIN — NICOTINE POLACRILEX 4 MG: 4 LOZENGE ORAL at 17:02

## 2025-04-04 RX ADMIN — NICOTINE POLACRILEX 4 MG: 4 LOZENGE ORAL at 10:58

## 2025-04-04 RX ADMIN — NICOTINE POLACRILEX 4 MG: 4 LOZENGE ORAL at 19:27

## 2025-04-04 RX ADMIN — CYCLOBENZAPRINE 10 MG: 10 TABLET, FILM COATED ORAL at 20:05

## 2025-04-04 RX ADMIN — THIAMINE HCL TAB 100 MG 100 MG: 100 TAB at 07:30

## 2025-04-04 RX ADMIN — NICOTINE POLACRILEX 4 MG: 4 LOZENGE ORAL at 18:15

## 2025-04-04 RX ADMIN — FOLIC ACID 1 MG: 1 TABLET ORAL at 07:30

## 2025-04-04 RX ADMIN — CYCLOBENZAPRINE 10 MG: 10 TABLET, FILM COATED ORAL at 15:15

## 2025-04-04 RX ADMIN — NICOTINE POLACRILEX 4 MG: 4 LOZENGE ORAL at 20:20

## 2025-04-04 RX ADMIN — BUPROPION HYDROCHLORIDE 150 MG: 150 TABLET, EXTENDED RELEASE ORAL at 07:30

## 2025-04-04 RX ADMIN — Medication 1 TABLET: at 07:30

## 2025-04-04 RX ADMIN — NICOTINE POLACRILEX 4 MG: 4 LOZENGE ORAL at 16:03

## 2025-04-04 RX ADMIN — CYCLOBENZAPRINE 10 MG: 10 TABLET, FILM COATED ORAL at 04:22

## 2025-04-04 RX ADMIN — NICOTINE POLACRILEX 4 MG: 4 LOZENGE ORAL at 11:59

## 2025-04-04 RX ADMIN — NICOTINE POLACRILEX 4 MG: 4 LOZENGE ORAL at 07:31

## 2025-04-04 ASSESSMENT — ACTIVITIES OF DAILY LIVING (ADL)
ADLS_ACUITY_SCORE: 26
ORAL_HYGIENE: INDEPENDENT
ADLS_ACUITY_SCORE: 26
ADLS_ACUITY_SCORE: 26
ORAL_HYGIENE: INDEPENDENT
ADLS_ACUITY_SCORE: 26
DRESS: INDEPENDENT
LAUNDRY: WITH SUPERVISION
ADLS_ACUITY_SCORE: 26
HYGIENE/GROOMING: INDEPENDENT
ADLS_ACUITY_SCORE: 26
HYGIENE/GROOMING: INDEPENDENT
ADLS_ACUITY_SCORE: 26
ADLS_ACUITY_SCORE: 26
DRESS: INDEPENDENT
ADLS_ACUITY_SCORE: 26

## 2025-04-04 NOTE — PLAN OF CARE
BEH IP Unit Acuity Rating Score (UARS)  Patient is given one point for every criteria they meet.    CRITERIA SCORING   On a 72 hour hold, court hold, committed, stay of commitment, or revocation. 0    Patient LOS on BEH unit exceeds 20 days. 0  LOS: 17   Patient under guardianship, 55+, otherwise medically complex, or under age 11. 0   Suicide ideation without relief of precipitating factors. 1   Current plan for suicide. 1   Current plan for homicide. 0   Imminent risk or actual attempt to seriously harm another without relief of factors precipitating the attempt. 0   Severe dysfunction in daily living (ex: complete neglect for self care, extreme disruption in vegetative function, extreme deterioration in social interactions). 1   Recent (last 7 days) or current physical aggression in the ED or on unit. 0   Restraints or seclusion episode in past 72 hours. 0   Recent (last 7 days) or current verbal aggression, agitation, yelling, etc., while in the ED or unit. 0   Active psychosis. 0   Need for constant or near constant redirection (from leaving, from others, etc).  0   Intrusive or disruptive behaviors. 0   Patient requires 3 or more hours of individualized nursing care per 8-hour shift (i.e. for ADLs, meds, therapeutic interventions). 0   TOTAL 3

## 2025-04-04 NOTE — PLAN OF CARE
"Goal Outcome Evaluation:    Plan of Care Reviewed With: patient      Problem: Anxiety Signs/Symptoms  Goal: Improved Mood Symptoms (Anxiety Signs/Symptoms)  Outcome: Progressing  Note: Zenon presented with an anxious affect. He stated his anxiety was high this morning due to other pts complaining and being negative. Writer offered headphones, which were given. Pt endorsed withdrawal symptoms as \"not bad\" and depression as \"not bad.\" COWS was a 4. He reported he slept well last night and did not have nightmares. He paced with headphones and was in and out of his room during the shift. Around 1145, pt went to his room and slammed his door after complaining about a peer \"pouring coffee all over the floor\".  Intervention: Optimize Emotion and Mood  Recent Flowsheet Documentation  Taken 4/4/2025 0800 by Aida Armando RN  Supportive Measures:   active listening utilized   decision-making supported   positive reinforcement provided   self-responsibility promoted   verbalization of feelings encouraged    Temp: 98.4  F (36.9  C) Temp src: Oral BP: (!) 150/104 Pulse: 83   Resp: 15 SpO2: 98 % O2 Device: None (Room air)           "

## 2025-04-04 NOTE — PLAN OF CARE
Team Note Due:  Wednesday    Assessment/Intervention/Current Symtoms and Care Coordination:  Chart review and met with team, discussed pt progress, symptomology, and response to treatment.  Discussed the discharge plan and any potential impediments to discharge.    -Received an email from Zenon's  stating she is planning to visit on Tuesday. He is aware of visit.      Discharge Plan or Goal:  TBD     Barriers to Discharge:  Medication Management  ECT     Referral Status:  None currently      Legal Status:  Voluntary       Contacts (include TIERNEY status):  Methadone Management: [facility] [place]     /ACT Team: TIERNEY signed  Name/Clinic: [facility]    Number: [phone]      Upcoming Meetings and Dates/Important Information and next steps:   visit- Tuesday, time TBD

## 2025-04-04 NOTE — PROGRESS NOTES
Rehab Group    Start time: 1020  End time: 1205  Patient time total: 15 minutes    attended partial group    #5 attended   Group Type: occupational therapy   Group Topic Covered: coping skills     Group Session Detail:  OT clinic     Patient Response/Contribution:  cooperative with task, attentive, actively engaged, and left group on several occasions     Patient Detail:    Pt actively participated in occupational therapy clinic to facilitate coping skill exploration, creative expression within personally meaningful activities, and clinical observation of social, cognitive, and kinesthetic performance skills. Pt response: Independent to initiate, gather materials, sequence, and adjust to workspace demands as needed. Demonstrated limited attention to task and good attention to detail for selected detailed, goal-directed task. Attentive to task for approximately x5-10 minute intervals before excusing himself from group x2. He appeared more restless and distractible and was less social during this group in comparison to previous groups with writer. Calm and polite in brief interactions with writer.      [id number] OT Group (2 or more in attendance)      Patient Active Problem List   Diagnosis    Opioid use disorder, severe, on maintenance therapy (H)    Suicidal ideation    Severe benzodiazepine use disorder (H)    Substance-induced anxiety disorder (H)    Benzodiazepine withdrawal without complication (H)    Cannabis use disorder, moderate, dependence (H)    Cigarette nicotine dependence with withdrawal    Moderate episode of recurrent major depressive disorder (H)    Homelessness    Opioid withdrawal (H)    MDD (major depressive disorder), recurrent severe, without psychosis (H)    ALISSON (generalized anxiety disorder)

## 2025-04-04 NOTE — PROGRESS NOTES
"  ----------------------------------------------------------------------------------------------------------  [facility]  Psychiatry Progress Note  Hospital Day #17     Interim History:     The patient's care was discussed with the treatment team and chart notes were reviewed.    Vitals: VSS. 146/96  Sleep: 5.5 hours (04/04/25 0600)  Scheduled medications: Took all scheduled medications as prescribed  Psychiatric PRN medications: clonidine 0.1mg, haldol 2mg x2, flexeril 10mg x3    Staff Report:   No acute events or safety concerns overnight.  - Reports nightmares.   Please see staff notes for details.      Subjective:     Patient Interview:  Zenon Truong is feeling okay today. We talked about the nightmares, he mentions they are distressing but he is used to it. He mentions trying prazosin in the past and did not worked. Today he feels tired, fidgety, he feels \"sick of being here\". He mentions he is tired of \"being on this earth\". Expressed that he wishes he was not born.  His withdrawal symptoms are much better, says he is not experiencing nausea or sweats anymore. His main concern is his anxiety which has been his baseline for a couple of years.    His goal for today is going to OT and see how it goes, he mentions feeling restless and the constant need of moving. He says he finds it hard to focus on anything, feels like he has to be doing something but can't focus enough to do anything.  We talk about therapy, he mentions trying DBT and mindfulness in the past. He still feels it is not working. We informed him about his medication adjustments, he is okay with having prazosin as a prn and discussing increasing wellbutrin next week. When asked about adding something for restlessness, he declined.     ROS:  Patient has insomnia   Patient denies nausea and chills     Objective:     Vitals:  BP (!) 145/96 (BP Location: Right arm)   Pulse 67   Temp 97  F (36.1  C) " (Temporal)   Resp 16   Wt 91 kg (200 lb 11.2 oz)   SpO2 98%   BMI 28.00 kg/m      Allergies:  Allergies   Allergen Reactions    Iodinated Contrast Media Hives and Rash     Reports he is allergic to Iodine contrast     Reports he is allergic to Iodine contrast       Reports he is allergic to Iodine contrast    Reports he is allergic to Iodine contrast      Reports he is allergic to Iodine contrast   Reports he is allergic to Iodine contrast    Iodine Hives    Lisinopril Hives and Rash    Lithium Hives    Topiramate Hives    Escitalopram Rash     Rash around mouth    Iodine-131 Hives    Trazodone Anxiety     Trazodone causes RLS for pt    Olanzapine Rash     Pt reported taking approximately one year ago. Developed rash around mouth.       Current Medications:  Scheduled:  Current Facility-Administered Medications   Medication Dose Route Frequency Provider Last Rate Last Admin    acetaminophen (TYLENOL) tablet 650 mg  650 mg Oral Q4H PRN Patti Bernard MD   650 mg at 04/02/25 1719    alum & mag hydroxide-simethicone (MAALOX) suspension 30 mL  30 mL Oral Q4H PRN Patti Bernard MD        buPROPion (WELLBUTRIN XL) 24 hr tablet 150 mg  150 mg Oral Daily Tracy Castillo MD   150 mg at 04/04/25 0730    cloNIDine (CATAPRES) tablet 0.1 mg  0.1 mg Oral TID PRN Tracy Castillo MD   0.1 mg at 04/03/25 2034    cyclobenzaprine (FLEXERIL) tablet 10 mg  10 mg Oral TID PRN Patti Bernard MD   10 mg at 04/04/25 0422    dicyclomine (BENTYL) capsule 10 mg  10 mg Oral 4x Daily PRN Suad Coyle MD        folic acid (FOLVITE) tablet 1 mg  1 mg Oral Daily Patti Bernard MD   1 mg at 04/04/25 0730    gabapentin (NEURONTIN) tablet 1,200 mg  1,200 mg Oral TID Sunni Lorenz MD   1,200 mg at 04/04/25 0730    haloperidol (HALDOL) tablet 2 mg  2 mg Oral BID PRN Sunni Lorenz MD   2 mg at 04/03/25 2034    Or    haloperidol lactate (HALDOL) injection 2 mg  2 mg Intramuscular BID PRN  Sunni Lorenz MD        Hold Medications for ECT (select and list medications to be held)   Does not apply HOLD Kraen Ribera MD        loperamide (IMODIUM) capsule 2 mg  2 mg Oral 4x Daily PRN Suad Coyle MD   2 mg at 03/28/25 0858    melatonin tablet 3 mg  3 mg Oral At Bedtime PRN Patti Bernard MD   3 mg at 04/03/25 2004    methadone (DOLOPHINE-INTENSOL) 10 MG/ML (HIGH CONC) solution 135 mg  135 mg Oral Daily Dilshad Bailey MD   135 mg at 04/04/25 0507    midazolam 5 mg/mL (VERSED) intranasal solution 10 mg  10 mg Intranasal Once PRN Tracy Castillo MD        And    mucosal atomization device #  device 1 Device  1 Device Inhalation DOES NOT GO TO MAR Tracy Castillo MD        mirtazapine (REMERON) tablet 45 mg  45 mg Oral At Bedtime Dmeetri Sarabia MD   45 mg at 04/03/25 2004    multivitamin w/minerals (THERA-VIT-M) tablet 1 tablet  1 tablet Oral Daily Patti Bernard MD   1 tablet at 04/04/25 0730    naloxone (NARCAN) injection 0.2 mg  0.2 mg Intravenous Q2 Min PRN Charles Menjivar MD        Or    naloxone (NARCAN) injection 0.4 mg  0.4 mg Intravenous Q2 Min PRN Charles Menjivar MD        Or    naloxone (NARCAN) injection 0.2 mg  0.2 mg Intramuscular Q2 Min PRN Charles Menjivar MD        Or    naloxone (NARCAN) injection 0.4 mg  0.4 mg Intramuscular Q2 Min PRN Charles Menjivar MD        nicotine (NICORETTE) lozenge 4 mg  4 mg Buccal Q1H PRN Patti Bernard MD   4 mg at 04/04/25 0731    polyethylene glycol (MIRALAX) Packet 17 g  17 g Oral Daily PRN Patti Bernard MD        thiamine (B-1) tablet 100 mg  100 mg Oral Daily Patti Bernard MD   100 mg at 04/04/25 0730       PRN:  Current Facility-Administered Medications   Medication Dose Route Frequency Provider Last Rate Last Admin    acetaminophen (TYLENOL) tablet 650 mg  650 mg Oral Q4H PRN Patti Bernard MD   650 mg at  04/02/25 1719    alum & mag hydroxide-simethicone (MAALOX) suspension 30 mL  30 mL Oral Q4H PRN Patti Bernard MD        buPROPion (WELLBUTRIN XL) 24 hr tablet 150 mg  150 mg Oral Daily Tracy Castillo MD   150 mg at 04/04/25 0730    cloNIDine (CATAPRES) tablet 0.1 mg  0.1 mg Oral TID PRN Tracy Castillo MD   0.1 mg at 04/03/25 2034    cyclobenzaprine (FLEXERIL) tablet 10 mg  10 mg Oral TID PRN Patti Bernard MD   10 mg at 04/04/25 0422    dicyclomine (BENTYL) capsule 10 mg  10 mg Oral 4x Daily PRN Suad Coyle MD        folic acid (FOLVITE) tablet 1 mg  1 mg Oral Daily Patti Bernard MD   1 mg at 04/04/25 0730    gabapentin (NEURONTIN) tablet 1,200 mg  1,200 mg Oral TID Sunni Lorenz MD   1,200 mg at 04/04/25 0730    haloperidol (HALDOL) tablet 2 mg  2 mg Oral BID PRN Sunni Lorenz MD   2 mg at 04/03/25 2034    Or    haloperidol lactate (HALDOL) injection 2 mg  2 mg Intramuscular BID PRN Sunni Lorenz MD        Hold Medications for ECT (select and list medications to be held)   Does not apply HOLD Karen Ribera MD        loperamide (IMODIUM) capsule 2 mg  2 mg Oral 4x Daily PRN Suad Coyle MD   2 mg at 03/28/25 0858    melatonin tablet 3 mg  3 mg Oral At Bedtime PRN Patti Bernard MD   3 mg at 04/03/25 2004    methadone (DOLOPHINE-INTENSOL) 10 MG/ML (HIGH CONC) solution 135 mg  135 mg Oral Daily Dilshad Bailey MD   135 mg at 04/04/25 0507    midazolam 5 mg/mL (VERSED) intranasal solution 10 mg  10 mg Intranasal Once PRN Tracy Castillo MD        And    mucosal atomization device #  device 1 Device  1 Device Inhalation DOES NOT GO TO Tracy Thomson MD        mirtazapine (REMERON) tablet 45 mg  45 mg Oral At Bedtime Demetri Sarabia MD   45 mg at 04/03/25 2004    multivitamin w/minerals (THERA-VIT-M) tablet 1 tablet  1 tablet Oral Daily Patti Bernard MD   1 tablet at 04/04/25 9045     "naloxone (NARCAN) injection 0.2 mg  0.2 mg Intravenous Q2 Min PRN Charles Menjivar MD        Or    naloxone (NARCAN) injection 0.4 mg  0.4 mg Intravenous Q2 Min PRN Charles Menjivar MD        Or    naloxone (NARCAN) injection 0.2 mg  0.2 mg Intramuscular Q2 Min PRN Charles Menjivar MD        Or    naloxone (NARCAN) injection 0.4 mg  0.4 mg Intramuscular Q2 Min PRN Charles Menjivar MD        nicotine (NICORETTE) lozenge 4 mg  4 mg Buccal Q1H PRN Patti Bernard MD   4 mg at 04/04/25 0731    polyethylene glycol (MIRALAX) Packet 17 g  17 g Oral Daily PRN Patti Bernard MD        thiamine (B-1) tablet 100 mg  100 mg Oral Daily Patti Bernard MD   100 mg at 04/04/25 0730       Labs and Imaging:  New results:   No results found for this or any previous visit (from the past 24 hours).    Data this admission:  - CBC with chronic thrombocytopenia (platelet count 78)   - CMP with mildly elevated AST (47) and ALT (84)   - TSH normal  - UDS positive for amphetamines, cannabinoids, fentanyl  - Vit D in process  - Lipids not ordered this admission (but within normal limits on 2/26/2025)  - Vit B12 normal  - Folate normal  - EKG sinus bradycardia, QTc 396     Mental Status Exam:     Oriented to:  Grossly Oriented, Person/Self, and Situation  General:  Awake and Alert  Appearance:  Appears older than stated age Edentulous. Slightly disheveled, dressed in personal clothing.   Behavior/Attitude:  Sitting on bed. Generally calm but short with answers, dismissive and verbally defensive.   Eye Contact:  Limited eye contact, looking down at his hands and occasionally looks up.   Psychomotor: Slightly tremulous, moving his feet  no catatonia present  Speech:  appropriate volume/tone, spontaneous, and with good articulation  Language: Fluent in English with appropriate syntax and vocabulary.  Mood:  \"Anxious, I don't want to live in this planet\"  Affect:   restricted affect, " "irritable, labile  Thought Process:  ruminative, perseverative   Thought Content:   suicidal ideation with plan (without intent, has mentioned different methods at different times); No apparent delusions  Associations:  intact  Insight:  poor due to his inability to recognize rational thoughts from irrational thought, like the scenarios of killing himself, hopelessness for his life, and staff/team are not trying to help him.  Judgment:  poor today but was previously engaged in groups/treatment here. He does express the desire to die and \"not wake up\".    Impulse control: poor due to irritability and acting out on frustration (punching the wall, verbally abusive to staff), able to recognize impulses and has not been acting out on them today   Attention Span:  adequate for conversation  Concentration:  grossly intact to conversation   Recent and Remote Memory:  not formally assessed but intact to conversation   Fund of Knowledge: average, estimated  Muscle Strength and Tone: normal  Gait and Station: Normal     Psychiatric Assessment     Zenon Truong is a 54 year old male with previous psychiatric diagnoses of polysubstance use disorder [benzodiazepines, opiates, methamphetamine, THC, nicotine], severe depression with suicidal ideation, borderline personality disorder and PTSD admitted 03/18/2025 due to concern for SI with plan and intent in the context of medication non adherence, substance use and housing/financial insecurity. On admission, he presented with symptoms of SI with intent and plan, hopelessness, poor motivation, low energy, anhedonia, impulsivity and irritability.      Had a recent hospitalization at [facility] 2/23-3/6/2025 for suicidal ideation and benzodiazepine overdose. ECT was considered and planned during that admission, but he decided against pursuing it at that time. Did not continue taking his meds after leaving.     There is genetic predisposition given family hx of substance use disorder. " Patient has struggled with homelessness and severe substance use for years. This current admission is likely precipitated by recent psychiatric hospitalization at end of February followed by relapse soon after discharge to a sober house. Perpetuating factors include ongoing substance use, medication non adherence, longstanding functional impairment, personality traits, lack of perceived support, deconditioning and housing and financial insecurity. Patient's support system includes  . Based on patient's history and current symptoms, criteria are met for primary diagnosis of MDD without psychotic features, opioid use disorder, benzodiazepine use disorder, stimulant use disorder and cannabis use disorder. Patient warrants inpatient hospitalization to maintain their safety and would benefit from medication optimization and outpatient referral/resources.       ECT had been discussed as one of Zenon's goals for this admission with the plan to start on 3/31. He declined on the intended start date as he did not think this was the right treatment for him. From chart review, it appears like this has occurred on previous admissions, with the plan to initiate ECT and Zenon declining prior to starting. He has been increasingly frustrated and irritable throughout his admission. It appears as though his depression has improved over the admission but he is still reporting significant anxiety and physical symptoms. He does have baseline chronic SI and still continues to express a desire to not wake up. It appears like his current presentation is more likely borderline personality with mood swings, irritability, impulsive behaviors such as slamming doors and difficulty with interpersonal relationships with staff . There is likely a component of withdrawal/cravings at play which may be contributing to his mood and irritability but borderline personality traits are more apparent now that he is physically stabilizing.       As of , it appears as though his borderline personality traits are contributing most to his current presentation. There is certainly a component of chronic SI that does pose significant risks to his discharge. However, he has not expressed attachment to a specific plan or intent. Given that he does not wish to initiate ECT, prolonged admission may not offer the most benefit at this point.      There is a significant impact of prior mistrust in the medical system. He does demonstrate a desire to commit to treatment and is apologetic for his outbursts in hindsight. His goal is to uptitrate his wellbutrin to his PTA dose of 300mg. We will make the according adjustments in the following days. He expresses set goals regarding up titration of methadone  and would like to taper down if it does not improve his symptoms at 145 mg. His desire to titrate down methadone is due to the fact that he does not wish to stay on this as a long term medication. Will continue to discuss with addiction med. His physical withdrawal symptoms are significantly improved and his main symptom is his anxiety. He does endorse restlessness and racing thoughts. We offered a beta blocker such as propranolol for the restlessness but he declined. He has a history of nightmares stemming from a traumatic event when his fiance  5-6 years ago. These are been consistently present and he has tried prazosin in the past. We discussed initiating it as a prn since they are causing significant distress.      We will defer CD treatment conversation, until we evidence a persistent pattern regarding his mood and thought process.     Today's changes:  - none     Psychiatric Plan by Diagnosis      #Major depressive disorder, recurrent, severe without psychosis  #Borderline personality disorder.  - Declined ECT on 3/31  - Continue Mirtazapine 45 mg po at bedtime  - Continue wellbutrin 150mg XL (PTA was taking 300 mg) to help with stimulant use disorder  symptoms.     #Opioid use disorder, severe  Due to his accounts of taking large amounts of fentanyl prior to admission,we discussed with addiction medicine team given duration and severity of his withdrawal. There is a high likelihood that the fentanyl he was taking was cut with other substances, including xylazine. Xylazine is an alpha 2 agonist used as a veterinary anesthetic but also commonly found in illicit opioids. We are working with addiction team to manage this.  - methadone 135mg daily (of note, 4/1 wellbutrin started, this can increase serum levels of the methadone)  - Addiction team following  - Thiamine, MVI, folic acid   For symptomatic management:  - 10 mg Flexeril TID PRN Q8H for muscle cramps, spasms, or tightness  - loperamide, bentyl     #Benzodiazepine use disorder, severe  Phenobarbital taper was discontinued on 3/26 per recommendation from addiction medicine team recommendation that his symptoms were likely secondary to opiate withdrawal vs benzo withdrawal.   - Addiction team following  - scheduled clonidine 0.1mg TID changed to PRN (HR parameters to hold if <55) per patient request        #Stimulant (methamphetamine) use disorder  #Cannabis use disorder    #Nightmares  - add prazosin 1mg prn at bedtime      Additional Plans:  - Patient will be treated in therapeutic milieu with appropriate individual and group therapies as described  - CDT conversation defer for now  - Continue coordination with Addiction medicine regarding Methadone     Psychiatric Hospital Course:      Zenon Truong was admitted to [facility] as a voluntary patient. Had a recent hospitalization   Medications and other updates:  3/14: hold bupropion due to risk of lower seizure threshold  3/14: Continue PTA desvenlafaxine 25mg and mirtazapine 30mg for sleep  3/18: start clonidine 0.1mg TID   3/20: Increase mirtazapine to 45 mg daily for continued insomnia   3/21: restart PTA gabapentin 600mg TID for anxiety   3/21:  continuing phenobarb taper (today is 32.4 mg BID)  3/23: no scheduled phenobarb received 32.4mg due to ongoing withdrawal symptoms  3/24: give one time phenobarbital dose of 32.4mg due to significant discomfort that seem at least in part due to withdrawal  3/24: consult addiction medicine for assistance with methadone dose and ongoing withdrawal symptoms   3/24: discussed re-initiating wellbutrin. However, patient reporting symptoms that he feels were similar to auras he has had prior to seizures (this has been worse the last couple days), so hesitant to do this at this time.  3/25: one time phenobarbital dose of 32.4mg given significant withdrawal symptoms  3/25: increase desvenlafaxine to 50mg daily (per chart review, was prescribed this dose outpatient)  3/26: increase methadone to 115 mg daily per addiction medicine recs   3/26: discontinue phenobarb taper   3/26: medicine consult for ECT clearance; Defer to psychiatry for decision of proceeding with ECT with recent benzodiazepine withdrawal/use. Otherwise, no absolute medical contraindications to proceeding with ECT at this time. Treatment plan per psychiatry.   3/27: increase gabapentin to 900 TID per addiction med team recs   3.27: increase methadone to 125mg daily per addiction med recs given ongoing withdrawal symptoms   3/27: pending ECT team recs regarding methadone on ECT days  3/31: declined ECT on intended start day  4/1: increase methadone to 135mg daily per addiction med recs  4/1: changed scheduled clonidine 0.1mg TID to PRN per patient request  4/1: discontinue prn quetiapine and hydroxyzine per patient request  4/1: discontinue desvenlafaxine 50mg per patient request  4/1: restart PTA wellbutrin, was previously on 300mg, restart at 150mg XL  4/1: hypertensive with /140s but refused repeat vital signs  4/2: clonidine 0.1 prn given; BP 150s/110s on recheck after medication. Will offer another prn clonidine (he has been refusing clonidine)     4/3: /99 this morning  4/4: prazosin 1mg prn for nightmares     The risks, benefits, alternatives, and side effects were discussed and understood by the patient.     Medical Assessment and Plan     #Palpitations  3/21 patient reporting short episodes (few seconds) of palpitations occurring intermittently over the last month. Does have shortness of breath with this, no chest pain or other symptoms. He is not sure if it is associated with anxiety.   - repeat EKG ordered showed sinus bradycardia with no significant change to prior EKG      #Chronic hepatitis C, never treated  #Mild transaminase elevations, hepatic steatosis:  Never treated for hepatitis C.  Chronic, mild transaminase elevations with normal hepatic function.  Most recent abdominal ultrasound 2/15/2024 showed hepatic steatosis without hepatic lesions, normal gallbladder.  Denies current alcohol use.  - monitor outpatient      #Chronic thrombocytopenia  #Normocytic anemia, now resolved  Chronic thrombocytopenia, moderate and stable.  Denies any history of bleeding including no history of gastric ulcers, hematemesis, melena or hematochezia.  Anemia appears to be more recent since 2/23/2025 when hemoglobin was 11.6, MCV 86.  Hgb 14.1, Plts 97, MCV 86, ferritin, iron, TIBC wnl on 3/15.      #Concern for malnutrition  Seen by dietician team, patient does not meet two of the established criteria necessary for diagnosing malnutrition but is at risk for malnutrition.   - ensure enlive ordered        Medical course: Patient was physically examined by the ED prior to being transferred to the unit and was found to be medically stable and appropriate for admission.      Consults:  Medicine (for clearance for ECT)  Addiction med (for methadone dosage and withdrawal symptoms)      Checklist     Legal Status: Voluntary   /ACT Team: TIERNEY signed  Name/Clinic: [facility]    Number: [phone]     Safety Assessment:    Behavioral Orders   Procedures    Code 1 - Restrict to Unit    Fall precautions    Routine Programming     As clinically indicated    Seizure precautions    Status 15     Every 15 minutes.    Suicide precautions: Suicide Risk: LOW; Clinical rationale to override score: lack of access to a plan for self-harm     .     Order Specific Question:   Suicide Risk     Answer:   LOW     Order Specific Question:   Clinical rationale to override score:     Answer:   lack of access to a plan for self-harm    Withdrawal precautions       Risk Assessment:  Risk for harm is low  Risk factors: SI, maladaptive coping, substance use, impulsive, and past behaviors  Protective factors: engaged in treatment      SIO: no     Disposition: Pending stabilization, possible ECT, medication optimization, & development of a safe discharge plan. Will offer DBT as option.     Attestations     Mikayla Latham, MS3  [facility] Medical Student     I was present with the medical student who participated in the service and in the documentation of the note.  I have verified the history and personally performed the physical exam and medical decision making. I agree with the assessment and plan of care as documented in the note.    This patient was seen and discussed with my attending physician.  Rupinder Maurice MD  [facility] Psychiatry Resident  04/04/2025        This patient has been seen and evaluated by me, Charles Fink.  I have discussed this patient with the psychiatry resident and I agree with the findings and plan in this note.    I have reviewed today's vital signs, medications, labs and imaging.     Charles Menjivar MD

## 2025-04-04 NOTE — PLAN OF CARE
Pt was up several times this shift. Requested for PRN Flexeril. Got scheduled Methadone at 0500. Pt is awake at this time watching TV. His opiate withdrawal scale was 3. VS improved from last recorded. BP (!) 145/96 (BP Location: Right arm)   Pulse 67   Temp 97  F (36.1  C) (Temporal)   Resp 16   Wt 91 kg (200 lb 11.2 oz)   SpO2 98%   BMI 28.00 kg/m       04/04/25 0600   Sleep/Rest   Sleep/Rest/Relaxation difficulty falling asleep   Sleep Hygiene Promotion awakenings minimized   Night Time # Hours 5.5 hours   Clinical Opiate Withdrawal Scale   Resting Pulse Rate: Measured After Patient is Sitting or Lying for One Minute 0   GI Upset: Over Last Half Hour 0   Sweating: Over Past Half Hour Not Accounted for by Room Temperature or Patient Activity 0   Tremor: Observation of Outstretched Hands 1   Restlessness: Observation During Assessment 1   Yawning: Observation During Assessment 0   Pupil Size 0   Anxiety and Irritability 1   Bone or Joint Aches: If Patient was Having Pain Previously, Only the Additional Component Attributed to Opiate Withdrawal is Scored 0   Gooseflesh Skin 0   Runny Nose or Tearing: Not Accounted for by Cold Symptoms or Allergies 0   Clinical Opiate Withdrawal Scale Total Score 3   Safety   Safety WDL WDL   Patient Location patient room, own;hallway   Observed Behavior pacing;sleeping;walking;sitting   Suicidality Status 15     Problem: Psychotic Signs/Symptoms  Goal: Improved Sleep (Psychotic Signs/Symptoms)  Outcome: Progressing  Intervention: Promote Healthy Sleep Hygiene  Recent Flowsheet Documentation  Taken 4/4/2025 0600 by Abimbola Anne RN  Sleep Hygiene Promotion: awakenings minimized   Goal Outcome Evaluation:

## 2025-04-04 NOTE — PROGRESS NOTES
Brief Medicine Cross-Cover Note:    Was notified by psychiatry team that at about 5:20pm patient punched the window glass in his room with his right hand. The glass did not crack or shatter. Patient seen at bedside, mildly agitated and pacing in bl[place]ard, but cooperative with exam. He is having pain at his fourth knuckle with bruising. He is able to make a complete fist and extend his fingers completely, though with some pain. No numbness/tingling. Radial pulse 2+. Sensation and motor fully intact.   - Patient currently declining XR imaging. If he becomes amenable, would obtain XR right hand 3 views. Not ordered for now since he is refusing.   - RICE (rest, ice, compression, elevation) recommended. Ice should be 20 minute intervals for first 48 hour of injury (20 minutes on, 20 minutes off) as patient allows for pain and swelling control.   - Tylenol and Flexeril both available for pain   - Please monitor for any worsening swelling or worsening pain, new range of motion deficits, numbness, tingling. Re-page medicine team should this occur.    Medicine will continue to follow with you.    Mark Loera PA-C on 4/4/2025 at 6:47 PM

## 2025-04-05 PROCEDURE — 250N000013 HC RX MED GY IP 250 OP 250 PS 637

## 2025-04-05 PROCEDURE — 124N000002 HC R&B MH UMMC

## 2025-04-05 PROCEDURE — 250N000013 HC RX MED GY IP 250 OP 250 PS 637: Performed by: STUDENT IN AN ORGANIZED HEALTH CARE EDUCATION/TRAINING PROGRAM

## 2025-04-05 PROCEDURE — 99222 1ST HOSP IP/OBS MODERATE 55: CPT

## 2025-04-05 RX ORDER — IBUPROFEN 200 MG
400 TABLET ORAL EVERY 6 HOURS PRN
Status: DISCONTINUED | OUTPATIENT
Start: 2025-04-05 | End: 2025-04-05

## 2025-04-05 RX ADMIN — CLONIDINE HYDROCHLORIDE 0.1 MG: 0.1 TABLET ORAL at 18:17

## 2025-04-05 RX ADMIN — NICOTINE POLACRILEX 4 MG: 4 LOZENGE ORAL at 08:32

## 2025-04-05 RX ADMIN — CYCLOBENZAPRINE 10 MG: 10 TABLET, FILM COATED ORAL at 05:03

## 2025-04-05 RX ADMIN — FOLIC ACID 1 MG: 1 TABLET ORAL at 07:34

## 2025-04-05 RX ADMIN — BUPROPION HYDROCHLORIDE 150 MG: 150 TABLET, EXTENDED RELEASE ORAL at 07:33

## 2025-04-05 RX ADMIN — NICOTINE POLACRILEX 4 MG: 4 LOZENGE ORAL at 11:10

## 2025-04-05 RX ADMIN — GABAPENTIN 1200 MG: 600 TABLET, FILM COATED ORAL at 07:33

## 2025-04-05 RX ADMIN — METHADONE HYDROCHLORIDE 135 MG: 10 CONCENTRATE ORAL at 05:02

## 2025-04-05 RX ADMIN — NICOTINE POLACRILEX 4 MG: 4 LOZENGE ORAL at 18:18

## 2025-04-05 RX ADMIN — CLONIDINE HYDROCHLORIDE 0.1 MG: 0.1 TABLET ORAL at 07:40

## 2025-04-05 RX ADMIN — GABAPENTIN 1200 MG: 600 TABLET, FILM COATED ORAL at 19:04

## 2025-04-05 RX ADMIN — NICOTINE POLACRILEX 4 MG: 4 LOZENGE ORAL at 15:22

## 2025-04-05 RX ADMIN — CYCLOBENZAPRINE 10 MG: 10 TABLET, FILM COATED ORAL at 12:53

## 2025-04-05 RX ADMIN — NICOTINE POLACRILEX 4 MG: 4 LOZENGE ORAL at 17:21

## 2025-04-05 RX ADMIN — NICOTINE POLACRILEX 4 MG: 4 LOZENGE ORAL at 09:33

## 2025-04-05 RX ADMIN — NICOTINE POLACRILEX 4 MG: 4 LOZENGE ORAL at 12:14

## 2025-04-05 RX ADMIN — THIAMINE HCL TAB 100 MG 100 MG: 100 TAB at 07:33

## 2025-04-05 RX ADMIN — NICOTINE POLACRILEX 4 MG: 4 LOZENGE ORAL at 07:34

## 2025-04-05 RX ADMIN — NICOTINE POLACRILEX 4 MG: 4 LOZENGE ORAL at 06:31

## 2025-04-05 RX ADMIN — NICOTINE POLACRILEX 4 MG: 4 LOZENGE ORAL at 16:25

## 2025-04-05 RX ADMIN — Medication 1 TABLET: at 07:34

## 2025-04-05 RX ADMIN — HALOPERIDOL 2 MG: 1 TABLET ORAL at 18:18

## 2025-04-05 RX ADMIN — CYCLOBENZAPRINE 10 MG: 10 TABLET, FILM COATED ORAL at 19:04

## 2025-04-05 RX ADMIN — GABAPENTIN 1200 MG: 600 TABLET, FILM COATED ORAL at 13:30

## 2025-04-05 RX ADMIN — NICOTINE POLACRILEX 4 MG: 4 LOZENGE ORAL at 13:31

## 2025-04-05 ASSESSMENT — ACTIVITIES OF DAILY LIVING (ADL)
ADLS_ACUITY_SCORE: 26
LAUNDRY: WITH SUPERVISION
ADLS_ACUITY_SCORE: 26
LAUNDRY: WITH SUPERVISION
ADLS_ACUITY_SCORE: 26
HYGIENE/GROOMING: INDEPENDENT
ADLS_ACUITY_SCORE: 26
ADLS_ACUITY_SCORE: 26
DRESS: INDEPENDENT
ADLS_ACUITY_SCORE: 26
ORAL_HYGIENE: INDEPENDENT
ADLS_ACUITY_SCORE: 26
ORAL_HYGIENE: INDEPENDENT
DRESS: INDEPENDENT
ADLS_ACUITY_SCORE: 26
HYGIENE/GROOMING: INDEPENDENT
ADLS_ACUITY_SCORE: 26

## 2025-04-05 NOTE — PLAN OF CARE
Goal Outcome Evaluation:  Problem: Sleep Disturbance  Goal: Adequate Sleep/Rest  Outcome: Progressing     Pt observed sleeping in bed at the start of the shift, even and non-labored respirations noted in all safety checks. Pt received scheduled Methadone with prn Flexeril 10 Mg at 0500. Slept for 5.75 hours.

## 2025-04-05 NOTE — PLAN OF CARE
"Goal Outcome Evaluation:  At the beginning of shift, pt appears tense and restless. Was irritable. Described mood as \"anxious\". Rates anxiety at 7/10. Denies depression, SI,HI,SIB,AVH. Offered PRN for anxiety, but declined \"stating nothing works for me except ativan and they took it away\". Visible in the milieu on and off. Ate 100% dinner. Requested and received nicotine lozenge almost every hour.      At about 5:20 pm, pt reported to staff that he punched the window glass in his room with his right hand few minutes ago, and that he thinks he broke one of his fingers. Writer went in, met pt sitting in bed. What happened? \"I am so frustrated, nothing to do, nothing is working for me, I don't want to be here...so I punched the window glass..I think I broke a bone here, It hurts but I'm fine.\" Window glass intact.   Skin over the 4th right proximal phalange appears bruised, reddened, swollen and tender. Rates pain 5/10. Had PRN tylenol. Ice pack applied. On call Psych Provider paged, saw pt. IM consulted, saw pt. Xray ordered. Pt later declined saying \"I don't need that, I will be fine\".   Pt was calm the remainder of the shift. BP elevated. Refused recheck.  Refused reassessment of his injured right hand.    COWs=3  PRNs Flexeril, tylenol, nicotine lozenge  Vital signs:  Temp: 97  F (36.1  C) Temp src: Temporal BP: (!) 157/129 Pulse: 64   Resp: 15 SpO2: 99 % O2 Device: None (Room air)            "

## 2025-04-05 NOTE — PLAN OF CARE
"Goal Outcome Evaluation:    Plan of Care Reviewed With: patient      Problem: Anxiety Signs/Symptoms  Goal: Improved Mood Symptoms (Anxiety Signs/Symptoms)  Outcome: Progressing  Note: Zenon endorsed high anxiety this morning due to his BP being \"through the roof.\" He requested and received clonidine for anxiety and BP at 0733, stating \"I don't want to have a stroke.\" BP improved from 162/128 to 152/111 on recheck. Pt expressed regret about punching the window yesterday, \"I was panicking and I didn't have an outlet...I'm not going to do that again.\" Around 1030, pt was sitting in the lounge with his head in his heads. Writer checked on him and he stated, \"there's just no escape from it...the madness and aggression here.\" Writer offered ear plugs, riding on the exercise bike and medication, all of which he declined.     He slammed his door at 1100 - did not respond when writer went to check on him. Declined x-ray, which he had previously agreed to this morning. Half an hour later, pt came out of his room and agreed to the x-ray. Stated to writer, \"I'm sorry about slamming the door, but I don't have any outlet. X-ray's going to be getting to know me pretty well here.\" COWS was a 4. X-ray was completed and showed no fracture. He requested and received flexeril for back pain at 1253.     Temp: 97.5  F (36.4  C) Temp src: Oral BP: (!) 152/111 Pulse: 79     SpO2: 97 % O2 Device: None (Room air)      Intervention: Optimize Emotion and Mood  Recent Flowsheet Documentation  Taken 4/5/2025 0800 by Aida Armando RN  Supportive Measures:   active listening utilized   decision-making supported   positive reinforcement provided   self-responsibility promoted   verbalization of feelings encouraged     "

## 2025-04-05 NOTE — CONSULTS
"[facility]  Consult Note - Hospitalist Service  Date of Admission:  3/18/2025  Consult Requested by: Natacha Griffiths MD   Reason for Consult: \"pt punched wall, assess need for xray and help with pain management\"    Assessment & Plan   Zenon Truong is a 55 year old male admitted on 3/18/2025. He has a past medical history of depression and anxiety, polysubstance abuse including benzodiazepines, fentanyl, occasional methamphetamine and THC. He was originally admitted to [facility] on 3/14/25 for suicidal ideation in the context of recent drug relapse. Ultimately, discharged from the Medicine service and to  Psychiatry 3/18/25. Medicine is consulted today to address his R hand after he punched a window in his room.    R Hand Pain  Evening of 4/4/25, pt punched a glass window with his R hand in his room on the unit. Glass did not shatter. He reports pain at the R PIP joint following this. On my exam today, he has point tenderness overlying the dorsal aspect of the fourth PIP joint on the R hand. There are no bony abnormalities. No snuffbox tenderness, and no tenderness specifically along dorsal aspect of the medial hand (to suggest a Boxer's fracture). No tenderness at the corresponding digit. Able to make a fist, but w/ pain. Neurovascularly intact at the distal R hand. Normal AROM of the wrist, no styloid process tenderness. Radial pulse 2+ at R wrist. No bleeding or open skin.  - Obtain XR of the R hand when patient is agreeable  - Tylenol PRN & Flexeril PRN for pain   - Avoid Ibuprofen/Naproxen given his chronic thrombocytopenia  - Can ice the hand (with a cloth between icepack and skin) for 20 minutes followed by 20 minutes of a break from ice (can oscillate between these two until pain improves)  - Continue to monitor and notify Medicine if new numbness/tingling, worsening pain, new deficits in ROM     The patient's care was discussed with the " "Bedside Nurse and Patient.    Medicine will continue to follow along for R hand XR. If negative, will sign off. Recommendations relayed to primary team via this progress note.  Thank you for the opportunity to be involved in this patient's care.    Clinically Significant Risk Factors                              # Overweight: Estimated body mass index is 28 kg/m  as calculated from the following:    Height as of 3/14/25: 1.803 m (5' 10.98\").    Weight as of this encounter: 91 kg (200 lb 11.2 oz).      # Financial/Environmental Concerns:    # Support System: poor social support noted in nursing assessment  # Housing Instability: noted in nursing assessment         Billy Bailey PA-C  Hospitalist Service  Securely message with [employer] (more info)  Text page via [facility] Paging/Directory   ______________________________________________________________________    Chief Complaint   \"My hand hurts\"    History is obtained from the patient    History of Present Illness   Zenon Truong is a 55 year old male who is seen in the milieu this morning.  Patient confirms punching a window yesterday with his right hand, and has had pain ever since.  He specifies that the pain is over the fourth knuckle of the right hand, and nowhere else.  He denies any numbness or tingling, but notes that when he touches the area of pain, he gets a sensation of radiating pain up his hand into his wrist, and down the corresponding finger.  No other areas of pain, and he feels that he is able to move his wrist without issue.  No bleeding or open wounds.    Past Medical History    Past Medical History:   Diagnosis Date    Hep C w/o coma, chronic (H) 2011    Substance abuse (H)     Testicular cancer (H)        Past Surgical History   Past Surgical History:   Procedure Laterality Date    GENITOURINARY SURGERY  1996    Testicular CA     TESTICLE SURGERY Bilateral        Medications   I have reviewed this patient's current medications       Review of " Systems    The 5 point Review of Systems is negative other than noted in the HPI or here.     Social History   I have reviewed this patient's social history and updated it with pertinent information if needed.  Social History     Tobacco Use    Smoking status: Every Day    Smokeless tobacco: Never    Tobacco comments:     Switched to e cigarettes   Substance Use Topics    Alcohol use: No    Drug use: Yes     Types: Benzodiazepines        Physical Exam   Vital Signs: Temp: 97.5  F (36.4  C) Temp src: Oral BP: (!) 152/111 Pulse: 79     SpO2: 97 % O2 Device: None (Room air)    Weight: 200 lbs 11.2 oz    Constitutional: awake, alert, cooperative, no apparent distress, and appears stated age. Pt sitting in milieu requesting to talk there.  Eyes: lids and lashes normal, sclera clear, and conjunctiva normal  ENT: normocephalic, without obvious abnormality  Skin: no bruising or bleeding, no redness, warmth, or swelling, no rashes, and no lesions on visualized skin.   Musculoskeletal: At the R hand: point tenderness overlying the dorsal aspect of the fourth PIP joint. There are no bony abnormalities. No snuffbox tenderness, and no tenderness specifically along dorsal aspect of the medial hand (to suggest a Boxer's fracture). No tenderness at the corresponding digit. Able to make a fist, but w/ pain. Neurovascularly intact at the distal R hand. Normal AROM of the wrist, no styloid process tenderness. Radial pulse 2+ at R wrist.  Neurologic: Moving all extremities equally and spontaneously. No obvious focal neuro deficits.    Medical Decision Making       35 MINUTES SPENT BY ME on the date of service doing chart review, history, exam, documentation & further activities per the note.      Data         Imaging results reviewed over the past 24 hrs:   No results found for this or any previous visit (from the past 24 hours).  No lab results found in last 7 days.

## 2025-04-06 PROCEDURE — 250N000013 HC RX MED GY IP 250 OP 250 PS 637

## 2025-04-06 PROCEDURE — 250N000013 HC RX MED GY IP 250 OP 250 PS 637: Performed by: STUDENT IN AN ORGANIZED HEALTH CARE EDUCATION/TRAINING PROGRAM

## 2025-04-06 PROCEDURE — 124N000002 HC R&B MH UMMC

## 2025-04-06 RX ADMIN — HALOPERIDOL 2 MG: 1 TABLET ORAL at 18:01

## 2025-04-06 RX ADMIN — GABAPENTIN 1200 MG: 600 TABLET, FILM COATED ORAL at 07:47

## 2025-04-06 RX ADMIN — NICOTINE POLACRILEX 4 MG: 4 LOZENGE ORAL at 20:04

## 2025-04-06 RX ADMIN — NICOTINE POLACRILEX 4 MG: 4 LOZENGE ORAL at 06:30

## 2025-04-06 RX ADMIN — MIRTAZAPINE 45 MG: 45 TABLET, FILM COATED ORAL at 20:04

## 2025-04-06 RX ADMIN — Medication 3 MG: at 20:04

## 2025-04-06 RX ADMIN — CYCLOBENZAPRINE 10 MG: 10 TABLET, FILM COATED ORAL at 13:59

## 2025-04-06 RX ADMIN — NICOTINE POLACRILEX 4 MG: 4 LOZENGE ORAL at 13:36

## 2025-04-06 RX ADMIN — GABAPENTIN 1200 MG: 600 TABLET, FILM COATED ORAL at 13:36

## 2025-04-06 RX ADMIN — CYCLOBENZAPRINE 10 MG: 10 TABLET, FILM COATED ORAL at 04:33

## 2025-04-06 RX ADMIN — NICOTINE POLACRILEX 4 MG: 4 LOZENGE ORAL at 14:50

## 2025-04-06 RX ADMIN — NICOTINE POLACRILEX 4 MG: 4 LOZENGE ORAL at 12:34

## 2025-04-06 RX ADMIN — CYCLOBENZAPRINE 10 MG: 10 TABLET, FILM COATED ORAL at 20:04

## 2025-04-06 RX ADMIN — THIAMINE HCL TAB 100 MG 100 MG: 100 TAB at 07:47

## 2025-04-06 RX ADMIN — NICOTINE POLACRILEX 4 MG: 4 LOZENGE ORAL at 09:25

## 2025-04-06 RX ADMIN — NICOTINE POLACRILEX 4 MG: 4 LOZENGE ORAL at 10:28

## 2025-04-06 RX ADMIN — NICOTINE POLACRILEX 4 MG: 4 LOZENGE ORAL at 11:40

## 2025-04-06 RX ADMIN — NICOTINE POLACRILEX 4 MG: 4 LOZENGE ORAL at 18:01

## 2025-04-06 RX ADMIN — BUPROPION HYDROCHLORIDE 150 MG: 150 TABLET, EXTENDED RELEASE ORAL at 07:48

## 2025-04-06 RX ADMIN — GABAPENTIN 1200 MG: 600 TABLET, FILM COATED ORAL at 20:04

## 2025-04-06 RX ADMIN — NICOTINE POLACRILEX 4 MG: 4 LOZENGE ORAL at 07:51

## 2025-04-06 RX ADMIN — METHADONE HYDROCHLORIDE 135 MG: 10 CONCENTRATE ORAL at 05:47

## 2025-04-06 RX ADMIN — NICOTINE POLACRILEX 4 MG: 4 LOZENGE ORAL at 16:50

## 2025-04-06 RX ADMIN — HALOPERIDOL 2 MG: 1 TABLET ORAL at 04:33

## 2025-04-06 RX ADMIN — NICOTINE POLACRILEX 4 MG: 4 LOZENGE ORAL at 19:07

## 2025-04-06 RX ADMIN — NICOTINE POLACRILEX 4 MG: 4 LOZENGE ORAL at 15:51

## 2025-04-06 RX ADMIN — FOLIC ACID 1 MG: 1 TABLET ORAL at 07:47

## 2025-04-06 RX ADMIN — Medication 1 TABLET: at 07:48

## 2025-04-06 ASSESSMENT — ACTIVITIES OF DAILY LIVING (ADL)
ADLS_ACUITY_SCORE: 26
HYGIENE/GROOMING: INDEPENDENT
ADLS_ACUITY_SCORE: 26
LAUNDRY: WITH SUPERVISION
DRESS: INDEPENDENT
HYGIENE/GROOMING: INDEPENDENT
ADLS_ACUITY_SCORE: 26
LAUNDRY: WITH SUPERVISION
ADLS_ACUITY_SCORE: 26
ORAL_HYGIENE: INDEPENDENT
ORAL_HYGIENE: INDEPENDENT
ADLS_ACUITY_SCORE: 26
DRESS: INDEPENDENT
ADLS_ACUITY_SCORE: 26

## 2025-04-06 NOTE — PLAN OF CARE
"  Problem: Adult Behavioral Health Plan of Care  Goal: Plan of Care Review  Outcome: Not Progressing  Flowsheets  Taken 4/5/2025 2035  Plan of Care Reviewed With: patient  Patient Agreement with Plan of Care: refuses to participate  Taken 4/5/2025 1711  Patient Agreement with Plan of Care: (pt declined to participate.) other (see comments)   Goal Outcome Evaluation: not progressing.    Plan of Care Reviewed With: patient Plan of Care Reviewed With: patient        Pt presented as restless, anxious, tense, and irritable, with a labile affect. He was withdrawn and showed no interest in social engagement with others. Throughout the evening, he frequently goes in and out of his room, and making hourly visits to the desk to request for nicotine lozenges. Initially, he requested PRN Haldol and Clonidine. However, he became agitated when nurse attempted to assess his blood pressure and pulse before administering the Clonidine. He refused vital sign checks and declined the medication, walking away and slamming his door. About an hour later, pt requested the Clonidine and Haldol again and agreed to have his blood pressure checked, which was 147/107. Medication was administered at 1817, but he declined a recheck of his blood pressure. He also requested his scheduled medication early, which was provided. Additionally, he requested for PRN Flexeril and Seroquel. Flexeril was given with his scheduled medication. However, since there was no current order for PRN Seroquel, nurse explained the situation to him. He be came agitated, cursing, and a temper outburst. Pt expressed dissatisfaction with Dr. Fink, stating, \"Dr. Fink is not my fucking doctor. I don t want him as my doctor.\" He instructed nurse to inform Dr. Fink not to visit him in his room, threatening, \"I will kick his ass out.\" Pt also emphasized that nurse should ensure this message is conveyed to the doctor.    Pt ate and drank sufficiently. He denied " having thoughts of self-harm or harm to others. ?

## 2025-04-06 NOTE — PLAN OF CARE
Goal Outcome Evaluation:  Problem: Sleep Disturbance  Goal: Adequate Sleep/Rest  Outcome: Not Progressing     Pt asleep at the start of the shift, even and non labored respirations noted. Pt woke up at 0300, observed walking back and forth in his room, came at 0430 requested for prn Flexeril and Haldol, both med administered at 0433 for lower back pain rated 5/10 and agitation. Pt received his scheduled Methadone.  Slept for only 3.75 hours.

## 2025-04-06 NOTE — PLAN OF CARE
"Goal Outcome Evaluation:    Plan of Care Reviewed With: patient      Problem: Depressive Signs/Symptoms  Goal: Improved Mood Symptoms (Depressive Signs/Symptoms)  Outcome: Progressing  Note: Zenon presented with a labile affect. He started the shift in a good mood, stating that he felt motivated today. He said yesterday was a hard day for him because a peer was yelling on the phone and another peer was complaining constantly. Pt became irritable after breakfast. He went back to his room, stating, \"I can't deal with all the negativity.\" He teared up in his room, stated that his ex-fiance would be 50 today if she were alive.     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 He stated that it's not right that he can't get benzos for his anxiety. He denied SI. COWS was a 4. Pt was in and out of his room during the shift. Paced with headphones and did his laundry.     He had a conflict with a peer over the tv channel and chose to go back to his room after peer continued complaining about the channel. He called peer an \"ugly bitch,\" as he paced the hallway.  Pt reported he is starting to get constipated from methadone. Declined miralax, \"I don't need it right now.\" Requested to have senna in the future for constipation. Requested and received flexeril at 1400 for back pain.    Temp: (!) 96.7  F (35.9  C) Temp src: Temporal BP: (!) 153/107       SpO2: 96 %            Intervention: Promote Mood Improvement  Recent Flowsheet Documentation  Taken 4/6/2025 0900 by Aida Armando RN  Supportive Measures:   active listening utilized   decision-making supported   positive reinforcement provided   self-responsibility promoted   verbalization of feelings encouraged  Trust Relationship/Rapport:   care explained   emotional support provided   empathic listening provided   " thoughts/feelings acknowledged

## 2025-04-07 PROCEDURE — 250N000013 HC RX MED GY IP 250 OP 250 PS 637

## 2025-04-07 PROCEDURE — 124N000002 HC R&B MH UMMC

## 2025-04-07 PROCEDURE — 250N000013 HC RX MED GY IP 250 OP 250 PS 637: Performed by: STUDENT IN AN ORGANIZED HEALTH CARE EDUCATION/TRAINING PROGRAM

## 2025-04-07 RX ORDER — BUPROPION HYDROCHLORIDE 300 MG/1
300 TABLET ORAL DAILY
Status: DISCONTINUED | OUTPATIENT
Start: 2025-04-08 | End: 2025-04-16 | Stop reason: HOSPADM

## 2025-04-07 RX ADMIN — CYCLOBENZAPRINE 10 MG: 10 TABLET, FILM COATED ORAL at 05:04

## 2025-04-07 RX ADMIN — NICOTINE POLACRILEX 4 MG: 4 LOZENGE ORAL at 06:29

## 2025-04-07 RX ADMIN — CLONIDINE HYDROCHLORIDE 0.1 MG: 0.1 TABLET ORAL at 20:05

## 2025-04-07 RX ADMIN — NICOTINE POLACRILEX 4 MG: 4 LOZENGE ORAL at 16:45

## 2025-04-07 RX ADMIN — BUPROPION HYDROCHLORIDE 150 MG: 150 TABLET, EXTENDED RELEASE ORAL at 07:32

## 2025-04-07 RX ADMIN — MIRTAZAPINE 45 MG: 45 TABLET, FILM COATED ORAL at 20:04

## 2025-04-07 RX ADMIN — FOLIC ACID 1 MG: 1 TABLET ORAL at 07:31

## 2025-04-07 RX ADMIN — CYCLOBENZAPRINE 10 MG: 10 TABLET, FILM COATED ORAL at 17:47

## 2025-04-07 RX ADMIN — HALOPERIDOL 2 MG: 1 TABLET ORAL at 07:31

## 2025-04-07 RX ADMIN — Medication 1 TABLET: at 07:31

## 2025-04-07 RX ADMIN — NICOTINE POLACRILEX 4 MG: 4 LOZENGE ORAL at 18:04

## 2025-04-07 RX ADMIN — NICOTINE POLACRILEX 4 MG: 4 LOZENGE ORAL at 14:06

## 2025-04-07 RX ADMIN — NICOTINE POLACRILEX 4 MG: 4 LOZENGE ORAL at 15:43

## 2025-04-07 RX ADMIN — NICOTINE POLACRILEX 4 MG: 4 LOZENGE ORAL at 09:46

## 2025-04-07 RX ADMIN — NICOTINE POLACRILEX 4 MG: 4 LOZENGE ORAL at 07:35

## 2025-04-07 RX ADMIN — GABAPENTIN 1200 MG: 600 TABLET, FILM COATED ORAL at 07:31

## 2025-04-07 RX ADMIN — GABAPENTIN 1200 MG: 600 TABLET, FILM COATED ORAL at 20:04

## 2025-04-07 RX ADMIN — CLONIDINE HYDROCHLORIDE 0.1 MG: 0.1 TABLET ORAL at 16:15

## 2025-04-07 RX ADMIN — NICOTINE POLACRILEX 4 MG: 4 LOZENGE ORAL at 08:36

## 2025-04-07 RX ADMIN — NICOTINE POLACRILEX 4 MG: 4 LOZENGE ORAL at 10:36

## 2025-04-07 RX ADMIN — GABAPENTIN 1200 MG: 600 TABLET, FILM COATED ORAL at 14:06

## 2025-04-07 RX ADMIN — THIAMINE HCL TAB 100 MG 100 MG: 100 TAB at 07:31

## 2025-04-07 RX ADMIN — NICOTINE POLACRILEX 4 MG: 4 LOZENGE ORAL at 11:34

## 2025-04-07 RX ADMIN — NICOTINE POLACRILEX 4 MG: 4 LOZENGE ORAL at 20:06

## 2025-04-07 RX ADMIN — NICOTINE POLACRILEX 4 MG: 4 LOZENGE ORAL at 12:43

## 2025-04-07 RX ADMIN — METHADONE HYDROCHLORIDE 135 MG: 10 CONCENTRATE ORAL at 05:03

## 2025-04-07 ASSESSMENT — ACTIVITIES OF DAILY LIVING (ADL)
HYGIENE/GROOMING: INDEPENDENT
ADLS_ACUITY_SCORE: 26
DRESS: INDEPENDENT
ADLS_ACUITY_SCORE: 26
ADLS_ACUITY_SCORE: 26
LAUNDRY: WITH SUPERVISION
ADLS_ACUITY_SCORE: 26
ADLS_ACUITY_SCORE: 26
ORAL_HYGIENE: INDEPENDENT
ADLS_ACUITY_SCORE: 26
DRESS: INDEPENDENT
ADLS_ACUITY_SCORE: 26
HYGIENE/GROOMING: INDEPENDENT
ADLS_ACUITY_SCORE: 26
ORAL_HYGIENE: INDEPENDENT
ADLS_ACUITY_SCORE: 26

## 2025-04-07 NOTE — PLAN OF CARE
"Brief Psychotherapy Note    Therapist checked in with Pt in [facility] to discuss progress with his anxiety. Pt reports he is feeling less anxious, writer provided words of encouragement and commended him on his progress. Writer explained that anxiety itself cannot physically hurt him which Pt scoffed at. Pt reports that nothing people offered him last week helped with his anxiety. Writer attempted to use ACT to highlight that Pt was fighting with/against his anxiety, pt interrupted writer and said \"so you're saying it's my fault? Get away from me\" and Pt walked away and went to his room.               "

## 2025-04-07 NOTE — PLAN OF CARE
Problem: Adult Behavioral Health Plan of Care  Goal: Adheres to Safety Considerations for Self and Others  Outcome: Progressing  Flowsheets (Taken 4/6/2025 1937)  Adheres to Safety Considerations for Self and Others: making progress toward outcome  Intervention: Develop and Maintain Individualized Safety Plan  Recent Flowsheet Documentation  Taken 4/6/2025 1715 by Hannah Ballesteros RN  Safety Measures:   environmental rounds completed   safety rounds completed   Goal Outcome Evaluation:progressing    Plan of Care Reviewed With: patient Plan of Care Reviewed With: patient        Pt was met in his room, awake in bed, wearing headphone. He appeared tense, restless, and irritable, with a blunted affect. When nurse attempted to engage him in conversation, asking about his well-being, mental health concerns, or his day, he ignored nurse. However, as the shift progressed, pt's mood noticeably improved. He became more compliant with staff and care, allowing them to check his vital signs. He even expressed gratitude towards nurse when his requests were fulfilled. Despite his improvement, he remained withdrawn and did not engage socially with peers or staff, or attend OT group. Pt requested and was given PRN Haldol for agitation, which was effective. He took hs medication without any issues and received prn flexeril 10 mg and melatonin 3 mg at 2000 as requested for muscle spasm. Pt reported a good appetite and ate dinner and snacks sufficiently. He was well-groomed and appropriately dressed. ?    Blood pressure 135/87, pulse 81, temperature (!) 96.5  F (35.8  C), temperature source Temporal, resp. rate 15, weight 90.9 kg (200 lb 4.8 oz), SpO2 99%.

## 2025-04-07 NOTE — PLAN OF CARE
BEH IP Unit Acuity Rating Score (UARS)  Patient is given one point for every criteria they meet.    CRITERIA SCORING   On a 72 hour hold, court hold, committed, stay of commitment, or revocation. 0    Patient LOS on BEH unit exceeds 20 days. 1  LOS: 20   Patient under guardianship, 55+, otherwise medically complex, or under age 11. 0   Suicide ideation without relief of precipitating factors. 1   Current plan for suicide. 1   Current plan for homicide. 0   Imminent risk or actual attempt to seriously harm another without relief of factors precipitating the attempt. 0   Severe dysfunction in daily living (ex: complete neglect for self care, extreme disruption in vegetative function, extreme deterioration in social interactions). 1   Recent (last 7 days) or current physical aggression in the ED or on unit. 0   Restraints or seclusion episode in past 72 hours. 0   Recent (last 7 days) or current verbal aggression, agitation, yelling, etc., while in the ED or unit. 0   Active psychosis. 0   Need for constant or near constant redirection (from leaving, from others, etc).  0   Intrusive or disruptive behaviors. 0   Patient requires 3 or more hours of individualized nursing care per 8-hour shift (i.e. for ADLs, meds, therapeutic interventions). 0   TOTAL 4

## 2025-04-07 NOTE — PLAN OF CARE
Goal Outcome Evaluation:  Problem: Sleep Disturbance  Goal: Adequate Sleep/Rest  Outcome: Progressing     Pt appeared asleep in bed at the start of the shift, respirations appeared even and non labored. Pt has been awake since 0400, sat in [place] working on a puzzle. Received prn Flexile 10 Mg with his scheduled Methadone at 0500. Slept for 4.75 hours.

## 2025-04-07 NOTE — PLAN OF CARE
Team Note Due:  Wednesday    Assessment/Intervention/Current Symtoms and Care Coordination:  Chart review and met with team, discussed pt progress, symptomology, and response to treatment.  Discussed the discharge plan and any potential impediments to discharge.    -Received an email from Zenon's  stating she is planning to visit on Tuesday at 2:30pm. She asked about discharge planning because she worries about Zenon discharging to a shelter. She reported Zenon has made it clear that he will end his life if he goes to a shelter. She continues to recommend commitment for Zenon and would like to meet tomorrow to discuss discharge planning. She reported Zenon was open to an IRTS when she spoke with him today.      Discharge Plan or Goal:  TBD     Barriers to Discharge:  Medication Management  ECT     Referral Status:  None currently      Legal Status:  Voluntary       Contacts (include TIERNEY status):  Methadone Management: [facility] daily     /ACT Team: TIERNEY signed  Name/Clinic: [facility]    Number: [phone]      Upcoming Meetings and Dates/Important Information and next steps:   visit- Tuesday, time TBD

## 2025-04-07 NOTE — PLAN OF CARE
"Goal Outcome Evaluation:    Plan of Care Reviewed With: patient      Problem: Suicide Risk  Goal: Absence of Self-Harm  Note: Zenon presented with a tense affect. He reported high anxiety and stated depression was \"not bad.\" He requested and received haldol for agitation at 0730 and expressed irritation about a peer. Pt reported he still has SI and needs housing, \"I still believe the only way to get out of my situation is to kill myself.\" He stated he is not interested in IRTS or CD treatment. Pt was in and out of his room during the shift.   Outcome: Progressing    Temp: 97.7  F (36.5  C) Temp src: Temporal BP: 127/90 Pulse: 83     SpO2: 98 % O2 Device: None (Room air)      Intervention: Assess Risk to Self and Maintain Safety  Recent Flowsheet Documentation  Taken 4/7/2025 0900 by Aida Armando RN  Behavior Management: behavioral plan reviewed  Intervention: Promote Psychosocial Wellbeing  Recent Flowsheet Documentation  Taken 4/7/2025 0900 by Aida Armando RN  Supportive Measures:   active listening utilized   decision-making supported   positive reinforcement provided   self-responsibility promoted   verbalization of feelings encouraged     "

## 2025-04-07 NOTE — PROGRESS NOTES
----------------------------------------------------------------------------------------------------------  [facility]  Psychiatry Progress Note  Hospital Day #20     Interim History:     The patient's care was discussed with the treatment team and chart notes were reviewed.    Identifier: 53 yo male with PSUD  [benzos, opiates, meth, THC, nicotine], severe depression with SI, BPD and PTSD admitted from medicine 03/18/2025 due to concern for SI with plan and intent to overdose on fentanyl on his bday 3/22 in the context of medication. This is in the context of medication non adherence, substance use and housing/financial insecurity. Was planning to start ECT, unsure of plan now as he refused it 3/31..    Vitals: /87 (BP Location: Right arm, Patient Position: Sitting, Cuff Size: Adult Regular)   Pulse 81   Temp 97.7  F (36.5  C) (Temporal)   Resp 15   Wt 90.9 kg (200 lb 4.8 oz)   SpO2 98%   BMI 27.95 kg/m    Sleep: 4.75 hours (04/07/25 0630)  Scheduled medications: Took all scheduled medications as prescribed  Psychiatric PRN medications:   Last 24H PRN:     cyclobenzaprine (FLEXERIL) tablet 10 mg, 10 mg at 04/07/25 0504    haloperidol (HALDOL) tablet 2 mg, 2 mg at 04/07/25 0731 **OR** haloperidol lactate (HALDOL) injection 2 mg    melatonin tablet 3 mg, 3 mg at 04/06/25 2004    nicotine (NICORETTE) lozenge 4 mg, 4 mg at 04/07/25 1406      Staff Report:   Zenon presents with labile mood over the weekend, irritable and verbally agitated towards the healthcare staff, but redirectable.  Slammed the door on the 4/5, needed an xray of right hand to r/o fracture. X-ray came back negative. Please see staff notes for details.      Subjective:     Patient Interview:  Zenon MICHAEL HamptonTruong mentions to be doing okay today. He is still anxious but less over the weekend. Team addresses up titration in his Bupropion and no changes in current methadone dose due to interactions  "between medications. He seems understanding. We talk about that we will observe him for a couple of days and then we will be able to discharge him. He is okay with plan and is agreeable to go to a shelter. When ask about other options for placement, he mentions that no toher places worked for him in the past. He is not \"gonna go to a CDT facility for the 25th time\" or a sober house as \"they also do drugs there\". While he mentions he wants to stay sober, he also knows how difficult his situation is, especially while dealing with trauma. He mentions how he just want to be discharge and go and re-start using BZD as is the \"only thing that worked and helped him in the past to deal with anxiety\". He also mentions he could do it \"the normal way or his way\". He further adds, that he probably will do it his way, even though is not the healthier way, as in \"getting the pills from the black market\", because everytime he goes to the hospital for help, \"they treat him like an addict\". He currently denies SI, A/V/H. Zenon mentions that he wants to stop the haldol as he find difficult to urinate afterwards. Team is agreeable with that. Zenon does not raise any other concerns at the moment.    ROS:  Patient reports urinary retention  Patient denies acute concerns     Objective:     Vitals:  /87 (BP Location: Right arm, Patient Position: Sitting, Cuff Size: Adult Regular)   Pulse 81   Temp 97.7  F (36.5  C) (Temporal)   Resp 15   Wt 90.9 kg (200 lb 4.8 oz)   SpO2 98%   BMI 27.95 kg/m      Allergies:  Allergies   Allergen Reactions    Iodinated Contrast Media Hives and Rash     Reports he is allergic to Iodine contrast     Reports he is allergic to Iodine contrast       Reports he is allergic to Iodine contrast    Reports he is allergic to Iodine contrast      Reports he is allergic to Iodine contrast   Reports he is allergic to Iodine contrast    Iodine Hives    Lisinopril Hives and Rash    Lithium Hives    Topiramate " Hives    Escitalopram Rash     Rash around mouth    Iodine-131 Hives    Trazodone Anxiety     Trazodone causes RLS for pt    Olanzapine Rash     Pt reported taking approximately one year ago. Developed rash around mouth.       Current Medications:  Scheduled:  Current Facility-Administered Medications   Medication Dose Route Frequency Provider Last Rate Last Admin    acetaminophen (TYLENOL) tablet 650 mg  650 mg Oral Q4H PRN Patti Bernard MD   650 mg at 04/04/25 1722    alum & mag hydroxide-simethicone (MAALOX) suspension 30 mL  30 mL Oral Q4H PRN Patti Bernard MD        [START ON 4/8/2025] buPROPion (WELLBUTRIN XL) 24 hr tablet 300 mg  300 mg Oral Daily Rupinder Bradford MD        cloNIDine (CATAPRES) tablet 0.1 mg  0.1 mg Oral TID PRN Tracy Castillo MD   0.1 mg at 04/05/25 1817    cyclobenzaprine (FLEXERIL) tablet 10 mg  10 mg Oral TID PRN Patti Bernard MD   10 mg at 04/07/25 0504    dicyclomine (BENTYL) capsule 10 mg  10 mg Oral 4x Daily PRN Suad Coyle MD        folic acid (FOLVITE) tablet 1 mg  1 mg Oral Daily Patti Bernard MD   1 mg at 04/07/25 0731    gabapentin (NEURONTIN) tablet 1,200 mg  1,200 mg Oral TID Sunni Lorenz MD   1,200 mg at 04/07/25 1406    haloperidol (HALDOL) tablet 2 mg  2 mg Oral BID PRN Sunni Lorenz MD   2 mg at 04/07/25 0731    Or    haloperidol lactate (HALDOL) injection 2 mg  2 mg Intramuscular BID PRN Sunni Lorenz MD        Hold Medications for ECT (select and list medications to be held)   Does not apply HOLD Karen Ribera MD        loperamide (IMODIUM) capsule 2 mg  2 mg Oral 4x Daily PRN Suad Coyle MD   2 mg at 03/28/25 0858    melatonin tablet 3 mg  3 mg Oral At Bedtime PRN Patti Bernard MD   3 mg at 04/06/25 2004    methadone (DOLOPHINE-INTENSOL) 10 MG/ML (HIGH CONC) solution 135 mg  135 mg Oral Daily Dilshad Bailey MD   135 mg at 04/07/25 0503    midazolam 5  mg/mL (VERSED) intranasal solution 10 mg  10 mg Intranasal Once PRN Tracy Castillo MD        And    mucosal atomization device #  device 1 Device  1 Device Inhalation DOES NOT GO TO Tracy Thomson MD        mirtazapine (REMERON) tablet 45 mg  45 mg Oral At Bedtime Demetri Sarabia MD   45 mg at 04/06/25 2004    multivitamin w/minerals (THERA-VIT-M) tablet 1 tablet  1 tablet Oral Daily Patti Bernard MD   1 tablet at 04/07/25 0731    naloxone (NARCAN) injection 0.2 mg  0.2 mg Intravenous Q2 Min PRN Charles Menjivar MD        Or    naloxone (NARCAN) injection 0.4 mg  0.4 mg Intravenous Q2 Min PRN Charles Menjivar MD        Or    naloxone (NARCAN) injection 0.2 mg  0.2 mg Intramuscular Q2 Min PRN Charles Menjivar MD        Or    naloxone (NARCAN) injection 0.4 mg  0.4 mg Intramuscular Q2 Min PRN Charles Menjivar MD        nicotine (NICORETTE) lozenge 4 mg  4 mg Buccal Q1H PRN Patti Bernard MD   4 mg at 04/07/25 1406    polyethylene glycol (MIRALAX) Packet 17 g  17 g Oral Daily PRN Patti Bernard MD        thiamine (B-1) tablet 100 mg  100 mg Oral Daily Patti Bernard MD   100 mg at 04/07/25 0731       PRN:  Current Facility-Administered Medications   Medication Dose Route Frequency Provider Last Rate Last Admin    acetaminophen (TYLENOL) tablet 650 mg  650 mg Oral Q4H PRN Patti Bernard MD   650 mg at 04/04/25 1722    alum & mag hydroxide-simethicone (MAALOX) suspension 30 mL  30 mL Oral Q4H PRN Patti Bernard MD        [START ON 4/8/2025] buPROPion (WELLBUTRIN XL) 24 hr tablet 300 mg  300 mg Oral Daily Rupinder Bradford MD        cloNIDine (CATAPRES) tablet 0.1 mg  0.1 mg Oral TID PRN Tracy Castillo MD   0.1 mg at 04/05/25 1817    cyclobenzaprine (FLEXERIL) tablet 10 mg  10 mg Oral TID PRN Patti Bernard MD   10 mg at 04/07/25 0504    dicyclomine (BENTYL) capsule 10 mg   10 mg Oral 4x Daily PRN Suad Coyle MD        folic acid (FOLVITE) tablet 1 mg  1 mg Oral Daily Patti Bernard MD   1 mg at 04/07/25 0731    gabapentin (NEURONTIN) tablet 1,200 mg  1,200 mg Oral TID Sunni Lorenz MD   1,200 mg at 04/07/25 1406    haloperidol (HALDOL) tablet 2 mg  2 mg Oral BID PRN Sunni Lorenz MD   2 mg at 04/07/25 0731    Or    haloperidol lactate (HALDOL) injection 2 mg  2 mg Intramuscular BID PRN Sunni Lorenz MD        Hold Medications for ECT (select and list medications to be held)   Does not apply HOLD Karen Ribera MD        loperamide (IMODIUM) capsule 2 mg  2 mg Oral 4x Daily PRN Suad Coyle MD   2 mg at 03/28/25 0858    melatonin tablet 3 mg  3 mg Oral At Bedtime PRN Patti Bernard MD   3 mg at 04/06/25 2004    methadone (DOLOPHINE-INTENSOL) 10 MG/ML (HIGH CONC) solution 135 mg  135 mg Oral Daily Dilshad Bailey MD   135 mg at 04/07/25 0503    midazolam 5 mg/mL (VERSED) intranasal solution 10 mg  10 mg Intranasal Once PRN Tracy Castillo MD        And    mucosal atomization device #  device 1 Device  1 Device Inhalation DOES NOT GO TO Tracy Thomson MD        mirtazapine (REMERON) tablet 45 mg  45 mg Oral At Bedtime Demetri Sarabia MD   45 mg at 04/06/25 2004    multivitamin w/minerals (THERA-VIT-M) tablet 1 tablet  1 tablet Oral Daily Patti Bernard MD   1 tablet at 04/07/25 0731    naloxone (NARCAN) injection 0.2 mg  0.2 mg Intravenous Q2 Min PRN Charles Menjivar MD        Or    naloxone (NARCAN) injection 0.4 mg  0.4 mg Intravenous Q2 Min PRN Charles Menjivar MD        Or    naloxone (NARCAN) injection 0.2 mg  0.2 mg Intramuscular Q2 Min PRN Charles Menjivar MD        Or    naloxone (NARCAN) injection 0.4 mg  0.4 mg Intramuscular Q2 Min PRN Charles Menjivar MD        nicotine (NICORETTE) lozenge 4 mg  4 mg Buccal Q1H PRN Marco Antonio,  "MD Patti   4 mg at 04/07/25 1406    polyethylene glycol (MIRALAX) Packet 17 g  17 g Oral Daily PRN Patti Bernard MD        thiamine (B-1) tablet 100 mg  100 mg Oral Daily Patti Bernard MD   100 mg at 04/07/25 0731       Labs and Imaging:  New results:   No results found for this or any previous visit (from the past 24 hours).    Data this admission:  - CBC with chronic thrombocytopenia (platelet count 78)   - CMP with mildly elevated AST (47) and ALT (84)   - TSH normal  - UDS positive for amphetamines, cannabinoids, fentanyl  - Vit D in process  - Lipids not ordered this admission (but within normal limits on 2/26/2025)  - Vit B12 normal  - Folate normal  - EKG sinus bradycardia, QTc 396     Mental Status Exam:     Oriented to:  Grossly Oriented, Person/Self, and Situation  General:  Awake and Alert  Appearance:  Appears older than stated age Edentulous. Slightly disheveled, dressed in personal clothing.   Behavior/Attitude:  Laying in bed. Generally calm but short with answers, dismissive and verbally defensive.   Eye Contact:  Limited eye contact, looking down at his hands and occasionally looks up.   Psychomotor: Slightly tremulous, moving his feet  no catatonia present  Speech:  appropriate volume/tone, spontaneous, and with good articulation  Language: Fluent in English with appropriate syntax and vocabulary.  Mood:  \"Anxious\"  Affect:   restricted affect, irritable, labile  Thought Process:  ruminative, perseverative   Thought Content:   Denies SI/HI; No apparent delusions  Associations:  intact  Insight:  impaired due to his inability to recognize rational thoughts from irrational thought, like the scenarios of killing himself, hopelessness for his life  Judgment:  poor due to past behaviors and maladaptive coping    Impulse control: poor due to irritability and acting out on frustration (punching the wall, verbally abusive to staff)  Attention Span:  adequate for " conversation  Concentration:  grossly intact to conversation   Recent and Remote Memory:  not formally assessed but intact to conversation   Fund of Knowledge: average, estimated  Muscle Strength and Tone: normal  Gait and Station: Normal     Psychiatric Assessment     Zenon Truong is a 54 year old male with previous psychiatric diagnoses of polysubstance use disorder [benzodiazepines, opiates, methamphetamine, THC, nicotine], severe depression with suicidal ideation, borderline personality disorder and PTSD admitted 03/18/2025 due to concern for SI with plan and intent in the context of medication non adherence, substance use and housing/financial insecurity. On admission, he presented with symptoms of SI with intent and plan, hopelessness, poor motivation, low energy, anhedonia, impulsivity and irritability.      Had a recent hospitalization at [facility] 2/23-3/6/2025 for suicidal ideation and benzodiazepine overdose. ECT was considered and planned during that admission, but he decided against pursuing it at that time. Did not continue taking his meds after leaving.     There is genetic predisposition given family hx of substance use disorder. Patient has struggled with homelessness and severe substance use for years. This current admission is likely precipitated by recent psychiatric hospitalization at end of February followed by relapse soon after discharge to a sober house. Perpetuating factors include ongoing substance use, medication non adherence, longstanding functional impairment, personality traits, lack of perceived support, deconditioning and housing and financial insecurity. Patient's support system includes  . Based on patient's history and current symptoms, criteria are met for primary diagnosis of MDD without psychotic features, opioid use disorder, benzodiazepine use disorder, stimulant use disorder and cannabis use disorder. Patient warrants inpatient hospitalization to maintain  their safety and would benefit from medication optimization and outpatient referral/resources.       ECT had been discussed as one of Zenon's goals for this admission with the plan to start on 3/31. He declined on the intended start date as he did not think this was the right treatment for him. From chart review, it appears like this has occurred on previous admissions, with the plan to initiate ECT and Zenon declining prior to starting. He has been increasingly frustrated and irritable throughout his admission. It appears as though his depression has improved over the admission but he is still reporting significant anxiety and physical symptoms. He does have baseline chronic SI and still continues to express a desire to not wake up. It appears like his current presentation is more likely borderline personality with mood swings, irritability, impulsive behaviors such as slamming doors and difficulty with interpersonal relationships with staff . There is likely a component of withdrawal/cravings at play which may be contributing to his mood and irritability but borderline personality traits are more apparent now that he is physically stabilizing.      As of 4/1, it appears as though his borderline personality traits are contributing most to his current presentation. There is certainly a component of chronic SI that does pose significant risks to his discharge. However, he has not expressed attachment to a specific plan or intent. Given that he does not wish to initiate ECT, prolonged admission may not offer the most benefit at this point.      There is a significant impact of prior mistrust in the medical system. He does demonstrate a desire to commit to treatment and is apologetic for his outbursts in hindsight. His goal is to uptitrate his wellbutrin to his PTA dose of 300mg. We will make the according adjustments in the following days. He expresses set goals regarding up titration of methadone  and would like to  taper down if it does not improve his symptoms at 145 mg. His physical withdrawal symptoms are significantly improved, but affect is still labile and irritable due to anxiety. He does endorse restlessness and racing thoughts.  He has  persistent  nightmares stemming from a traumatic event when his fiance  5-6 years ago. To target withdrawal and current stimulant use ds, we will up titrate his current dose of bupropion while maintaining current methadone dose, due to interactions between the two. We will monitor for the following days, before planning for discharge, with goal of discharge on Wednesday. Zenon is agreeable to discharge plans and current treatment.     Today's changes:  - Increase Bupropion to 300 mg     Psychiatric Plan by Diagnosis      #Major depressive disorder, recurrent, severe without psychosis  #Borderline personality disorder.  - Declined ECT on 3/31  - Continue Mirtazapine 45 mg po at bedtime  - Increase Bupropion to 300 mg to help with stimulant use disorder symptoms.     #Opioid use disorder, severe  Due to his accounts of taking large amounts of fentanyl prior to admission,we discussed with addiction medicine team given duration and severity of his withdrawal. There is a high likelihood that the fentanyl he was taking was cut with other substances, including xylazine. Xylazine is an alpha 2 agonist used as a veterinary anesthetic but also commonly found in illicit opioids. We are working with addiction team to manage this.  - methadone 135mg daily (of note,  wellbutrin started, this can increase serum levels of the methadone)  - Addiction team following  - Thiamine, MVI, folic acid   For symptomatic management:  - 10 mg Flexeril TID PRN Q8H for muscle cramps, spasms, or tightness  - loperamide, bentyl     #Benzodiazepine use disorder, severe  Phenobarbital taper was discontinued on 3/26 per recommendation from addiction medicine team recommendation that his symptoms were likely  secondary to opiate withdrawal vs benzo withdrawal.   - Addiction team following  - scheduled clonidine 0.1mg TID changed to PRN (HR parameters to hold if <55) per patient request        #Stimulant (methamphetamine) use disorder  #Cannabis use disorder    #Nightmares  - add prazosin 1mg prn at bedtime      Additional Plans:  - Patient will be treated in therapeutic milieu with appropriate individual and group therapies as described  - CDT conversation defer for now  - Continue coordination with Addiction medicine regarding Methadone     Psychiatric Hospital Course:      Zenon Truong was admitted to [facility] as a voluntary patient. Had a recent hospitalization   Medications and other updates:  3/14: hold bupropion due to risk of lower seizure threshold  3/14: Continue PTA desvenlafaxine 25mg and mirtazapine 30mg for sleep  3/18: start clonidine 0.1mg TID   3/20: Increase mirtazapine to 45 mg daily for continued insomnia   3/21: restart PTA gabapentin 600mg TID for anxiety   3/21: continuing phenobarb taper (today is 32.4 mg BID)  3/23: no scheduled phenobarb received 32.4mg due to ongoing withdrawal symptoms  3/24: give one time phenobarbital dose of 32.4mg due to significant discomfort that seem at least in part due to withdrawal  3/24: consult addiction medicine for assistance with methadone dose and ongoing withdrawal symptoms   3/24: discussed re-initiating wellbutrin. However, patient reporting symptoms that he feels were similar to auras he has had prior to seizures (this has been worse the last couple days), so hesitant to do this at this time.  3/25: one time phenobarbital dose of 32.4mg given significant withdrawal symptoms  3/25: increase desvenlafaxine to 50mg daily (per chart review, was prescribed this dose outpatient)  3/26: increase methadone to 115 mg daily per addiction medicine recs   3/26: discontinue phenobarb taper   3/26: medicine consult for ECT clearance; Defer to psychiatry for  decision of proceeding with ECT with recent benzodiazepine withdrawal/use. Otherwise, no absolute medical contraindications to proceeding with ECT at this time. Treatment plan per psychiatry.   3/27: increase gabapentin to 900 TID per addiction med team recs   3.27: increase methadone to 125mg daily per addiction med recs given ongoing withdrawal symptoms   3/27: pending ECT team recs regarding methadone on ECT days  3/31: declined ECT on intended start day  4/1: increase methadone to 135mg daily per addiction med recs  4/1: changed scheduled clonidine 0.1mg TID to PRN per patient request  4/1: discontinue prn quetiapine and hydroxyzine per patient request  4/1: discontinue desvenlafaxine 50mg per patient request  4/1: restart PTA wellbutrin, was previously on 300mg, restart at 150mg XL  4/1: hypertensive with /140s but refused repeat vital signs  4/2: clonidine 0.1 prn given; BP 150s/110s on recheck after medication. Will offer another prn clonidine (he has been refusing clonidine)    4/3: /99 this morning  4/4: prazosin 1mg prn for nightmares  4/7 Increase Bupropion to 300 mg     The risks, benefits, alternatives, and side effects were discussed and understood by the patient.     Medical Assessment and Plan     #Palpitations  3/21 patient reporting short episodes (few seconds) of palpitations occurring intermittently over the last month. Does have shortness of breath with this, no chest pain or other symptoms. He is not sure if it is associated with anxiety.   - repeat EKG ordered showed sinus bradycardia with no significant change to prior EKG      #Chronic hepatitis C, never treated  #Mild transaminase elevations, hepatic steatosis:  Never treated for hepatitis C.  Chronic, mild transaminase elevations with normal hepatic function.  Most recent abdominal ultrasound 2/15/2024 showed hepatic steatosis without hepatic lesions, normal gallbladder.  Denies current alcohol use.  - monitor outpatient       #Chronic thrombocytopenia  #Normocytic anemia, now resolved  Chronic thrombocytopenia, moderate and stable.  Denies any history of bleeding including no history of gastric ulcers, hematemesis, melena or hematochezia.  Anemia appears to be more recent since 2/23/2025 when hemoglobin was 11.6, MCV 86.  Hgb 14.1, Plts 97, MCV 86, ferritin, iron, TIBC wnl on 3/15.      #Concern for malnutrition  Seen by dietician team, patient does not meet two of the established criteria necessary for diagnosing malnutrition but is at risk for malnutrition.   - ensure enlive ordered        Medical course: Patient was physically examined by the ED prior to being transferred to the unit and was found to be medically stable and appropriate for admission.      Consults:  Medicine (for clearance for ECT)  Addiction med (for methadone dosage and withdrawal symptoms)      Checklist     Legal Status: Voluntary   /ACT Team: TIERNEY signed  Name/Clinic: [facility]    Number: [phone]     Safety Assessment:   Behavioral Orders   Procedures    Code 1 - Restrict to Unit    Fall precautions    Routine Programming     As clinically indicated    Seizure precautions    Status 15     Every 15 minutes.    Suicide precautions: Suicide Risk: LOW; Clinical rationale to override score: lack of access to a plan for self-harm     .     Order Specific Question:   Suicide Risk     Answer:   LOW     Order Specific Question:   Clinical rationale to override score:     Answer:   lack of access to a plan for self-harm    Withdrawal precautions       Risk Assessment:  Risk for harm is low  Risk factors: SI, maladaptive coping, substance use, impulsive, and past behaviors  Protective factors: engaged in treatment      SIO: no     Disposition: discharge goal for 4/9     Attestations     This patient was seen and discussed with my attending physician.  Rupinder Maurice MD  [facility] Psychiatry  Resident  04/07/2025    This patient has been seen and evaluated by me, Charles Fink.  I have discussed this patient with the psychiatry resident and I agree with the findings and plan in this note.    I have reviewed today's vital signs, medications, labs and imaging.     Charles Menjivar MD

## 2025-04-08 PROCEDURE — 97150 GROUP THERAPEUTIC PROCEDURES: CPT | Mod: GO

## 2025-04-08 PROCEDURE — 124N000002 HC R&B MH UMMC

## 2025-04-08 PROCEDURE — 99232 SBSQ HOSP IP/OBS MODERATE 35: CPT | Mod: GC | Performed by: PSYCHIATRY & NEUROLOGY

## 2025-04-08 PROCEDURE — 250N000013 HC RX MED GY IP 250 OP 250 PS 637

## 2025-04-08 PROCEDURE — 250N000013 HC RX MED GY IP 250 OP 250 PS 637: Performed by: STUDENT IN AN ORGANIZED HEALTH CARE EDUCATION/TRAINING PROGRAM

## 2025-04-08 PROCEDURE — 90832 PSYTX W PT 30 MINUTES: CPT

## 2025-04-08 RX ORDER — BUPROPION HYDROCHLORIDE 300 MG/1
300 TABLET ORAL DAILY
Qty: 30 TABLET | Refills: 0 | Status: SHIPPED | OUTPATIENT
Start: 2025-04-09 | End: 2025-04-09

## 2025-04-08 RX ORDER — GABAPENTIN 600 MG/1
1200 TABLET ORAL 3 TIMES DAILY
Qty: 180 TABLET | Refills: 0 | Status: SHIPPED | OUTPATIENT
Start: 2025-04-08 | End: 2025-05-08

## 2025-04-08 RX ORDER — MIRTAZAPINE 45 MG/1
45 TABLET, FILM COATED ORAL AT BEDTIME
Qty: 30 TABLET | Refills: 0 | Status: SHIPPED | OUTPATIENT
Start: 2025-04-08 | End: 2025-04-16

## 2025-04-08 RX ADMIN — CYCLOBENZAPRINE 10 MG: 10 TABLET, FILM COATED ORAL at 14:03

## 2025-04-08 RX ADMIN — Medication 1 TABLET: at 07:29

## 2025-04-08 RX ADMIN — MIRTAZAPINE 45 MG: 45 TABLET, FILM COATED ORAL at 20:00

## 2025-04-08 RX ADMIN — NICOTINE POLACRILEX 4 MG: 4 LOZENGE ORAL at 12:31

## 2025-04-08 RX ADMIN — NICOTINE POLACRILEX 4 MG: 4 LOZENGE ORAL at 13:32

## 2025-04-08 RX ADMIN — THIAMINE HCL TAB 100 MG 100 MG: 100 TAB at 07:29

## 2025-04-08 RX ADMIN — NICOTINE POLACRILEX 4 MG: 4 LOZENGE ORAL at 19:34

## 2025-04-08 RX ADMIN — NICOTINE POLACRILEX 4 MG: 4 LOZENGE ORAL at 14:38

## 2025-04-08 RX ADMIN — GABAPENTIN 1200 MG: 600 TABLET, FILM COATED ORAL at 20:00

## 2025-04-08 RX ADMIN — CLONIDINE HYDROCHLORIDE 0.1 MG: 0.1 TABLET ORAL at 16:23

## 2025-04-08 RX ADMIN — NICOTINE POLACRILEX 4 MG: 4 LOZENGE ORAL at 15:44

## 2025-04-08 RX ADMIN — CYCLOBENZAPRINE 10 MG: 10 TABLET, FILM COATED ORAL at 05:09

## 2025-04-08 RX ADMIN — CLONIDINE HYDROCHLORIDE 0.1 MG: 0.1 TABLET ORAL at 11:19

## 2025-04-08 RX ADMIN — NICOTINE POLACRILEX 4 MG: 4 LOZENGE ORAL at 20:30

## 2025-04-08 RX ADMIN — NICOTINE POLACRILEX 4 MG: 4 LOZENGE ORAL at 07:29

## 2025-04-08 RX ADMIN — NICOTINE POLACRILEX 4 MG: 4 LOZENGE ORAL at 18:14

## 2025-04-08 RX ADMIN — GABAPENTIN 1200 MG: 600 TABLET, FILM COATED ORAL at 13:21

## 2025-04-08 RX ADMIN — CYCLOBENZAPRINE 10 MG: 10 TABLET, FILM COATED ORAL at 20:00

## 2025-04-08 RX ADMIN — NICOTINE POLACRILEX 4 MG: 4 LOZENGE ORAL at 06:34

## 2025-04-08 RX ADMIN — GABAPENTIN 1200 MG: 600 TABLET, FILM COATED ORAL at 07:29

## 2025-04-08 RX ADMIN — NICOTINE POLACRILEX 4 MG: 4 LOZENGE ORAL at 10:22

## 2025-04-08 RX ADMIN — METHADONE HYDROCHLORIDE 135 MG: 10 CONCENTRATE ORAL at 05:09

## 2025-04-08 RX ADMIN — NICOTINE POLACRILEX 4 MG: 4 LOZENGE ORAL at 11:22

## 2025-04-08 RX ADMIN — NICOTINE POLACRILEX 4 MG: 4 LOZENGE ORAL at 09:27

## 2025-04-08 RX ADMIN — FOLIC ACID 1 MG: 1 TABLET ORAL at 07:29

## 2025-04-08 RX ADMIN — NICOTINE POLACRILEX 4 MG: 4 LOZENGE ORAL at 16:49

## 2025-04-08 RX ADMIN — BUPROPION HYDROCHLORIDE 300 MG: 300 TABLET, EXTENDED RELEASE ORAL at 07:29

## 2025-04-08 RX ADMIN — Medication 3 MG: at 20:01

## 2025-04-08 ASSESSMENT — ACTIVITIES OF DAILY LIVING (ADL)
ADLS_ACUITY_SCORE: 26
LAUNDRY: WITH SUPERVISION
ADLS_ACUITY_SCORE: 26
DRESS: INDEPENDENT
LAUNDRY: WITH SUPERVISION
ADLS_ACUITY_SCORE: 26
ADLS_ACUITY_SCORE: 26
HYGIENE/GROOMING: INDEPENDENT
ADLS_ACUITY_SCORE: 26
HYGIENE/GROOMING: INDEPENDENT
ADLS_ACUITY_SCORE: 26
ORAL_HYGIENE: INDEPENDENT
ADLS_ACUITY_SCORE: 26
DRESS: INDEPENDENT
ORAL_HYGIENE: INDEPENDENT
ADLS_ACUITY_SCORE: 26
ADLS_ACUITY_SCORE: 26

## 2025-04-08 NOTE — PLAN OF CARE
"Individual Therapy Note      Date of Service: April 8, 2025    Patient: Zenon goes by \"Zenon,\" uses he/him pronouns    Individuals Present: PADMINI Rios    Session start: 1045  Session end: 1105  Session duration in minutes: 20      Modality Used: Person Centered, Rapport Building, and ACT    Goals: Identify behaviors that sabotage growth and incorporate healthier behaviors     Patient Description of current symptoms: Has been thinking, sees that his view of his anxiety is making it worse     Mental Status Exam:   Attitude: cooperative  Eye Contact: good  Mood: anxious, sad , depressed, and better  Affect: appropriate and in normal range and intensity is heightened  Speech: clear, coherent and pressured speech  Psychomotor Behavior: fidgeting  Thought Process:  logical, linear, and goal oriented  Associations: no loose associations  Thought Content: passive suicidal ideation present  Insight: fair  Judgement: limited  Attention Span and Concentration: fair    Pt progress: Pt requested to meet with writer for individual session. Compared to yesterday Pt appeared much more calm though still visibly anxious/restless, able to engage in discussion for 20 minutes. Pt reports he has had time to think and agrees with what writer said yesterday, that his view of his anxiety makes it \"worse.\" Discussed acceptance, resistance to distressing emotions, strategies to develop acceptance. Pt reports he wants to work on this. Pt shared his preference for going to CD tx versus a shelter, blamed his \"addict brain\" for wanting to go to shelter because he can easily use substances. Feels choosing CD tx is him choosing to live. Engaged in safety planning, Pt was cooperative and denied SI, crisis resources were provided, pt reports crisis lines have helped in the past.    Treatment Objective(s) Addressed:   The focus of this session was on safety planning, identifying an appropriate aftercare plan, building distress " tolerance, assessing safety, and building self-esteem     Progress Towards Goals and Assessment of Patient:   Patient is making progress towards treatment goals as evidenced by less agitated, more engaged and open.       Therapeutic Intervention(s):   Provided active listening, unconditional positive regard, and validation.   Engaged in safety planning identifying coping skills, warning signs, health support and resources, Explored motivation for behavioral change, and Engaged in cognitive restructuring/ reframing, looked at common cognitive distortions and challenged negative thoughts    Safety Plan: completed with patient and safety plan was printed out and copy given to the patient    Plan/next step: Writer will continue to check in with Pt, encouraged Pt to attend group sessions.      06486 - Psychotherapy (with patient) - 30 (16-37*) min    Patient Active Problem List   Diagnosis    Opioid use disorder, severe, on maintenance therapy (H)    Suicidal ideation    Severe benzodiazepine use disorder (H)    Substance-induced anxiety disorder (H)    Benzodiazepine withdrawal without complication (H)    Cannabis use disorder, moderate, dependence (H)    Cigarette nicotine dependence with withdrawal    Moderate episode of recurrent major depressive disorder (H)    Homelessness    Opioid withdrawal (H)    MDD (major depressive disorder), recurrent severe, without psychosis (H)    ALISSON (generalized anxiety disorder)

## 2025-04-08 NOTE — PLAN OF CARE
Problem: Sleep Disturbance  Goal: Adequate Sleep/Rest  Outcome: Progressing   Goal Outcome Evaluation:    Patient slept 5 Hours during the night. Safety checked was done every 15 minutes. At 0505 Patient came out of his room and request for Flexeril 10 mg  PRN was given for muscle spasm rated pain 6/10 on his back with Methadone 135 mg was given at 0509. Patient went back to his room. AT 0634 patient requested for Nicotine gum was given  No further concerned noted

## 2025-04-08 NOTE — PLAN OF CARE
SHIFT NURSING PLAN OF CARE  Problem: Suicide Risk  Goal: Absence of Self-Harm  Outcome: Progressing     Problem: Substance Withdrawal  Goal: Substance Withdrawal  Description: Signs and symptoms of listed problems will be absent or manageable.  Outcome: Progressing     Problem: Anxiety Signs/Symptoms  Goal: Improved Mood Symptoms (Anxiety Signs/Symptoms)  Outcome: Progressing  Flowsheets (Taken 4/8/2025 1724)  Mutually Determined Action Steps (Improved Mood Symptoms):   adheres to medication regimen   shares insight re: need for meds   Goal Outcome Evaluation:    Pt has been intermittently visible in the milieu; spent the shift coloring art work in his room and pacing in the hallway; endorsed passive suicidal thought with no plan and hopelessness; pt said that he is trying to stay positive even though he is not sure how to get out of his addiction. Pt is hoping to get in to CD treatment post discharge; denied SI,AVH and HI; rated anxiety 8/10 and received PRN Clonidine 0.1 mg  per request and reported anxiety comedown to 4/10; rated depression 3/10 and attributed to hopelessness. Denied issue with appetite and BM; scored 2 on COWS; medication compliant; denied medication side effect; VS stable except /92.

## 2025-04-08 NOTE — PLAN OF CARE
Problem: Adult Behavioral Health Plan of Care  Goal: Plan of Care Review  Outcome: Progressing  Flowsheets (Taken 4/8/2025 1111)  Plan of Care Reviewed With: patient  Overall Patient Progress: improving  Patient Agreement with Plan of Care: agrees  Goal: Optimized Coping Skills in Response to Life Stressors  Outcome: Progressing  Flowsheets (Taken 4/8/2025 1111)  Optimized Coping Skills in Response to Life Stressors: making progress toward outcome     Problem: Depressive Signs/Symptoms  Goal: Enhanced Social, Occupational or Functional Skills (Depressive Signs/Symptoms)  Recent Flowsheet Documentation  Taken 4/8/2025 1111 by Taylor Lynch RN  Mutually Determined Action Steps (Enhanced Social, Occupational or Functional Skills): identifies support resources      Goal Outcome Evaluation:      Plan of Care Reviewed With: patient    Overall Patient Progress: improvingOverall Patient Progress: improving     Zenon OOB throughout morning-appropriately social with staff and peers-attending grps-requested to speak with RN-began conversation explaining this is the part of him that wants to live which is often overwhelmed with depression and hopelessness and unable to fight for his life-expressed fear that if he discharges to shelter tomorrow it will only be a matter of time before he overdoses with Fentanyl-states he has been fighting for forty years to stay alive and that he doesn't want to give up now-requesting discharge to [facility] or [facility] notified- Zenon requested clonidine 0.1 mg PO sy 1119 for c/o anxiety with moderate effect-requested Flexeril 10 mg PO at 1403 which provides adequate relief of lower back spasms- 0830 COWS =5 due to shifting of weight and movements while in conversation-

## 2025-04-08 NOTE — PLAN OF CARE
Goal Outcome Evaluation:    Plan of Care Reviewed With: patient      Patient appeared calm most shift, but did complain of being anxious a couple times, requesting clonidine.  Pt requested Flexaril at 1745 for back pain which was effective. Pt's BP was 159/130 ~ 1900; but it came down to 133/84 at ~1930 after receiving second clonidine.   Pt seldom interacted with peers, but pleasant and polite on approach.

## 2025-04-08 NOTE — DISCHARGE INSTRUCTIONS
Behavioral Discharge Planning and Instructions    Summary: You were admitted on 3/18/2025  due to Borderline Personality Disorder and Suicidal Ideations.  You were treated by Charles Fink MD and discharged on *** from [facility]ter ***    Main Diagnosis: Borderline Personality Disorder, Opiod Use Disorder    Health Care Follow-up:     Psychiatry:  [facility]  [address] [place] [address]  P: [phone] F: [phone]  4/24/25 1pm, in person appointment     Therapy:  [facility]  [address] [place] [address]  P: [phone]    [facility]  [address] [place], [place]  P: [phone]      Patient Navigation Hub:   [facility] work to be your point-of-contact for trustworthy and compassionate care from Inpatient services to [facility] Programmatic Care. We will provide resources and communication to help guide you into programmatic care. Ultimately, our goal is to be the one-stop-shop of communication, coordination, and support for your journey to programmatic care.    Phone: [phone]    Information will be faxed to your outpatient providers to ensure a healthy continuity of care for you.     Attend all scheduled appointments with your outpatient providers. Call at least 24 hours in advance if you need to reschedule an appointment to ensure continued access to your outpatient providers.     Major Treatments, Procedures and Findings:  You were provided with: a psychiatric assessment, assessed for medical stability, medication evaluation and/or management, group therapy, individual therapy, and milieu management    Symptoms to Report: feeling more aggressive, losing more sleep, or mood getting worse    Early warning signs can include: increased thoughts or behaviors of suicide or self-harm     Safety and Wellness:  Take all medicines as directed.  Make no changes  unless your doctor suggests them.      Follow treatment recommendations.  Refrain from alcohol and non-prescribed drugs.  If there is a concern for safety, call 911.    Resources:   Mental Health Crisis Resources  Throughout [place]: call **CRISIS (**049064)  Crisis Text Line: is available for free, 24/7 by texting [place] to 852256  Suicide Awareness Voices of Education (SAVE) (www.save.org): 241-567-XRAB (7002)  The National Suicide Prevention Lifeline is now: 988 Suicide and Crisis Lifeline. Call 988 anytime.  National [place] on Mental Illness (www.[place].ant.org): 773.989.7977 or 813-040-1643.  Fmhb5geve: text the word LIFE to 31078 for immediate support and crisis intervention  Mental Health Consumer/Survivor Network of [place] (www.mhcsn.net): 607.173.9121 or 585-137-4541  Mental Health Association of [place] (www.mentalhealth.org): 341.428.6957 or 002-928-2201  Peer Support Connection [place] Warmline (PSC) 1-998.924.9390 Available from 5pm - 9am (7 days a week/365 days a year)  [facility] 1-311.540.5840 Community Outreach for Psych Emergencies  {:PHR,SUDAVSRESOURCES}    General Medication Instructions:   See your medication sheet(s) for instructions.   Take all medicines as directed.  Make no changes unless your doctor suggests them.   Go to all your doctor visits.  Be sure to have all your required lab tests. This way, your medicines can be refilled on time.  Do not use any drugs not prescribed by your doctor.  Avoid alcohol.    Advance Directives:   Scanned document on file with "[employer]? No scanned doc  Is document scanned? Pt states no documents  Honoring Choices Your Rights Handout: Informed and given  Was more information offered? Pt declined    The Treatment team has appreciated the opportunity to work with you. If you have any questions or concerns about your recent admission, you can contact the unit which can receive your call 24 hours a day, 7 days a week. They will be able to get in touch with a Provider if  needed. The unit number is [phone] .     Substance Abuse    Met with patient to discuss possible JOSIAH treatment options and to possibly complete an assessment. Assessment has been completed at this time and patient is being recommended:          Recommendations:   3.5 Clinically Managed Medium and High Intensity Residential Services     If patient discharges prior to placement being found patient should contact the below programs daily until placement is found.      Referrals/ Alternatives:  [place]  [place] Access Team for Referrals  Phone: [phone]  Fax: [phone]  [email or web address]        [facility]  Main: [phone]  Referral/Intake Line: [phone]  Fax: [phone]  [email or web address]        [facility] (I[facility] with sober housing options)  [address] [place] [address]  Phone: [phone]  fax: [phone]  [email or web address]

## 2025-04-08 NOTE — PLAN OF CARE
Team Note Due:  Wednesday    Assessment/Intervention/Current Symtoms and Care Coordination:  Chart review and met with team, discussed pt progress, symptomology, and response to treatment.  Discussed the discharge plan and any potential impediments to discharge.    -Spoke with RN who reported Zenon would like CD treatment. Asked team to put in a CD consult for an assessment.   -Met with Zenon to let him know about CD consult and he agreed. Also spoke to him regarding outpatient psychiatry, and he is open to anywhere. He would like to stay in the [place] area because he goes to [facility] addiction clinic, but he is unsure where he will end up.   -Called [facility] to schedule outpatient psychiatry. Appointment is listed on AVS.       Discharge Plan or Goal:  TBD     Barriers to Discharge:  Medication Management  ECT     Referral Status:  None currently      Legal Status:  Voluntary       Contacts (include TIERNEY status):  Methadone Management: [facility] daily     /ACT Team: TIERNEY signed  Name/Clinic: [facility]    Number: [phone]      Upcoming Meetings and Dates/Important Information and next steps:   visit- Tuesday, time TBD

## 2025-04-08 NOTE — PROGRESS NOTES
CD Consult acknowledged.  Staff will attempt to see pt for CD Consult Wednesday 4/9.  If pt discharges prior to consult, they can call [place] at [phone] to schedule an OP JOSIAH Assessment.     BLOSSOM Costello on 4/8/2025 at 4:13 PM

## 2025-04-08 NOTE — PLAN OF CARE
BEH IP Unit Acuity Rating Score (UARS)  Patient is given one point for every criteria they meet.    CRITERIA SCORING   On a 72 hour hold, court hold, committed, stay of commitment, or revocation. 0    Patient LOS on BEH unit exceeds 20 days. 1  LOS: 21   Patient under guardianship, 55+, otherwise medically complex, or under age 11. 0   Suicide ideation without relief of precipitating factors. 1   Current plan for suicide. 1   Current plan for homicide. 0   Imminent risk or actual attempt to seriously harm another without relief of factors precipitating the attempt. 0   Severe dysfunction in daily living (ex: complete neglect for self care, extreme disruption in vegetative function, extreme deterioration in social interactions). 1   Recent (last 7 days) or current physical aggression in the ED or on unit. 0   Restraints or seclusion episode in past 72 hours. 0   Recent (last 7 days) or current verbal aggression, agitation, yelling, etc., while in the ED or unit. 0   Active psychosis. 0   Need for constant or near constant redirection (from leaving, from others, etc).  0   Intrusive or disruptive behaviors. 0   Patient requires 3 or more hours of individualized nursing care per 8-hour shift (i.e. for ADLs, meds, therapeutic interventions). 0   TOTAL 4

## 2025-04-08 NOTE — PROGRESS NOTES
----------------------------------------------------------------------------------------------------------  [facility]  Psychiatry Progress Note  Hospital Day #21     Interim History:     The patient's care was discussed with the treatment team and chart notes were reviewed.    Identifier: 55 yo male with PSUD  [benzos, opiates, meth, THC, nicotine], severe depression with SI, BPD and PTSD admitted from medicine 03/18/2025 due to concern for SI with plan and intent to overdose on fentanyl on his bday 3/22 in the context of medication. This is in the context of medication non adherence, substance use and housing/financial insecurity. Was planning to start ECT, unsure of plan now as he refused it 3/31..    Vitals: /84   Pulse 84   Temp 97.5  F (36.4  C) (Temporal)   Resp 15   Wt 90.9 kg (200 lb 4.8 oz)   SpO2 99%   BMI 27.95 kg/m    Sleep: 5 hours (04/08/25 0620)  Scheduled medications: Took all scheduled medications as prescribed  Psychiatric PRN medications:   Last 24H PRN:     cloNIDine (CATAPRES) tablet 0.1 mg, 0.1 mg at 04/07/25 2005    cyclobenzaprine (FLEXERIL) tablet 10 mg, 10 mg at 04/08/25 0509    nicotine (NICORETTE) lozenge 4 mg, 4 mg at 04/08/25 0729      Staff Report:   Zenon presents with labile mood, but redirectable.  Requested PRN for anxiety and pain, no other events overnight. Please see staff notes for details.      Subjective:     Patient Interview:  Zenon mentions he would want to be here a couple of days and start CDT. He feels that if he goes to a shelter, and the despair he would feel, is like  getting the needle on his hand. So he wants to keep going sober. So he just don't want to be a burden. He mentions that even thought he does not like CDT he prefers that than ending his life. Team addressed the hard work he has been doing. He mentions that these thoughts ad desire to be clean is real and comes from his heart. He mentions that  he has to do it. He asks if he can do Buspar for anxiety and the team is agreeable. He is still anxious, but hopeful for the future.      ROS:  Patient reports urinary retention is better since stopping th haldol.  Patient denies acute concerns     Objective:     Vitals:  /84   Pulse 84   Temp 97.5  F (36.4  C) (Temporal)   Resp 15   Wt 90.9 kg (200 lb 4.8 oz)   SpO2 99%   BMI 27.95 kg/m      Allergies:  Allergies   Allergen Reactions    Iodinated Contrast Media Hives and Rash     Reports he is allergic to Iodine contrast     Reports he is allergic to Iodine contrast       Reports he is allergic to Iodine contrast    Reports he is allergic to Iodine contrast      Reports he is allergic to Iodine contrast   Reports he is allergic to Iodine contrast    Iodine Hives    Lisinopril Hives and Rash    Lithium Hives    Topiramate Hives    Escitalopram Rash     Rash around mouth    Iodine-131 Hives    Trazodone Anxiety     Trazodone causes RLS for pt    Olanzapine Rash     Pt reported taking approximately one year ago. Developed rash around mouth.       Current Medications:  Scheduled:  Current Facility-Administered Medications   Medication Dose Route Frequency Provider Last Rate Last Admin    acetaminophen (TYLENOL) tablet 650 mg  650 mg Oral Q4H PRN Patti Bernard MD   650 mg at 04/04/25 1722    alum & mag hydroxide-simethicone (MAALOX) suspension 30 mL  30 mL Oral Q4H PRN Patti Bernard MD        buPROPion (WELLBUTRIN XL) 24 hr tablet 300 mg  300 mg Oral Daily Rupinder Bradford MD   300 mg at 04/08/25 0729    cloNIDine (CATAPRES) tablet 0.1 mg  0.1 mg Oral TID PRN Tracy Castillo MD   0.1 mg at 04/07/25 2005    cyclobenzaprine (FLEXERIL) tablet 10 mg  10 mg Oral TID PRN Patti Bernard MD   10 mg at 04/08/25 0509    dicyclomine (BENTYL) capsule 10 mg  10 mg Oral 4x Daily PRN Suad Coyle MD        folic acid (FOLVITE) tablet 1 mg  1 mg Oral Daily  Patti Bernard MD   1 mg at 04/08/25 0729    gabapentin (NEURONTIN) tablet 1,200 mg  1,200 mg Oral TID Sunni Lorenz MD   1,200 mg at 04/08/25 0729    haloperidol (HALDOL) tablet 2 mg  2 mg Oral BID PRN Sunni Lorenz MD   2 mg at 04/07/25 0731    Or    haloperidol lactate (HALDOL) injection 2 mg  2 mg Intramuscular BID PRN Sunni Lorenz MD        Hold Medications for ECT (select and list medications to be held)   Does not apply HOLD Karen Ribera MD        loperamide (IMODIUM) capsule 2 mg  2 mg Oral 4x Daily PRN Suad Coyle MD   2 mg at 03/28/25 0858    melatonin tablet 3 mg  3 mg Oral At Bedtime PRN Patti Bernard MD   3 mg at 04/06/25 2004    methadone (DOLOPHINE-INTENSOL) 10 MG/ML (HIGH CONC) solution 135 mg  135 mg Oral Daily Dilshad Bailey MD   135 mg at 04/08/25 0509    midazolam 5 mg/mL (VERSED) intranasal solution 10 mg  10 mg Intranasal Once PRN Tracy Castillo MD        And    mucosal atomization device #  device 1 Device  1 Device Inhalation DOES NOT GO TO Tracy Thomson MD        mirtazapine (REMERON) tablet 45 mg  45 mg Oral At Bedtime Demetri Sarabia MD   45 mg at 04/07/25 2004    multivitamin w/minerals (THERA-VIT-M) tablet 1 tablet  1 tablet Oral Daily Patti Bernard MD   1 tablet at 04/08/25 0729    naloxone (NARCAN) injection 0.2 mg  0.2 mg Intravenous Q2 Min PRN Charles Menjivar MD        Or    naloxone (NARCAN) injection 0.4 mg  0.4 mg Intravenous Q2 Min PRN Charles Menjivar MD        Or    naloxone (NARCAN) injection 0.2 mg  0.2 mg Intramuscular Q2 Min PRN Charles Menjivar MD        Or    naloxone (NARCAN) injection 0.4 mg  0.4 mg Intramuscular Q2 Min PRN Charles Menjivar MD        nicotine (NICORETTE) lozenge 4 mg  4 mg Buccal Q1H PRN Patit Bernard MD   4 mg at 04/08/25 0729    polyethylene glycol (MIRALAX) Packet 17 g  17 g Oral Daily PRN  Patti Bernard MD        thiamine (B-1) tablet 100 mg  100 mg Oral Daily Patti Bernard MD   100 mg at 04/08/25 0729       PRN:  Current Facility-Administered Medications   Medication Dose Route Frequency Provider Last Rate Last Admin    acetaminophen (TYLENOL) tablet 650 mg  650 mg Oral Q4H PRN Patti Bernard MD   650 mg at 04/04/25 1722    alum & mag hydroxide-simethicone (MAALOX) suspension 30 mL  30 mL Oral Q4H PRN Patti Bernard MD        buPROPion (WELLBUTRIN XL) 24 hr tablet 300 mg  300 mg Oral Daily Rupinder Bradford MD   300 mg at 04/08/25 0729    cloNIDine (CATAPRES) tablet 0.1 mg  0.1 mg Oral TID PRN Tracy Castillo MD   0.1 mg at 04/07/25 2005    cyclobenzaprine (FLEXERIL) tablet 10 mg  10 mg Oral TID PRN Patti Bernard MD   10 mg at 04/08/25 0509    dicyclomine (BENTYL) capsule 10 mg  10 mg Oral 4x Daily PRN Suad Coyle MD        folic acid (FOLVITE) tablet 1 mg  1 mg Oral Daily Patti Bernard MD   1 mg at 04/08/25 0729    gabapentin (NEURONTIN) tablet 1,200 mg  1,200 mg Oral TID Sunni Lorenz MD   1,200 mg at 04/08/25 0729    haloperidol (HALDOL) tablet 2 mg  2 mg Oral BID PRN Sunni Lorenz MD   2 mg at 04/07/25 0731    Or    haloperidol lactate (HALDOL) injection 2 mg  2 mg Intramuscular BID PRN Sunni Lorenz MD        Hold Medications for ECT (select and list medications to be held)   Does not apply HOLD Karen Ribera MD        loperamide (IMODIUM) capsule 2 mg  2 mg Oral 4x Daily PRN Suad Coyle MD   2 mg at 03/28/25 0858    melatonin tablet 3 mg  3 mg Oral At Bedtime PRN Patti Bernard MD   3 mg at 04/06/25 2004    methadone (DOLOPHINE-INTENSOL) 10 MG/ML (HIGH CONC) solution 135 mg  135 mg Oral Daily Dilshad Bailey MD   135 mg at 04/08/25 0509    midazolam 5 mg/mL (VERSED) intranasal solution 10 mg  10 mg Intranasal Once PRN Tracy Castillo MD        And    mucosal  atomization device #  device 1 Device  1 Device Inhalation DOES NOT GO TO Tracy Thomson MD        mirtazapine (REMERON) tablet 45 mg  45 mg Oral At Bedtime Demetri Sarabia MD   45 mg at 04/07/25 2004    multivitamin w/minerals (THERA-VIT-M) tablet 1 tablet  1 tablet Oral Daily Patti Bernard MD   1 tablet at 04/08/25 0729    naloxone (NARCAN) injection 0.2 mg  0.2 mg Intravenous Q2 Min PRN Charles Menjivar MD        Or    naloxone (NARCAN) injection 0.4 mg  0.4 mg Intravenous Q2 Min PRN Charles Menjivar MD        Or    naloxone (NARCAN) injection 0.2 mg  0.2 mg Intramuscular Q2 Min PRN Charles Menjivar MD        Or    naloxone (NARCAN) injection 0.4 mg  0.4 mg Intramuscular Q2 Min PRN Charles Menjivar MD        nicotine (NICORETTE) lozenge 4 mg  4 mg Buccal Q1H PRN Patti Bernard MD   4 mg at 04/08/25 0729    polyethylene glycol (MIRALAX) Packet 17 g  17 g Oral Daily PRN Patti Bernard MD        thiamine (B-1) tablet 100 mg  100 mg Oral Daily Patti Bernard MD   100 mg at 04/08/25 0729       Labs and Imaging:  New results:   No results found for this or any previous visit (from the past 24 hours).    Data this admission:  - CBC with chronic thrombocytopenia (platelet count 78)   - CMP with mildly elevated AST (47) and ALT (84)   - TSH normal  - UDS positive for amphetamines, cannabinoids, fentanyl  - Vit D in process  - Lipids not ordered this admission (but within normal limits on 2/26/2025)  - Vit B12 normal  - Folate normal  - EKG sinus bradycardia, QTc 396     Mental Status Exam:     Oriented to:  Grossly Oriented, Person/Self, and Situation  General:  Awake and Alert  Appearance:  Appears older than stated age Edentulous. Dressed in personal clothing.   Behavior/Attitude:  Generally calm with anxious answers, but engage   Eye Contact:  Limited eye contact, looking down at his hands.  Psychomotor: Slightly tremulous,  "moving his feet  no catatonia present  Speech:  appropriate volume/tone, spontaneous, and with good articulation  Language: Fluent in English with appropriate syntax and vocabulary.  Mood:  \"I don't want to die\"  Affect:   congruent with mood, anxious, mildly labile  Thought Process:  ruminative, perseverative   Thought Content:   Denies SI/HI; No apparent delusions  Associations:  intact  Insight: limited due to poor coping, but goal oriented  Judgment:  poor due to past behaviors and maladaptive coping    Impulse control: poor due to irritability and acting out on frustration (punching the wall, verbally abusive to staff)  Attention Span:  adequate for conversation  Concentration:  grossly intact to conversation   Recent and Remote Memory:  not formally assessed but intact to conversation   Fund of Knowledge: average, estimated  Muscle Strength and Tone: normal  Gait and Station: Normal     Psychiatric Assessment     Zenon Truong is a 54 year old male with previous psychiatric diagnoses of polysubstance use disorder [benzodiazepines, opiates, methamphetamine, THC, nicotine], severe depression with suicidal ideation, borderline personality disorder and PTSD admitted 03/18/2025 due to concern for SI with plan and intent in the context of medication non adherence, substance use and housing/financial insecurity. On admission, he presented with symptoms of SI with intent and plan, hopelessness, poor motivation, low energy, anhedonia, impulsivity and irritability.      Had a recent hospitalization at [facility] 2/23-3/6/2025 for suicidal ideation and benzodiazepine overdose. ECT was considered and planned during that admission, but he decided against pursuing it at that time. Did not continue taking his meds after leaving.     There is genetic predisposition given family hx of substance use disorder. Patient has struggled with homelessness and severe substance use for years. This current admission is likely " precipitated by recent psychiatric hospitalization at end of February followed by relapse soon after discharge to a sober house. Perpetuating factors include ongoing substance use, medication non adherence, longstanding functional impairment, personality traits, lack of perceived support, deconditioning and housing and financial insecurity. Patient's support system includes  . Based on patient's history and current symptoms, criteria are met for primary diagnosis of MDD without psychotic features, opioid use disorder, benzodiazepine use disorder, stimulant use disorder and cannabis use disorder. Patient warrants inpatient hospitalization to maintain their safety and would benefit from medication optimization and outpatient referral/resources.       He does have baseline chronic SI and still continues to express a desire to not wake up. It appears like his current presentation is more likely borderline personality with mood swings, irritability, impulsive behaviors such as slamming doors and difficulty with interpersonal relationships with staff . There is likely a component of withdrawal/cravings at play which may be contributing to his mood and irritability but borderline personality traits are more apparent now that he is physically stabilizing.      As of 4/1, it appears as though his borderline personality traits are contributing most to his current presentation. There is certainly a component of chronic SI that does pose significant risks to his discharge. However, he has not expressed attachment to a specific plan or intent. Given that he does not wish to initiate ECT, prolonged admission may not offer the most benefit at this point.      There is a significant impact of prior mistrust in the medical system. He does demonstrate a desire to commit to treatment and is apologetic for his outbursts in hindsight. His physical withdrawal symptoms are significantly improved, but affect is still labile  and irritable due to anxiety. He does endorse restlessness and racing thoughts.  He has  persistent  nightmares stemming from a traumatic event when his fiance  5-6 years ago. To target withdrawal and current stimulant use ds, we will up titrate his current dose of bupropion while maintaining current methadone dose, due to interactions between the two. We will monitor for the following days, before planning for discharge, with goal of discharge on Wednesday. Zenon is agreeable to pursue CDT evaluation and is goal oriented regarding maintaining sobriety.     Today's changes:  - CDT consult     Psychiatric Plan by Diagnosis      #Major depressive disorder, recurrent, severe without psychosis  #Borderline personality disorder.  - Declined ECT on 3/31  - Continue Mirtazapine 45 mg po at bedtime  - Increase Bupropion to 300 mg to help with stimulant use disorder symptoms.     #Opioid use disorder, severe  Due to his accounts of taking large amounts of fentanyl prior to admission,we discussed with addiction medicine team given duration and severity of his withdrawal. There is a high likelihood that the fentanyl he was taking was cut with other substances, including xylazine. Xylazine is an alpha 2 agonist used as a veterinary anesthetic but also commonly found in illicit opioids. We are working with addiction team to manage this.  - methadone 135mg daily (of note,  wellbutrin started, this can increase serum levels of the methadone)  - Addiction team following  - Thiamine, MVI, folic acid   For symptomatic management:  - 10 mg Flexeril TID PRN Q8H for muscle cramps, spasms, or tightness  - loperamide, bentyl     #Benzodiazepine use disorder, severe  Phenobarbital taper was discontinued on 3/26 per recommendation from addiction medicine team recommendation that his symptoms were likely secondary to opiate withdrawal vs benzo withdrawal.   - Addiction team following  - scheduled clonidine 0.1mg TID changed to PRN (HR  parameters to hold if <55) per patient request        #Stimulant (methamphetamine) use disorder  #Cannabis use disorder    #Nightmares  - add prazosin 1mg prn at bedtime      Additional Plans:  - Patient will be treated in therapeutic milieu with appropriate individual and group therapies as described  - CDT  consult placed  - Continue coordination with Addiction medicine regarding Methadone     Psychiatric Hospital Course:      Zenon Truong was admitted to [facility] as a voluntary patient. Had a recent hospitalization   Medications and other updates:  3/14: hold bupropion due to risk of lower seizure threshold  3/14: Continue PTA desvenlafaxine 25mg and mirtazapine 30mg for sleep  3/18: start clonidine 0.1mg TID   3/20: Increase mirtazapine to 45 mg daily for continued insomnia   3/21: restart PTA gabapentin 600mg TID for anxiety   3/21: continuing phenobarb taper (today is 32.4 mg BID)  3/23: no scheduled phenobarb received 32.4mg due to ongoing withdrawal symptoms  3/24: give one time phenobarbital dose of 32.4mg due to significant discomfort that seem at least in part due to withdrawal  3/24: consult addiction medicine for assistance with methadone dose and ongoing withdrawal symptoms   3/24: discussed re-initiating wellbutrin. However, patient reporting symptoms that he feels were similar to auras he has had prior to seizures (this has been worse the last couple days), so hesitant to do this at this time.  3/25: one time phenobarbital dose of 32.4mg given significant withdrawal symptoms  3/25: increase desvenlafaxine to 50mg daily (per chart review, was prescribed this dose outpatient)  3/26: increase methadone to 115 mg daily per addiction medicine recs   3/26: discontinue phenobarb taper   3/26: medicine consult for ECT clearance; Defer to psychiatry for decision of proceeding with ECT with recent benzodiazepine withdrawal/use. Otherwise, no absolute medical contraindications to proceeding with ECT at  this time. Treatment plan per psychiatry.   3/27: increase gabapentin to 900 TID per addiction med team recs   3.27: increase methadone to 125mg daily per addiction med recs given ongoing withdrawal symptoms   3/27: pending ECT team recs regarding methadone on ECT days  3/31: declined ECT on intended start day  4/1: increase methadone to 135mg daily per addiction med recs  4/1: changed scheduled clonidine 0.1mg TID to PRN per patient request  4/1: discontinue prn quetiapine and hydroxyzine per patient request  4/1: discontinue desvenlafaxine 50mg per patient request  4/1: restart PTA wellbutrin, was previously on 300mg, restart at 150mg XL  4/1: hypertensive with /140s but refused repeat vital signs  4/2: clonidine 0.1 prn given; BP 150s/110s on recheck after medication. Will offer another prn clonidine (he has been refusing clonidine)    4/3: /99 this morning  4/4: prazosin 1mg prn for nightmares  4/7 Increase Bupropion to 300 mg  4/8: CDT consult placed     The risks, benefits, alternatives, and side effects were discussed and understood by the patient.     Medical Assessment and Plan     #Palpitations  3/21 patient reporting short episodes (few seconds) of palpitations occurring intermittently over the last month. Does have shortness of breath with this, no chest pain or other symptoms. He is not sure if it is associated with anxiety.   - repeat EKG ordered showed sinus bradycardia with no significant change to prior EKG      #Chronic hepatitis C, never treated  #Mild transaminase elevations, hepatic steatosis:  Never treated for hepatitis C.  Chronic, mild transaminase elevations with normal hepatic function.  Most recent abdominal ultrasound 2/15/2024 showed hepatic steatosis without hepatic lesions, normal gallbladder.  Denies current alcohol use.  - monitor outpatient      #Chronic thrombocytopenia  #Normocytic anemia, now resolved  Chronic thrombocytopenia, moderate and stable.  Denies any  history of bleeding including no history of gastric ulcers, hematemesis, melena or hematochezia.  Anemia appears to be more recent since 2/23/2025 when hemoglobin was 11.6, MCV 86.  Hgb 14.1, Plts 97, MCV 86, ferritin, iron, TIBC wnl on 3/15.      #Concern for malnutrition  Seen by dietician team, patient does not meet two of the established criteria necessary for diagnosing malnutrition but is at risk for malnutrition.   - ensure enlive ordered        Medical course: Patient was physically examined by the ED prior to being transferred to the unit and was found to be medically stable and appropriate for admission.      Consults:  Medicine (for clearance for ECT)  Addiction med (for methadone dosage and withdrawal symptoms)      Checklist     Legal Status: Voluntary   /ACT Team: TIERNEY signed  Name/Clinic: [facility]    Number: [phone]     Safety Assessment:   Behavioral Orders   Procedures    Code 1 - Restrict to Unit    Fall precautions    Routine Programming     As clinically indicated    Seizure precautions    Status 15     Every 15 minutes.    Suicide precautions: Suicide Risk: LOW; Clinical rationale to override score: lack of access to a plan for self-harm     .     Order Specific Question:   Suicide Risk     Answer:   LOW     Order Specific Question:   Clinical rationale to override score:     Answer:   lack of access to a plan for self-harm    Withdrawal precautions       Risk Assessment:  Risk for harm is low  Risk factors: SI, maladaptive coping, substance use, impulsive, and past behaviors  Protective factors: engaged in treatment      SIO: no     Disposition: discharge goal for 4/9     Attestations     This patient was seen and discussed with my attending physician.  Rupinder Maurice MD  [facility] Psychiatry Resident  04/08/2025    This patient has been seen and evaluated by me, Charles Fink.  I have discussed this patient with the psychiatry  resident and I agree with the findings and plan in this note.    I have reviewed today's vital signs, medications, labs and imaging.     Charles Menjivar MD

## 2025-04-08 NOTE — PROGRESS NOTES
"  Rehab Group    Start time: 1020  End time: 1150  Patient time total: 20 minutes    attended partial group    #6 attended   Group Type: occupational therapy   Group Topic Covered: coping skills     Group Session Detail:  OT clinic     Patient Response/Contribution:  cooperative with task, attentive, and actively engaged     Patient Detail:    Pt actively participated in occupational therapy clinic to facilitate coping skill exploration, creative expression within personally meaningful activities, and clinical observation of social, cognitive, and kinesthetic performance skills. Pt response: Independent to initiate, gather materials, sequence, and adjust to workspace demands as needed. Demonstrated fair attention to task and good attention to detail for selected detailed, goal-directed task. Able to ask for assistance as needed, and occasionally socialized with peers and staff. Expressed satisfaction with progress he was making on his task. Excused himself from group early to go walk the hallways and appeared somewhat restless. He briefly returned towards the end of group and informed a peer that he was planning on pursuing CD treatment rather than discharging tomorrow, and stated that he would \"relapse on fentanyl\" if he were to discharge tomorrow. Calm and pleasant in brief interactions.       [id number] OT Group (2 or more in attendance)      Patient Active Problem List   Diagnosis    Opioid use disorder, severe, on maintenance therapy (H)    Suicidal ideation    Severe benzodiazepine use disorder (H)    Substance-induced anxiety disorder (H)    Benzodiazepine withdrawal without complication (H)    Cannabis use disorder, moderate, dependence (H)    Cigarette nicotine dependence with withdrawal    Moderate episode of recurrent major depressive disorder (H)    Homelessness    Opioid withdrawal (H)    MDD (major depressive disorder), recurrent severe, without psychosis (H)    ALISSON (generalized anxiety disorder)       "

## 2025-04-09 PROCEDURE — 124N000002 HC R&B MH UMMC

## 2025-04-09 PROCEDURE — 250N000013 HC RX MED GY IP 250 OP 250 PS 637

## 2025-04-09 PROCEDURE — 250N000013 HC RX MED GY IP 250 OP 250 PS 637: Performed by: STUDENT IN AN ORGANIZED HEALTH CARE EDUCATION/TRAINING PROGRAM

## 2025-04-09 PROCEDURE — 99232 SBSQ HOSP IP/OBS MODERATE 35: CPT | Mod: GC | Performed by: PSYCHIATRY & NEUROLOGY

## 2025-04-09 PROCEDURE — 97150 GROUP THERAPEUTIC PROCEDURES: CPT | Mod: GO

## 2025-04-09 RX ORDER — NALOXONE HYDROCHLORIDE 0.4 MG/ML
0.2 INJECTION, SOLUTION INTRAMUSCULAR; INTRAVENOUS; SUBCUTANEOUS
Status: DISCONTINUED | OUTPATIENT
Start: 2025-04-09 | End: 2025-04-11

## 2025-04-09 RX ORDER — BUSPIRONE HYDROCHLORIDE 10 MG/1
10 TABLET ORAL 2 TIMES DAILY
Qty: 60 TABLET | Refills: 0 | Status: SHIPPED | OUTPATIENT
Start: 2025-04-09 | End: 2025-04-16

## 2025-04-09 RX ORDER — BUPROPION HYDROCHLORIDE 300 MG/1
300 TABLET ORAL DAILY
Qty: 30 TABLET | Refills: 0 | Status: SHIPPED | OUTPATIENT
Start: 2025-04-09 | End: 2025-05-09

## 2025-04-09 RX ORDER — BUSPIRONE HYDROCHLORIDE 10 MG/1
10 TABLET ORAL 2 TIMES DAILY
Status: DISCONTINUED | OUTPATIENT
Start: 2025-04-09 | End: 2025-04-15

## 2025-04-09 RX ORDER — NALOXONE HYDROCHLORIDE 0.4 MG/ML
0.4 INJECTION, SOLUTION INTRAMUSCULAR; INTRAVENOUS; SUBCUTANEOUS
Status: DISCONTINUED | OUTPATIENT
Start: 2025-04-09 | End: 2025-04-11

## 2025-04-09 RX ADMIN — NICOTINE POLACRILEX 4 MG: 4 LOZENGE ORAL at 14:23

## 2025-04-09 RX ADMIN — NICOTINE POLACRILEX 4 MG: 4 LOZENGE ORAL at 11:15

## 2025-04-09 RX ADMIN — METHADONE HYDROCHLORIDE 135 MG: 10 CONCENTRATE ORAL at 05:04

## 2025-04-09 RX ADMIN — CYCLOBENZAPRINE 10 MG: 10 TABLET, FILM COATED ORAL at 20:06

## 2025-04-09 RX ADMIN — NICOTINE POLACRILEX 4 MG: 4 LOZENGE ORAL at 16:42

## 2025-04-09 RX ADMIN — BUSPIRONE HYDROCHLORIDE 10 MG: 10 TABLET ORAL at 10:42

## 2025-04-09 RX ADMIN — NICOTINE POLACRILEX 4 MG: 4 LOZENGE ORAL at 06:28

## 2025-04-09 RX ADMIN — BUSPIRONE HYDROCHLORIDE 10 MG: 10 TABLET ORAL at 20:04

## 2025-04-09 RX ADMIN — CLONIDINE HYDROCHLORIDE 0.1 MG: 0.1 TABLET ORAL at 07:43

## 2025-04-09 RX ADMIN — NICOTINE POLACRILEX 4 MG: 4 LOZENGE ORAL at 10:03

## 2025-04-09 RX ADMIN — NICOTINE POLACRILEX 4 MG: 4 LOZENGE ORAL at 13:17

## 2025-04-09 RX ADMIN — NICOTINE POLACRILEX 4 MG: 4 LOZENGE ORAL at 18:46

## 2025-04-09 RX ADMIN — NICOTINE POLACRILEX 4 MG: 4 LOZENGE ORAL at 12:18

## 2025-04-09 RX ADMIN — CYCLOBENZAPRINE 10 MG: 10 TABLET, FILM COATED ORAL at 15:49

## 2025-04-09 RX ADMIN — GABAPENTIN 1200 MG: 600 TABLET, FILM COATED ORAL at 07:43

## 2025-04-09 RX ADMIN — GABAPENTIN 1200 MG: 600 TABLET, FILM COATED ORAL at 20:04

## 2025-04-09 RX ADMIN — CYCLOBENZAPRINE 10 MG: 10 TABLET, FILM COATED ORAL at 05:11

## 2025-04-09 RX ADMIN — ACETAMINOPHEN 650 MG: 325 TABLET, FILM COATED ORAL at 09:11

## 2025-04-09 RX ADMIN — NICOTINE POLACRILEX 4 MG: 4 LOZENGE ORAL at 15:28

## 2025-04-09 RX ADMIN — FOLIC ACID 1 MG: 1 TABLET ORAL at 07:43

## 2025-04-09 RX ADMIN — NICOTINE POLACRILEX 4 MG: 4 LOZENGE ORAL at 09:03

## 2025-04-09 RX ADMIN — BUPROPION HYDROCHLORIDE 300 MG: 300 TABLET, EXTENDED RELEASE ORAL at 07:43

## 2025-04-09 RX ADMIN — NICOTINE POLACRILEX 4 MG: 4 LOZENGE ORAL at 20:04

## 2025-04-09 RX ADMIN — NICOTINE POLACRILEX 4 MG: 4 LOZENGE ORAL at 17:42

## 2025-04-09 RX ADMIN — NICOTINE POLACRILEX 4 MG: 4 LOZENGE ORAL at 07:46

## 2025-04-09 RX ADMIN — MIRTAZAPINE 45 MG: 45 TABLET, FILM COATED ORAL at 20:06

## 2025-04-09 RX ADMIN — Medication 3 MG: at 20:05

## 2025-04-09 RX ADMIN — GABAPENTIN 1200 MG: 600 TABLET, FILM COATED ORAL at 13:17

## 2025-04-09 RX ADMIN — POLYETHYLENE GLYCOL 3350 17 G: 17 POWDER, FOR SOLUTION ORAL at 07:53

## 2025-04-09 ASSESSMENT — ACTIVITIES OF DAILY LIVING (ADL)
ADLS_ACUITY_SCORE: 26
ADLS_ACUITY_SCORE: 26
DRESS: INDEPENDENT
ORAL_HYGIENE: INDEPENDENT
ADLS_ACUITY_SCORE: 26
HYGIENE/GROOMING: INDEPENDENT
ADLS_ACUITY_SCORE: 26
LAUNDRY: WITH SUPERVISION
ADLS_ACUITY_SCORE: 26

## 2025-04-09 NOTE — PROGRESS NOTES
Rehab Group    Start time: 1015  End time: 1115  Patient time total: 15 minutes    attended partial group    #7 attended   Group Type: occupational therapy   Group Topic Covered: coping skills     Group Session Detail:  OT clinic     Patient Response/Contribution:  cooperative with task, organized, attentive, and actively engaged     Patient Detail:    Pt actively participated in occupational therapy clinic to facilitate coping skill exploration, creative expression within personally meaningful activities, and clinical observation of social, cognitive, and kinesthetic performance skills. Pt response: Independent to initiate, gather materials, sequence, and adjust to workspace demands as needed. Demonstrated fair attention to task (x15 minutes) and good attention to detail for selected detailed, goal-directed task. Able to ask for assistance as needed, and socialized with peers and staff. Future-oriented in conversation, specifically sharing that he was feeling optimistic about CD treatment referrals that were being sent out for him today. Calm, pleasant, and engaged.      [id number] OT Group (2 or more in attendance)      Patient Active Problem List   Diagnosis    Opioid use disorder, severe, on maintenance therapy (H)    Suicidal ideation    Severe benzodiazepine use disorder (H)    Substance-induced anxiety disorder (H)    Benzodiazepine withdrawal without complication (H)    Cannabis use disorder, moderate, dependence (H)    Cigarette nicotine dependence with withdrawal    Moderate episode of recurrent major depressive disorder (H)    Homelessness    Opioid withdrawal (H)    MDD (major depressive disorder), recurrent severe, without psychosis (H)    ALISSON (generalized anxiety disorder)

## 2025-04-09 NOTE — PLAN OF CARE
04/09/25 1145   Individualization/Patient Specific Goals   Patient Personal Strengths insight into illness/situation;motivated for recovery   Patient Vulnerabilities substance abuse/addiction;housing insecurity   Interprofessional Rounds   Participants CTC;nursing;psychiatrist   Behavioral Team Discussion   Progress Ongoing symptoms   Anticipated length of stay 5-7 days   Continued Stay Criteria/Rationale Medication management, safe disposition   Medical/Physical See H&P   Precautions See below   Plan Psychiatric assessment/Medication management. Therapeutic Milieu. Individual care planning and after care planning. Patient to participate in unit groups and activities. Individual and group support on unit.   Rationale for change in precautions or plan N/A   Safety Plan Per unit protocol   Anticipated Discharge Disposition substance use treatment         PRECAUTIONS AND SAFETY    Behavioral Orders   Procedures    Code 1 - Restrict to Unit    Fall precautions    Routine Programming     As clinically indicated    Seizure precautions    Status 15     Every 15 minutes.    Suicide precautions: Suicide Risk: LOW; Clinical rationale to override score: lack of access to a plan for self-harm     .     Order Specific Question:   Suicide Risk     Answer:   LOW     Order Specific Question:   Clinical rationale to override score:     Answer:   lack of access to a plan for self-harm    Withdrawal precautions       Safety  Safety WDL: WDL  Patient Location: hallway  Observed Behavior: pacing  Observed Behavior (Comment): sleeping  Safety Measures: environmental rounds completed, safety rounds completed, suicide assessment completed  Diversional Activity: music, television, other (see comments) (walking)  Suicidality: Status 15  Withdrawal: monitor withdrawal process  Seizure precautions: calm, consistent lighting  Elopement Interventions: status 15, no shoes, room away from unit doors  Sexual:  (fall precaution in  place)  Additional Documentation:  (fall precaution in place)

## 2025-04-09 NOTE — PLAN OF CARE
Problem: Adult Behavioral Health Plan of Care  Goal: Plan of Care Review  Outcome: Progressing  Flowsheets  Taken 4/9/2025 1838  Plan of Care Reviewed With: patient  Patient Agreement with Plan of Care: agrees  Taken 4/9/2025 1553  Patient Agreement with Plan of Care: agrees     Problem: Suicide Risk  Goal: Absence of Self-Harm  Outcome: Progressing  Intervention: Assess Risk to Self and Maintain Safety  Recent Flowsheet Documentation  Taken 4/9/2025 1553 by Samantha Hackett RN  Behavior Management: behavioral plan reviewed  Intervention: Promote Psychosocial Wellbeing  Recent Flowsheet Documentation  Taken 4/9/2025 1553 by Samantha Hackett RN  Supportive Measures:   active listening utilized   decision-making supported   positive reinforcement provided   self-responsibility promoted   verbalization of feelings encouraged   Goal Outcome Evaluation:    Plan of Care Reviewed With: patient Plan of Care Reviewed With: patient  Pt is seen in the milieu, social with peers, spent time watching TV with peers at the lounge, pt reports mood as good. Denies SI/SIB/hallucinations and depression, pt reports anxiety of 4/10, chronic back pain of 5/10. Pt utilized flexeril PRN which was helpful. Pt contracts for safety.  PRN- nicotine lozenge x3, melatonin and flexeril administered.
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
## 2025-04-09 NOTE — PLAN OF CARE
Team Note Due:  Wednesday    Assessment/Intervention/Current Symtoms and Care Coordination:  Chart review and met with team, discussed pt progress, symptomology, and response to treatment.  Discussed the discharge plan and any potential impediments to discharge.    -Zenon completed CD assessment today. They are sending referrals to [facility], [facility], and [facility]. Zenon said he is hoping to go to [facility] or [facility]. He said he's been to a [facility] program in the past and everyone there was still using which made it difficult for him to remain in the program. He said he's serious about getting help and is motivated for recovery.   -Received an email from [facility] admissions stating Zenon was accepted to their [place] or [facility] location. Informed Zenon of this, and he said he'd rather go to their [place] location. He would like to see if [facility] accepts him before making any final decisions on placement.      Discharge Plan or Goal:  TBD     Barriers to Discharge:  Medication Management  ECT     Referral Status:  None currently      Legal Status:  Voluntary       Contacts (include TIERNEY status):  Methadone Management: [facility] daily     /ACT Team: TIERNEY signed  Name/Clinic: Stephanie [facility]/ [facility]    Number: [phone]      Upcoming Meetings and Dates/Important Information and next steps:   visit- Tuesday, time TBD

## 2025-04-09 NOTE — PLAN OF CARE
"Goal Outcome Evaluation:    Plan of Care Reviewed With: patient      Problem: Suicide Risk  Goal: Absence of Self-Harm  Outcome: Progressing  Note: Zenon was pleasant and calm on approach. He reported to feeling \"ambitious\" and \"focused.\" He stated he knows CD treatment is what he needs to do and hopes to be set up with a [facility] house when he completes it. He endorsed moderate anxiety and depression and \"fleeting\" SI. Pt stated he still feels hopeless and has a lot of uncertainty about the future. He said he constantly has racing thoughts, which makes it difficult for him to concentrate. He removed himself from the lounge and went back to his room when two pt's were yelling at each other. Pt requested and received clonidine for anxiety at 0743 with reported effect, tylenol for a headache with reported effect and miralax for constipation.     Temp: 97.1  F (36.2  C) Temp src: Temporal BP: (!) 145/92 Pulse: 90   Resp: 14 SpO2: 98 % O2 Device: None (Room air)      Intervention: Assess Risk to Self and Maintain Safety  Recent Flowsheet Documentation  Taken 4/9/2025 0800 by Aida Armando RN  Behavior Management: behavioral plan reviewed  Intervention: Promote Psychosocial Wellbeing  Recent Flowsheet Documentation  Taken 4/9/2025 0800 by Aida Armando RN  Supportive Measures:   active listening utilized   decision-making supported   positive reinforcement provided   self-responsibility promoted   verbalization of feelings encouraged     "

## 2025-04-09 NOTE — PROGRESS NOTES
Received emails from intake at [facility] and [facility].  [facility] patient has been approved for [facility]: Declined patient due to not accepting [facility].     Scot Rey [facility] on 4/9/2025 at 4:00 PM

## 2025-04-09 NOTE — CONSULTS
Met with patient to discuss possible [facility]OS[facility] treatment options and to possibly complete an assessment. Assessment has been completed at this time and patient is being recommended:         Recommendations:   3.5 Clinically Managed Medium and High Intensity Residential Services       Referrals/ Alternatives:  [facility]  [place] Access Team for Referrals  Phone: [phone]  Fax: [phone]  [email or web address]        [facility]  Main: [phone]  Referral/Intake Line: [phone]  Fax: [phone]  [email or web address]        [facility] (I[facility] with sober housing options)  [address] [place], [place] [id number]  Phone: [phone]  fax: [phone]  [email or web address]    Referrals are being made at this time.  Patient is aware of the referrals and is in agreement. Patient gave verbal TIERNEY's to the above programs.     BLOSSOM Costello on 4/9/2025 at 10:38 AM

## 2025-04-09 NOTE — PLAN OF CARE
BEH IP Unit Acuity Rating Score (UARS)  Patient is given one point for every criteria they meet.    CRITERIA SCORING   On a 72 hour hold, court hold, committed, stay of commitment, or revocation. 0    Patient LOS on BEH unit exceeds 20 days. 1  LOS: 22   Patient under guardianship, 55+, otherwise medically complex, or under age 11. 0   Suicide ideation without relief of precipitating factors. 1   Current plan for suicide. 1   Current plan for homicide. 0   Imminent risk or actual attempt to seriously harm another without relief of factors precipitating the attempt. 0   Severe dysfunction in daily living (ex: complete neglect for self care, extreme disruption in vegetative function, extreme deterioration in social interactions). 1   Recent (last 7 days) or current physical aggression in the ED or on unit. 0   Restraints or seclusion episode in past 72 hours. 0   Recent (last 7 days) or current verbal aggression, agitation, yelling, etc., while in the ED or unit. 0   Active psychosis. 0   Need for constant or near constant redirection (from leaving, from others, etc).  0   Intrusive or disruptive behaviors. 0   Patient requires 3 or more hours of individualized nursing care per 8-hour shift (i.e. for ADLs, meds, therapeutic interventions). 0   TOTAL 4

## 2025-04-09 NOTE — PLAN OF CARE
Goal Outcome Evaluation:  Problem: Sleep Disturbance  Goal: Adequate Sleep/Rest  Outcome: Progressing     Pt asleep at the start of the shift respirations appeared even and non labored. Safety checks q15 minutes. Pt woke at 0445, paced back and forth the hallway couple of times, received scheduled Methadone with prn Flexile 10 Mg at 0500.  Slept for 5.25 hours.

## 2025-04-09 NOTE — PROGRESS NOTES
"  ----------------------------------------------------------------------------------------------------------  [facility]  Psychiatry Progress Note  Hospital Day #22     Interim History:     The patient's care was discussed with the treatment team and chart notes were reviewed.    Identifier: 53 yo male with PSUD  [benzos, opiates, meth, THC, nicotine], severe depression with SI, BPD and PTSD admitted from medicine 03/18/2025 due to concern for SI with plan and intent to overdose on fentanyl on his bday 3/22 in the context of medication. This is in the context of medication non adherence, substance use and housing/financial insecurity. Was planning to start ECT, unsure of plan now as he refused it 3/31..    Vitals: BP (!) 145/92 (Patient Position: Sitting, Cuff Size: Adult Regular)   Pulse 90   Temp 97.1  F (36.2  C) (Temporal)   Resp 14   Wt 91.6 kg (202 lb)   SpO2 98%   BMI 28.19 kg/m    Sleep: 5.25 hours (04/09/25 0639)  Scheduled medications: Took all scheduled medications as prescribed  Psychiatric PRN medications:   Last 24H PRN:     cloNIDine (CATAPRES) tablet 0.1 mg, 0.1 mg at 04/09/25 0743    cyclobenzaprine (FLEXERIL) tablet 10 mg, 10 mg at 04/09/25 0511    melatonin tablet 3 mg, 3 mg at 04/08/25 2001    nicotine (NICORETTE) lozenge 4 mg, 4 mg at 04/09/25 0746    polyethylene glycol (MIRALAX) Packet 17 g, 17 g at 04/09/25 0753      Staff Report:   Zenon presents with labile mood, but redirectable. endorsed passive suicidal thought with no plan and hopelessness; pt said that he is trying to stay positive.  Requested PRN for anxiety and pain, no other events overnight. Please see staff notes for details.      Subjective:     Patient Interview:  Zenon had his interview and he is anxious about the outcome of that meeting. He is going \"all in\" on this attempt at recovery from substance use disorder. He feels that his multiple fentanyl overdoses after the last " "year have made him realize that he does not want to die because of an overdose. He does not want to go to a shelter because he feels like that would be a \"disaster\", and that an IRTS will be a better living situation. We talked about the goal of extending periods of sobriety to be longer and longer. He feels that his buspar is \"taking the sharp edge off\". Fleeting thoughts of suicide which go away if he does not engage with them. No Auditory or visual hallucinations. Difficulty with concentration on tv or audio, shifting from one thing to the next. Feels that he is having difficulty with wavering between choices and making decisions. Group has been a good distraction. Continues to feel restlessness but it is improving. No questions for the team at this time..    ROS:  Patient reports no medical symptoms  Patient denies acute concerns     Objective:     Vitals:  BP (!) 145/92 (Patient Position: Sitting, Cuff Size: Adult Regular)   Pulse 90   Temp 97.1  F (36.2  C) (Temporal)   Resp 14   Wt 91.6 kg (202 lb)   SpO2 98%   BMI 28.19 kg/m      Allergies:  Allergies   Allergen Reactions    Iodinated Contrast Media Hives and Rash     Reports he is allergic to Iodine contrast     Reports he is allergic to Iodine contrast       Reports he is allergic to Iodine contrast    Reports he is allergic to Iodine contrast      Reports he is allergic to Iodine contrast   Reports he is allergic to Iodine contrast    Iodine Hives    Lisinopril Hives and Rash    Lithium Hives    Topiramate Hives    Escitalopram Rash     Rash around mouth    Iodine-131 Hives    Trazodone Anxiety     Trazodone causes RLS for pt    Olanzapine Rash     Pt reported taking approximately one year ago. Developed rash around mouth.       Current Medications:  Scheduled:  Current Facility-Administered Medications   Medication Dose Route Frequency Provider Last Rate Last Admin    acetaminophen (TYLENOL) tablet 650 mg  650 mg Oral Q4H PRN Patti Bernard, " MD   650 mg at 04/04/25 1722    alum & mag hydroxide-simethicone (MAALOX) suspension 30 mL  30 mL Oral Q4H PRN Patti Bernard MD        buPROPion (WELLBUTRIN XL) 24 hr tablet 300 mg  300 mg Oral Daily Rupinder Bradford MD   300 mg at 04/09/25 0743    cloNIDine (CATAPRES) tablet 0.1 mg  0.1 mg Oral TID PRN Tracy Castillo MD   0.1 mg at 04/09/25 0743    cyclobenzaprine (FLEXERIL) tablet 10 mg  10 mg Oral TID PRN Patti Bernard MD   10 mg at 04/09/25 0511    dicyclomine (BENTYL) capsule 10 mg  10 mg Oral 4x Daily PRN Suad Coyle MD        folic acid (FOLVITE) tablet 1 mg  1 mg Oral Daily Patti Bernard MD   1 mg at 04/09/25 0743    gabapentin (NEURONTIN) tablet 1,200 mg  1,200 mg Oral TID Sunni Lorenz MD   1,200 mg at 04/09/25 0743    haloperidol (HALDOL) tablet 2 mg  2 mg Oral BID PRN Sunni Lorenz MD   2 mg at 04/07/25 0731    Or    haloperidol lactate (HALDOL) injection 2 mg  2 mg Intramuscular BID PRN Sunni Lorenz MD        Hold Medications for ECT (select and list medications to be held)   Does not apply HOLD Karen Ribera MD        loperamide (IMODIUM) capsule 2 mg  2 mg Oral 4x Daily PRN Suad Coyle MD   2 mg at 03/28/25 0858    melatonin tablet 3 mg  3 mg Oral At Bedtime PRN Patti Bernard MD   3 mg at 04/08/25 2001    methadone (DOLOPHINE-INTENSOL) 10 MG/ML (HIGH CONC) solution 135 mg  135 mg Oral Daily Dilshad Bailey MD   135 mg at 04/09/25 0504    midazolam 5 mg/mL (VERSED) intranasal solution 10 mg  10 mg Intranasal Once PRN Tracy Castillo MD        And    mucosal atomization device #  device 1 Device  1 Device Inhalation DOES NOT GO TO Tracy Thomson MD        mirtazapine (REMERON) tablet 45 mg  45 mg Oral At Bedtime Demetri Sarabia MD   45 mg at 04/08/25 2000    multivitamin w/minerals (THERA-VIT-M) tablet 1 tablet  1 tablet Oral Daily Patti Bernard MD   1  tablet at 04/08/25 0729    naloxone (NARCAN) injection 0.2 mg  0.2 mg Intravenous Q2 Min PRN Charles Menjivar MD        Or    naloxone (NARCAN) injection 0.4 mg  0.4 mg Intravenous Q2 Min PRN Charles Menjivar MD        Or    naloxone (NARCAN) injection 0.2 mg  0.2 mg Intramuscular Q2 Min PRN Charles Menjivar MD        Or    naloxone (NARCAN) injection 0.4 mg  0.4 mg Intramuscular Q2 Min PRN Charles Menjivar MD        nicotine (NICORETTE) lozenge 4 mg  4 mg Buccal Q1H PRN Patti Bernard MD   4 mg at 04/09/25 0746    polyethylene glycol (MIRALAX) Packet 17 g  17 g Oral Daily PRN Patti Bernard MD   17 g at 04/09/25 0753    thiamine (B-1) tablet 100 mg  100 mg Oral Daily Patti Bernard MD   100 mg at 04/08/25 0729       PRN:  Current Facility-Administered Medications   Medication Dose Route Frequency Provider Last Rate Last Admin    acetaminophen (TYLENOL) tablet 650 mg  650 mg Oral Q4H PRN Patti Bernard MD   650 mg at 04/04/25 1722    alum & mag hydroxide-simethicone (MAALOX) suspension 30 mL  30 mL Oral Q4H PRN Patti Bernard MD        buPROPion (WELLBUTRIN XL) 24 hr tablet 300 mg  300 mg Oral Daily Rupinder Bradford MD   300 mg at 04/09/25 0743    cloNIDine (CATAPRES) tablet 0.1 mg  0.1 mg Oral TID PRN Tracy Castillo MD   0.1 mg at 04/09/25 0743    cyclobenzaprine (FLEXERIL) tablet 10 mg  10 mg Oral TID PRN Patti Bernard MD   10 mg at 04/09/25 0511    dicyclomine (BENTYL) capsule 10 mg  10 mg Oral 4x Daily PRN uSad Coyle MD        folic acid (FOLVITE) tablet 1 mg  1 mg Oral Daily Patti Bernard MD   1 mg at 04/09/25 0743    gabapentin (NEURONTIN) tablet 1,200 mg  1,200 mg Oral TID Sunni Lorenz MD   1,200 mg at 04/09/25 0743    haloperidol (HALDOL) tablet 2 mg  2 mg Oral BID PRN Sunni Lorenz MD   2 mg at 04/07/25 0731    Or    haloperidol lactate (HALDOL) injection 2 mg  2  mg Intramuscular BID PRN Sunni Lorenz MD        Hold Medications for ECT (select and list medications to be held)   Does not apply HOLD Karen Ribera MD        loperamide (IMODIUM) capsule 2 mg  2 mg Oral 4x Daily PRN Suad Coyle MD   2 mg at 03/28/25 0858    melatonin tablet 3 mg  3 mg Oral At Bedtime PRN Patti Bernard MD   3 mg at 04/08/25 2001    methadone (DOLOPHINE-INTENSOL) 10 MG/ML (HIGH CONC) solution 135 mg  135 mg Oral Daily Dilshad Bailey MD   135 mg at 04/09/25 0504    midazolam 5 mg/mL (VERSED) intranasal solution 10 mg  10 mg Intranasal Once PRN Tracy Castillo MD        And    mucosal atomization device #  device 1 Device  1 Device Inhalation DOES NOT GO TO MAR Tracy Castillo MD        mirtazapine (REMERON) tablet 45 mg  45 mg Oral At Bedtime Demetri Sarabia MD   45 mg at 04/08/25 2000    multivitamin w/minerals (THERA-VIT-M) tablet 1 tablet  1 tablet Oral Daily Patti Bernard MD   1 tablet at 04/08/25 0729    naloxone (NARCAN) injection 0.2 mg  0.2 mg Intravenous Q2 Min PRN Charles Menjivar MD        Or    naloxone (NARCAN) injection 0.4 mg  0.4 mg Intravenous Q2 Min PRN Charles Menjivar MD        Or    naloxone (NARCAN) injection 0.2 mg  0.2 mg Intramuscular Q2 Min PRN Charles Menjivar MD        Or    naloxone (NARCAN) injection 0.4 mg  0.4 mg Intramuscular Q2 Min PRN Charles Menjivar MD        nicotine (NICORETTE) lozenge 4 mg  4 mg Buccal Q1H PRN Patti Bernard MD   4 mg at 04/09/25 0746    polyethylene glycol (MIRALAX) Packet 17 g  17 g Oral Daily PRN Patti Bernard MD   17 g at 04/09/25 0753    thiamine (B-1) tablet 100 mg  100 mg Oral Daily Patti Bernard MD   100 mg at 04/08/25 0729       Labs and Imaging:  New results:   No results found for this or any previous visit (from the past 24 hours).    Data this admission:  - CBC with chronic thrombocytopenia  "(platelet count 78)   - CMP with mildly elevated AST (47) and ALT (84)   - TSH normal  - UDS positive for amphetamines, cannabinoids, fentanyl  - Vit D in process  - Lipids not ordered this admission (but within normal limits on 2/26/2025)  - Vit B12 normal  - Folate normal  - EKG sinus bradycardia, QTc 396     Mental Status Exam:     Oriented to:  Grossly Oriented, Person/Self, and Situation  General:  Awake and Alert   Appearance:  Appears older than stated age Edentulous. Dressed in personal clothing.   Behavior/Attitude:  Generally calm with anxious answers, but engage   Eye Contact:  Limited eye contact, looking down at his hands.  Psychomotor: Slightly tremulous, moving his feet  no catatonia present  Speech:  appropriate volume/tone, spontaneous, and with good articulation  Language: Fluent in English with appropriate syntax and vocabulary.  Mood:  \"better\"  Affect:   congruent with mood, anxious, mildly labile  Thought Process:  ruminative, perseverative, but self-redirectable  Thought Content:   Denies SI/HI; No apparent delusions  Associations:  intact  Insight: limited due to poor coping, but goal oriented  Judgment:  poor due to past behaviors and maladaptive coping    Impulse control: poor due to irritability and acting out on frustration (punching the wall, verbally abusive to staff)  Attention Span:  adequate for conversation  Concentration:  grossly intact to conversation   Recent and Remote Memory:  not formally assessed but intact to conversation   Fund of Knowledge: average, estimated  Muscle Strength and Tone: normal  Gait and Station: Normal     Psychiatric Assessment     Zenon Truong is a 54 year old male with previous psychiatric diagnoses of polysubstance use disorder [benzodiazepines, opiates, methamphetamine, THC, nicotine], severe depression with suicidal ideation, borderline personality disorder and PTSD admitted 03/18/2025 due to concern for SI with plan and intent in the context of " medication non adherence, substance use and housing/financial insecurity. On admission, he presented with symptoms of SI with intent and plan, hopelessness, poor motivation, low energy, anhedonia, impulsivity and irritability. There is genetic predisposition given family hx of substance use disorder. Patient has struggled with homelessness and severe substance use for years. This current admission is likely precipitated by recent psychiatric hospitalization at end of February followed by relapse soon after discharge to a sober house. Perpetuating factors include ongoing substance use, medication non adherence, longstanding functional impairment, personality traits, lack of perceived support, deconditioning and housing and financial insecurity. Patient's support system includes  . Based on patient's history and current symptoms, criteria are met for primary diagnosis of MDD without psychotic features, opioid use disorder, benzodiazepine use disorder, stimulant use disorder and cannabis use disorder. Patient warrants inpatient hospitalization to maintain their safety and would benefit from medication optimization and outpatient referral/resources.       He does have baseline chronic SI and still continues to express a desire to not wake up. It appears like his current presentation is more likely borderline personality with mood swings, irritability, impulsive behaviors such as slamming doors and difficulty with interpersonal relationships with staff . There is likely a component of withdrawal/cravings at play which may be contributing to his mood and irritability but borderline personality traits are more apparent now that he is physically stabilizing.     There is a significant impact of prior mistrust in the medical system. He does demonstrate a desire to commit to treatment and is apologetic for his outbursts in hindsight. His physical withdrawal symptoms are significantly improved, but affect is  still labile and irritable due to anxiety. He does endorse restlessness and racing thoughts.  He has  persistent  nightmares stemming from a traumatic event when his fiance  5-6 years ago. Currently targeting symptoms of opioid and stimulant use disorder with methadone and bupropio, respectively. Zenon was agreeable to pursue CDT evaluation and is goal oriented regarding maintaining sobriety. We started Buspar to target anxiety symptoms.      Today's changes:  - CDT evaluation today  - Start Buspar 10 mg BID     Psychiatric Plan by Diagnosis      #Major depressive disorder, recurrent, severe without psychosis  #Borderline personality disorder.  #Complex PTSD with anxious mood  - Declined ECT on 3/31  - Continue Mirtazapine 45 mg po at bedtime  - Bupropion to 300 mg daily  - Add Buspar 10 mg BID     #Opioid use disorder, severe  - methadone 135mg daily   - Addiction team following  - Thiamine, MVI, folic acid     For symptomatic management:  - 10 mg Flexeril TID PRN Q8H for muscle cramps, spasms, or tightness  - loperamide, bentyl     #Benzodiazepine use disorder, severe  Phenobarbital taper was discontinued on 3/26 per recommendation from addiction medicine team recommendation that his symptoms were likely secondary to opiate withdrawal vs benzo withdrawal.   - Addiction team following  - scheduled clonidine 0.1mg TID PRN        #Stimulant (methamphetamine) use disorder  - Bupropion 300 mg daily    #Cannabis use disorder       Additional Plans:  - Patient will be treated in therapeutic milieu with appropriate individual and group therapies as described  - Continue coordination with Addiction medicine regarding Methadone     Psychiatric Hospital Course:      Zenon Truong was admitted to [facility] as a voluntary patient. Had a recent hospitalization   Medications and other updates:  3/14: hold bupropion due to risk of lower seizure threshold  3/14: Continue PTA desvenlafaxine 25mg and mirtazapine 30mg for  sleep  3/18: start clonidine 0.1mg TID   3/20: Increase mirtazapine to 45 mg daily for continued insomnia   3/21: restart PTA gabapentin 600mg TID for anxiety   3/21: continuing phenobarb taper (today is 32.4 mg BID)  3/23: no scheduled phenobarb received 32.4mg due to ongoing withdrawal symptoms  3/24: give one time phenobarbital dose of 32.4mg due to significant discomfort that seem at least in part due to withdrawal  3/24: consult addiction medicine for assistance with methadone dose and ongoing withdrawal symptoms   3/24: discussed re-initiating wellbutrin. However, patient reporting symptoms that he feels were similar to auras he has had prior to seizures (this has been worse the last couple days), so hesitant to do this at this time.  3/25: one time phenobarbital dose of 32.4mg given significant withdrawal symptoms  3/25: increase desvenlafaxine to 50mg daily (per chart review, was prescribed this dose outpatient)  3/26: increase methadone to 115 mg daily per addiction medicine recs   3/26: discontinue phenobarb taper   3/26: medicine consult for ECT clearance; Defer to psychiatry for decision of proceeding with ECT with recent benzodiazepine withdrawal/use. Otherwise, no absolute medical contraindications to proceeding with ECT at this time. Treatment plan per psychiatry.   3/27: increase gabapentin to 900 TID per addiction med team recs   3.27: increase methadone to 125mg daily per addiction med recs given ongoing withdrawal symptoms   3/27: pending ECT team recs regarding methadone on ECT days  3/31: declined ECT on intended start day  4/1: increase methadone to 135mg daily per addiction med recs  4/1: changed scheduled clonidine 0.1mg TID to PRN per patient request  4/1: discontinue prn quetiapine and hydroxyzine per patient request  4/1: discontinue desvenlafaxine 50mg per patient request  4/1: restart PTA wellbutrin, was previously on 300mg, restart at 150mg XL  4/1: hypertensive with /140s but  refused repeat vital signs  4/2: clonidine 0.1 prn given; BP 150s/110s on recheck after medication. Will offer another prn clonidine (he has been refusing clonidine)    4/3: /99 this morning  4/4: prazosin 1mg prn for nightmares  4/7 Increase Bupropion to 300 mg  4/8: CDT consult placed  4/9: Start Buspar 10 mg BID     The risks, benefits, alternatives, and side effects were discussed and understood by the patient.     Medical Assessment and Plan     #Palpitations  3/21 patient reporting short episodes (few seconds) of palpitations occurring intermittently over the last month. Does have shortness of breath with this, no chest pain or other symptoms. He is not sure if it is associated with anxiety.   - repeat EKG ordered showed sinus bradycardia with no significant change to prior EKG      #Chronic hepatitis C, never treated  #Mild transaminase elevations, hepatic steatosis:  Never treated for hepatitis C.  Chronic, mild transaminase elevations with normal hepatic function.  Most recent abdominal ultrasound 2/15/2024 showed hepatic steatosis without hepatic lesions, normal gallbladder.  Denies current alcohol use.  - monitor outpatient      #Chronic thrombocytopenia  #Normocytic anemia, now resolved  Chronic thrombocytopenia, moderate and stable.  Denies any history of bleeding including no history of gastric ulcers, hematemesis, melena or hematochezia.  Anemia appears to be more recent since 2/23/2025 when hemoglobin was 11.6, MCV 86.  Hgb 14.1, Plts 97, MCV 86, ferritin, iron, TIBC wnl on 3/15.      #Concern for malnutrition  Seen by dietician team, patient does not meet two of the established criteria necessary for diagnosing malnutrition but is at risk for malnutrition.   - ensure enlive ordered        Medical course: Patient was physically examined by the ED prior to being transferred to the unit and was found to be medically stable and appropriate for admission.      Consults:  Medicine (for clearance for  ECT)  Addiction med (for methadone dosage and withdrawal symptoms)      Checklist     Legal Status: Voluntary   /ACT Team: TIERNEY signed  Name/Clinic: [facility]    Number: [phone]     Safety Assessment:   Behavioral Orders   Procedures    Code 1 - Restrict to Unit    Fall precautions    Routine Programming     As clinically indicated    Seizure precautions    Status 15     Every 15 minutes.    Suicide precautions: Suicide Risk: LOW; Clinical rationale to override score: lack of access to a plan for self-harm     .     Order Specific Question:   Suicide Risk     Answer:   LOW     Order Specific Question:   Clinical rationale to override score:     Answer:   lack of access to a plan for self-harm    Withdrawal precautions       Risk Assessment:  Risk for harm is low  Risk factors: SI, maladaptive coping, substance use, impulsive, and past behaviors  Protective factors: engaged in treatment      SIO: no     Disposition: discharge goal for 4/9     Attestations     This patient was seen and discussed with my attending physician.  Rupinder Maurice MD  [facility] Psychiatry Resident  04/09/2025    This patient has been seen and evaluated by me, Charles Fink.  I have discussed this patient with the psychiatry resident and I agree with the findings and plan in this note.    I have reviewed today's vital signs, medications, labs and imaging.     Charles Menjivar MD

## 2025-04-10 VITALS
HEART RATE: 99 BPM | DIASTOLIC BLOOD PRESSURE: 100 MMHG | WEIGHT: 201.6 LBS | TEMPERATURE: 97.2 F | RESPIRATION RATE: 14 BRPM | SYSTOLIC BLOOD PRESSURE: 166 MMHG | OXYGEN SATURATION: 99 % | BODY MASS INDEX: 28.13 KG/M2

## 2025-04-10 PROCEDURE — 124N000002 HC R&B MH UMMC

## 2025-04-10 PROCEDURE — 250N000013 HC RX MED GY IP 250 OP 250 PS 637

## 2025-04-10 PROCEDURE — 250N000013 HC RX MED GY IP 250 OP 250 PS 637: Performed by: STUDENT IN AN ORGANIZED HEALTH CARE EDUCATION/TRAINING PROGRAM

## 2025-04-10 PROCEDURE — 99232 SBSQ HOSP IP/OBS MODERATE 35: CPT | Mod: GC | Performed by: PSYCHIATRY & NEUROLOGY

## 2025-04-10 RX ADMIN — GABAPENTIN 1200 MG: 600 TABLET, FILM COATED ORAL at 07:36

## 2025-04-10 RX ADMIN — NICOTINE POLACRILEX 4 MG: 4 LOZENGE ORAL at 17:39

## 2025-04-10 RX ADMIN — CYCLOBENZAPRINE 10 MG: 10 TABLET, FILM COATED ORAL at 05:01

## 2025-04-10 RX ADMIN — MIRTAZAPINE 45 MG: 45 TABLET, FILM COATED ORAL at 20:03

## 2025-04-10 RX ADMIN — FOLIC ACID 1 MG: 1 TABLET ORAL at 07:36

## 2025-04-10 RX ADMIN — METHADONE HYDROCHLORIDE 135 MG: 10 CONCENTRATE ORAL at 05:01

## 2025-04-10 RX ADMIN — NICOTINE POLACRILEX 4 MG: 4 LOZENGE ORAL at 06:31

## 2025-04-10 RX ADMIN — NICOTINE POLACRILEX 4 MG: 4 LOZENGE ORAL at 07:36

## 2025-04-10 RX ADMIN — CYCLOBENZAPRINE 10 MG: 10 TABLET, FILM COATED ORAL at 10:43

## 2025-04-10 RX ADMIN — BUPROPION HYDROCHLORIDE 300 MG: 300 TABLET, EXTENDED RELEASE ORAL at 07:36

## 2025-04-10 RX ADMIN — BUSPIRONE HYDROCHLORIDE 10 MG: 10 TABLET ORAL at 07:36

## 2025-04-10 RX ADMIN — NICOTINE POLACRILEX 4 MG: 4 LOZENGE ORAL at 11:09

## 2025-04-10 RX ADMIN — BUSPIRONE HYDROCHLORIDE 10 MG: 10 TABLET ORAL at 20:03

## 2025-04-10 RX ADMIN — NICOTINE POLACRILEX 4 MG: 4 LOZENGE ORAL at 16:37

## 2025-04-10 RX ADMIN — NICOTINE POLACRILEX 4 MG: 4 LOZENGE ORAL at 08:46

## 2025-04-10 RX ADMIN — CLONIDINE HYDROCHLORIDE 0.1 MG: 0.1 TABLET ORAL at 18:01

## 2025-04-10 RX ADMIN — GABAPENTIN 1200 MG: 600 TABLET, FILM COATED ORAL at 13:09

## 2025-04-10 RX ADMIN — NICOTINE POLACRILEX 4 MG: 4 LOZENGE ORAL at 15:15

## 2025-04-10 RX ADMIN — NICOTINE POLACRILEX 4 MG: 4 LOZENGE ORAL at 20:03

## 2025-04-10 RX ADMIN — CYCLOBENZAPRINE 10 MG: 10 TABLET, FILM COATED ORAL at 20:05

## 2025-04-10 RX ADMIN — NICOTINE POLACRILEX 4 MG: 4 LOZENGE ORAL at 12:11

## 2025-04-10 RX ADMIN — NICOTINE POLACRILEX 4 MG: 4 LOZENGE ORAL at 14:18

## 2025-04-10 RX ADMIN — NICOTINE POLACRILEX 4 MG: 4 LOZENGE ORAL at 10:03

## 2025-04-10 RX ADMIN — GABAPENTIN 1200 MG: 600 TABLET, FILM COATED ORAL at 20:03

## 2025-04-10 RX ADMIN — NICOTINE POLACRILEX 4 MG: 4 LOZENGE ORAL at 18:42

## 2025-04-10 RX ADMIN — NICOTINE POLACRILEX 4 MG: 4 LOZENGE ORAL at 13:09

## 2025-04-10 ASSESSMENT — ACTIVITIES OF DAILY LIVING (ADL)
ADLS_ACUITY_SCORE: 26
ADLS_ACUITY_SCORE: 26
DRESS: INDEPENDENT
ADLS_ACUITY_SCORE: 26
HYGIENE/GROOMING: INDEPENDENT
ADLS_ACUITY_SCORE: 26
LAUNDRY: WITH SUPERVISION
HYGIENE/GROOMING: INDEPENDENT
ADLS_ACUITY_SCORE: 26
DRESS: INDEPENDENT
ORAL_HYGIENE: INDEPENDENT
LAUNDRY: WITH SUPERVISION
ADLS_ACUITY_SCORE: 26
ORAL_HYGIENE: INDEPENDENT
ADLS_ACUITY_SCORE: 26

## 2025-04-10 NOTE — PLAN OF CARE
Team Note Due:  Wednesday    Assessment/Intervention/Current Symtoms and Care Coordination:  Chart review and met with team, discussed pt progress, symptomology, and response to treatment.  Discussed the discharge plan and any potential impediments to discharge.    -Zenon was accepted to [facility] and he agreed to go. Called and spoke with his  to update her on the plan. She asked if Zenon could complete a [facility] assessment prior to his discharge and planned for Monday at 1230.   -Spoke with Zenon's [facility] addiction counselor, Alfonso, about getting a federal exemption form for a 30 day take out of Methadone. She agreed to do a 27 day take out and Zenon will have to see them in person on the 28th day. Made sure with [facility] that that would work, and they agreed. Alfonso is working on the form and the tentative discharge date is 4/23.      Discharge Plan or Goal:  TBD     Barriers to Discharge:  Medication Management  ECT     Referral Status:  None currently      Legal Status:  Voluntary       Contacts (include TIERNEY status):  Methadone Management: [facility] daily     /ACT Team: TIERNEY signed  Name/Clinic: [facility]    Number: [phone]      Upcoming Meetings and Dates/Important Information and next steps:   visit- Tuesday, time TBD

## 2025-04-10 NOTE — PLAN OF CARE
Problem: Adult Behavioral Health Plan of Care  Goal: Plan of Care Review  Outcome: Progressing  Flowsheets (Taken 4/10/2025 1236)  Plan of Care Reviewed With: patient  Overall Patient Progress: improving  Patient Agreement with Plan of Care: agrees  Goal: Optimized Coping Skills in Response to Life Stressors  Outcome: Progressing  Flowsheets (Taken 4/10/2025 1236)  Optimized Coping Skills in Response to Life Stressors: making progress toward outcome  Goal: Develops/Participates in Therapeutic [place] to Support Successful Transition  Outcome: Progressing  Flowsheets (Taken 4/10/2025 1236)  Develops/Participates in Therapeutic [place] to Support Successful Transition: making progress toward outcome     Problem: Depressive Signs/Symptoms  Goal: Improved Mood Symptoms (Depressive Signs/Symptoms)  Outcome: Progressing  Flowsheets (Taken 4/10/2025 1236)  Mutually Determined Action Steps (Improved Mood Symptoms): acknowledges progress     Problem: Depressive Signs/Symptoms  Goal: Enhanced Social, Occupational or Functional Skills (Depressive Signs/Symptoms)  Outcome: Progressing   Goal Outcome Evaluation:      Plan of Care Reviewed With: patient    Overall Patient Progress: improvingOverall Patient Progress: improving     Zenon active in milieu, but did not attend morning grp-states he is anxious about discharge plans-reassured all will be in place on day of discharge-grateful he has bed at [facility] program-c/o lump right abdomen which he states he only noticed today-expressed concern as he has hepatitis C and because he has hx of testicular cancer-MDs notified-received Flexeril 10 mg PO at 1043 for c/o lower back spasms with effective relief-  1420 Zenon more isolated this afternoon, spending most of time in his room-frustrated he cannot obtain work up for above in hospital and will have to complete outpt- although frustrated, did maintain calm

## 2025-04-10 NOTE — PROGRESS NOTES
----------------------------------------------------------------------------------------------------------  [facility]  Psychiatry Progress Note  Hospital Day #23     Interim History:     The patient's care was discussed with the treatment team and chart notes were reviewed.    Identifier: 55 yo male with PSUD  [benzos, opiates, meth, THC, nicotine], severe depression with SI, BPD and PTSD admitted from medicine 03/18/2025 due to concern for SI with plan and intent to overdose on fentanyl on his bday 3/22 in the context of medication. This is in the context of medication non adherence, substance use and housing/financial insecurity. Was planning to start ECT, unsure of plan now as he refused it 3/31..    Vitals: /78 (BP Location: Left arm, Patient Position: Sitting, Cuff Size: Adult Regular)   Pulse 80   Temp 96.9  F (36.1  C) (Temporal)   Resp 14   Wt 91.6 kg (202 lb)   SpO2 97%   BMI 28.19 kg/m    Sleep: 3.75 hours (04/10/25 0632)  Scheduled medications: Took all scheduled medications as prescribed  Psychiatric PRN medications:   Last 24H PRN:     acetaminophen (TYLENOL) tablet 650 mg, 650 mg at 04/09/25 0911    cyclobenzaprine (FLEXERIL) tablet 10 mg, 10 mg at 04/10/25 0501    melatonin tablet 3 mg, 3 mg at 04/09/25 2005    nicotine (NICORETTE) lozenge 4 mg, 4 mg at 04/10/25 0736      Staff Report:   Zenon presents with labile mood, but redirectable. endorsed passive suicidal thought with no plan and hopelessness; pt said that he is trying to stay positive.  Requested PRN for anxiety and pain, no other events overnight. Please see staff notes for details.      Subjective:     Patient Interview:  Zenon had his interview and he is anxious about his sleep. When we mentioned any thoughts or triggers, he stated its something he cannot, touch or smell and feels overwhelmed with it all. We talked about CDT facility placement and how the methadone prescription  needed several authorization he understood this. He was most concerned about getting enough doses of methadone prior to going to the CDT. He reported having a panic attack due to environmental stressors (limp recently felt in his abdomen), methadone clinic paperwork, and plans for discharge. At this point Zenon seemed more irritated and anxious and we decided to step out per his request. We informed him we would keep him updated as information came.    ROS:  Patient reports no medical symptoms  Patient reports insomnia (falling asleep  or staying asleep )     Objective:     Vitals:  /78 (BP Location: Left arm, Patient Position: Sitting, Cuff Size: Adult Regular)   Pulse 80   Temp 96.9  F (36.1  C) (Temporal)   Resp 14   Wt 91.6 kg (202 lb)   SpO2 97%   BMI 28.19 kg/m      Allergies:  Allergies   Allergen Reactions    Iodinated Contrast Media Hives and Rash     Reports he is allergic to Iodine contrast     Reports he is allergic to Iodine contrast       Reports he is allergic to Iodine contrast    Reports he is allergic to Iodine contrast      Reports he is allergic to Iodine contrast   Reports he is allergic to Iodine contrast    Iodine Hives    Lisinopril Hives and Rash    Lithium Hives    Topiramate Hives    Escitalopram Rash     Rash around mouth    Iodine-131 Hives    Trazodone Anxiety     Trazodone causes RLS for pt    Olanzapine Rash     Pt reported taking approximately one year ago. Developed rash around mouth.       Current Medications:  Scheduled:  Current Facility-Administered Medications   Medication Dose Route Frequency Provider Last Rate Last Admin    acetaminophen (TYLENOL) tablet 650 mg  650 mg Oral Q4H PRN Patti Bernard MD   650 mg at 04/09/25 0911    alum & mag hydroxide-simethicone (MAALOX) suspension 30 mL  30 mL Oral Q4H PRN Patti Bernard MD        buPROPion (WELLBUTRIN XL) 24 hr tablet 300 mg  300 mg Oral Daily Rupinder Bradford MD   300 mg at  04/10/25 0736    busPIRone (BUSPAR) tablet 10 mg  10 mg Oral BID Rupinder Bradford MD   10 mg at 04/10/25 0736    cloNIDine (CATAPRES) tablet 0.1 mg  0.1 mg Oral TID PRN Tracy Castillo MD   0.1 mg at 04/09/25 0743    cyclobenzaprine (FLEXERIL) tablet 10 mg  10 mg Oral TID PRN Patti Bernard MD   10 mg at 04/10/25 0501    dicyclomine (BENTYL) capsule 10 mg  10 mg Oral 4x Daily PRN Suad Coyle MD        folic acid (FOLVITE) tablet 1 mg  1 mg Oral Daily Patti Bernard MD   1 mg at 04/10/25 0736    gabapentin (NEURONTIN) tablet 1,200 mg  1,200 mg Oral TID Sunni Lorenz MD   1,200 mg at 04/10/25 0736    haloperidol (HALDOL) tablet 2 mg  2 mg Oral BID PRN Sunni Lorenz MD   2 mg at 04/07/25 0731    Or    haloperidol lactate (HALDOL) injection 2 mg  2 mg Intramuscular BID PRN Sunni Lorenz MD        loperamide (IMODIUM) capsule 2 mg  2 mg Oral 4x Daily PRN Suad Coyle MD   2 mg at 03/28/25 0858    melatonin tablet 3 mg  3 mg Oral At Bedtime PRN Patti Bernard MD   3 mg at 04/09/25 2005    methadone (DOLOPHINE-INTENSOL) 10 MG/ML (HIGH CONC) solution 135 mg  135 mg Oral Daily Dilshad Bailey MD   135 mg at 04/10/25 0501    midazolam 5 mg/mL (VERSED) intranasal solution 10 mg  10 mg Intranasal Once PRN Rupinder Bradford MD        And    mucosal atomization device #  device 1 Device  1 Device Inhalation DOES NOT GO TO Rupinder Cuadra MD        mirtazapine (REMERON) tablet 45 mg  45 mg Oral At Bedtime Demetri Sarabia MD   45 mg at 04/09/25 2006    multivitamin w/minerals (THERA-VIT-M) tablet 1 tablet  1 tablet Oral Daily Patti Bernard MD   1 tablet at 04/08/25 0729    naloxone (NARCAN) injection 0.2 mg  0.2 mg Intravenous Q2 Min PRN Rupinder Bradford MD        Or    naloxone (NARCAN) injection 0.4 mg  0.4 mg Intravenous Q2 Min PRN Rupinder Bradford,  MD        Or    naloxone (NARCAN) injection 0.2 mg  0.2 mg Intramuscular Q2 Min PRN Rupinder Bradford MD        Or    naloxone (NARCAN) injection 0.4 mg  0.4 mg Intramuscular Q2 Min PRN Rupinder Bradford MD        nicotine (NICORETTE) lozenge 4 mg  4 mg Buccal Q1H PRN Patti Bernard MD   4 mg at 04/10/25 0736    polyethylene glycol (MIRALAX) Packet 17 g  17 g Oral Daily PRN Patti Bernard MD   17 g at 04/09/25 0753    thiamine (B-1) tablet 100 mg  100 mg Oral Daily Patti Bernard MD   100 mg at 04/08/25 0729       PRN:  Current Facility-Administered Medications   Medication Dose Route Frequency Provider Last Rate Last Admin    acetaminophen (TYLENOL) tablet 650 mg  650 mg Oral Q4H PRN Patti Bernard MD   650 mg at 04/09/25 0911    alum & mag hydroxide-simethicone (MAALOX) suspension 30 mL  30 mL Oral Q4H PRN Patti Bernard MD        buPROPion (WELLBUTRIN XL) 24 hr tablet 300 mg  300 mg Oral Daily Rupinder Bradford MD   300 mg at 04/10/25 0736    busPIRone (BUSPAR) tablet 10 mg  10 mg Oral BID Rupinder Bradford MD   10 mg at 04/10/25 0736    cloNIDine (CATAPRES) tablet 0.1 mg  0.1 mg Oral TID PRN Tracy Castillo MD   0.1 mg at 04/09/25 0743    cyclobenzaprine (FLEXERIL) tablet 10 mg  10 mg Oral TID PRN Patti Bernard MD   10 mg at 04/10/25 0501    dicyclomine (BENTYL) capsule 10 mg  10 mg Oral 4x Daily PRN Suad Coyle MD        folic acid (FOLVITE) tablet 1 mg  1 mg Oral Daily Patti Bernard MD   1 mg at 04/10/25 0736    gabapentin (NEURONTIN) tablet 1,200 mg  1,200 mg Oral TID Nyquist, Sunni, MD   1,200 mg at 04/10/25 0736    haloperidol (HALDOL) tablet 2 mg  2 mg Oral BID PRN Sunni Lorenz MD   2 mg at 04/07/25 0731    Or    haloperidol lactate (HALDOL) injection 2 mg  2 mg Intramuscular BID PRN Sunni Lorenz MD        loperamide (IMODIUM) capsule 2 mg  2 mg Oral  4x Daily PRN Suad Coyle MD   2 mg at 03/28/25 0858    melatonin tablet 3 mg  3 mg Oral At Bedtime PRN Patti Bernard MD   3 mg at 04/09/25 2005    methadone (DOLOPHINE-INTENSOL) 10 MG/ML (HIGH CONC) solution 135 mg  135 mg Oral Daily Dilshad Bailey MD   135 mg at 04/10/25 0501    midazolam 5 mg/mL (VERSED) intranasal solution 10 mg  10 mg Intranasal Once PRN Rupinder Bradford MD        And    mucosal atomization device #  device 1 Device  1 Device Inhalation DOES NOT GO TO Rupinder Cuadra MD        mirtazapine (REMERON) tablet 45 mg  45 mg Oral At Bedtime Demetri Sarabia MD   45 mg at 04/09/25 2006    multivitamin w/minerals (THERA-VIT-M) tablet 1 tablet  1 tablet Oral Daily Patti Bernard MD   1 tablet at 04/08/25 0729    naloxone (NARCAN) injection 0.2 mg  0.2 mg Intravenous Q2 Min PRN Rupinder Bradford MD        Or    naloxone (NARCAN) injection 0.4 mg  0.4 mg Intravenous Q2 Min PRN Rupinder Bradford MD        Or    naloxone (NARCAN) injection 0.2 mg  0.2 mg Intramuscular Q2 Min PRN Rupinder Bradford MD        Or    naloxone (NARCAN) injection 0.4 mg  0.4 mg Intramuscular Q2 Min PRN Rupinder Bradford MD        nicotine (NICORETTE) lozenge 4 mg  4 mg Buccal Q1H PRN Patti Bernard MD   4 mg at 04/10/25 0736    polyethylene glycol (MIRALAX) Packet 17 g  17 g Oral Daily PRN Patti Bernard MD   17 g at 04/09/25 0753    thiamine (B-1) tablet 100 mg  100 mg Oral Daily Patti Bernard MD   100 mg at 04/08/25 0729       Labs and Imaging:  New results:   No results found for this or any previous visit (from the past 24 hours).    Data this admission:  - CBC with chronic thrombocytopenia (platelet count 78)   - CMP with mildly elevated AST (47) and ALT (84)   - TSH normal  - UDS positive for amphetamines, cannabinoids, fentanyl  - Vit D in process  - Lipids  "not ordered this admission (but within normal limits on 2/26/2025)  - Vit B12 normal  - Folate normal  - EKG sinus bradycardia, QTc 396     Mental Status Exam:     Oriented to:  Grossly Oriented, Person/Self, and Situation  General:  Awake and Alert   Appearance:  Appears older than stated age Edentulous. Dressed in personal clothing.   Behavior/Attitude:  High anxious answers, he was moderately engaged  Eye Contact:  Limited eye contact, looking down at his hands.  Psychomotor: tremulous, increased psychomotor activity due to anxiety.  no catatonia present  Speech:  moderately increased volume/tone, spontaneous, and with good articulation  Language: Fluent in English with appropriate syntax and vocabulary.  Mood:  \"okay\"  Affect:   congruent with mood, anxious, mildly labile  Thought Process:  ruminative, perseverative, circumstantial  Thought Content:   ruminative about discharge process  Associations:  intact  Insight: limited due to poor coping,   Judgment:  poor due to past behaviors and maladaptive coping    Impulse control: poor due to irritability and acting out on frustration (wanting to punching the window)  Attention Span:  limited because anxiety  Concentration:  fair  Recent and Remote Memory:  not formally assessed but intact to conversation   Fund of Knowledge: average, estimated  Muscle Strength and Tone: normal  Gait and Station: Normal     Psychiatric Assessment     Zenon Truong is a 54 year old male with previous psychiatric diagnoses of polysubstance use disorder [benzodiazepines, opiates, methamphetamine, THC, nicotine], severe depression with suicidal ideation, borderline personality disorder and PTSD admitted 03/18/2025 due to concern for SI with plan and intent in the context of medication non adherence, substance use and housing/financial insecurity. On admission, he presented with symptoms of SI with intent and plan, hopelessness, poor motivation, low energy, anhedonia, impulsivity and " irritability. There is genetic predisposition given family hx of substance use disorder. Patient has struggled with homelessness and severe substance use for years. This current admission is likely precipitated by recent psychiatric hospitalization at end of February followed by relapse soon after discharge to a sober house. Perpetuating factors include ongoing substance use, medication non adherence, longstanding functional impairment, personality traits, lack of perceived support, deconditioning and housing and financial insecurity. Patient's support system includes  . Based on patient's history and current symptoms, criteria are met for primary diagnosis of MDD without psychotic features, opioid use disorder, benzodiazepine use disorder, stimulant use disorder and cannabis use disorder. Patient warrants inpatient hospitalization to maintain their safety and would benefit from medication optimization and outpatient referral/resources.       He does have baseline chronic SI and still continues to express a desire to not wake up. It appears like his current presentation is more likely borderline personality with mood swings, irritability, impulsive behaviors such as slamming doors and difficulty with interpersonal relationships with staff . There is likely a component of withdrawal/cravings at play which may be contributing to his mood and irritability but borderline personality traits are more apparent now that he is physically stabilizing.     There is a significant impact of prior mistrust in the medical system. He does demonstrate a desire to commit to treatment and is apologetic for his outbursts in hindsight. His physical withdrawal symptoms are significantly improved, but affect is still labile and irritable due to anxiety. He does endorse restlessness and racing thoughts.  He has  persistent  nightmares stemming from a traumatic event when his fiance  5-6 years ago. Currently targeting  "symptoms of opioid and stimulant use disorder with methadone and bupropio, respectively. Zenon was agreeable to pursue CDT evaluation and is goal oriented regarding maintaining sobriety.     Today, Zenon, presented at the interview with increased anxiety, and he reports having a \"panic attack\". While he denies any triggering factors as he mentions it comes \"out of nowhere\" as something \"present but unable to touch or feel\", he reported to staff his concerns regarding getting enough methadone doses before going to CDT and a lump sensation on his abdomen. Environmental stressors like this, are related to current affect dysregulation evidenced throughout interaction with Zenon. Currently we are working on acquired necessary paperwork [facility]. Due to thisprocess discharge would be pending and [facility] will be in contact with CDT facility in [place] for availability of openings next week, as well as, setting appointments for following current medical concerns at discharge.     Today's changes:  - Pending placement, no plans for discharge this week     Psychiatric Plan by Diagnosis      #Major depressive disorder, recurrent, severe without psychosis  #Borderline personality disorder.  #Complex PTSD with anxious mood  - Declined ECT on 3/31  - Continue Mirtazapine 45 mg po at bedtime  - Bupropion to 300 mg daily  - Add Buspar 10 mg BID     #Opioid use disorder, severe  - methadone 135mg daily   - Addiction team following  - Thiamine, MVI, folic acid     For symptomatic management:  - 10 mg Flexeril TID PRN Q8H for muscle cramps, spasms, or tightness  - loperamide, bentyl     #Benzodiazepine use disorder, severe  Phenobarbital taper was discontinued on 3/26 per recommendation from addiction medicine team recommendation that his symptoms were likely secondary to opiate withdrawal vs benzo withdrawal.   - Addiction team following  - scheduled clonidine 0.1mg TID PRN        #Stimulant (methamphetamine) use " disorder  - Bupropion 300 mg daily    #Cannabis use disorder       Additional Plans:  - Patient will be treated in therapeutic milieu with appropriate individual and group therapies as described  - Continue coordination with Addiction medicine regarding Methadone     Psychiatric Hospital Course:      Zenon Truong was admitted to [facility] as a voluntary patient. Had a recent hospitalization   Medications and other updates:  3/14: hold bupropion due to risk of lower seizure threshold  3/14: Continue PTA desvenlafaxine 25mg and mirtazapine 30mg for sleep  3/18: start clonidine 0.1mg TID   3/20: Increase mirtazapine to 45 mg daily for continued insomnia   3/21: restart PTA gabapentin 600mg TID for anxiety   3/21: continuing phenobarb taper (today is 32.4 mg BID)  3/23: no scheduled phenobarb received 32.4mg due to ongoing withdrawal symptoms  3/24: give one time phenobarbital dose of 32.4mg due to significant discomfort that seem at least in part due to withdrawal  3/24: consult addiction medicine for assistance with methadone dose and ongoing withdrawal symptoms   3/24: discussed re-initiating wellbutrin. However, patient reporting symptoms that he feels were similar to auras he has had prior to seizures (this has been worse the last couple days), so hesitant to do this at this time.  3/25: one time phenobarbital dose of 32.4mg given significant withdrawal symptoms  3/25: increase desvenlafaxine to 50mg daily (per chart review, was prescribed this dose outpatient)  3/26: increase methadone to 115 mg daily per addiction medicine recs   3/26: discontinue phenobarb taper   3/26: medicine consult for ECT clearance; Defer to psychiatry for decision of proceeding with ECT with recent benzodiazepine withdrawal/use. Otherwise, no absolute medical contraindications to proceeding with ECT at this time. Treatment plan per psychiatry.   3/27: increase gabapentin to 900 TID per addiction med team recs   3.27: increase  methadone to 125mg daily per addiction med recs given ongoing withdrawal symptoms   3/27: pending ECT team recs regarding methadone on ECT days  3/31: declined ECT on intended start day  4/1: increase methadone to 135mg daily per addiction med recs  4/1: changed scheduled clonidine 0.1mg TID to PRN per patient request  4/1: discontinue prn quetiapine and hydroxyzine per patient request  4/1: discontinue desvenlafaxine 50mg per patient request  4/1: restart PTA wellbutrin, was previously on 300mg, restart at 150mg XL  4/1: hypertensive with /140s but refused repeat vital signs  4/2: clonidine 0.1 prn given; BP 150s/110s on recheck after medication. Will offer another prn clonidine (he has been refusing clonidine)    4/3: /99 this morning  4/4: prazosin 1mg prn for nightmares  4/7 Increase Bupropion to 300 mg  4/8: CDT consult placed  4/9: Start Buspar 10 mg BID     The risks, benefits, alternatives, and side effects were discussed and understood by the patient.     Medical Assessment and Plan     #Palpitations  3/21 patient reporting short episodes (few seconds) of palpitations occurring intermittently over the last month. Does have shortness of breath with this, no chest pain or other symptoms. He is not sure if it is associated with anxiety.   - repeat EKG ordered showed sinus bradycardia with no significant change to prior EKG      #Chronic hepatitis C, never treated  #Mild transaminase elevations, hepatic steatosis:  Never treated for hepatitis C.  Chronic, mild transaminase elevations with normal hepatic function.  Most recent abdominal ultrasound 2/15/2024 showed hepatic steatosis without hepatic lesions, normal gallbladder.  Denies current alcohol use.  - monitor outpatient      #Chronic thrombocytopenia  #Normocytic anemia, now resolved  Chronic thrombocytopenia, moderate and stable.  Denies any history of bleeding including no history of gastric ulcers, hematemesis, melena or hematochezia.   Anemia appears to be more recent since 2/23/2025 when hemoglobin was 11.6, MCV 86.  Hgb 14.1, Plts 97, MCV 86, ferritin, iron, TIBC wnl on 3/15.      #Concern for malnutrition  Seen by dietician team, patient does not meet two of the established criteria necessary for diagnosing malnutrition but is at risk for malnutrition.   - ensure enlive ordered        Medical course: Patient was physically examined by the ED prior to being transferred to the unit and was found to be medically stable and appropriate for admission.      Consults:  Medicine (for clearance for ECT)  Addiction med (for methadone dosage and withdrawal symptoms)      Checklist     Legal Status: Voluntary   /ACT Team: TIERNEY signed  Name/Clinic: Stephanie Bailey/ [facility]    Number: [phone]     Safety Assessment:   Behavioral Orders   Procedures    Code 1 - Restrict to Unit    Fall precautions    Routine Programming     As clinically indicated    Seizure precautions    Status 15     Every 15 minutes.    Suicide precautions: Suicide Risk: LOW; Clinical rationale to override score: lack of access to a plan for self-harm     .     Order Specific Question:   Suicide Risk     Answer:   LOW     Order Specific Question:   Clinical rationale to override score:     Answer:   lack of access to a plan for self-harm    Withdrawal precautions       Risk Assessment:  Risk for harm is low  Risk factors: SI, maladaptive coping, substance use, impulsive, and past behaviors  Protective factors: engaged in treatment      SIO: no     Disposition: No plans for discharge as of this week (4/11/25)     Attestations     Eh Uribe, MS3  [employer]    Resident/Fellow Attestation   I, Rupinder Maurice, was present with the medical/TAWANA student who participated in the service and in the documentation of the note.  I have verified the history and personally performed the physical exam and medical decision making.  I agree with the  assessment and plan of care as documented in the note.      This patient was seen and discussed with my attending physician.  Rupinder Maurice MD  [facility] Psychiatry Resident  04/10/2025      This patient has been seen and evaluated by me, Charles Fink.  I have discussed this patient with the psychiatry resident and I agree with the findings and plan in this note.    I have reviewed today's vital signs, medications, labs and imaging.     Charles Menjivar MD

## 2025-04-10 NOTE — PLAN OF CARE
Go  Problem: Sleep Disturbance  Goal: Adequate Sleep/Rest  Outcome: Not Progressing   al Outcome Evaluation:      Pt received asleep as the shift started, breathing quietly with no distress observed in all safety rounds. Pt awake at 0345, unable to go back to sleep, out in the lounge coloring, received scheduled Methadone with prn Flexeril 10 Mg. Appeared to have slept for 3.75 hours.

## 2025-04-10 NOTE — PLAN OF CARE
BEH IP Unit Acuity Rating Score (UARS)  Patient is given one point for every criteria they meet.    CRITERIA SCORING   On a 72 hour hold, court hold, committed, stay of commitment, or revocation. 0    Patient LOS on BEH unit exceeds 20 days. 1  LOS: 23   Patient under guardianship, 55+, otherwise medically complex, or under age 11. 0   Suicide ideation without relief of precipitating factors. 1   Current plan for suicide. 1   Current plan for homicide. 0   Imminent risk or actual attempt to seriously harm another without relief of factors precipitating the attempt. 0   Severe dysfunction in daily living (ex: complete neglect for self care, extreme disruption in vegetative function, extreme deterioration in social interactions). 1   Recent (last 7 days) or current physical aggression in the ED or on unit. 0   Restraints or seclusion episode in past 72 hours. 0   Recent (last 7 days) or current verbal aggression, agitation, yelling, etc., while in the ED or unit. 0   Active psychosis. 0   Need for constant or near constant redirection (from leaving, from others, etc).  0   Intrusive or disruptive behaviors. 0   Patient requires 3 or more hours of individualized nursing care per 8-hour shift (i.e. for ADLs, meds, therapeutic interventions). 0   TOTAL 4

## 2025-04-11 ENCOUNTER — APPOINTMENT (OUTPATIENT)
Dept: ULTRASOUND IMAGING | Facility: CLINIC | Age: 55
End: 2025-04-11
Attending: PHYSICIAN ASSISTANT
Payer: COMMERCIAL

## 2025-04-11 PROCEDURE — 76705 ECHO EXAM OF ABDOMEN: CPT | Mod: 26 | Performed by: RADIOLOGY

## 2025-04-11 PROCEDURE — 250N000013 HC RX MED GY IP 250 OP 250 PS 637

## 2025-04-11 PROCEDURE — 76705 ECHO EXAM OF ABDOMEN: CPT

## 2025-04-11 PROCEDURE — 99232 SBSQ HOSP IP/OBS MODERATE 35: CPT | Mod: GC | Performed by: PSYCHIATRY & NEUROLOGY

## 2025-04-11 PROCEDURE — 250N000013 HC RX MED GY IP 250 OP 250 PS 637: Performed by: STUDENT IN AN ORGANIZED HEALTH CARE EDUCATION/TRAINING PROGRAM

## 2025-04-11 PROCEDURE — 124N000002 HC R&B MH UMMC

## 2025-04-11 PROCEDURE — 99232 SBSQ HOSP IP/OBS MODERATE 35: CPT | Performed by: PHYSICIAN ASSISTANT

## 2025-04-11 RX ADMIN — NICOTINE POLACRILEX 4 MG: 4 LOZENGE ORAL at 21:29

## 2025-04-11 RX ADMIN — BUPROPION HYDROCHLORIDE 300 MG: 300 TABLET, EXTENDED RELEASE ORAL at 07:55

## 2025-04-11 RX ADMIN — BUSPIRONE HYDROCHLORIDE 10 MG: 10 TABLET ORAL at 07:55

## 2025-04-11 RX ADMIN — METHADONE HYDROCHLORIDE 135 MG: 10 CONCENTRATE ORAL at 05:04

## 2025-04-11 RX ADMIN — NICOTINE POLACRILEX 4 MG: 4 LOZENGE ORAL at 06:31

## 2025-04-11 RX ADMIN — CYCLOBENZAPRINE 10 MG: 10 TABLET, FILM COATED ORAL at 20:22

## 2025-04-11 RX ADMIN — NICOTINE POLACRILEX 4 MG: 4 LOZENGE ORAL at 11:10

## 2025-04-11 RX ADMIN — MIRTAZAPINE 45 MG: 45 TABLET, FILM COATED ORAL at 20:22

## 2025-04-11 RX ADMIN — NICOTINE POLACRILEX 4 MG: 4 LOZENGE ORAL at 17:38

## 2025-04-11 RX ADMIN — NICOTINE POLACRILEX 4 MG: 4 LOZENGE ORAL at 07:55

## 2025-04-11 RX ADMIN — NICOTINE POLACRILEX 4 MG: 4 LOZENGE ORAL at 15:16

## 2025-04-11 RX ADMIN — GABAPENTIN 1200 MG: 600 TABLET, FILM COATED ORAL at 07:55

## 2025-04-11 RX ADMIN — NICOTINE POLACRILEX 4 MG: 4 LOZENGE ORAL at 12:19

## 2025-04-11 RX ADMIN — CYCLOBENZAPRINE 10 MG: 10 TABLET, FILM COATED ORAL at 05:05

## 2025-04-11 RX ADMIN — GABAPENTIN 1200 MG: 600 TABLET, FILM COATED ORAL at 20:21

## 2025-04-11 RX ADMIN — NICOTINE POLACRILEX 4 MG: 4 LOZENGE ORAL at 20:21

## 2025-04-11 RX ADMIN — CYCLOBENZAPRINE 10 MG: 10 TABLET, FILM COATED ORAL at 15:16

## 2025-04-11 RX ADMIN — NICOTINE POLACRILEX 4 MG: 4 LOZENGE ORAL at 09:59

## 2025-04-11 RX ADMIN — NICOTINE POLACRILEX 4 MG: 4 LOZENGE ORAL at 16:19

## 2025-04-11 RX ADMIN — BUSPIRONE HYDROCHLORIDE 10 MG: 10 TABLET ORAL at 20:22

## 2025-04-11 RX ADMIN — NICOTINE POLACRILEX 4 MG: 4 LOZENGE ORAL at 13:39

## 2025-04-11 RX ADMIN — NICOTINE POLACRILEX 4 MG: 4 LOZENGE ORAL at 18:50

## 2025-04-11 RX ADMIN — GABAPENTIN 1200 MG: 600 TABLET, FILM COATED ORAL at 13:15

## 2025-04-11 ASSESSMENT — ACTIVITIES OF DAILY LIVING (ADL)
ADLS_ACUITY_SCORE: 26
HYGIENE/GROOMING: INDEPENDENT
LAUNDRY: WITH SUPERVISION
ADLS_ACUITY_SCORE: 26
ADLS_ACUITY_SCORE: 26
ORAL_HYGIENE: INDEPENDENT
ADLS_ACUITY_SCORE: 26
DRESS: INDEPENDENT
ADLS_ACUITY_SCORE: 26

## 2025-04-11 NOTE — PLAN OF CARE
Team Note Due:  Wednesday    Assessment/Intervention/Current Symtoms and Care Coordination:  - chart review    - voicemail from patient's addiction counselor Alfonso, saying calling to ask for fax number to send TIERNEY  - call back is [phone] - spoke with Roselyne and provided our fax number and my direct as will be covering today and Monday. Per Xee she spoke with pt on the phone this morning and reviewed TIERNEY she is asking him to sign for [facility] - will fax it to us for him to sign and return to her.   Also wanted to make sure we are aware that they are not open tomorrow if for some reason discharge was to be considered. Did confirm with her that current plan is for admission on 4/23. And if there is a change we will reach out.      - team meeting - discussed pt progress, symptomology, and response to treatment.  Discussed the discharge plan and any potential impediments to discharge.    - patient signed TIERNEY for counselor to communicate with [facility]- this writer faxed back to Alfonso (counselor)       Discharge Plan or Goal:  TBD     Barriers to Discharge:  Medication Management  ECT     Referral Status:  None currently      Legal Status:  Voluntary       Contacts (include TIERNEY status):  Methadone Management: [facility] [place]     /ACT Team: TIERNEY signed  Name/Clinic: [facility]    Number: [phone]      Upcoming Meetings and Dates/Important Information and next steps:    Monday April 14, 2025 - 12:30 pm - [facility]      visit- Tuesday, time TBD

## 2025-04-11 NOTE — CONSULTS
"[facility]    Medicine Progress Note - Hospitalist Service    Date of Admission:  3/18/2025    Assessment & Plan   Zenon Truong is a 55 year old male admitted on 3/18/2025. He has a past medical history of depression and anxiety, testicular cancer, polysubstance abuse including benzodiazepines, fentanyl, occasional methamphetamine and THC. He was originally admitted to [facility] on 3/14/25 for suicidal ideation in the context of recent drug relapse. Ultimately, discharged from the Medicine service and to  Psychiatry 3/18/25. Medicine was asked to see patient today for \"mass sensation of RUQ.\"     Mass/lesion of RUQ abdominal wall  Patient notes small mass of RUQ abdominal wall, non painful, non mobile. Notes he has lost weight recently at noted mass \"a few days ago.\" No surrounding erythema, edema. Denies any other lesions on skin. Suspect lipoma or cyst, less likely abscess given no surrounding erythema.   -US skin/soft tissue of RUQ abdomen     Hematochezia, resolved  Patient reported one episode of \"dark blood clot in stool\" several days ago. Since has had multiple normal BM without any recurrence of bleeding. Denies hx of GI bleed. Patient denies having any screening colonoscopies.   -Check Hgb in AM   -Educated patient to inform nursing if recurrent blood in stool    -Recommend colonoscopy screening as outpatient    Chronic hepatitis C (untreated)   Hepatic steatosis   Patient has hx of chronic hepatitis C (untreated). Most recent US abdomen 2/15/24 with hepatic steatosis without hepatic lesion, normal gallbladder. AST/ALT chronically mildly elevated. Suspect hepatomegaly in the setting of known hepatic steatosis.   -Recommend follow up with hepatology to consider hepatitis C treatment and ongoing monitoring     Remainder of care per prior consult/progress notes and psychiatry.     Internal medicine will follow up US skin/soft tissue and hgb in AM. " "Please notify on call TAWANA for additional questions or concerns.     Clinically Significant Risk Factors                              # Overweight: Estimated body mass index is 28.13 kg/m  as calculated from the following:    Height as of 3/14/25: 1.803 m (5' 10.98\").    Weight as of this encounter: 91.4 kg (201 lb 9.6 oz).      # Financial/Environmental Concerns:    # Support System: poor social support noted in nursing assessment  # Housing Instability: noted in nursing assessment         Social Drivers of Health    Food Insecurity: High Risk (3/18/2025)    Food Insecurity     Within the past 12 months, did you worry that your food would run out before you got money to buy more?: Yes     Within the past 12 months, did the food you bought just not last and you didn t have money to get more?: Yes   Depression: At risk (2/26/2025)    Received from [facility]    PHQ-2     PHQ-2 TOTAL SCORE: 6   Housing Stability: High Risk (3/18/2025)    Housing Stability     Do you have housing? : Yes     Are you worried about losing your housing?: Yes   Tobacco Use: Medium Risk (3/1/2025)    [facility]    Patient History     Smoking Tobacco Use: Former     Smokeless Tobacco Use: Never   Transportation Needs: High Risk (3/18/2025)    Transportation Needs     Within the past 12 months, has lack of transportation kept you from medical appointments, getting your medicines, non-medical meetings or appointments, work, or from getting things that you need?: Yes   Social Connections: Socially Isolated (2/23/2025)    Received from [facility]    Social Connections     Do you often feel lonely or isolated from those around you?: 4           The patient's care was discussed with the Bedside Nurse and Patient. Recommendations communicated to primary team via this note.     HELENA Chao  Hospitalist Service  [facility] " [facility]  Securely message with [employer] (more info)  Text page via [facility] Paging/Directory   ______________________________________________________________________    Interval History   Patient reports noting lesion of right upper quadrant skin several days ago.  Recalls weight losing weight and noting lesion due to weight loss.  Denies any pain at site of lesion or with palpation.  Denies any rash or skin changes.  Denies any other skin lesions.  Denies any fevers, chills, chest pain, shortness of breath.  Patient does note an episode of dark red blood clot in stool several days ago.  Denies any recurrence of blood in stool.  Denies any history of GI bleed.  Patient does note constipation for which she takes MiraLAX and is well-controlled.  Reports having daily bowel movements recently.  Denies having any colonoscopies previously.  Denies any lightheadedness or dizziness.    Physical Exam     Weight: 201 lbs 9.6 oz  Blood pressure 130/87, pulse 87, temperature 97.3  F (36.3  C), temperature source Temporal, resp. rate 14, weight 91.4 kg (201 lb 9.6 oz), SpO2 99%.  GENERAL: Alert and oriented x 3. NAD.   HEENT: Anicteric sclera. Mucous membranes moist and without lesions.   RESPIRATORY: Effort normal on RA.   ABDOMEN: Small (about 1.5 cm) round mass of RUQ abdomen, non painful, non mobile. No surrounding erythema or edema.   MUSCULOSKELETAL: Moves all extremities.   NEUROLOGICAL: No focal deficits.       Medical Decision Making       45 MINUTES SPENT BY ME on the date of service doing chart review, history, exam, documentation & further activities per the note.      Data   Reviewed

## 2025-04-11 NOTE — PLAN OF CARE
NOC PLAN OF CARE   Problem: Sleep Disturbance  Goal: Adequate Sleep/Rest  Outcome: Progressing   Goal Outcome Evaluation:  Pt in bed at beginning of shift, breathing quiet and unlabored.     Requested and received PRN Flexeril 10 mg at 0504 for back spasm with morning dose Methadone and reported relief. Denied pain.     Appeared to have slept for 5.75 hrs

## 2025-04-11 NOTE — PLAN OF CARE
"  Problem: Suicide Risk  Goal: Absence of Self-Harm  Intervention: Assess Risk to Self and Maintain Safety  Recent Flowsheet Documentation  Taken 4/10/2025 1919 by Hannah Ballesteros RN  Behavior Management: impulse control promoted  Self-Harm Prevention: environmental self-harm risks assessed  Taken 4/10/2025 1652 by Hannah Ballesteros RN  Behavior Management: impulse control promoted   Goal Outcome Evaluation:    Plan of Care Reviewed With: patient          Pt presented as restlessness, tense, irritable and labile. His affect was labile. He was observed pacing in and out of his room, slamming his door loudly on multiple occasions, and punching the window in the lounge area. Denies injury and declined assessment to his hand. Pt also appeared to be talking to himself and made threatening comments towards another pt stating \"I'm going to shut his fucking mouth and wire it shut\" in response to the other pt's outburst in the hallway. Additionally he expressed feelings of hopelessness, stating that his life is over and that he has nothing to lose. He requested for prn clonidine 0.1 mg which was administered and provided some relief. He remained compliant with his medication regimen and continued to receive supportive care. Despite his intense emotional state, pt denied experiencing other psychotic symptoms such as hallucination, depression, suicidal and homicidal ideation. He ate and drink well. Pt made regular request for prn nicotine lozenges and also received prn flexeril 10 mg as hs for muscle spasm, which provided some relief. Pt declined vital signs assessment except for /100, and refused recheck.    Plan:  Continue to monitor pt's behavior and provide supportive care as needed, address pt's anxiety and irritability, such as behavioral therapy or adjustments to his medication regimen. ?                                "

## 2025-04-11 NOTE — PLAN OF CARE
BEH IP Unit Acuity Rating Score (UARS)  Patient is given one point for every criteria they meet.    CRITERIA SCORING   On a 72 hour hold, court hold, committed, stay of commitment, or revocation. 0    Patient LOS on BEH unit exceeds 20 days. 1  LOS: 24   Patient under guardianship, 55+, otherwise medically complex, or under age 11. 0   Suicide ideation without relief of precipitating factors. 1   Current plan for suicide. 0   Current plan for homicide. 0   Imminent risk or actual attempt to seriously harm another without relief of factors precipitating the attempt. 0   Severe dysfunction in daily living (ex: complete neglect for self care, extreme disruption in vegetative function, extreme deterioration in social interactions). 1   Recent (last 7 days) or current physical aggression in the ED or on unit. 0   Restraints or seclusion episode in past 72 hours. 0   Recent (last 7 days) or current verbal aggression, agitation, yelling, etc., while in the ED or unit. 1   Active psychosis. 0   Need for constant or near constant redirection (from leaving, from others, etc).  0   Intrusive or disruptive behaviors. 0   Patient requires 3 or more hours of individualized nursing care per 8-hour shift (i.e. for ADLs, meds, therapeutic interventions). 0   TOTAL 4

## 2025-04-11 NOTE — PLAN OF CARE
Problem: Adult Behavioral Health Plan of Care  Goal: Adheres to Safety Considerations for Self and Others  Outcome: Progressing     Problem: Suicide Risk  Goal: Absence of Self-Harm  Outcome: Progressing    Pt appeared to be frustrated and was slamming his room's door occasionally. When this writer asked pt for check in, pt stated that put zero for everything and said he don't want to talk about it. Pt stated that the doctor thinks that he is making up on his symptoms. Pt came to this writer for prn Nicotine lozenges in this shift. Pt was isolative from others and was seen pacing in a hallway at times. Pt did not interact much with other peers. Pt had meals on time. Pt's mood was better in a second half of the evening shift. Pt requested and received prn Flexeril for back pain at 2022. Pt took his bed time meds.      Goal Outcome Evaluation:    Plan of Care Reviewed With: patient
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
## 2025-04-12 ENCOUNTER — TELEPHONE (OUTPATIENT)
Dept: BEHAVIORAL HEALTH | Facility: CLINIC | Age: 55
End: 2025-04-12
Payer: COMMERCIAL

## 2025-04-12 LAB — HGB BLD-MCNC: 15.2 G/DL (ref 13.3–17.7)

## 2025-04-12 PROCEDURE — 85018 HEMOGLOBIN: CPT | Performed by: PHYSICIAN ASSISTANT

## 2025-04-12 PROCEDURE — 250N000013 HC RX MED GY IP 250 OP 250 PS 637: Performed by: STUDENT IN AN ORGANIZED HEALTH CARE EDUCATION/TRAINING PROGRAM

## 2025-04-12 PROCEDURE — 250N000013 HC RX MED GY IP 250 OP 250 PS 637

## 2025-04-12 PROCEDURE — 124N000002 HC R&B MH UMMC

## 2025-04-12 PROCEDURE — 36415 COLL VENOUS BLD VENIPUNCTURE: CPT | Performed by: PHYSICIAN ASSISTANT

## 2025-04-12 RX ADMIN — ACETAMINOPHEN 650 MG: 325 TABLET, FILM COATED ORAL at 09:06

## 2025-04-12 RX ADMIN — NICOTINE POLACRILEX 4 MG: 4 LOZENGE ORAL at 09:06

## 2025-04-12 RX ADMIN — BUSPIRONE HYDROCHLORIDE 10 MG: 10 TABLET ORAL at 19:01

## 2025-04-12 RX ADMIN — BUPROPION HYDROCHLORIDE 300 MG: 300 TABLET, EXTENDED RELEASE ORAL at 07:56

## 2025-04-12 RX ADMIN — HALOPERIDOL 2 MG: 1 TABLET ORAL at 16:33

## 2025-04-12 RX ADMIN — NICOTINE POLACRILEX 4 MG: 4 LOZENGE ORAL at 19:13

## 2025-04-12 RX ADMIN — METHADONE HYDROCHLORIDE 135 MG: 10 CONCENTRATE ORAL at 05:03

## 2025-04-12 RX ADMIN — CYCLOBENZAPRINE 10 MG: 10 TABLET, FILM COATED ORAL at 13:42

## 2025-04-12 RX ADMIN — GABAPENTIN 1200 MG: 600 TABLET, FILM COATED ORAL at 19:01

## 2025-04-12 RX ADMIN — NICOTINE POLACRILEX 4 MG: 4 LOZENGE ORAL at 12:09

## 2025-04-12 RX ADMIN — GABAPENTIN 1200 MG: 600 TABLET, FILM COATED ORAL at 13:24

## 2025-04-12 RX ADMIN — CYCLOBENZAPRINE 10 MG: 10 TABLET, FILM COATED ORAL at 05:03

## 2025-04-12 RX ADMIN — CLONIDINE HYDROCHLORIDE 0.1 MG: 0.1 TABLET ORAL at 09:06

## 2025-04-12 RX ADMIN — NICOTINE POLACRILEX 4 MG: 4 LOZENGE ORAL at 06:34

## 2025-04-12 RX ADMIN — GABAPENTIN 1200 MG: 600 TABLET, FILM COATED ORAL at 07:56

## 2025-04-12 RX ADMIN — NICOTINE POLACRILEX 4 MG: 4 LOZENGE ORAL at 20:13

## 2025-04-12 RX ADMIN — CYCLOBENZAPRINE 10 MG: 10 TABLET, FILM COATED ORAL at 19:01

## 2025-04-12 RX ADMIN — NICOTINE POLACRILEX 4 MG: 4 LOZENGE ORAL at 16:29

## 2025-04-12 RX ADMIN — CLONIDINE HYDROCHLORIDE 0.1 MG: 0.1 TABLET ORAL at 13:25

## 2025-04-12 RX ADMIN — NICOTINE POLACRILEX 4 MG: 4 LOZENGE ORAL at 18:13

## 2025-04-12 RX ADMIN — NICOTINE POLACRILEX 4 MG: 4 LOZENGE ORAL at 17:12

## 2025-04-12 RX ADMIN — NICOTINE POLACRILEX 4 MG: 4 LOZENGE ORAL at 07:38

## 2025-04-12 RX ADMIN — BUSPIRONE HYDROCHLORIDE 10 MG: 10 TABLET ORAL at 07:56

## 2025-04-12 RX ADMIN — NICOTINE POLACRILEX 4 MG: 4 LOZENGE ORAL at 11:09

## 2025-04-12 ASSESSMENT — ACTIVITIES OF DAILY LIVING (ADL)
DRESS: INDEPENDENT
LAUNDRY: WITH SUPERVISION
ORAL_HYGIENE: INDEPENDENT
ADLS_ACUITY_SCORE: 26
HYGIENE/GROOMING: INDEPENDENT
ADLS_ACUITY_SCORE: 26
ORAL_HYGIENE: INDEPENDENT
ADLS_ACUITY_SCORE: 26
DRESS: INDEPENDENT
ADLS_ACUITY_SCORE: 26
HYGIENE/GROOMING: INDEPENDENT
ADLS_ACUITY_SCORE: 26
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
## 2025-04-12 NOTE — PROGRESS NOTES
Brief Medicine Note    Medicine following for US skin/soft tissue of RUQ abdomen in setting of palpable lesions on RUQ and repeat Hgb in setting of reported episode of hematochezia x 1 several days ago (since resolved).    US showed 3 non-circumscribed avascular ovoid hyperechoic soft tissue nodules with appearance most suggestive of fat necrosis. Differential diagnosis includes benign lipomas. Both of these etiologies are benign and do not require further monitoring or work-up.    Repeat Hgb reassuringly wnl at 15.2. Notify Medicine if recurrent hematochezia this admission. Outpatient colonoscopy screening.    Medicine will sign off. Please page the on-call TAWANA for any intercurrent medical issues which arise.    Nadja Johnson PA-C  Hospitalist Service

## 2025-04-12 NOTE — PROVIDER NOTIFICATION
"On call resident note    Event:  Was notified at around 12:30pm that, Zenon and gotten into a verbal escalation with another patient leading to posturing and verbal threats to harm the other patient (\"choke him dead\") which further led to agitated behavior requiring 4 staff to hold him back. He was able to go back to his room and lie down per staff and later took his Haldol.     Per nursing staff, he has made verbal threats to kill two of them as well. Per chart, he has had escalated behavior with several patients on the unit over the past days here, and often is not open to discussing behavior or treatment plans with staff or his primary treating team. He is voluntary.     On interview,   Zenon was seen in his room, was lying down. He shared that he \"did not threaten anyone\" and that he \"could just discharge\". When asked if he felt that would be best, he said, \"I'll just discharge Monday and get my meds in my room\". Discussed that he will need to abide by station rules and treat staff and peers respectfully. He said that no meds help except ativan but \"no provider will give it to me\", said, \"you don't care\", \"you have smoke up your ass\". And didn't want to discuss further.     Vital signs:  Temp: 97.9  F (36.6  C)   BP: (!) 158/128 Pulse: 98     SpO2: 96 %       Weight: 91.4 kg (201 lb 9.6 oz)  Estimated body mass index is 28.13 kg/m  as calculated from the following:    Height as of 3/14/25: 1.803 m (5' 10.98\").    Weight as of this encounter: 91.4 kg (201 lb 9.6 oz).     Mental Status:   General appearance:  Appropriate, well kept   Behavior: moderately engaged   Affect: angry   Eye contact: glaring   Mood: agitated   Speech: loud   Thought process: perseverative    Thought content: did not share if he is suicidal    Psychosis: Denies   Suicidal / Homicidal Thoughts: Denies, \"I never threatened anyone\"   Insight: limited    Judgement: poor impulse control   Cognition: Appropriate for age   Orientation: Times " three   Attention: limited   Memory: Appropriate long term / Short term   Psychomotor: none specific   Muscle Strength and Tone: not assessed  Gait and Station: not assessed      Assessment:   Zenon Truong is a 54 year old male with previous psychiatric diagnoses of polysubstance use disorder [benzodiazepines, opiates, methamphetamine, THC, nicotine], severe depression with suicidal ideation, borderline personality disorder and PTSD admitted 03/18/2025 due to concern for SI with plan and intent in the context of medication non adherence, substance use and housing/financial insecurity.     There is concern for ongoing poor distress tolerance and agitation with Zenon. He intermittently will use agitation meds. He may need more behavior interventions to prevent violent acts on the unit.     Plan:  - discussed with staff in huddle that if patient excalates again, due to severity of agitation requiring multiple staff, ongoing verbal threats to peers and staff with threats to end lives, he would benefit from seclusion if he is unwilling to take agitation meds or go to his room and disengage.   - encouraged prn use and agitation meds as needed for acuity     Sunni Lorenz MD  Psychiatry Resident Physician  PGY2

## 2025-04-12 NOTE — PLAN OF CARE
Problem: Sleep Disturbance  Goal: Adequate Sleep/Rest  Outcome: Progressing   Goal Outcome Evaluation:Kemi Yarbrough was sleeping when shift began with non labored breathing. Continues on 15 minutes checks for safety. Patient requested and received prn Flexeril for muscle spasm at 0503 and reports med as being helpful. Also received prn Nicotine Lozenge. Slept for about 5 hours. No behavioral concerns.
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
## 2025-04-12 NOTE — PLAN OF CARE
Problem: Adult Behavioral Health Plan of Care  Goal: Adheres to Safety Considerations for Self and Others  Outcome: Not Progressing     Problem: Suicide Risk  Goal: Absence of Self-Harm  Outcome: Not Progressing     Pt was in his room isolating from others. At 1633, pt approached to this writer and received prn 2 mg of Haldol on pt's request. Pt received prn Nicotine lozenges on request. Pt told this writer that he only needs Nicotine lozenge and prn Flexeril with his bed time meds. Pt stated that he won't bother to this writer beside these things. This writer was presented when the resident checked in with him. Pt was little irritable and verbally abusive during a conversations. Pt asked staff to not disturb him.      One of the RN reported that pt said he will like to get a discharge on Monday so that he can kill himself. When the staff tried to ask more question about it; pt refused to engage in a conversation. Pt asked that RN to come in his room only if they have a shot, cyanide, or gun. Pt declined to take scheduled Remeron. Pt requested and received prn Flexeril with her HS meds at 1900.     Pt requested morning RN to bring his scheduled Methadone and prn Flexeril at 0500 to his room.      Goal Outcome Evaluation:     Plan of Care Reviewed With: patient

## 2025-04-12 NOTE — PLAN OF CARE
Problem: Anxiety Signs/Symptoms  Goal: Optimized Energy Level (Anxiety Signs/Symptoms)  Outcome: Not Progressing     Problem: Anxiety Signs/Symptoms  Goal: Optimized Energy Level (Anxiety Signs/Symptoms)  Outcome: Not Progressing   Goal Outcome Evaluation:       Out of room most of shift. IM came to see him d/t a nodule on his left abdomen and ordered US. US was done. Pt rated depression and anxiety 3/10. Pt was quiet and kept to himself. Only PRN was nicotine.

## 2025-04-13 ENCOUNTER — TELEPHONE (OUTPATIENT)
Dept: BEHAVIORAL HEALTH | Facility: CLINIC | Age: 55
End: 2025-04-13
Payer: COMMERCIAL

## 2025-04-13 PROCEDURE — 124N000002 HC R&B MH UMMC

## 2025-04-13 PROCEDURE — 250N000013 HC RX MED GY IP 250 OP 250 PS 637

## 2025-04-13 PROCEDURE — 250N000013 HC RX MED GY IP 250 OP 250 PS 637: Performed by: STUDENT IN AN ORGANIZED HEALTH CARE EDUCATION/TRAINING PROGRAM

## 2025-04-13 RX ADMIN — NICOTINE POLACRILEX 4 MG: 4 LOZENGE ORAL at 11:35

## 2025-04-13 RX ADMIN — CYCLOBENZAPRINE 10 MG: 10 TABLET, FILM COATED ORAL at 04:59

## 2025-04-13 RX ADMIN — GABAPENTIN 1200 MG: 600 TABLET, FILM COATED ORAL at 14:30

## 2025-04-13 RX ADMIN — BUPROPION HYDROCHLORIDE 300 MG: 300 TABLET, EXTENDED RELEASE ORAL at 08:09

## 2025-04-13 RX ADMIN — NICOTINE POLACRILEX 4 MG: 4 LOZENGE ORAL at 10:36

## 2025-04-13 RX ADMIN — NICOTINE POLACRILEX 4 MG: 4 LOZENGE ORAL at 08:09

## 2025-04-13 RX ADMIN — NICOTINE POLACRILEX 4 MG: 4 LOZENGE ORAL at 12:48

## 2025-04-13 RX ADMIN — NICOTINE POLACRILEX 4 MG: 4 LOZENGE ORAL at 21:11

## 2025-04-13 RX ADMIN — BUSPIRONE HYDROCHLORIDE 10 MG: 10 TABLET ORAL at 08:09

## 2025-04-13 RX ADMIN — NICOTINE POLACRILEX 4 MG: 4 LOZENGE ORAL at 19:21

## 2025-04-13 RX ADMIN — HALOPERIDOL 2 MG: 1 TABLET ORAL at 11:20

## 2025-04-13 RX ADMIN — NICOTINE POLACRILEX 4 MG: 4 LOZENGE ORAL at 06:30

## 2025-04-13 RX ADMIN — NICOTINE POLACRILEX 4 MG: 4 LOZENGE ORAL at 18:00

## 2025-04-13 RX ADMIN — NICOTINE POLACRILEX 4 MG: 4 LOZENGE ORAL at 14:29

## 2025-04-13 RX ADMIN — NICOTINE POLACRILEX 4 MG: 4 LOZENGE ORAL at 16:02

## 2025-04-13 RX ADMIN — GABAPENTIN 1200 MG: 600 TABLET, FILM COATED ORAL at 08:09

## 2025-04-13 RX ADMIN — GABAPENTIN 1200 MG: 600 TABLET, FILM COATED ORAL at 19:21

## 2025-04-13 RX ADMIN — NICOTINE POLACRILEX 4 MG: 4 LOZENGE ORAL at 09:42

## 2025-04-13 RX ADMIN — ACETAMINOPHEN 650 MG: 325 TABLET, FILM COATED ORAL at 05:47

## 2025-04-13 RX ADMIN — CYCLOBENZAPRINE 10 MG: 10 TABLET, FILM COATED ORAL at 11:59

## 2025-04-13 RX ADMIN — CLONIDINE HYDROCHLORIDE 0.1 MG: 0.1 TABLET ORAL at 11:59

## 2025-04-13 RX ADMIN — CYCLOBENZAPRINE 10 MG: 10 TABLET, FILM COATED ORAL at 19:21

## 2025-04-13 RX ADMIN — METHADONE HYDROCHLORIDE 135 MG: 10 CONCENTRATE ORAL at 05:00

## 2025-04-13 RX ADMIN — BUSPIRONE HYDROCHLORIDE 10 MG: 10 TABLET ORAL at 19:21

## 2025-04-13 ASSESSMENT — ACTIVITIES OF DAILY LIVING (ADL)
ADLS_ACUITY_SCORE: 26

## 2025-04-13 NOTE — TELEPHONE ENCOUNTER
S:  [facility] and [facility] CRN were wanting pt to move to another unit due to conflict with another pt on the unit.  CRN's on Stations 10, 20, 30 and 32 all decline due to pt and unit acuity and no actual open beds..  ANS, [facility] CRN  and resident are all aware.  Pt will remain on list at this time.

## 2025-04-13 NOTE — TELEPHONE ENCOUNTER
[facility] ONLY: Transfer  R: [place] [facility] Inpatient Bed Call Log  4/13/25 @ 1:00am    [facility]: @ capacity for Adult MH beds.     Pt remains on waitlist pending appropriate placement availability.

## 2025-04-13 NOTE — PLAN OF CARE
Problem: Sleep Disturbance  Goal: Adequate Sleep/Rest  Outcome: Progressing    Pt has slept 5 hours based on Q 15 min rounds. Even and unlabored respirations noted. On pt's request, pt received prn Flexeril for lower back pain rated 5/10 and scheduled Methadone at 0500. Pt's mood was good and cooperative with cares. Pt requested and received prn Tylenol for headache rated 4/10 at 0547; reported effective on recheck. Pt told this writer that he is apologetic to the staff about yesterday incident and he stated that he made those statements out of frustration and for his self defense. Pt stated that he would never hit anybody and has never done it before. Pt received prn Nicotine lozenge.      Goal Outcome Evaluation:    Plan of Care Reviewed With: patient

## 2025-04-14 ENCOUNTER — TELEPHONE (OUTPATIENT)
Dept: BEHAVIORAL HEALTH | Facility: CLINIC | Age: 55
End: 2025-04-14
Payer: COMMERCIAL

## 2025-04-14 PROCEDURE — 250N000013 HC RX MED GY IP 250 OP 250 PS 637

## 2025-04-14 PROCEDURE — 250N000013 HC RX MED GY IP 250 OP 250 PS 637: Performed by: STUDENT IN AN ORGANIZED HEALTH CARE EDUCATION/TRAINING PROGRAM

## 2025-04-14 PROCEDURE — 99232 SBSQ HOSP IP/OBS MODERATE 35: CPT | Mod: GC | Performed by: PSYCHIATRY & NEUROLOGY

## 2025-04-14 PROCEDURE — 124N000002 HC R&B MH UMMC

## 2025-04-14 RX ADMIN — GABAPENTIN 1200 MG: 600 TABLET, FILM COATED ORAL at 07:41

## 2025-04-14 RX ADMIN — NICOTINE POLACRILEX 4 MG: 4 LOZENGE ORAL at 06:39

## 2025-04-14 RX ADMIN — NICOTINE POLACRILEX 4 MG: 4 LOZENGE ORAL at 12:35

## 2025-04-14 RX ADMIN — NICOTINE POLACRILEX 4 MG: 4 LOZENGE ORAL at 07:42

## 2025-04-14 RX ADMIN — NICOTINE POLACRILEX 4 MG: 4 LOZENGE ORAL at 17:19

## 2025-04-14 RX ADMIN — NICOTINE POLACRILEX 4 MG: 4 LOZENGE ORAL at 11:24

## 2025-04-14 RX ADMIN — NICOTINE POLACRILEX 4 MG: 4 LOZENGE ORAL at 21:06

## 2025-04-14 RX ADMIN — GABAPENTIN 1200 MG: 600 TABLET, FILM COATED ORAL at 19:57

## 2025-04-14 RX ADMIN — GABAPENTIN 1200 MG: 600 TABLET, FILM COATED ORAL at 14:05

## 2025-04-14 RX ADMIN — BUSPIRONE HYDROCHLORIDE 10 MG: 10 TABLET ORAL at 07:41

## 2025-04-14 RX ADMIN — NICOTINE POLACRILEX 4 MG: 4 LOZENGE ORAL at 16:01

## 2025-04-14 RX ADMIN — NICOTINE POLACRILEX 4 MG: 4 LOZENGE ORAL at 19:57

## 2025-04-14 RX ADMIN — CYCLOBENZAPRINE 10 MG: 10 TABLET, FILM COATED ORAL at 19:57

## 2025-04-14 RX ADMIN — NICOTINE POLACRILEX 4 MG: 4 LOZENGE ORAL at 14:06

## 2025-04-14 RX ADMIN — CYCLOBENZAPRINE 10 MG: 10 TABLET, FILM COATED ORAL at 05:03

## 2025-04-14 RX ADMIN — CLONIDINE HYDROCHLORIDE 0.1 MG: 0.1 TABLET ORAL at 09:18

## 2025-04-14 RX ADMIN — NICOTINE POLACRILEX 4 MG: 4 LOZENGE ORAL at 08:52

## 2025-04-14 RX ADMIN — CYCLOBENZAPRINE 10 MG: 10 TABLET, FILM COATED ORAL at 14:05

## 2025-04-14 RX ADMIN — NICOTINE POLACRILEX 4 MG: 4 LOZENGE ORAL at 18:21

## 2025-04-14 RX ADMIN — METHADONE HYDROCHLORIDE 135 MG: 10 CONCENTRATE ORAL at 05:03

## 2025-04-14 RX ADMIN — NICOTINE POLACRILEX 4 MG: 4 LOZENGE ORAL at 10:22

## 2025-04-14 RX ADMIN — BUPROPION HYDROCHLORIDE 300 MG: 300 TABLET, EXTENDED RELEASE ORAL at 07:41

## 2025-04-14 RX ADMIN — BUSPIRONE HYDROCHLORIDE 10 MG: 10 TABLET ORAL at 19:57

## 2025-04-14 ASSESSMENT — ACTIVITIES OF DAILY LIVING (ADL)
ADLS_ACUITY_SCORE: 26
ADLS_ACUITY_SCORE: 26
LAUNDRY: WITH SUPERVISION
ADLS_ACUITY_SCORE: 26
ORAL_HYGIENE: INDEPENDENT
ADLS_ACUITY_SCORE: 26
HYGIENE/GROOMING: INDEPENDENT
ADLS_ACUITY_SCORE: 26
ADLS_ACUITY_SCORE: 26
ORAL_HYGIENE: INDEPENDENT
ADLS_ACUITY_SCORE: 26
HYGIENE/GROOMING: INDEPENDENT
ADLS_ACUITY_SCORE: 26
DRESS: INDEPENDENT
LAUNDRY: WITH SUPERVISION
ADLS_ACUITY_SCORE: 26
DRESS: INDEPENDENT

## 2025-04-14 NOTE — PLAN OF CARE
BEH IP Unit Acuity Rating Score (UARS)  Patient is given one point for every criteria they meet.    CRITERIA SCORING   On a 72 hour hold, court hold, committed, stay of commitment, or revocation. 0    Patient LOS on BEH unit exceeds 20 days. 1  LOS: 27   Patient under guardianship, 55+, otherwise medically complex, or under age 11. 0   Suicide ideation without relief of precipitating factors. 0   Current plan for suicide. 0   Current plan for homicide. 0   Imminent risk or actual attempt to seriously harm another without relief of factors precipitating the attempt. 0   Severe dysfunction in daily living (ex: complete neglect for self care, extreme disruption in vegetative function, extreme deterioration in social interactions). 1   Recent (last 7 days) or current physical aggression in the ED or on unit. 0   Restraints or seclusion episode in past 72 hours. 0   Recent (last 7 days) or current verbal aggression, agitation, yelling, etc., while in the ED or unit. 1   Active psychosis. 0   Need for constant or near constant redirection (from leaving, from others, etc).  0   Intrusive or disruptive behaviors. 1   Patient requires 3 or more hours of individualized nursing care per 8-hour shift (i.e. for ADLs, meds, therapeutic interventions). 0   TOTAL 4

## 2025-04-14 NOTE — PLAN OF CARE
"  Problem: Depressive Signs/Symptoms  Goal: Improved Psychomotor Symptoms (Depressive Signs/Symptoms)  Outcome: Progressing  Flowsheets (Taken 4/14/2025 1847)  Mutually Determined Action Steps (Improved Psychomotor Symptoms): adheres to medication regimen  Intervention: Manage Psychomotor Movement  Recent Flowsheet Documentation  Taken 4/14/2025 1839 by Hannah Ballesteros RN  Diversional Activity: music  Activity (Behavioral Health):   up ad radha   activity encouraged   Goal Outcome Evaluation:    Plan of Care Reviewed With: patient          Pt reported feeling \"okay\" and appeared slightly restless and withdrawn. His affect appears flat, and mood was calm, mostly cooperative and withdrawn. He kept to himself for most of the shift, choosing not to attend OT group or engage socially with peers or staff. He was observed frequently in and out of his room, but did not exhibit any angry outbursts. Pt denied experiencing hallucinations, anxiety, depression, or thoughts of self-harm or harm to others. Overall, his behavior was controlled and stable. Pt had a good appetite, eating and snacking well, and maintaining adequate hydration. He was generally compliant with his medication regimen, although he declined to take his Remeron without providing a reason. Pt's grooming and dress were adequate. Good eye contact and, clear and coherent speech. Prn flexeril provided as requested for muscle spasm which per pt was effective.     Blood pressure (!) 142/93, pulse 105, temperature 97  F (36.1  C), temperature source Temporal, resp. rate 18, weight 92.1 kg (203 lb 1.6 oz), SpO2 97%.          "

## 2025-04-14 NOTE — PROGRESS NOTES
----------------------------------------------------------------------------------------------------------  [facility]  Psychiatry Progress Note  Hospital Day #27     Interim History:     The patient's care was discussed with the treatment team and chart notes were reviewed.    Identifier: 55 yo male with PSUD  [benzos, opiates, meth, THC, nicotine], severe depression with SI, BPD and PTSD admitted from medicine 03/18/2025 due to concern for SI with plan and intent to overdose on fentanyl on his bday 3/22 in the context of medication. This is in the context of medication non adherence, substance use and housing/financial insecurity. Was planning to start ECT, unsure of plan now as he refused it 3/31..    Vitals: /81 (BP Location: Left arm, Patient Position: Sitting, Cuff Size: Adult Regular)   Pulse 90   Temp 97.8  F (36.6  C) (Temporal)   Resp 20   Wt 92.1 kg (203 lb 1.6 oz)   SpO2 96%   BMI 28.34 kg/m    Sleep: 4.75 hours (04/14/25 0600)  Scheduled medications: Took all scheduled medications as prescribed  Psychiatric PRN medications:   Last 24H PRN:     cloNIDine (CATAPRES) tablet 0.1 mg, 0.1 mg at 04/13/25 1159    cyclobenzaprine (FLEXERIL) tablet 10 mg, 10 mg at 04/14/25 0503    haloperidol (HALDOL) tablet 2 mg, 2 mg at 04/13/25 1120 **OR** haloperidol lactate (HALDOL) injection 2 mg    nicotine (NICORETTE) lozenge 4 mg, 4 mg at 04/14/25 0742      Staff Report:   Zenon presents with labile mood, but redirectable. endorsed passive suicidal thought with no plan and hopelessness; pt said that he is trying to stay positive.  Requested PRN for anxiety and pain, no other events overnight. Please see staff notes for details.      Subjective:     Patient Interview:    Zenon is 'frustrated and irritated' today. He says he didn't do anything wrong but stood up for himself. Said he is dealing with some other personal things that he didn't want to discuss. He is  still feeling anxious. He slept about 2 hours, maybe less. Awakens every hour. Says he is sick of being here and feels like his  life outside of the hospital is never going to get any better no matter what he does. He is very concerned about the logistics of leaving the hospital such as not having hygiene products or clothing. He doesn't want to go to treatment but will only go because he sees this as his only other option besides the shelter. He endorses some suicidal thoughts.        ROS:  Patient reports no medical symptoms  Patient reports insomnia (falling asleep  or staying asleep )     Objective:     Vitals:  /81 (BP Location: Left arm, Patient Position: Sitting, Cuff Size: Adult Regular)   Pulse 90   Temp 97.8  F (36.6  C) (Temporal)   Resp 20   Wt 92.1 kg (203 lb 1.6 oz)   SpO2 96%   BMI 28.34 kg/m      Allergies:  Allergies   Allergen Reactions    Iodinated Contrast Media Hives and Rash     Reports he is allergic to Iodine contrast     Reports he is allergic to Iodine contrast       Reports he is allergic to Iodine contrast    Reports he is allergic to Iodine contrast      Reports he is allergic to Iodine contrast   Reports he is allergic to Iodine contrast    Iodine Hives    Lisinopril Hives and Rash    Lithium Hives    Topiramate Hives    Escitalopram Rash     Rash around mouth    Iodine-131 Hives    Trazodone Anxiety     Trazodone causes RLS for pt    Olanzapine Rash     Pt reported taking approximately one year ago. Developed rash around mouth.       Current Medications:  Scheduled:  Current Facility-Administered Medications   Medication Dose Route Frequency Provider Last Rate Last Admin    acetaminophen (TYLENOL) tablet 650 mg  650 mg Oral Q4H PRN Patti Bernard MD   650 mg at 04/13/25 0547    alum & mag hydroxide-simethicone (MAALOX) suspension 30 mL  30 mL Oral Q4H PRN Patti Bernard MD        buPROPion (WELLBUTRIN XL) 24 hr tablet 300 mg  300 mg Oral Daily Andrea Rand  Rupinder Wong MD   300 mg at 04/14/25 0741    busPIRone (BUSPAR) tablet 10 mg  10 mg Oral BID Rupinder Bradford MD   10 mg at 04/14/25 0741    cloNIDine (CATAPRES) tablet 0.1 mg  0.1 mg Oral TID PRN Tracy Castillo MD   0.1 mg at 04/13/25 1159    cyclobenzaprine (FLEXERIL) tablet 10 mg  10 mg Oral TID PRN Patti Bernard MD   10 mg at 04/14/25 0503    dicyclomine (BENTYL) capsule 10 mg  10 mg Oral 4x Daily PRN Suad Coyle MD        folic acid (FOLVITE) tablet 1 mg  1 mg Oral Daily Patti Bernard MD   1 mg at 04/10/25 0736    gabapentin (NEURONTIN) tablet 1,200 mg  1,200 mg Oral TID Sunni Lorenz MD   1,200 mg at 04/14/25 0741    haloperidol (HALDOL) tablet 2 mg  2 mg Oral BID PRN Sunni Lorenz MD   2 mg at 04/13/25 1120    Or    haloperidol lactate (HALDOL) injection 2 mg  2 mg Intramuscular BID PRN Sunni Lorenz MD        loperamide (IMODIUM) capsule 2 mg  2 mg Oral 4x Daily PRN Suad Coyle MD   2 mg at 03/28/25 0858    melatonin tablet 3 mg  3 mg Oral At Bedtime PRN Patti Bernard MD   3 mg at 04/09/25 2005    methadone (DOLOPHINE-INTENSOL) 10 MG/ML (HIGH CONC) solution 135 mg  135 mg Oral Daily Dilshad Bailey MD   135 mg at 04/14/25 0503    mirtazapine (REMERON) tablet 45 mg  45 mg Oral At Bedtime Demetri Sarabia MD   45 mg at 04/11/25 2022    multivitamin w/minerals (THERA-VIT-M) tablet 1 tablet  1 tablet Oral Daily Patti Bernard MD   1 tablet at 04/08/25 0729    nicotine (NICORETTE) lozenge 4 mg  4 mg Buccal Q1H PRN Patti Bernard MD   4 mg at 04/14/25 0742    polyethylene glycol (MIRALAX) Packet 17 g  17 g Oral Daily PRN Patti Bernard MD   17 g at 04/09/25 0753    thiamine (B-1) tablet 100 mg  100 mg Oral Daily Patti Bernard MD   100 mg at 04/08/25 0729       PRN:  Current Facility-Administered Medications   Medication Dose Route Frequency Provider Last Rate Last Admin    acetaminophen  (TYLENOL) tablet 650 mg  650 mg Oral Q4H PRN Patti Bernard MD   650 mg at 04/13/25 0547    alum & mag hydroxide-simethicone (MAALOX) suspension 30 mL  30 mL Oral Q4H PRN Patti Bernard MD        buPROPion (WELLBUTRIN XL) 24 hr tablet 300 mg  300 mg Oral Daily Rupinder Bradford MD   300 mg at 04/14/25 0741    busPIRone (BUSPAR) tablet 10 mg  10 mg Oral BID Rupinder Bradford MD   10 mg at 04/14/25 0741    cloNIDine (CATAPRES) tablet 0.1 mg  0.1 mg Oral TID PRN Tracy Castillo MD   0.1 mg at 04/13/25 1159    cyclobenzaprine (FLEXERIL) tablet 10 mg  10 mg Oral TID PRN Patti Bernard MD   10 mg at 04/14/25 0503    dicyclomine (BENTYL) capsule 10 mg  10 mg Oral 4x Daily PRN Suad Coyle MD        folic acid (FOLVITE) tablet 1 mg  1 mg Oral Daily Patti Bernard MD   1 mg at 04/10/25 0736    gabapentin (NEURONTIN) tablet 1,200 mg  1,200 mg Oral TID Sunni Lorenz MD   1,200 mg at 04/14/25 0741    haloperidol (HALDOL) tablet 2 mg  2 mg Oral BID PRN Sunni Lorenz MD   2 mg at 04/13/25 1120    Or    haloperidol lactate (HALDOL) injection 2 mg  2 mg Intramuscular BID PRN Sunni Lorenz MD        loperamide (IMODIUM) capsule 2 mg  2 mg Oral 4x Daily PRN Suad Coyle MD   2 mg at 03/28/25 0858    melatonin tablet 3 mg  3 mg Oral At Bedtime PRN Patti Bernard MD   3 mg at 04/09/25 2005    methadone (DOLOPHINE-INTENSOL) 10 MG/ML (HIGH CONC) solution 135 mg  135 mg Oral Daily Dilshad Bailey MD   135 mg at 04/14/25 0503    mirtazapine (REMERON) tablet 45 mg  45 mg Oral At Bedtime Demetri Sarabia MD   45 mg at 04/11/25 2022    multivitamin w/minerals (THERA-VIT-M) tablet 1 tablet  1 tablet Oral Daily Patti Bernard MD   1 tablet at 04/08/25 0729    nicotine (NICORETTE) lozenge 4 mg  4 mg Buccal Q1H PRN Patti Bernard MD   4 mg at 04/14/25 0742    polyethylene glycol (MIRALAX) Packet 17 g  17 g Oral Daily  "Patti Araiza MD   17 g at 04/09/25 0753    thiamine (B-1) tablet 100 mg  100 mg Oral Daily Patti Bernard MD   100 mg at 04/08/25 0729       Labs and Imaging:  New results:   No results found for this or any previous visit (from the past 24 hours).    Data this admission:  - CBC with chronic thrombocytopenia (platelet count 78)   - CMP with mildly elevated AST (47) and ALT (84)   - TSH normal  - UDS positive for amphetamines, cannabinoids, fentanyl  - Vit D in process  - Lipids not ordered this admission (but within normal limits on 2/26/2025)  - Vit B12 normal  - Folate normal  - EKG sinus bradycardia, QTc 396     Mental Status Exam:     Oriented to:  Grossly Oriented, Person/Self, and Situation  General:  Awake and Alert   Appearance:  Appears older than stated age Edentulous. Dressed in hospital scrubs.   Behavior/Attitude:  anxious answers, frustrated, he was moderately engaged  Eye Contact:  Limited eye contact.  Psychomotor: tremulous, increased psychomotor activity due to anxiety.  no catatonia present  Speech:  decreased volume/tone, good articulation  Language: Fluent in English with appropriate syntax and vocabulary.  Mood:  \"anxious, frustrated\"  Affect:   congruent with mood, anxious, labile.  Thought Process:  perseverative about catastrophic thoughts, belief that \"nothing will work\".  Thought Content:   ruminative about discharge process, hopelessness  Associations:  unable to access  Insight: limited due to poor coping   Judgment:  poor due to past behaviors and maladaptive coping    Impulse control: poor due past interactions with staff and peers  Attention Span:  limited because anxiety/depression  Concentration:  fair  Recent and Remote Memory:  not formally assessed but intact to conversation   Fund of Knowledge: average, estimated  Muscle Strength and Tone: normal  Gait and Station: Normal     Psychiatric Assessment     Zenon Truong is a 54 year old male with previous " psychiatric diagnoses of polysubstance use disorder [benzodiazepines, opiates, methamphetamine, THC, nicotine], severe depression with suicidal ideation, borderline personality disorder and PTSD admitted 03/18/2025 due to concern for SI with plan and intent in the context of medication non adherence, substance use and housing/financial insecurity. On admission, he presented with symptoms of SI with intent and plan, hopelessness, poor motivation, low energy, anhedonia, impulsivity and irritability. There is genetic predisposition given family hx of substance use disorder. Patient has struggled with homelessness and severe substance use for years. This current admission is likely precipitated by recent psychiatric hospitalization at end of February followed by relapse soon after discharge to a sober house. Perpetuating factors include ongoing substance use, medication non adherence, longstanding functional impairment, personality traits, lack of perceived support, deconditioning and housing and financial insecurity. Patient's support system includes  . Based on patient's history and current symptoms, criteria are met for primary diagnosis of MDD without psychotic features, opioid use disorder, benzodiazepine use disorder, stimulant use disorder and cannabis use disorder. Patient warrants inpatient hospitalization to maintain their safety and would benefit from medication optimization and outpatient referral/resources.       He does have baseline chronic SI and still continues to express a desire to not wake up. It appears like his current presentation is more likely borderline personality with mood swings, irritability, impulsive behaviors such as slamming doors and difficulty with interpersonal relationships with staff . There is likely a component of withdrawal/cravings at play which may be contributing to his mood and irritability but borderline personality traits are more apparent now that he is  physically stabilizing.     There is a significant impact of prior mistrust in the medical system. He does demonstrate a desire to commit to treatment and is apologetic for his outbursts in hindsight. His physical withdrawal symptoms are significantly improved, but affect is still labile and irritable due to anxiety. He does endorse restlessness and racing thoughts.  He has  persistent  nightmares stemming from a traumatic event when his fiance  5-6 years ago. Currently targeting symptoms of opioid and stimulant use disorder with methadone and bupropion, respectively. Zenon was agreeable to pursue CDT evaluation and is goal oriented regarding maintaining sobriety.  Although he presents still labile, and tearful due to environmental stressors these past days (verbal altercation with another peer; delays in CD tt placement and uncertainty about methadone doses needed for discharge). He still is goal-directed and future oriented regarding receiving treatment even though he currently is having nihilistic thoughts, and catastrophic ruminations.     Currently we are working on acquired necessary paperwork from [facility]. Due to thisprocess discharge would be pending and CTC will be in contact with CDT facility in [place] for availability of openings next week, as well as, setting appointments for following current medical concerns at discharge.     Today's changes:  - Pending placement, no plans for discharge this week  - Today [place] choice assessment     Psychiatric Plan by Diagnosis      #Major depressive disorder, recurrent, severe without psychosis  #Borderline personality disorder.  #Complex PTSD with anxious mood  - Declined ECT on 3/31  - Continue Mirtazapine 45 mg po at bedtime  - Bupropion to 300 mg daily  - Buspar 10 mg BID     #Opioid use disorder, severe  - methadone 135mg daily   - Addiction team following  - Thiamine, MVI, folic acid     For symptomatic management:  - 10 mg Flexeril TID PRN Q8H for  muscle cramps, spasms, or tightness  - loperamide, bentyl     #Benzodiazepine use disorder, severe  Phenobarbital taper was discontinued on 3/26 per recommendation from addiction medicine team recommendation that his symptoms were likely secondary to opiate withdrawal vs benzo withdrawal.   - Addiction team following  - scheduled clonidine 0.1mg TID PRN        #Stimulant (methamphetamine) use disorder  - Bupropion 300 mg daily    #Cannabis use disorder       Additional Plans:  - Patient will be treated in therapeutic milieu with appropriate individual and group therapies as described  - Continue coordination with Addiction medicine regarding Methadone     Psychiatric Hospital Course:      Zenon Truong was admitted to [facility] as a voluntary patient. Had a recent hospitalization   Medications and other updates:  3/14: hold bupropion due to risk of lower seizure threshold  3/14: Continue PTA desvenlafaxine 25mg and mirtazapine 30mg for sleep  3/18: start clonidine 0.1mg TID   3/20: Increase mirtazapine to 45 mg daily for continued insomnia   3/21: restart PTA gabapentin 600mg TID for anxiety   3/21: continuing phenobarb taper (today is 32.4 mg BID)  3/23: no scheduled phenobarb received 32.4mg due to ongoing withdrawal symptoms  3/24: give one time phenobarbital dose of 32.4mg due to significant discomfort that seem at least in part due to withdrawal  3/24: consult addiction medicine for assistance with methadone dose and ongoing withdrawal symptoms   3/24: discussed re-initiating wellbutrin. However, patient reporting symptoms that he feels were similar to auras he has had prior to seizures (this has been worse the last couple days), so hesitant to do this at this time.  3/25: one time phenobarbital dose of 32.4mg given significant withdrawal symptoms  3/25: increase desvenlafaxine to 50mg daily (per chart review, was prescribed this dose outpatient)  3/26: increase methadone to 115 mg daily per addiction  "medicine recs   3/26: discontinue phenobarb taper   3/26: medicine consult for ECT clearance; Defer to psychiatry for decision of proceeding with ECT with recent benzodiazepine withdrawal/use. Otherwise, no absolute medical contraindications to proceeding with ECT at this time. Treatment plan per psychiatry.   3/27: increase gabapentin to 900 TID per addiction med team recs   3.27: increase methadone to 125mg daily per addiction med recs given ongoing withdrawal symptoms   3/27: pending ECT team recs regarding methadone on ECT days  3/31: declined ECT on intended start day  4/1: increase methadone to 135mg daily per addiction med recs  4/1: changed scheduled clonidine 0.1mg TID to PRN per patient request  4/1: discontinue prn quetiapine and hydroxyzine per patient request  4/1: discontinue desvenlafaxine 50mg per patient request  4/1: restart PTA wellbutrin, was previously on 300mg, restart at 150mg XL  4/1: hypertensive with /140s but refused repeat vital signs  4/2: clonidine 0.1 prn given; BP 150s/110s on recheck after medication. Will offer another prn clonidine (he has been refusing clonidine)    4/3: /99 this morning  4/4: prazosin 1mg prn for nightmares  4/7 Increase Bupropion to 300 mg  4/8: CDT consult placed  4/9: Start Buspar 10 mg BID  4/14: Today MN choice assessment     The risks, benefits, alternatives, and side effects were discussed and understood by the patient.     Medical Assessment and Plan     #Mass/lesion of RUQ abdominal wall  - Zenon was evaluated by Medicine. Per notes: small mass of RUQ abdominal wall, non painful, non mobile. Notes he has lost weight recently at noted mass \"a few days ago.\" No surrounding erythema, edema. Denies any other lesions on skin. Suspect lipoma or cyst, less likely abscess given no surrounding erythema.   -US skin/soft tissue of RUQ abdomen   - Pending re-eval with results.       #Palpitations  3/21 patient reporting short episodes (few seconds) of " palpitations occurring intermittently over the last month. Does have shortness of breath with this, no chest pain or other symptoms. He is not sure if it is associated with anxiety.   - repeat EKG ordered showed sinus bradycardia with no significant change to prior EKG      #Chronic hepatitis C, never treated  #Mild transaminase elevations, hepatic steatosis:  Never treated for hepatitis C.  Chronic, mild transaminase elevations with normal hepatic function.  Most recent abdominal ultrasound 2/15/2024 showed hepatic steatosis without hepatic lesions, normal gallbladder.  Denies current alcohol use.  - monitor outpatient      #Chronic thrombocytopenia  #Normocytic anemia, now resolved  Chronic thrombocytopenia, moderate and stable.  Denies any history of bleeding including no history of gastric ulcers, hematemesis, melena or hematochezia.  Anemia appears to be more recent since 2/23/2025 when hemoglobin was 11.6, MCV 86.  Hgb 14.1, Plts 97, MCV 86, ferritin, iron, TIBC wnl on 3/15.      #Concern for malnutrition  Seen by dietician team, patient does not meet two of the established criteria necessary for diagnosing malnutrition but is at risk for malnutrition.   - ensure enlive ordered        Medical course: Patient was physically examined by the ED prior to being transferred to the unit and was found to be medically stable and appropriate for admission.      Consults:  Medicine (for clearance for ECT)  Addiction med (for methadone dosage and withdrawal symptoms)      Checklist     Legal Status: Voluntary   /ACT Team: TIERNEY signed  Name/Clinic: [facility]    Number: [phone]     Safety Assessment:   Behavioral Orders   Procedures    Code 1 - Restrict to Unit    Code 2     For ultrasound    Fall precautions    Routine Programming     As clinically indicated    Seizure precautions    Status 15     Every 15 minutes.    Suicide precautions: Suicide Risk: LOW; Clinical rationale  to override score: lack of access to a plan for self-harm     .     Order Specific Question:   Suicide Risk     Answer:   LOW     Order Specific Question:   Clinical rationale to override score:     Answer:   lack of access to a plan for self-harm       Risk Assessment:  Risk for harm is low  Risk factors: SI, maladaptive coping, substance use, impulsive, and past behaviors  Protective factors: engaged in treatment      SIO: no     Disposition: No plans for discharge as of this week (4/11/25)     Attestations     Eh Uribe, MS3  [employer]    Resident/Fellow Attestation   I, Rupinder Maurice, was present with the medical/TAWANA student who participated in the service and in the documentation of the note.  I have verified the history and personally performed the physical exam and medical decision making.  I agree with the assessment and plan of care as documented in the note.      This patient was seen and discussed with my attending physician.  Rupinder Maurice MD  [facility] Psychiat[facility] Resident  04/14/2025    This patient has been seen and evaluated by me, Charles Fink.  I have discussed this patient with the psychiatry resident and I agree with the findings and plan in this note.    I have reviewed today's vital signs, medications, labs and imaging.     Charles Menjivar MD

## 2025-04-14 NOTE — PLAN OF CARE
Team Note Due:  Wednesday    Assessment/Intervention/Current Symtoms and Care Coordination:  - chart review    - team meeting - discussed pt progress, symptomology, and response to treatment.  Discussed the discharge plan and any potential impediments to discharge.    - voicemail from patient's  TCM from Stephanie Bailey ph: [phone]  Called Stephanie back and spoke with her - she reports that patient left several messages this weekend stating he was going to ask to discharge and go see his dealer and overdose.   In regards to [facility] Assessment - per Stephanie she let him know this morning that will be occurring. Also said on phone She again advocated for commitment and asked if him requesting to discharge would prompt this. Discussed that the provider does assess such things ongoing. Also spoke about his mood / statements seeming to be situational /  related to the moment. For example will be very irritated with the providers and not talk to them, however will engage with this writer and is pleasant as our interaction was regarding ensuring he has access to Methadone and signing that TIERNEY. Thus far he has not requested to discharge and as of time of our conversation plan remains to be discharge to treatment.   Informed her that this writer will assist in providing information to the [employer]  as long as patient will sign TIERNEY for us to do so. She reports that he has wanted to have a waiver assessment for some time and believes he'll be willing to do so. Said he was upset when on the phone with her and hopes he will be cooperative with . She also asked that this writer pass on message to patient that the verification for his expresspay has been approved.     - this writer approached patient , spoke with him about upcoming [facility] Assessment and asked that he sign TIERNEY as they will likely request records such as medication list , H&P , other notes for the assessment. He signed TIERNEY with no hesitance.  "Also gave him the message from his TCM regarding [employer]. He was very happy / relieved to hear this about the card. Discussed that seems  should be here around 12:30 pm  and we will help set them up in a space to meet.     - voicemail from patient's addiction counselor Alfonso, saying calling to ask for fax number to send TIERNEY  - call back is [phone] - did receive fax back - she spoke with [facility] re 4/23/25 - says that hospital SHOULD NOT GIVE METHADONE DOSE the day of discharge -treatment transport team will bring him to [facility] after they pick him up here - they will give dose there at in person encounter as well as send further supply with [facility] staff. THEY CLOSE AT NOON - she did inform [facility] of this timeline. Hospital team will also be sure to discuss when arranging.  - this writer discussed this with Psychiatry Resident.      - patient met with [facility] . This writer spoke with her prior to give requested info. Met with her following their meeting - she reports meeting went very well, noting that patient \"was such a nice calin\" / very pleasant. Says that he qualifies in terms of has disability / MA - will now be assessing to ensure meets the level of care need. Will update ongoing.        Discharge Plan or Goal:  Discharge directly to [facility] treatment - [facility] current date for admit is 4/23       Barriers to Discharge:  Medication Management  ECT     Referral Status:  None currently      Legal Status:  Voluntary       Contacts (include TIERNEY status):  Methadone Management: [facility] daily     /ACT Team: TIERNEY signed  Name/Clinic: [facility]/ [facility]    Number: [phone]      Upcoming Meetings and Dates/Important Information and next steps:         visit- Tuesday, time TBD     Re Methadone Update [facility] Methadone clinic/ counselor day prior to discharge - [facility] transport will take patient to [facility] for dose of Methadone and to get further " supply immediately after picking him upo here at the hospital. Clinic closes at noon which Alejandro CROSS was informed. HOSPITAL SHOULD NOT GIVE METHADONE DOSE ON DAY OF DISCHARGE.- [facility] will give during in person check in.  Counselor Alfonso [phone]

## 2025-04-14 NOTE — PLAN OF CARE
Problem: Sleep Disturbance  Goal: Adequate Sleep/Rest  Outcome: Progressing   Goal Outcome Evaluation:Progressing    Patient was received sleeping when shift started with non labored breathing. Continues on 15 minutes checks for safety, no concerns. Patient was administered prn Flexeril for back pain rated 5/10. Slept for about 4.75 hours.

## 2025-04-14 NOTE — TELEPHONE ENCOUNTER
R: 7:37pm- Transfer     R: [place]  Access Inpatient Bed Call Log 4/13/25 at 7:37pm.                        [facility] is at capacity.             Pt remains on the work list pending appropriate bed availability.

## 2025-04-14 NOTE — TELEPHONE ENCOUNTER
[facility] ONLY:  R: [place] MH Access Inpatient Bed Call Log  4/14/25 @ 1:00am    [facility]: @ capacity for Adult MH beds.     Pt remains on waitlist pending appropriate placement availability.

## 2025-04-14 NOTE — PLAN OF CARE
"Goal Outcome Evaluation:    Plan of Care Reviewed With: patient      Problem: Depressive Signs/Symptoms  Goal: Improved Mood Symptoms (Depressive Signs/Symptoms)  Outcome: Progressing  Note: Zenon presented with a tense affect. He perseverated on events that happened over the weekend. Pt stated he did not threaten RN or any staff. \"I told her to go fuck herself. I never threatened her. I didn't threaten him either,\" pt stated, referring to a peer. \"I said if he tried anything I would break his neck.\"     Pt reported high anxiety this morning related to a peer yelling. He requested and received clonidine at 0741. Reported depression as \"not bad.\" He endorsed passive, fleeting SI. Pt paced the blanchard and did a [facility] assessment during the shift. Requested and received flexeril at 1405 for back pain.    At 1525, pt went to his room and slammed his door after being told to wait for nicotine gum.    Temp: 97  F (36.1  C) Temp src: Temporal BP: (!) 142/93 Pulse: 105   Resp: 18 SpO2: 97 % O2 Device: None (Room air)      Intervention: Promote Mood Improvement  Recent Flowsheet Documentation  Taken 4/14/2025 1000 by Aida Armando RN  Supportive Measures:   active listening utilized   decision-making supported   positive reinforcement provided   self-responsibility promoted   verbalization of feelings encouraged  Trust Relationship/Rapport:   care explained   emotional support provided   empathic listening provided   thoughts/feelings acknowledged     "

## 2025-04-14 NOTE — TELEPHONE ENCOUNTER
Pt currently admitted to [facility], awaiting transfer off unit.     R: MN  Access Inpatient Bed Call Log 4/14/25 @ 9:47 am:    Intake has called facilities that have not updated the bed status within the last 12 hours.               [facility] is posting 0 beds.      Pt remains on work list pending appropriate bed availability.

## 2025-04-14 NOTE — PLAN OF CARE
"  Problem: Depressive Signs/Symptoms  Goal: Optimized Energy Level (Depressive Signs/Symptoms)  Outcome: Progressing  Intervention: Optimize Energy Level  Recent Flowsheet Documentation  Taken 4/13/2025 1900 by Serena Etienne RN  Activity (Behavioral Health):   activity encouraged   up ad radha     Problem: Anxiety Signs/Symptoms  Goal: Optimized Energy Level (Anxiety Signs/Symptoms)  4/13/2025 1928 by Serena Etienne RN  Outcome: Progressing  4/13/2025 1927 by Serena Etienne RN  Outcome: Progressing  Intervention: Optimize Energy Level  Recent Flowsheet Documentation  Taken 4/13/2025 1900 by Serena Etienne RN  Activity (Behavioral Health):   activity encouraged   up ad radha   Goal Outcome Evaluation:    Plan of Care Reviewed With: patient      Continues to be impulsive though observed to be isolative to his room most of the shift. Easily irritated mostly towards a peer. States he is avoiding the peer. \"I am trying to do the right thing, I do not want to get into it with him again\". Requested and received Flexeril with his bedtime medications. No outburst this shift. Staff will continue to monitor and offer support as needed.       "

## 2025-04-15 ENCOUNTER — TELEPHONE (OUTPATIENT)
Dept: BEHAVIORAL HEALTH | Facility: CLINIC | Age: 55
End: 2025-04-15
Payer: COMMERCIAL

## 2025-04-15 PROCEDURE — 250N000013 HC RX MED GY IP 250 OP 250 PS 637

## 2025-04-15 PROCEDURE — 124N000002 HC R&B MH UMMC

## 2025-04-15 PROCEDURE — 99232 SBSQ HOSP IP/OBS MODERATE 35: CPT | Mod: GC | Performed by: PSYCHIATRY & NEUROLOGY

## 2025-04-15 PROCEDURE — 97150 GROUP THERAPEUTIC PROCEDURES: CPT | Mod: GO

## 2025-04-15 PROCEDURE — 250N000013 HC RX MED GY IP 250 OP 250 PS 637: Performed by: STUDENT IN AN ORGANIZED HEALTH CARE EDUCATION/TRAINING PROGRAM

## 2025-04-15 RX ORDER — BUSPIRONE HYDROCHLORIDE 15 MG/1
15 TABLET ORAL 2 TIMES DAILY
Status: DISCONTINUED | OUTPATIENT
Start: 2025-04-15 | End: 2025-04-16 | Stop reason: HOSPADM

## 2025-04-15 RX ADMIN — NICOTINE POLACRILEX 4 MG: 4 LOZENGE ORAL at 09:28

## 2025-04-15 RX ADMIN — NICOTINE POLACRILEX 4 MG: 4 LOZENGE ORAL at 20:41

## 2025-04-15 RX ADMIN — NICOTINE POLACRILEX 4 MG: 4 LOZENGE ORAL at 06:31

## 2025-04-15 RX ADMIN — METHADONE HYDROCHLORIDE 135 MG: 10 CONCENTRATE ORAL at 05:12

## 2025-04-15 RX ADMIN — GABAPENTIN 1200 MG: 600 TABLET, FILM COATED ORAL at 19:35

## 2025-04-15 RX ADMIN — CYCLOBENZAPRINE 10 MG: 10 TABLET, FILM COATED ORAL at 12:51

## 2025-04-15 RX ADMIN — NICOTINE POLACRILEX 4 MG: 4 LOZENGE ORAL at 17:01

## 2025-04-15 RX ADMIN — CYCLOBENZAPRINE 10 MG: 10 TABLET, FILM COATED ORAL at 05:05

## 2025-04-15 RX ADMIN — BUPROPION HYDROCHLORIDE 300 MG: 300 TABLET, EXTENDED RELEASE ORAL at 08:19

## 2025-04-15 RX ADMIN — NICOTINE POLACRILEX 4 MG: 4 LOZENGE ORAL at 10:43

## 2025-04-15 RX ADMIN — GABAPENTIN 1200 MG: 600 TABLET, FILM COATED ORAL at 14:30

## 2025-04-15 RX ADMIN — NICOTINE POLACRILEX 4 MG: 4 LOZENGE ORAL at 14:28

## 2025-04-15 RX ADMIN — CYCLOBENZAPRINE 10 MG: 10 TABLET, FILM COATED ORAL at 19:35

## 2025-04-15 RX ADMIN — BUSPIRONE HYDROCHLORIDE 10 MG: 10 TABLET ORAL at 08:20

## 2025-04-15 RX ADMIN — NICOTINE POLACRILEX 4 MG: 4 LOZENGE ORAL at 19:35

## 2025-04-15 RX ADMIN — NICOTINE POLACRILEX 4 MG: 4 LOZENGE ORAL at 08:20

## 2025-04-15 RX ADMIN — NICOTINE POLACRILEX 4 MG: 4 LOZENGE ORAL at 12:51

## 2025-04-15 RX ADMIN — BUSPIRONE HYDROCHLORIDE 15 MG: 15 TABLET ORAL at 19:35

## 2025-04-15 RX ADMIN — GABAPENTIN 1200 MG: 600 TABLET, FILM COATED ORAL at 08:20

## 2025-04-15 RX ADMIN — NICOTINE POLACRILEX 4 MG: 4 LOZENGE ORAL at 11:44

## 2025-04-15 RX ADMIN — NICOTINE POLACRILEX 4 MG: 4 LOZENGE ORAL at 15:54

## 2025-04-15 ASSESSMENT — ACTIVITIES OF DAILY LIVING (ADL)
HYGIENE/GROOMING: INDEPENDENT
ADLS_ACUITY_SCORE: 26
ADLS_ACUITY_SCORE: 26
ORAL_HYGIENE: INDEPENDENT
ADLS_ACUITY_SCORE: 26
ORAL_HYGIENE: INDEPENDENT
ADLS_ACUITY_SCORE: 26
DRESS: INDEPENDENT
ADLS_ACUITY_SCORE: 26
LAUNDRY: WITH SUPERVISION
ADLS_ACUITY_SCORE: 26
DRESS: INDEPENDENT
ADLS_ACUITY_SCORE: 26
HYGIENE/GROOMING: INDEPENDENT

## 2025-04-15 NOTE — TELEPHONE ENCOUNTER
R: 8:00 PM Bed Search Update [facility] Only    Pt awaiting transfer off unit 22.    No appropriate beds available on other [facility] units.    Pt remains on intake worklist awaiting appropriate placement.

## 2025-04-15 NOTE — PLAN OF CARE
Team Note Due:  Wednesday    Assessment/Intervention/Current Symtoms and Care Coordination:  Chart review and met with team, discussed pt progress, symptomology, and response to treatment.  Discussed the discharge plan and any potential impediments to discharge.    -Continues to wait for bed at [facility] on 4/23.      Discharge Plan or Goal:  TBD     Barriers to Discharge:  Medication Management  ECT     Referral Status:  None currently      Legal Status:  Voluntary       Contacts (include TIERNEY status):  Methadone Management: [facility]     /ACT Team: TIERNEY signed  Name/Clinic: Stephanie [facility]/ [facility]    Number: [phone]      Upcoming Meetings and Dates/Important Information and next steps:   visit- Tuesday, time TBD

## 2025-04-15 NOTE — PLAN OF CARE
Goal Outcome Evaluation:      Pt received flexeril 10 mg po per request for lower back pain @ 0505 with good results per pt.  He appeared to have slept for 5.5 hours.  He woke up early unable to fall back to sleep.

## 2025-04-15 NOTE — TELEPHONE ENCOUNTER
R: per bed search at 8 am: ([facility] only):   [facility] is at [facility].  Pt remains on the work list pending appropriate bed availability.

## 2025-04-15 NOTE — PLAN OF CARE
BEH IP Unit Acuity Rating Score (UARS)  Patient is given one point for every criteria they meet.    CRITERIA SCORING   On a 72 hour hold, court hold, committed, stay of commitment, or revocation. 0    Patient LOS on BEH unit exceeds 20 days. 1  LOS: 28   Patient under guardianship, 55+, otherwise medically complex, or under age 11. 0   Suicide ideation without relief of precipitating factors. 0   Current plan for suicide. 0   Current plan for homicide. 0   Imminent risk or actual attempt to seriously harm another without relief of factors precipitating the attempt. 0   Severe dysfunction in daily living (ex: complete neglect for self care, extreme disruption in vegetative function, extreme deterioration in social interactions). 1   Recent (last 7 days) or current physical aggression in the ED or on unit. 0   Restraints or seclusion episode in past 72 hours. 0   Recent (last 7 days) or current verbal aggression, agitation, yelling, etc., while in the ED or unit. 1   Active psychosis. 0   Need for constant or near constant redirection (from leaving, from others, etc).  0   Intrusive or disruptive behaviors. 1   Patient requires 3 or more hours of individualized nursing care per 8-hour shift (i.e. for ADLs, meds, therapeutic interventions). 0   TOTAL 4

## 2025-04-15 NOTE — TELEPHONE ENCOUNTER
Pt waiting to be transferred off unit 22.    There are no appropriate beds available on other [facility] units    R: MN  Access Inpatient Bed Call Log 4/15/2025 @ 12:00 AM:  Intake has called facilities that have not updated their bed status within the last 12 hours.   Adults:    [facility] is posting 0 beds.                      Pt remains on the work list pending appropriate bed availability.

## 2025-04-15 NOTE — PROGRESS NOTES
Rehab Group     Start time: 1015  End time: 1145  Patient time total: 25 minutes     attended partial group     #6 attended   Group Type: occupational therapy and OT Clinic   Group Topic Covered: activity therapy, coping skills, and problem solving         Group Session Detail:  OT Clinic      Patient Response/Contribution:  cooperative with task, organized, socially appropriate, safe use of materials/supplies, and actively engaged         Patient Detail:     Pt actively participated in occupational therapy clinic to facilitate coping skill exploration, creative expression within personally meaningful activities, and clinical observation of social, cognitive, and kinesthetic performance skills. Pt response: Independent to initiate, gather materials, sequence, and adjust to workspace demands as needed. Demonstrated fair focus, planning, and problem solving for selected scratch art task. Able to ask for assistance as needed, and socially appropriate with peers and staff. Pt only worked for about 25 minutes.  Asked to take a break and did not return to group.  Pt will continue to be encouraged to attend groups for further asssesssment and to address goals identified on plan of care.        [id number] OT Group (2 or more in attendance)       Patient Active Problem List   Diagnosis    Opioid use disorder, severe, on maintenance therapy (H)    Suicidal ideation    Severe benzodiazepine use disorder (H)    Substance-induced anxiety disorder (H)    Benzodiazepine withdrawal without complication (H)    Cannabis use disorder, moderate, dependence (H)    Cigarette nicotine dependence with withdrawal    Moderate episode of recurrent major depressive disorder (H)    Homelessness    Opioid withdrawal (H)    MDD (major depressive disorder), recurrent severe, without psychosis (H)    ALISSON (generalized anxiety disorder)

## 2025-04-15 NOTE — PLAN OF CARE
"  Problem: Suicide Risk  Goal: Absence of Self-Harm  Outcome: Progressing   Goal Outcome Evaluation:       Pt has been up and out of his room this shift. So far today pt has been polite and cooperative. Pt declined all vitamins but took the rest of his scheduled medication. While walking by the desk writer overheard him say, \"I'm probably dead, yep, no point, pretty sure I'm dead.\" Then pt went to his room. Pt denies all MH sx.                  "

## 2025-04-15 NOTE — PROGRESS NOTES
"  ----------------------------------------------------------------------------------------------------------  [facility]  Psychiatry Progress Note  Hospital Day #28     Interim History:     The patient's care was discussed with the treatment team and chart notes were reviewed.    Identifier: 55 yo male with PSUD  [benzos, opiates, meth, THC, nicotine], severe depression with SI, BPD and PTSD admitted from medicine 03/18/2025 due to concern for SI with plan and intent to overdose on fentanyl on his bday 3/22 in the context of medication. This is in the context of medication non adherence, substance use and housing/financial insecurity. Was planning to start ECT, unsure of plan now as he refused it 3/31..    Vitals: BP (!) 142/93 (Patient Position: Sitting, Cuff Size: Adult Regular)   Pulse 105   Temp 97  F (36.1  C) (Temporal)   Resp 18   Wt 92.1 kg (203 lb 1.6 oz)   SpO2 97%   BMI 28.34 kg/m    Sleep: 5.5 hours (04/15/25 0600)  Scheduled medications: Took all scheduled medications as prescribed  Psychiatric PRN medications:   Last 24H PRN:     cloNIDine (CATAPRES) tablet 0.1 mg, 0.1 mg at 04/14/25 0918    cyclobenzaprine (FLEXERIL) tablet 10 mg, 10 mg at 04/15/25 0505    nicotine (NICORETTE) lozenge 4 mg, 4 mg at 04/15/25 0631      Staff Report:   Zenon presents with labile mood, but redirectable. endorsed passive suicidal thought with no plan and hopelessness; pt said that he is trying to stay positive.  Requested PRN for anxiety and pain, no other events overnight. Please see staff notes for details.      Subjective:     Patient Interview:  Zenon says he is having a \"much better\" day today. He had some financial concerns with his debit card and his benefits which were causing him a lot of distress over the past few days. He was able to take care of this yesterday with his  and that makes him feel much better. He says he needs to get his mail " eventually but this will be taken care of with his  after he arrives at the CD facility. Zenon is looking forward to leaving because he says that the other patients who are being loud and making noise is a trigger for past childhood trauma with his parents. Zenon understands and appreciates the staff working on his placement so he can discharge. He has been able to talk with other patients in the milieu about the CD facility he is planning to go to which has been helpful to him. He is happy about the CD facility that he will go to because he has been concerned about other individuals using drugs at previous treatment facilities.He would like to discuss increasing his buspirone because he feels that his anxiety is not entirely controlled. He stopped taking the mirtazapine and felt that he was able to sleep better after that. We let him know that we will discuss changing the mirtazapine dosage with the team.    Zenon has no AH/VH. He endorses suicidal thoughts but they are fleeting and he is able to disregard them, though they cause him distress.      ROS:  Patient reports no medical symptoms  Patient reports insomnia (falling asleep  or staying asleep )     Objective:     Vitals:  BP (!) 142/93 (Patient Position: Sitting, Cuff Size: Adult Regular)   Pulse 105   Temp 97  F (36.1  C) (Temporal)   Resp 18   Wt 92.1 kg (203 lb 1.6 oz)   SpO2 97%   BMI 28.34 kg/m      Allergies:  Allergies   Allergen Reactions    Iodinated Contrast Media Hives and Rash     Reports he is allergic to Iodine contrast     Reports he is allergic to Iodine contrast       Reports he is allergic to Iodine contrast    Reports he is allergic to Iodine contrast      Reports he is allergic to Iodine contrast   Reports he is allergic to Iodine contrast    Iodine Hives    Lisinopril Hives and Rash    Lithium Hives    Topiramate Hives    Escitalopram Rash     Rash around mouth    Iodine-131 Hives    Trazodone Anxiety     Trazodone  causes RLS for pt    Olanzapine Rash     Pt reported taking approximately one year ago. Developed rash around mouth.       Current Medications:  Scheduled:  Current Facility-Administered Medications   Medication Dose Route Frequency Provider Last Rate Last Admin    acetaminophen (TYLENOL) tablet 650 mg  650 mg Oral Q4H PRN Patti Bernard MD   650 mg at 04/13/25 0547    alum & mag hydroxide-simethicone (MAALOX) suspension 30 mL  30 mL Oral Q4H PRN Patti Bernard MD        buPROPion (WELLBUTRIN XL) 24 hr tablet 300 mg  300 mg Oral Daily Rupinder Bradford MD   300 mg at 04/14/25 0741    busPIRone (BUSPAR) tablet 10 mg  10 mg Oral BID Rupinder Bradford MD   10 mg at 04/14/25 1957    cloNIDine (CATAPRES) tablet 0.1 mg  0.1 mg Oral TID PRN Tracy Castillo MD   0.1 mg at 04/14/25 0918    cyclobenzaprine (FLEXERIL) tablet 10 mg  10 mg Oral TID PRN Patti Bernard MD   10 mg at 04/15/25 0505    dicyclomine (BENTYL) capsule 10 mg  10 mg Oral 4x Daily PRN Suad Coyle MD        folic acid (FOLVITE) tablet 1 mg  1 mg Oral Daily Patti Bernard MD   1 mg at 04/10/25 0736    gabapentin (NEURONTIN) tablet 1,200 mg  1,200 mg Oral TID Sunni oLrenz MD   1,200 mg at 04/14/25 1957    haloperidol (HALDOL) tablet 2 mg  2 mg Oral BID PRN Sunni Lorenz MD   2 mg at 04/13/25 1120    Or    haloperidol lactate (HALDOL) injection 2 mg  2 mg Intramuscular BID PRN Sunni Lorenz MD        loperamide (IMODIUM) capsule 2 mg  2 mg Oral 4x Daily PRN Suad Coyle MD   2 mg at 03/28/25 0858    melatonin tablet 3 mg  3 mg Oral At Bedtime PRN Patti Bernard MD   3 mg at 04/09/25 2005    methadone (DOLOPHINE-INTENSOL) 10 MG/ML (HIGH CONC) solution 135 mg  135 mg Oral Daily Dilshad Bailey MD   135 mg at 04/15/25 0512    mirtazapine (REMERON) tablet 45 mg  45 mg Oral At Bedtime Demetri Sarabia MD   45 mg at 04/11/25 2022    multivitamin  w/minerals (THERA-VIT-M) tablet 1 tablet  1 tablet Oral Daily Patti Bernard MD   1 tablet at 04/08/25 0729    nicotine (NICORETTE) lozenge 4 mg  4 mg Buccal Q1H PRN Patti Bernard MD   4 mg at 04/15/25 0631    polyethylene glycol (MIRALAX) Packet 17 g  17 g Oral Daily PRN Patti Bernard MD   17 g at 04/09/25 0753    thiamine (B-1) tablet 100 mg  100 mg Oral Daily Patti Bernard MD   100 mg at 04/08/25 0729       PRN:  Current Facility-Administered Medications   Medication Dose Route Frequency Provider Last Rate Last Admin    acetaminophen (TYLENOL) tablet 650 mg  650 mg Oral Q4H PRN Patti Bernard MD   650 mg at 04/13/25 0547    alum & mag hydroxide-simethicone (MAALOX) suspension 30 mL  30 mL Oral Q4H PRN Patti Bernard MD        buPROPion (WELLBUTRIN XL) 24 hr tablet 300 mg  300 mg Oral Daily Rupinder Bradford MD   300 mg at 04/14/25 0741    busPIRone (BUSPAR) tablet 10 mg  10 mg Oral BID Rupinder Bradford MD   10 mg at 04/14/25 1957    cloNIDine (CATAPRES) tablet 0.1 mg  0.1 mg Oral TID PRN Tracy Castillo MD   0.1 mg at 04/14/25 0918    cyclobenzaprine (FLEXERIL) tablet 10 mg  10 mg Oral TID PRN Patti Bernard MD   10 mg at 04/15/25 0505    dicyclomine (BENTYL) capsule 10 mg  10 mg Oral 4x Daily PRN Suad Coyle MD        folic acid (FOLVITE) tablet 1 mg  1 mg Oral Daily Patti Bernard MD   1 mg at 04/10/25 0736    gabapentin (NEURONTIN) tablet 1,200 mg  1,200 mg Oral TID Sunni Lorenz MD   1,200 mg at 04/14/25 1957    haloperidol (HALDOL) tablet 2 mg  2 mg Oral BID PRN Sunni Lorenz MD   2 mg at 04/13/25 1120    Or    haloperidol lactate (HALDOL) injection 2 mg  2 mg Intramuscular BID PRN Sunni Lorenz MD        loperamide (IMODIUM) capsule 2 mg  2 mg Oral 4x Daily PRN Suad Coyle MD   2 mg at 03/28/25 0858    melatonin tablet 3 mg  3 mg Oral At Bedtime PRN Marco Antonio,  "MD Patti   3 mg at 04/09/25 2005    methadone (DOLOPHINE-INTENSOL) 10 MG/ML (HIGH CONC) solution 135 mg  135 mg Oral Daily Dilshad Bailey MD   135 mg at 04/15/25 0512    mirtazapine (REMERON) tablet 45 mg  45 mg Oral At Bedtime Demetri Sarabia MD   45 mg at 04/11/25 2022    multivitamin w/minerals (THERA-VIT-M) tablet 1 tablet  1 tablet Oral Daily Patti Bernard MD   1 tablet at 04/08/25 0729    nicotine (NICORETTE) lozenge 4 mg  4 mg Buccal Q1H PRN Patti Bernard MD   4 mg at 04/15/25 0631    polyethylene glycol (MIRALAX) Packet 17 g  17 g Oral Daily PRN Patti Bernard MD   17 g at 04/09/25 0753    thiamine (B-1) tablet 100 mg  100 mg Oral Daily Patti Bernard MD   100 mg at 04/08/25 0729       Labs and Imaging:  New results:   No results found for this or any previous visit (from the past 24 hours).    Data this admission:  - CBC with chronic thrombocytopenia (platelet count 78)   - CMP with mildly elevated AST (47) and ALT (84)   - TSH normal  - UDS positive for amphetamines, cannabinoids, fentanyl  - Vit D in process  - Lipids not ordered this admission (but within normal limits on 2/26/2025)  - Vit B12 normal  - Folate normal  - EKG sinus bradycardia, QTc 396     Mental Status Exam:     Oriented to:  Grossly Oriented, Person/Self, and Situation  General:  Awake and Alert   Appearance:  Appears older than stated age Edentulous. Dressed in hospital scrubs.   Behavior/Attitude:  anxious answers, he was engaged and energetic  Eye Contact:  Limited eye contact. Sometimes looking down at hands  Psychomotor: slightly tremulous, increased psychomotor activity was moderate. no catatonia present  Speech:  normal volume/tone, good articulation  Language: Fluent in English with appropriate syntax and vocabulary.  Mood:  \"Much better\"  Affect:   congruent with mood, slightly anxious, labile.  Thought Process: less perseverative about catastrophic thoughts, positive outlook on treatment " plan.  Thought Content:   ruminative about discharge process,  Associations:  None  Insight: limited due to poor coping   Judgment:  poor due to past behaviors and maladaptive coping    Impulse control: poor due past interactions with staff and peers  Attention Span:  fair due to anxiety/depression  Concentration:  fair  Recent and Remote Memory:  not formally assessed but intact to conversation   Fund of Knowledge: average, estimated  Muscle Strength and Tone: normal  Gait and Station: Normal     Psychiatric Assessment     Zenon Truong is a 54 year old male with previous psychiatric diagnoses of polysubstance use disorder [benzodiazepines, opiates, methamphetamine, THC, nicotine], severe depression with suicidal ideation, borderline personality disorder and PTSD admitted 03/18/2025 due to concern for SI with plan and intent in the context of medication non adherence, substance use and housing/financial insecurity. On admission, he presented with symptoms of SI with intent and plan, hopelessness, poor motivation, low energy, anhedonia, impulsivity and irritability. There is genetic predisposition given family hx of substance use disorder. Patient has struggled with homelessness and severe substance use for years. This current admission is likely precipitated by recent psychiatric hospitalization at end of February followed by relapse soon after discharge to a sober house. Perpetuating factors include ongoing substance use, medication non adherence, longstanding functional impairment, personality traits, lack of perceived support, deconditioning and housing and financial insecurity. Patient's support system includes  . Based on patient's history and current symptoms, criteria are met for primary diagnosis of MDD without psychotic features, opioid use disorder, benzodiazepine use disorder, stimulant use disorder and cannabis use disorder. Patient warrants inpatient hospitalization to maintain their  safety and would benefit from medication optimization and outpatient referral/resources.       He does have baseline chronic SI and still continues to express a desire to not wake up. It appears like his current presentation is more likely borderline personality with mood swings, irritability, impulsive behaviors such as slamming doors and difficulty with interpersonal relationships with staff . There is likely a component of withdrawal/cravings at play which may be contributing to his mood and irritability but borderline personality traits are more apparent now that he is physically stabilizing.     There is a significant impact of prior mistrust in the medical system. He does demonstrate a desire to commit to treatment and is apologetic for his outbursts in hindsight. His physical withdrawal symptoms are significantly improved, but affect is still labile and irritable due to anxiety. He does endorse restlessness and racing thoughts.  He has  persistent  nightmares stemming from a traumatic event when his fiance  5-6 years ago. Currently targeting symptoms of opioid and stimulant use disorder with methadone and bupropion, respectively. Zenon was agreeable to pursue CDT evaluation and is goal oriented regarding maintaining sobriety.  Although he presents still labile, and tearful due to environmental stressors these past days (verbal altercation with another peer; delays in CD tt placement and uncertainty about methadone doses needed for discharge). He still is goal-directed and future oriented regarding receiving treatment even though he currently is having nihilistic thoughts, and catastrophic ruminations.     Currently we are working on acquired necessary paperwork from [facility]. Due to this process discharge would be pending and CTC will be in contact with CDT facility in [place] for availability of openings next week, as well as, setting appointments for following current medical concerns at  discharge.     Today's changes:  - Pending placement, no plans for discharge this week  - Discontinued Mirtazapine 45 mg QHS  - Increased Buspar to 15 mg BID     Psychiatric Plan by Diagnosis      #Major depressive disorder, recurrent, severe without psychosis  #Borderline personality disorder.  #Complex PTSD with anxious mood  - Declined ECT on 3/31  - Continue Mirtazapine 45 mg po at bedtime  - Bupropion to 300 mg daily  - Increased Buspar to 15 mg BID        #Opioid use disorder, severe  - methadone 135mg daily   - Addiction team following  - Thiamine, MVI, folic acid     For symptomatic management:  - 10 mg Flexeril TID PRN Q8H for muscle cramps, spasms, or tightness  - loperamide, bentyl     #Benzodiazepine use disorder, severe  Phenobarbital taper was discontinued on 3/26 per recommendation from addiction medicine team recommendation that his symptoms were likely secondary to opiate withdrawal vs benzo withdrawal.   - Addiction team following  - scheduled clonidine 0.1mg TID PRN        #Stimulant (methamphetamine) use disorder  - Bupropion 300 mg daily    #Cannabis use disorder       Additional Plans:  - Patient will be treated in therapeutic milieu with appropriate individual and group therapies as described  - Continue coordination with Addiction medicine regarding Methadone     Psychiatric Hospital Course:      Zenon Truong was admitted to [facility] as a voluntary patient. Had a recent hospitalization   Medications and other updates:  3/14: hold bupropion due to risk of lower seizure threshold  3/14: Continue PTA desvenlafaxine 25mg and mirtazapine 30mg for sleep  3/18: start clonidine 0.1mg TID   3/20: Increase mirtazapine to 45 mg daily for continued insomnia   3/21: restart PTA gabapentin 600mg TID for anxiety   3/21: continuing phenobarb taper (today is 32.4 mg BID)  3/23: no scheduled phenobarb received 32.4mg due to ongoing withdrawal symptoms  3/24: give one time phenobarbital dose of 32.4mg  due to significant discomfort that seem at least in part due to withdrawal  3/24: consult addiction medicine for assistance with methadone dose and ongoing withdrawal symptoms   3/24: discussed re-initiating wellbutrin. However, patient reporting symptoms that he feels were similar to auras he has had prior to seizures (this has been worse the last couple days), so hesitant to do this at this time.  3/25: one time phenobarbital dose of 32.4mg given significant withdrawal symptoms  3/25: increase desvenlafaxine to 50mg daily (per chart review, was prescribed this dose outpatient)  3/26: increase methadone to 115 mg daily per addiction medicine recs   3/26: discontinue phenobarb taper   3/26: medicine consult for ECT clearance; Defer to psychiatry for decision of proceeding with ECT with recent benzodiazepine withdrawal/use. Otherwise, no absolute medical contraindications to proceeding with ECT at this time. Treatment plan per psychiatry.   3/27: increase gabapentin to 900 TID per addiction med team recs   3.27: increase methadone to 125mg daily per addiction med recs given ongoing withdrawal symptoms   3/27: pending ECT team recs regarding methadone on ECT days  3/31: declined ECT on intended start day  4/1: increase methadone to 135mg daily per addiction med recs  4/1: changed scheduled clonidine 0.1mg TID to PRN per patient request  4/1: discontinue prn quetiapine and hydroxyzine per patient request  4/1: discontinue desvenlafaxine 50mg per patient request  4/1: restart PTA wellbutrin, was previously on 300mg, restart at 150mg XL  4/1: hypertensive with /140s but refused repeat vital signs  4/2: clonidine 0.1 prn given; BP 150s/110s on recheck after medication. Will offer another prn clonidine (he has been refusing clonidine)    4/3: /99 this morning  4/4: prazosin 1mg prn for nightmares  4/7 Increase Bupropion to 300 mg  4/8: CDT consult placed  4/9: Start Buspar 10 mg BID  4/14: Today MN choice  "assessment  4/15: Discontinued Mirtazapine 45 mg QHS  4/15: Increased Buspar to 15 mg BID     The risks, benefits, alternatives, and side effects were discussed and understood by the patient.     Medical Assessment and Plan     #Mass/lesion of RUQ abdominal wall  - Zenon was evaluated by Medicine. Per notes: small mass of RUQ abdominal wall, non painful, non mobile. Notes he has lost weight recently at noted mass \"a few days ago.\" No surrounding erythema, edema. Denies any other lesions on skin. Suspect lipoma or cyst, less likely abscess given no surrounding erythema.   -US skin/soft tissue of RUQ abdomen   - Pending re-eval with results.       #Palpitations  3/21 patient reporting short episodes (few seconds) of palpitations occurring intermittently over the last month. Does have shortness of breath with this, no chest pain or other symptoms. He is not sure if it is associated with anxiety.   - repeat EKG ordered showed sinus bradycardia with no significant change to prior EKG      #Chronic hepatitis C, never treated  #Mild transaminase elevations, hepatic steatosis:  Never treated for hepatitis C.  Chronic, mild transaminase elevations with normal hepatic function.  Most recent abdominal ultrasound 2/15/2024 showed hepatic steatosis without hepatic lesions, normal gallbladder.  Denies current alcohol use.  - monitor outpatient      #Chronic thrombocytopenia  #Normocytic anemia, now resolved  Chronic thrombocytopenia, moderate and stable.  Denies any history of bleeding including no history of gastric ulcers, hematemesis, melena or hematochezia.  Anemia appears to be more recent since 2/23/2025 when hemoglobin was 11.6, MCV 86.  Hgb 14.1, Plts 97, MCV 86, ferritin, iron, TIBC wnl on 3/15.      #Concern for malnutrition  Seen by dietician team, patient does not meet two of the established criteria necessary for diagnosing malnutrition but is at risk for malnutrition.   - ensure enlive ordered        Medical course: " Patient was physically examined by the ED prior to being transferred to the unit and was found to be medically stable and appropriate for admission.      Consults:  Medicine (for clearance for ECT)  Addiction med (for methadone dosage and withdrawal symptoms)      Checklist     Legal Status: Voluntary   /ACT Team: TIERNEY signed  Name/Clinic: [facility]    Number: [phone]     Safety Assessment:   Behavioral Orders   Procedures    Code 1 - Restrict to Unit    Code 2     For ultrasound    Fall precautions    Routine Programming     As clinically indicated    Seizure precautions    Status 15     Every 15 minutes.    Suicide precautions: Suicide Risk: LOW; Clinical rationale to override score: lack of access to a plan for self-harm     .     Order Specific Question:   Suicide Risk     Answer:   LOW     Order Specific Question:   Clinical rationale to override score:     Answer:   lack of access to a plan for self-harm       Risk Assessment:  Risk for harm is low  Risk factors: SI, maladaptive coping, substance use, impulsive, and past behaviors  Protective factors: engaged in treatment      SIO: no     Disposition: No plans for discharge as of this week (4/11/25)     Attestations     Eh Uribe, MS3  [employer]    Resident/Fellow Attestation   I, Rupinder Maurice, was present with the medical/TAWANA student who participated in the service and in the documentation of the note.  I have verified the history and personally performed the physical exam and medical decision making.  I agree with the assessment and plan of care as documented in the note.      This patient was seen and discussed with my attending physician.  Rupinder Maurice MD  [facility] Resident  04/15/2025    This patient has been seen and evaluated by me, Charles Fink.  I have discussed this patient with the psychiatry resident and I agree with the findings and plan in this  note.    I have reviewed today's vital signs, medications, labs and imaging.     Charles Menjivar MD

## 2025-04-16 ENCOUNTER — TELEPHONE (OUTPATIENT)
Dept: BEHAVIORAL HEALTH | Facility: CLINIC | Age: 55
End: 2025-04-16
Payer: COMMERCIAL

## 2025-04-16 VITALS
TEMPERATURE: 97.9 F | OXYGEN SATURATION: 98 % | SYSTOLIC BLOOD PRESSURE: 137 MMHG | RESPIRATION RATE: 18 BRPM | WEIGHT: 204.2 LBS | HEART RATE: 96 BPM | DIASTOLIC BLOOD PRESSURE: 92 MMHG | BODY MASS INDEX: 28.49 KG/M2

## 2025-04-16 PROCEDURE — 250N000013 HC RX MED GY IP 250 OP 250 PS 637

## 2025-04-16 PROCEDURE — 99239 HOSP IP/OBS DSCHRG MGMT >30: CPT | Mod: GC | Performed by: PSYCHIATRY & NEUROLOGY

## 2025-04-16 PROCEDURE — 250N000013 HC RX MED GY IP 250 OP 250 PS 637: Performed by: STUDENT IN AN ORGANIZED HEALTH CARE EDUCATION/TRAINING PROGRAM

## 2025-04-16 RX ORDER — BUSPIRONE HYDROCHLORIDE 15 MG/1
15 TABLET ORAL 2 TIMES DAILY
Qty: 60 TABLET | Refills: 0 | Status: CANCELLED | OUTPATIENT
Start: 2025-04-16 | End: 2025-05-16

## 2025-04-16 RX ORDER — METHADONE HYDROCHLORIDE 10 MG/ML
135 CONCENTRATE ORAL DAILY
Status: SHIPPED
Start: 2025-04-17 | End: 2025-05-17

## 2025-04-16 RX ORDER — BUSPIRONE HYDROCHLORIDE 15 MG/1
15 TABLET ORAL 2 TIMES DAILY
Qty: 60 TABLET | Refills: 0 | Status: SHIPPED | OUTPATIENT
Start: 2025-04-16

## 2025-04-16 RX ADMIN — NICOTINE POLACRILEX 4 MG: 4 LOZENGE ORAL at 10:10

## 2025-04-16 RX ADMIN — BUPROPION HYDROCHLORIDE 300 MG: 300 TABLET, EXTENDED RELEASE ORAL at 07:55

## 2025-04-16 RX ADMIN — CYCLOBENZAPRINE 10 MG: 10 TABLET, FILM COATED ORAL at 05:05

## 2025-04-16 RX ADMIN — NICOTINE POLACRILEX 4 MG: 4 LOZENGE ORAL at 09:08

## 2025-04-16 RX ADMIN — NICOTINE POLACRILEX 4 MG: 4 LOZENGE ORAL at 11:16

## 2025-04-16 RX ADMIN — NICOTINE POLACRILEX 4 MG: 4 LOZENGE ORAL at 12:22

## 2025-04-16 RX ADMIN — CYCLOBENZAPRINE 10 MG: 10 TABLET, FILM COATED ORAL at 11:16

## 2025-04-16 RX ADMIN — NICOTINE POLACRILEX 4 MG: 4 LOZENGE ORAL at 06:31

## 2025-04-16 RX ADMIN — ACETAMINOPHEN 650 MG: 325 TABLET, FILM COATED ORAL at 09:39

## 2025-04-16 RX ADMIN — NICOTINE POLACRILEX 4 MG: 4 LOZENGE ORAL at 13:57

## 2025-04-16 RX ADMIN — NICOTINE POLACRILEX 4 MG: 4 LOZENGE ORAL at 07:55

## 2025-04-16 RX ADMIN — GABAPENTIN 1200 MG: 600 TABLET, FILM COATED ORAL at 07:55

## 2025-04-16 RX ADMIN — BUSPIRONE HYDROCHLORIDE 15 MG: 15 TABLET ORAL at 07:55

## 2025-04-16 RX ADMIN — METHADONE HYDROCHLORIDE 135 MG: 10 CONCENTRATE ORAL at 05:05

## 2025-04-16 RX ADMIN — GABAPENTIN 1200 MG: 600 TABLET, FILM COATED ORAL at 13:56

## 2025-04-16 ASSESSMENT — ACTIVITIES OF DAILY LIVING (ADL)
ADLS_ACUITY_SCORE: 26
ADLS_ACUITY_SCORE: 26
ORAL_HYGIENE: INDEPENDENT
ADLS_ACUITY_SCORE: 26
DRESS: INDEPENDENT
ORAL_HYGIENE: INDEPENDENT
ADLS_ACUITY_SCORE: 26
LAUNDRY: WITH SUPERVISION
HYGIENE/GROOMING: INDEPENDENT
ADLS_ACUITY_SCORE: 26
DRESS: INDEPENDENT
HYGIENE/GROOMING: INDEPENDENT
ADLS_ACUITY_SCORE: 26
ADLS_ACUITY_SCORE: 26

## 2025-04-16 NOTE — TELEPHONE ENCOUNTER
R: 9:38 PM Bed Search Update [facility] Only    No appropriate beds available.  Pt remains on intake worklist awaiting appropriate placement.

## 2025-04-16 NOTE — PLAN OF CARE
Team Note Due:  Wednesday    Assessment/Intervention/Current Symtoms and Care Coordination:  Chart review and met with team, discussed pt progress, symptomology, and response to treatment.  Discussed the discharge plan and any potential impediments to discharge.    -[facility] and [facility] reported [facility] have a [facility] bed available. Faxed [facility] for referral. They spoke to Zenon over the phone and he reported they'd be calling back at 3pm today.   -Emailed [facility]  to let them know Zenon wants to discharge prior to transferring to their facility next week.   -Received a call from [facility] stating they have to decline him at this time. They reported he doesn't seem to need a mental health crisis bed and their facility is not in a good neighborhood if he plans to go to treatment next week. Let Zenon know of this and he said he'd rather go to a shelter tonight than wait for a crisis bed. Let team know and he discharged to [facility].      Discharge Plan or Goal:  TBD     Barriers to Discharge:  Medication Management  ECT     Referral Status:  None currently      Legal Status:  Voluntary       Contacts (include TIERNEY status):  Methadone Management: [facility]     /ACT Team: TIERNEY signed  Name/Clinic: [facility]    Number: [phone]      Upcoming Meetings and Dates/Important Information and next steps:   visit- Tuesday, time TBD

## 2025-04-16 NOTE — TELEPHONE ENCOUNTER
R:  PPS Paged Dr Fink @ 9:16am & 10:18am to ensure pt still needing transfer OFF [facility] (as this was called to intake by [facility] over the weekend).    Dr Fink called intake back @ 10:20am and reports pt no longer needs to be on the Adult worklist to transfer off unit [facility].       Pt has been removed from the worklist!

## 2025-04-16 NOTE — PLAN OF CARE
Problem: Depressive Signs/Symptoms  Goal: Improved Mood Symptoms (Depressive Signs/Symptoms)  Outcome: Not Progressing  Flowsheets (Taken 4/15/2025 1955)  Mutually Determined Action Steps (Improved Mood Symptoms): other (see comments)  Intervention: Promote Mood Improvement  Recent Flowsheet Documentation  Taken 4/15/2025 1950 by Hannah Ballesteros RN  Supportive Measures: active listening utilized  Taken 4/15/2025 1743 by Hannah Ballesteros RN  Supportive Measures:   active listening utilized   self-care encouraged  Trust Relationship/Rapport:   choices provided   emotional support provided   Goal Outcome Evaluation:    Plan of Care Reviewed With: patient          At 1730, during OT group, pt did not attend, but became agitated when he demanded that the food cart be brought onto the unit and dinner be served. When redirected about the unit policy regarding meal times during OT, pt became tense, irritable, and agitated, using profanity and requesting to sign a 12-hour intent to leave. Nurse informed the Resident on-call about pt's intention to leave. However, shortly after, pt approached nurse and stated that he had changed his mind about leaving this evening, and instead wants to leave the following morning. Despite this change, pt remained tense, restless, frequently entering and exiting his room. His affect is tense and labile. He did not engage socially with others.    Pt collected his meal tray and ate his dinner completely. He also snacked well and maintained good hydration. He requested for and received prn Flexeril 10mg at 1935 for c/o muscle spasms, which he reported was helpful. He took his scheduled medication without any issues. Declined vital signs checks.

## 2025-04-16 NOTE — PROGRESS NOTES
----------------------------------------------------------------------------------------------------------  [facility]  Psychiatry Progress Note  Hospital Day #29     Interim History:     The patient's care was discussed with the treatment team and chart notes were reviewed.    Identifier: 53 yo male with PSUD  [benzos, opiates, meth, THC, nicotine], severe depression with SI, BPD and PTSD admitted from medicine 03/18/2025 due to concern for SI with plan and intent to overdose on fentanyl on his bday 3/22 in the context of medication. This is in the context of medication non adherence, substance use and housing/financial insecurity. Was planning to start ECT, unsure of plan now as he refused it 3/31..    Vitals: BP (!) 137/92 (BP Location: Right arm)   Pulse 96   Temp 97.1  F (36.2  C) (Temporal)   Resp 18   Wt 92.6 kg (204 lb 3.2 oz)   SpO2 97%   BMI 28.49 kg/m    Sleep: 5.75 hours (04/16/25 0600)  Scheduled medications: Took all scheduled medications as prescribed  Psychiatric PRN medications:   Last 24H PRN:     cyclobenzaprine (FLEXERIL) tablet 10 mg, 10 mg at 04/16/25 0505    nicotine (NICORETTE) lozenge 4 mg, 4 mg at 04/16/25 0755      Staff Report:   Zenon presents with labile mood, but redirectable. endorsed passive suicidal thought with no plan and hopelessness; pt said that he is trying to stay positive.  Requested PRN for anxiety and pain, no other events overnight. Please see staff notes for details.      Subjective:     Patient Interview:  Zenon says he started to regret, he feels frustrated. He does not want to be here more. He would want to go outside and breath fresh air. He mentions that it has been really hard. He mentions that he feel he would benefit from getting into a crisis bed before his CD treatment. CTC is working on it, he is understanding.     His sleep got a little better. He feels the Buspar helped also with the new increased. He  feels that other peers in the unit are triggering for him. Besides that he has been eating well, and currently his goal is to trying to remain calm.     Currently, his thoughts are still anxiety inducing but the headphones helped. Still intrusive thoughts about suicide but they are quieter so no active SI, he is trying to counteract with positive thoughts. He denies A/V/H.     ROS:  Patient reports no medical symptoms  Patient reports insomnia (falling asleep  or staying asleep )     Objective:     Vitals:  BP (!) 137/92 (BP Location: Right arm)   Pulse 96   Temp 97.1  F (36.2  C) (Temporal)   Resp 18   Wt 92.6 kg (204 lb 3.2 oz)   SpO2 97%   BMI 28.49 kg/m      Allergies:  Allergies   Allergen Reactions    Iodinated Contrast Media Hives and Rash     Reports he is allergic to Iodine contrast     Reports he is allergic to Iodine contrast       Reports he is allergic to Iodine contrast    Reports he is allergic to Iodine contrast      Reports he is allergic to Iodine contrast   Reports he is allergic to Iodine contrast    Iodine Hives    Lisinopril Hives and Rash    Lithium Hives    Topiramate Hives    Escitalopram Rash     Rash around mouth    Iodine-131 Hives    Trazodone Anxiety     Trazodone causes RLS for pt    Olanzapine Rash     Pt reported taking approximately one year ago. Developed rash around mouth.       Current Medications:  Scheduled:  Current Facility-Administered Medications   Medication Dose Route Frequency Provider Last Rate Last Admin    acetaminophen (TYLENOL) tablet 650 mg  650 mg Oral Q4H PRN Patti Bernard MD   650 mg at 04/13/25 0547    alum & mag hydroxide-simethicone (MAALOX) suspension 30 mL  30 mL Oral Q4H PRN Patti Bernard MD        buPROPion (WELLBUTRIN XL) 24 hr tablet 300 mg  300 mg Oral Daily Rupinder Bradford MD   300 mg at 04/16/25 0755    busPIRone (BUSPAR) tablet 15 mg  15 mg Oral BID Rupinder Bradford MD   15 mg at 04/16/25  0755    cloNIDine (CATAPRES) tablet 0.1 mg  0.1 mg Oral TID PRN Tracy Castillo MD   0.1 mg at 04/14/25 0918    cyclobenzaprine (FLEXERIL) tablet 10 mg  10 mg Oral TID PRN Patti Bernard MD   10 mg at 04/16/25 0505    dicyclomine (BENTYL) capsule 10 mg  10 mg Oral 4x Daily PRN Suad Coyle MD        folic acid (FOLVITE) tablet 1 mg  1 mg Oral Daily Patti Bernard MD   1 mg at 04/10/25 0736    gabapentin (NEURONTIN) tablet 1,200 mg  1,200 mg Oral TID Sunni Lorenz MD   1,200 mg at 04/16/25 0755    haloperidol (HALDOL) tablet 2 mg  2 mg Oral BID PRN Sunni Lorenz MD   2 mg at 04/13/25 1120    Or    haloperidol lactate (HALDOL) injection 2 mg  2 mg Intramuscular BID PRN Sunni Lorenz MD        loperamide (IMODIUM) capsule 2 mg  2 mg Oral 4x Daily PRN uSad Coyle MD   2 mg at 03/28/25 0858    melatonin tablet 3 mg  3 mg Oral At Bedtime PRN Patti Bernard MD   3 mg at 04/09/25 2005    methadone (DOLOPHINE-INTENSOL) 10 MG/ML (HIGH CONC) solution 135 mg  135 mg Oral Daily Dilshad Bailey MD   135 mg at 04/16/25 0505    multivitamin w/minerals (THERA-VIT-M) tablet 1 tablet  1 tablet Oral Daily Patti Bernard MD   1 tablet at 04/08/25 0729    nicotine (NICORETTE) lozenge 4 mg  4 mg Buccal Q1H PRN Patti Bernard MD   4 mg at 04/16/25 0755    polyethylene glycol (MIRALAX) Packet 17 g  17 g Oral Daily PRN Patti Bernard MD   17 g at 04/09/25 0753    thiamine (B-1) tablet 100 mg  100 mg Oral Daily Patti Bernard MD   100 mg at 04/08/25 0729       PRN:  Current Facility-Administered Medications   Medication Dose Route Frequency Provider Last Rate Last Admin    acetaminophen (TYLENOL) tablet 650 mg  650 mg Oral Q4H PRN Patti Bernard MD   650 mg at 04/13/25 0547    alum & mag hydroxide-simethicone (MAALOX) suspension 30 mL  30 mL Oral Q4H PRN Patti Bernard MD        buPROPion (WELLBUTRIN XL) 24 hr tablet 300 mg  300 mg Oral  Daily Rupinder Bradford MD   300 mg at 04/16/25 0755    busPIRone (BUSPAR) tablet 15 mg  15 mg Oral BID Rupinder Bradford MD   15 mg at 04/16/25 0755    cloNIDine (CATAPRES) tablet 0.1 mg  0.1 mg Oral TID PRN Tracy Castillo MD   0.1 mg at 04/14/25 0918    cyclobenzaprine (FLEXERIL) tablet 10 mg  10 mg Oral TID PRN Patti Bernard MD   10 mg at 04/16/25 0505    dicyclomine (BENTYL) capsule 10 mg  10 mg Oral 4x Daily PRN Suad Coyle MD        folic acid (FOLVITE) tablet 1 mg  1 mg Oral Daily Patti Benrard MD   1 mg at 04/10/25 0736    gabapentin (NEURONTIN) tablet 1,200 mg  1,200 mg Oral TID Sunni Lorenz MD   1,200 mg at 04/16/25 0755    haloperidol (HALDOL) tablet 2 mg  2 mg Oral BID PRN Sunni Lorenz MD   2 mg at 04/13/25 1120    Or    haloperidol lactate (HALDOL) injection 2 mg  2 mg Intramuscular BID PRN Sunni Lorenz MD        loperamide (IMODIUM) capsule 2 mg  2 mg Oral 4x Daily PRN Suad Coyle MD   2 mg at 03/28/25 0858    melatonin tablet 3 mg  3 mg Oral At Bedtime PRN Patti Bernard MD   3 mg at 04/09/25 2005    methadone (DOLOPHINE-INTENSOL) 10 MG/ML (HIGH CONC) solution 135 mg  135 mg Oral Daily Dislhad Bailey MD   135 mg at 04/16/25 0505    multivitamin w/minerals (THERA-VIT-M) tablet 1 tablet  1 tablet Oral Daily Patti Bernard MD   1 tablet at 04/08/25 0729    nicotine (NICORETTE) lozenge 4 mg  4 mg Buccal Q1H PRN Patti Bernard MD   4 mg at 04/16/25 0755    polyethylene glycol (MIRALAX) Packet 17 g  17 g Oral Daily PRN Patti Bernard MD   17 g at 04/09/25 0753    thiamine (B-1) tablet 100 mg  100 mg Oral Daily Patti Bernard MD   100 mg at 04/08/25 0729       Labs and Imaging:  New results:   No results found for this or any previous visit (from the past 24 hours).    Data this admission:  - CBC with chronic thrombocytopenia (platelet count 78)   - CMP with mildly elevated  "AST (47) and ALT (84)   - TSH normal  - UDS positive for amphetamines, cannabinoids, fentanyl  - Vit D in process  - Lipids not ordered this admission (but within normal limits on 2/26/2025)  - Vit B12 normal  - Folate normal  - EKG sinus bradycardia, QTc 396     Mental Status Exam:     Oriented to:  Grossly Oriented, Person/Self, and Situation  General:  Awake and Alert   Appearance:  Appears older than stated age Edentulous. Dressed in hospital scrubs.   Behavior/Attitude:  anxious answers, he was engaged   Eye Contact: partial eye contact. Sometimes looking down at hands  Psychomotor: slightly tremulous, increased psychomotor activity, no catatonia present  Speech:  normal volume/tone, good articulation  Language: Fluent in English with appropriate syntax and vocabulary.  Mood:  \"Frustrated\"  Affect:   congruent with mood, slightly anxious, labile.  Thought Process: less perseverative about catastrophic thoughts, positive outlook on treatment plan.  Thought Content:   ruminative about discharge process, No SI/HI  Associations:  None  Insight: limited due to poor coping   Judgment:  limited due to past behaviors and maladaptive coping    Impulse control: poor due past interactions with staff and peers  Attention Span:  fair   Concentration:  fair  Recent and Remote Memory:  not formally assessed but intact to conversation   Fund of Knowledge: average, estimated  Muscle Strength and Tone: normal  Gait and Station: Normal     Psychiatric Assessment     Zenon Truong is a 54 year old male with previous psychiatric diagnoses of polysubstance use disorder [benzodiazepines, opiates, methamphetamine, THC, nicotine], severe depression with suicidal ideation, borderline personality disorder and PTSD admitted 03/18/2025 due to concern for SI with plan and intent in the context of medication non adherence, substance use and housing/financial insecurity. On admission, he presented with symptoms of SI with intent and plan, " hopelessness, poor motivation, low energy, anhedonia, impulsivity and irritability. There is genetic predisposition given family hx of substance use disorder. Patient has struggled with homelessness and severe substance use for years. This current admission is likely precipitated by recent psychiatric hospitalization at end of February followed by relapse soon after discharge to a sober house. Perpetuating factors include ongoing substance use, medication non adherence, longstanding functional impairment, personality traits, lack of perceived support, deconditioning and housing and financial insecurity. Patient's support system includes  . Based on patient's history and current symptoms, criteria are met for primary diagnosis of MDD without psychotic features, opioid use disorder, benzodiazepine use disorder, stimulant use disorder and cannabis use disorder. Patient warrants inpatient hospitalization to maintain their safety and would benefit from medication optimization and outpatient referral/resources.       He does have baseline chronic SI and still continues to express a desire to not wake up. It appears like his current presentation is more likely borderline personality with mood swings, irritability, impulsive behaviors such as slamming doors and difficulty with interpersonal relationships with staff . There is likely a component of withdrawal/cravings at play which may be contributing to his mood and irritability but borderline personality traits are more apparent now that he is physically stabilizing.     Although he presents with labile affect, nihilistic thoughts, and catastrophic ruminations due to environmental stressors these past days (verbal altercation with another peer; delays in CD tt placement and uncertainty about methadone doses needed for discharge), he is goal-directed and future oriented regarding receiving treatment.      We are working towards discharge to [facility]  week, current labile mood is affecting Zenon's insight due to uncertainty and prolong hospitalization, so it was decided to placed referrals for  crisis bed. Until he can go to CDT on 4/23.     Today's changes:  - Crisis bed facility pending  - Possible discharge today or tomorrow     Psychiatric Plan by Diagnosis      #Major depressive disorder, recurrent, severe without psychosis  #Borderline personality disorder.  #Complex PTSD with anxious mood  - Declined ECT on 3/31  - Continue Mirtazapine 45 mg po at bedtime  - Bupropion to 300 mg daily  - Increased Buspar to 15 mg BID        #Opioid use disorder, severe  - methadone 135mg daily   - Addiction team following  - Thiamine, MVI, folic acid     For symptomatic management:  - 10 mg Flexeril TID PRN Q8H for muscle cramps, spasms, or tightness  - loperamide, bentyl     #Benzodiazepine use disorder, severe  Phenobarbital taper was discontinued on 3/26 per recommendation from addiction medicine team recommendation that his symptoms were likely secondary to opiate withdrawal vs benzo withdrawal.   - Addiction team following  - scheduled clonidine 0.1mg TID PRN        #Stimulant (methamphetamine) use disorder  - Bupropion 300 mg daily    #Cannabis use disorder       Additional Plans:  - Patient will be treated in therapeutic milieu with appropriate individual and group therapies as described  - Continue coordination with Addiction medicine regarding Methadone     Psychiatric Hospital Course:      Zenon Truogn was admitted to [facility] as a voluntary patient. Had a recent hospitalization   Medications and other updates:  3/14: hold bupropion due to risk of lower seizure threshold  3/14: Continue PTA desvenlafaxine 25mg and mirtazapine 30mg for sleep  3/18: start clonidine 0.1mg TID   3/20: Increase mirtazapine to 45 mg daily for continued insomnia   3/21: restart PTA gabapentin 600mg TID for anxiety   3/21: continuing phenobarb taper (today is 32.4 mg BID)  3/23: no  scheduled phenobarb received 32.4mg due to ongoing withdrawal symptoms  3/24: give one time phenobarbital dose of 32.4mg due to significant discomfort that seem at least in part due to withdrawal  3/24: consult addiction medicine for assistance with methadone dose and ongoing withdrawal symptoms   3/24: discussed re-initiating wellbutrin. However, patient reporting symptoms that he feels were similar to auras he has had prior to seizures (this has been worse the last couple days), so hesitant to do this at this time.  3/25: one time phenobarbital dose of 32.4mg given significant withdrawal symptoms  3/25: increase desvenlafaxine to 50mg daily (per chart review, was prescribed this dose outpatient)  3/26: increase methadone to 115 mg daily per addiction medicine recs   3/26: discontinue phenobarb taper   3/26: medicine consult for ECT clearance; Defer to psychiatry for decision of proceeding with ECT with recent benzodiazepine withdrawal/use. Otherwise, no absolute medical contraindications to proceeding with ECT at this time. Treatment plan per psychiatry.   3/27: increase gabapentin to 900 TID per addiction med team recs   3.27: increase methadone to 125mg daily per addiction med recs given ongoing withdrawal symptoms   3/27: pending ECT team recs regarding methadone on ECT days  3/31: declined ECT on intended start day  4/1: increase methadone to 135mg daily per addiction med recs  4/1: changed scheduled clonidine 0.1mg TID to PRN per patient request  4/1: discontinue prn quetiapine and hydroxyzine per patient request  4/1: discontinue desvenlafaxine 50mg per patient request  4/1: restart PTA wellbutrin, was previously on 300mg, restart at 150mg XL  4/1: hypertensive with /140s but refused repeat vital signs  4/2: clonidine 0.1 prn given; BP 150s/110s on recheck after medication. Will offer another prn clonidine (he has been refusing clonidine)    4/3: /99 this morning  4/4: prazosin 1mg prn for  "nightmares  4/7 Increase Bupropion to 300 mg  4/8: CDT consult placed  4/9: Start Buspar 10 mg BID  4/14: Today MN choice assessment  4/15: Discontinued Mirtazapine 45 mg QHS  4/15: Increased Buspar to 15 mg BID       The risks, benefits, alternatives, and side effects were discussed and understood by the patient.     Medical Assessment and Plan     #Mass/lesion of RUQ abdominal wall  - Zenon was evaluated by Medicine. Per notes: small mass of RUQ abdominal wall, non painful, non mobile. Notes he has lost weight recently at noted mass \"a few days ago.\" No surrounding erythema, edema. Denies any other lesions on skin. Suspect lipoma or cyst, less likely abscess given no surrounding erythema.   -US skin/soft tissue of RUQ abdomen   - Pending re-eval with results.       #Palpitations  3/21 patient reporting short episodes (few seconds) of palpitations occurring intermittently over the last month. Does have shortness of breath with this, no chest pain or other symptoms. He is not sure if it is associated with anxiety.   - repeat EKG ordered showed sinus bradycardia with no significant change to prior EKG      #Chronic hepatitis C, never treated  #Mild transaminase elevations, hepatic steatosis:  Never treated for hepatitis C.  Chronic, mild transaminase elevations with normal hepatic function.  Most recent abdominal ultrasound 2/15/2024 showed hepatic steatosis without hepatic lesions, normal gallbladder.  Denies current alcohol use.  - monitor outpatient      #Chronic thrombocytopenia  #Normocytic anemia, now resolved  Chronic thrombocytopenia, moderate and stable.  Denies any history of bleeding including no history of gastric ulcers, hematemesis, melena or hematochezia.  Anemia appears to be more recent since 2/23/2025 when hemoglobin was 11.6, MCV 86.  Hgb 14.1, Plts 97, MCV 86, ferritin, iron, TIBC wnl on 3/15.      #Concern for malnutrition  Seen by dietician team, patient does not meet two of the established " criteria necessary for diagnosing malnutrition but is at risk for malnutrition.   - ensure enlive ordered        Medical course: Patient was physically examined by the ED prior to being transferred to the unit and was found to be medically stable and appropriate for admission.      Consults:  Medicine (for clearance for ECT)  Addiction med (for methadone dosage and withdrawal symptoms)      Checklist     Legal Status: Voluntary   /ACT Team: TIERNEY signed  Name/Clinic: Stephanie Bailey/ [facility]    Number: [phone]     Safety Assessment:   Behavioral Orders   Procedures    Code 1 - Restrict to Unit    Code 2     For ultrasound    Fall precautions    Routine Programming     As clinically indicated    Seizure precautions    Status 15     Every 15 minutes.    Suicide precautions: Suicide Risk: LOW; Clinical rationale to override score: lack of access to a plan for self-harm     .     Order Specific Question:   Suicide Risk     Answer:   LOW     Order Specific Question:   Clinical rationale to override score:     Answer:   lack of access to a plan for self-harm       Risk Assessment:  Risk for harm is low  Risk factors: SI, maladaptive coping, substance use, impulsive, and past behaviors  Protective factors: engaged in treatment      SIO: no     Disposition: No plans for discharge as of this week (4/11/25)     Attestations     Eh Uribe, MS3  [employer]    Resident/Fellow Attestation   I, Rupinder Maurice, was present with the medical/TAWANA student who participated in the service and in the documentation of the note.  I have verified the history and personally performed the physical exam and medical decision making.  I agree with the assessment and plan of care as documented in the note.      This patient was seen and discussed with my attending physician.  Rupinder Maurice MD  [facility] Psychiatry Resident  04/16/2025        This patient has been seen and evaluated by  me, Charles Fink.  I have discussed this patient with the psychiatry resident and I agree with the findings and plan in this note.    I have reviewed today's vital signs, medications, labs and imaging.     Charles Menjivar MD

## 2025-04-16 NOTE — PLAN OF CARE
"  Problem: Depressive Signs/Symptoms  Goal: Optimized Energy Level (Depressive Signs/Symptoms)  Outcome: Progressing   Goal Outcome Evaluation:       Pt has been in and out of his room all shift like he typically is. This morning during a.m med pass writer asked pt what happened last citlali? He said something about \"not behaving well.\" Then pt said, \"I am ready to leave, I've been here for a month and all I am doing is getting crabbier; I could go to a crisis bed to wait for my CD Tx on the 23rd. Writer told all this to the tx team. Irais, our CTC is calling or beds. Pt endorsed a headache which he received PRN Tylenol for. Pt said it was helpful. Requested and received PRN flexeril for back spasm.                  "

## 2025-04-16 NOTE — PLAN OF CARE
Problem: Sleep Disturbance  Goal: Adequate Sleep/Rest  Outcome: Progressing  Goal Outcome Evaluation:  Patient observed in assigned room most of the shift and appeared to be comfortably asleep.  Awake early this morning, requested crystal light. PRN medications administered - Flexeril, Nicotine lozenge.  Daily dose of Methadone given. Patient reports 3/10 constant throbbing pain on the medial side of the left big toe. He states that it is joint pain. Worse when he wears shoes and with walking. On examination small redddened area on left big toe. Safety checks completed at least every 15 minutes.   Sleep hours - 5.75.  Nursing will continue to monitor.

## 2025-04-16 NOTE — PLAN OF CARE
Pt discharged at 1555. Pt's belongings were returned. Writer reviewed AVS with pt and pt verbalized understanding. Pt left with ordered medication. Pt agrees to contract for safety out in the community. PA escorted pt out of the hospital.

## 2025-04-16 NOTE — PLAN OF CARE
BEH IP Unit Acuity Rating Score (UARS)  Patient is given one point for every criteria they meet.    CRITERIA SCORING   On a 72 hour hold, court hold, committed, stay of commitment, or revocation. 0    Patient LOS on BEH unit exceeds 20 days. 1  LOS: 29   Patient under guardianship, 55+, otherwise medically complex, or under age 11. 0   Suicide ideation without relief of precipitating factors. 0   Current plan for suicide. 0   Current plan for homicide. 0   Imminent risk or actual attempt to seriously harm another without relief of factors precipitating the attempt. 0   Severe dysfunction in daily living (ex: complete neglect for self care, extreme disruption in vegetative function, extreme deterioration in social interactions). 1   Recent (last 7 days) or current physical aggression in the ED or on unit. 0   Restraints or seclusion episode in past 72 hours. 0   Recent (last 7 days) or current verbal aggression, agitation, yelling, etc., while in the ED or unit. 1   Active psychosis. 0   Need for constant or near constant redirection (from leaving, from others, etc).  0   Intrusive or disruptive behaviors. 1   Patient requires 3 or more hours of individualized nursing care per 8-hour shift (i.e. for ADLs, meds, therapeutic interventions). 0   TOTAL 4

## 2025-04-16 NOTE — PLAN OF CARE
04/16/25 1408   Individualization/Patient Specific Goals   Patient Personal Strengths insight into illness/situation;motivated for recovery   Patient Vulnerabilities substance abuse/addiction;housing insecurity   Interprofessional Rounds   Participants CTC;nursing;psychiatrist   Behavioral Team Discussion   Progress Improved   Anticipated length of stay 5-7 days   Continued Stay Criteria/Rationale Safe disposition   Medical/Physical See H&P   Precautions See below   Plan Psychiatric assessment/Medication management. Therapeutic Milieu. Individual care planning and after care planning. Patient to participate in unit groups and activities. Individual and group support on unit.   Rationale for change in precautions or plan N/A   Safety Plan Per unit protocol   Anticipated Discharge Disposition substance use treatment         PRECAUTIONS AND SAFETY    Behavioral Orders   Procedures    Code 1 - Restrict to Unit    Code 2     For ultrasound    Fall precautions    Routine Programming     As clinically indicated    Seizure precautions    Status 15     Every 15 minutes.    Suicide precautions: Suicide Risk: LOW; Clinical rationale to override score: lack of access to a plan for self-harm     .     Order Specific Question:   Suicide Risk     Answer:   LOW     Order Specific Question:   Clinical rationale to override score:     Answer:   lack of access to a plan for self-harm       Safety  Safety WDL: WDL  Patient Location: hallway, patient room, own  Observed Behavior: walking  Observed Behavior (Comment): sleeping  Safety Measures: safety rounds completed  Diversional Activity: music  De-Escalation Techniques: appropriate behavior reinforced, increased round frequency, medication administered, medication offered, quiet time facilitated, stimulation decreased, verbally redirected  Suicidality: Status 15  Withdrawal: monitor withdrawal process  Seizure precautions: clutter free environment  Assault: status 15, private  room  Elopement Interventions: status 15, status continuous sight, no shoes, room away from unit doors  Sexual:  (fall precaution in place)  Additional Documentation:  (fall precaution in place)

## 2025-04-16 NOTE — PLAN OF CARE
Individual Therapy Note    Session duration in minutes: 6    Pt progress: Checked in with Pt in the lounge. Reports he is doing better but expressed concern that he might feels worse d/t frustration, regret over not leaving the hospital sooner. Acknowledge that having a discharge date makes him feel a little better. Writer validated his feelings, offered words of encouragement and ACT skills. Pt was fairly argumentative but did not escalate as he had in past interactions with writer. Pt reports he may or may not be able to control his frustration/aggression.     Therapeutic Intervention(s):   Provided active listening, unconditional positive regard, and validation.   Introduced and discussed radical acceptance      No Charge (0-15 min)

## 2025-04-16 NOTE — TELEPHONE ENCOUNTER
R: [place]  Access Inpatient Bed Call Log 4/16/25 at 12:00 AM:  Intake has called facilities that have not updated the bed status within the last 12 hours.         (Adults):                         [facility] is at capacity.                Pt remains on the work list pending appropriate bed availability.